# Patient Record
Sex: MALE | Race: WHITE | NOT HISPANIC OR LATINO | Employment: OTHER | URBAN - METROPOLITAN AREA
[De-identification: names, ages, dates, MRNs, and addresses within clinical notes are randomized per-mention and may not be internally consistent; named-entity substitution may affect disease eponyms.]

---

## 2017-03-23 ENCOUNTER — TELEPHONE (OUTPATIENT)
Dept: FAMILY MEDICINE | Facility: CLINIC | Age: 67
End: 2017-03-23

## 2017-03-23 DIAGNOSIS — Z12.11 COLON CANCER SCREENING: Primary | ICD-10-CM

## 2017-03-23 DIAGNOSIS — E78.5 HYPERLIPIDEMIA LDL GOAL <130: ICD-10-CM

## 2017-03-23 NOTE — TELEPHONE ENCOUNTER
pravastatin (PRAVACHOL) 20 MG tablet     Last Written Prescription Date: 01/01/2017  Last Fill Quantity: 36, # refills: 0  Last Office Visit with G, [facility] or [facility] prescribing provider: 10/07/2016       Lab Results   Component Value Date    CHOL 202 10/07/2016     Lab Results   Component Value Date    HDL 56 10/07/2016     Lab Results   Component Value Date     10/07/2016     Lab Results   Component Value Date    TRIG 192 10/07/2016     Lab Results   Component Value Date    CHOLHDLRATIO 3.8 09/28/2015     Lita Girard MA

## 2017-03-24 RX ORDER — PRAVASTATIN SODIUM 20 MG
TABLET ORAL
Qty: 36 TABLET | Refills: 1 | Status: SHIPPED | OUTPATIENT
Start: 2017-03-24 | End: 2017-09-05

## 2017-03-24 NOTE — TELEPHONE ENCOUNTER
Routing refill request to provider for review/approval because:  Marquita given x1 and patient did not follow up, please advise    Christa Weinstein RN - BC

## 2017-03-24 NOTE — TELEPHONE ENCOUNTER
I will refill this medication but there are some concerns     1. His last colonoscopy was in 2007 and he will be absolutely due for the repeat colonoscopy in August but I don't want to wait that long, lets get the colonoscopy right away.  2. Refills provided for pravastatin   3. deadline for follow up with me is by October 2017 which would be a year         Bro Herrera MD

## 2017-03-27 NOTE — TELEPHONE ENCOUNTER
Left message on voicemail for patient to return call to RN hotline at # [phone].  Angelia Yao RN

## 2017-03-27 NOTE — TELEPHONE ENCOUNTER
Patient notified of providers message as written.  Patient states he will not do another colonoscopy, he did not like the prep and is not going to do another.  Advised patient that provider may want to order a fit test.   Routing as GRETEL Valdivia RN

## 2017-09-05 DIAGNOSIS — E78.5 HYPERLIPIDEMIA LDL GOAL <130: ICD-10-CM

## 2017-09-06 RX ORDER — PRAVASTATIN SODIUM 20 MG
TABLET ORAL
Qty: 36 TABLET | Refills: 0 | Status: SHIPPED | OUTPATIENT
Start: 2017-09-06 | End: 2017-10-13

## 2017-09-06 NOTE — TELEPHONE ENCOUNTER
Pravastatin      Last Written Prescription Date: 3/24/2017  Last Fill Quantity: 36, # refills: 1  Last Office Visit with G, P or [facility] prescribing provider: 10/7/2016       Lab Results   Component Value Date    CHOL 202 10/07/2016     Lab Results   Component Value Date    HDL 56 10/07/2016     Lab Results   Component Value Date     10/07/2016     Lab Results   Component Value Date    TRIG 192 10/07/2016     Lab Results   Component Value Date    CHOLHDLRATIO 3.8 09/28/2015

## 2017-09-06 NOTE — TELEPHONE ENCOUNTER
Medication is being filled for 1 time refill only due to:  Patient needs to be seen because pt is due to be seen next month .     Signed Prescriptions:                        Disp   Refills    pravastatin (PRAVACHOL) 20 MG tablet       36 tab*0        Sig: TAKE 1 TABLET BY MOUTH THREE TIMES A WEEK  Authorizing Provider: LACHELLE KOHLER  Ordering User: JUANITO SANTOS, RN, BSN

## 2017-10-13 ENCOUNTER — OFFICE VISIT (OUTPATIENT)
Dept: INTERNAL MEDICINE | Facility: CLINIC | Age: 67
End: 2017-10-13
Payer: COMMERCIAL

## 2017-10-13 VITALS
HEIGHT: 72 IN | TEMPERATURE: 98.1 F | WEIGHT: 169 LBS | SYSTOLIC BLOOD PRESSURE: 160 MMHG | DIASTOLIC BLOOD PRESSURE: 80 MMHG | BODY MASS INDEX: 22.89 KG/M2 | HEART RATE: 61 BPM | OXYGEN SATURATION: 99 %

## 2017-10-13 DIAGNOSIS — Z91.81 AT RISK FOR FALLING: ICD-10-CM

## 2017-10-13 DIAGNOSIS — Z00.00 ROUTINE GENERAL MEDICAL EXAMINATION AT A HEALTH CARE FACILITY: Primary | ICD-10-CM

## 2017-10-13 DIAGNOSIS — R73.09 ELEVATED GLUCOSE: ICD-10-CM

## 2017-10-13 DIAGNOSIS — E78.5 HYPERLIPIDEMIA LDL GOAL <130: ICD-10-CM

## 2017-10-13 DIAGNOSIS — I10 ESSENTIAL HYPERTENSION WITH GOAL BLOOD PRESSURE LESS THAN 140/90: ICD-10-CM

## 2017-10-13 DIAGNOSIS — Z12.11 SCREEN FOR COLON CANCER: ICD-10-CM

## 2017-10-13 DIAGNOSIS — H90.6 MIXED HEARING LOSS, BILATERAL: ICD-10-CM

## 2017-10-13 DIAGNOSIS — Z23 NEED FOR PROPHYLACTIC VACCINATION WITH TETANUS-DIPHTHERIA (TD): ICD-10-CM

## 2017-10-13 LAB
ANION GAP SERPL CALCULATED.3IONS-SCNC: 3 MMOL/L (ref 3–14)
BUN SERPL-MCNC: 16 MG/DL (ref 7–30)
CALCIUM SERPL-MCNC: 9.1 MG/DL (ref 8.5–10.1)
CHLORIDE SERPL-SCNC: 104 MMOL/L (ref 94–109)
CHOLEST SERPL-MCNC: 196 MG/DL
CO2 SERPL-SCNC: 30 MMOL/L (ref 20–32)
CREAT SERPL-MCNC: 1 MG/DL (ref 0.66–1.25)
GFR SERPL CREATININE-BSD FRML MDRD: 75 ML/MIN/1.7M2
GLUCOSE SERPL-MCNC: 106 MG/DL (ref 70–99)
HBA1C MFR BLD: 5.2 % (ref 4.3–6)
HDLC SERPL-MCNC: 64 MG/DL
HGB BLD-MCNC: 13.5 G/DL (ref 13.3–17.7)
LDLC SERPL CALC-MCNC: 106 MG/DL
NONHDLC SERPL-MCNC: 132 MG/DL
POTASSIUM SERPL-SCNC: 5 MMOL/L (ref 3.4–5.3)
SODIUM SERPL-SCNC: 137 MMOL/L (ref 133–144)
TRIGL SERPL-MCNC: 128 MG/DL

## 2017-10-13 PROCEDURE — 90471 IMMUNIZATION ADMIN: CPT | Performed by: INTERNAL MEDICINE

## 2017-10-13 PROCEDURE — 80061 LIPID PANEL: CPT | Performed by: INTERNAL MEDICINE

## 2017-10-13 PROCEDURE — 99397 PER PM REEVAL EST PAT 65+ YR: CPT | Mod: 25 | Performed by: INTERNAL MEDICINE

## 2017-10-13 PROCEDURE — 80048 BASIC METABOLIC PNL TOTAL CA: CPT | Performed by: INTERNAL MEDICINE

## 2017-10-13 PROCEDURE — 90714 TD VACC NO PRESV 7 YRS+ IM: CPT | Performed by: INTERNAL MEDICINE

## 2017-10-13 PROCEDURE — 85018 HEMOGLOBIN: CPT | Performed by: INTERNAL MEDICINE

## 2017-10-13 PROCEDURE — 83036 HEMOGLOBIN GLYCOSYLATED A1C: CPT | Performed by: INTERNAL MEDICINE

## 2017-10-13 PROCEDURE — 36415 COLL VENOUS BLD VENIPUNCTURE: CPT | Performed by: INTERNAL MEDICINE

## 2017-10-13 RX ORDER — LISINOPRIL 10 MG/1
10 TABLET ORAL DAILY
Qty: 90 TABLET | Refills: 3 | Status: SHIPPED | OUTPATIENT
Start: 2017-10-13 | End: 2017-10-13

## 2017-10-13 RX ORDER — LISINOPRIL 20 MG/1
20 TABLET ORAL DAILY
Qty: 90 TABLET | Refills: 3 | Status: SHIPPED | OUTPATIENT
Start: 2017-10-13 | End: 2018-10-05

## 2017-10-13 RX ORDER — PRAVASTATIN SODIUM 20 MG
TABLET ORAL
Qty: 36 TABLET | Refills: 3 | Status: SHIPPED | OUTPATIENT
Start: 2017-10-13 | End: 2018-10-22

## 2017-10-13 NOTE — PROGRESS NOTES
SUBJECTIVE:   Hamilton Angulo is a 67 year old male who presents for Preventive Visit.    -- Patient states that he is generally feeling good and active.    Hearing-- Patient has been having some hearing impairment and would like to go and get a hearing exam in order to get help on how to improve his hearing.     Hypertension--  BP Readings from Last 6 Encounters:   10/13/17 160/80   10/07/16 138/70   10/28/15 130/86   09/28/15 (!) 148/92   09/22/14 136/78   02/18/14 167/90       Are you in the first 12 months of your Medicare coverage?  No    Physical   Annual:     Getting at least 3 servings of Calcium per day::  NO    Bi-annual eye exam::  Yes    Dental care twice a year::  Yes    Sleep apnea or symptoms of sleep apnea::  None    Diet::  Regular (no restrictions)    Frequency of exercise::  2-3 days/week    Duration of exercise::  30-45 minutes    Taking medications regularly::  Yes    Medication side effects::  None    Additional concerns today::  No      COGNITIVE SCREEN  1) Repeat 3 items (Banana, Sunrise, Chair)    2) Clock draw: NORMAL  3) 3 item recall: Recalls 3 objects  Results: NORMAL clock, 1-2 items recalled: COGNITIVE IMPAIRMENT LESS LIKELY    Mini-CogTM Copyright S Sandra. Licensed by the author for use in [facility]; reprinted with permission (mick@.[email or web address]). All rights reserved.      Reviewed and updated as needed this visit by clinical staffTobacco  Allergies  Meds  Med Hx  Surg Hx  Fam Hx  Soc Hx      Reviewed and updated as needed this visit by Provider        Social History   Substance Use Topics     Smoking status: Never Smoker     Smokeless tobacco: Never Used     Alcohol use Yes      Comment: Ocss.       The patient does not drink >3 drinks per day nor >7 drinks per week.      Today's PHQ-2 Score:   PHQ-2 ( 1999 Pfizer) 10/13/2017   Q1: Little interest or pleasure in doing things 0   Q2: Feeling down, depressed or hopeless 0   PHQ-2 Score 0   Q1: Little interest or pleasure in  doing things Not at all   Q2: Feeling down, depressed or hopeless Not at all   PHQ-2 Score 0       Do you feel safe in your environment - Yes    Do you have a Health Care Directive?: No: Advance care planning was reviewed with patient; patient declined at this time.      Current providers sharing in care for this patient include: Patient Care Team:  Bro Herrera MD as PCP - General (Internal Medicine)      Hearing impairment: Yes, Difficulty following a conversation in a noisy restaurant or crowded room.    Ability to successfully perform activities of daily living: Yes, no assistance needed     Fall risk:  Fallen 2 or more times in the past year?: No  Any fall with injury in the past year?: No      Home safety:  none identified      The following health maintenance items are reviewed in Epic and correct as of today:  Health Maintenance   Topic Date Due     EYE EXAM Q1 YEAR  05/26/2017     TETANUS IMMUNIZATION (SYSTEM ASSIGNED)  06/27/2017     COLON CANCER SCREEN (SYSTEM ASSIGNED)  08/16/2017     FALL RISK ASSESSMENT  10/07/2017     ADVANCE DIRECTIVE PLANNING Q5 YRS  12/28/2017     LIPID SCREEN Q5 YR MALE (SYSTEM ASSIGNED)  10/07/2021     INFLUENZA VACCINE (SYSTEM ASSIGNED)  Completed     PNEUMOCOCCAL  Completed     AORTIC ANEURYSM SCREENING (SYSTEM ASSIGNED)  Completed     HEPATITIS C SCREENING  Completed     Labs reviewed in EPIC      ROS:  Constitutional, HEENT, cardiovascular, pulmonary, GI, , musculoskeletal, neuro, skin, endocrine and psych systems are negative, except as otherwise noted.    This document serves as a record of the services and decisions personally performed and made by Bro Herrera MD. It was created on their behalf by Timur Meyer, a trained medical scribe. The creation of this document is based the provider's statements to the medical scribe.  Timur Meyer  October 13, 2017 10:00 AM    OBJECTIVE:   /80  Pulse 61  Temp 98.1  F (36.7  C) (Oral)  Ht 1.829 m (6')  Wt 76.7 kg  (169 lb)  SpO2 99%  BMI 22.92 kg/m2 Estimated body mass index is 22.92 kg/(m^2) as calculated from the following:    Height as of this encounter: 1.829 m (6').    Weight as of this encounter: 76.7 kg (169 lb).  EXAM:   GENERAL: healthy, alert and no distress  EYES: Eyes grossly normal to inspection, EOMI, conjunctivae and sclerae normal  RESP: lungs clear to auscultation - no rales, rhonchi or wheezes  CV: regular rate and rhythm, normal S1 S2, no S3 or S4, no murmur, click or rub, no peripheral edema and peripheral pulses strong  ABDOMEN: soft, nontender, no hepatosplenomegaly, no masses and bowel sounds normal  SKIN: Seborrheic keratosis on the left lower quadrant of his abdomen  NEURO: mentation intact and speech normal  PSYCH: mentation appears normal, affect normal/bright    ASSESSMENT / PLAN:   (Z00.00) Routine general medical examination at a health care facility  (primary encounter diagnosis)  Comment: routine screening issues   Plan: Hemoglobin            (Z12.11) Screen for colon cancer  Comment: recommended to schedule colonoscopy   Plan: not entirely clear to me if he's agreeing or not. Will follow up with patient with telephone call     (Z91.81) At risk for falling  Comment:   Plan:     (Z23) Need for prophylactic vaccination with tetanus-diphtheria (TD)  Comment: administered   Plan: TD PRESERV FREE >=7 YRS ADS IM            (H90.6) Mixed hearing loss, bilateral  Comment: not a ear wax impaction , patient agrees to audiologic consultation   Plan: AUDIOLOGY ADULT REFERRAL            (E78.5) Hyperlipidemia LDL goal <130  Comment: continue current plan of care     Plan: Lipid panel reflex to direct LDL, pravastatin         (PRAVACHOL) 20 MG tablet            (R73.09) Elevated glucose  Comment: doubt clinical significance but warrants hemoglobin a1c  [ diabetes test ]   Plan: Hemoglobin A1c, Basic metabolic panel            (I10) Essential hypertension with goal blood pressure less than 140/90  Comment:  out of control today. Lisinopril dose double up , medical assistant blood pressure recheck  2-3 weeks  Plan: Basic metabolic panel, lisinopril         (PRINIVIL/ZESTRIL) 20 MG tablet, DISCONTINUED:         lisinopril (PRINIVIL/ZESTRIL) 10 MG tablet             End of Life Planning:  Patient currently has an advanced directive: The clinic has not recieved any directive    COUNSELING:  Reviewed preventive health counseling, as reflected in patient instructions       Regular exercise       Healthy diet/nutrition       Vision screening       Hearing screening       Dental care          Alcohol Use       Prostate cancer screening          Estimated body mass index is 22.92 kg/(m^2) as calculated from the following:    Height as of this encounter: 1.829 m (6').    Weight as of this encounter: 76.7 kg (169 lb).     reports that he has never smoked. He has never used smokeless tobacco.    Appropriate preventive services were discussed with this patient, including applicable screening as appropriate for cardiovascular disease, diabetes, osteopenia/osteoporosis, and glaucoma.  As appropriate for age/gender, discussed screening for colorectal cancer, prostate cancer, breast cancer, and cervical cancer. Checklist reviewing preventive services available has been given to the patient.    Reviewed patients plan of care and provided an AVS. The Basic Care Plan (routine screening as documented in Health Maintenance) for Hamilton meets the Care Plan requirement. This Care Plan has been established and reviewed with the Patient.    Counseling Resources:  ATP IV Guidelines  Pooled Cohorts Equation Calculator  Breast Cancer Risk Calculator  FRAX Risk Assessment  ICSI Preventive Guidelines  Dietary Guidelines for Americans, 2010  USDA's MyPlate  ASA Prophylaxis  Lung CA Screening    The information in this document, created by the medical scribe for me, accurately reflects the services I personally performed and the decisions made by me. I  have reviewed and approved this document for accuracy.   MD Bro Desir MD  [facility]

## 2017-10-13 NOTE — MR AVS SNAPSHOT
After Visit Summary   10/13/2017    Hamilton Angulo    MRN: [id number]           Patient Information     Date Of Birth          1950        Visit Information        Provider Department      10/13/2017 9:50 AM Bro Herrera MD [facility]        Today's Diagnoses     Routine general medical examination at a health care facility    -  1    Screen for colon cancer        At risk for falling        Need for prophylactic vaccination with tetanus-diphtheria (TD)        Mixed hearing loss, bilateral        Hyperlipidemia LDL goal <130        Elevated glucose        Essential hypertension with goal blood pressure less than 140/90          Care Instructions    [facility]    If you have any questions regarding to your visit please contact your care team:     Team Pink:   Clinic Hours Telephone Number   Internal Medicine:  Dr. Naomie Johnson NP       7am-7pm  Monday - Thursday   7am-5pm  Fridays  [phone]  (Appointment scheduling available 24/7)    Questions about your visit?  Team Line  [phone]   Urgent Care - [place] - 11am-9pm Monday-Friday Saturday-Sunday- 9am-5pm   [place] - 5pm-9pm Monday-Friday Saturday-Sunday- 9am-5pm  [phone] - [place]   [phone] - [place]       What options do I have for visits at the clinic other than the traditional office visit?  To expand how we care for you, many of our providers are utilizing electronic visits (e-visits) and telephone visits, when medically appropriate, for interactions with their patients rather than a visit in the clinic.   We also offer nurse visits for many medical concerns. Just like any other service, we will bill your insurance company for this type of visit based on time spent on the phone with your provider. Not all insurance companies cover these visits. Please check with your medical insurance if this type of visit is covered. You  will be responsible for any charges that are not paid by your insurance.      E-visits via [employer]:  generally incur a $35.00 fee.  Telephone visits:  Time spent on the phone: *charged based on time that is spent on the phone in increments of 10 minutes. Estimated cost:   5-10 mins $30.00   11-20 mins. $59.00   21-30 mins. $85.00   Use [employer]IC (formerly [employer])t (secure email communication and access to your chart) to send your primary care provider a message or make an appointment. Ask someone on your Team how to sign up for [place].    For a Price Quote for your services, please call our uControl Line at [phone].    As always, Thank you for trusting us with your health care needs!    BP Readings from Last 6 Encounters:   10/13/17 170/86   10/07/16 138/70   10/28/15 130/86   09/28/15 (!) 148/92   09/22/14 136/78   02/18/14 167/90               Follow-ups after your visit        Additional Services     AUDIOLOGY ADULT REFERRAL       Your provider has referred you to: [facility]: [place] [facility] [place] [phone]   [email or web address]    Treatment:  Evaluation & Treatment  Specialty Testing:  None    Please be aware that coverage of these services is subject to the terms and limitations of your health insurance plan.  Call member services at your health plan with any benefit or coverage questions.      Please bring the following to your appointment:  >>   Any x-rays, CTs or MRIs which have been performed.  Contact the facility where they were done to arrange for  prior to your scheduled appointment.   >>   List of current medications  >>   This referral request   >>   Any documents/labs given to you for this referral                  Who to contact     If you have questions or need follow up information about today's clinic visit or your schedule please contact [facility] directly at [phone].  Normal or non-critical lab and imaging results will be communicated to you by  MyChart, letter or phone within 4 business days after the clinic has received the results. If you do not hear from us within 7 days, please contact the clinic through MyChart or phone. If you have a critical or abnormal lab result, we will notify you by phone as soon as possible.  Submit refill requests through Cellumen or call your pharmacy and they will forward the refill request to us. Please allow 3 business days for your refill to be completed.          Additional Information About Your Visit        Care EveryWhere ID     This is your Care EveryWhere ID. This could be used by other organizations to access your [place] medical records  [id number]        Your Vitals Were     Pulse Temperature Height Pulse Oximetry BMI (Body Mass Index)       61 98.1  F (36.7  C) (Oral) 6' (1.829 m) 99% 22.92 kg/m2        Blood Pressure from Last 3 Encounters:   10/13/17 170/86   10/07/16 138/70   10/28/15 130/86    Weight from Last 3 Encounters:   10/13/17 169 lb (76.7 kg)   10/07/16 165 lb (74.8 kg)   09/28/15 165 lb (74.8 kg)              We Performed the Following     AUDIOLOGY ADULT REFERRAL     Basic metabolic panel     Hemoglobin A1c     Hemoglobin     Lipid panel reflex to direct LDL     TD PRESERV FREE >=7 YRS ADS IM          Today's Medication Changes          These changes are accurate as of: 10/13/17 10:42 AM.  If you have any questions, ask your nurse or doctor.               These medicines have changed or have updated prescriptions.        Dose/Directions    pravastatin 20 MG tablet   Commonly known as:  PRAVACHOL   This may have changed:  See the new instructions.   Used for:  Hyperlipidemia LDL goal <130   Changed by:  Bro Herrera MD        TAKE 1 TABLET BY MOUTH THREE TIMES A WEEK   Quantity:  36 tablet   Refills:  3            Where to get your medicines      These medications were sent to [place] [address] [place] [facility] - [place] - [address]  [address] [place] [address] [address]     Phone:  [phone]      lisinopril 10 MG tablet    pravastatin 20 MG tablet                Primary Care Provider Office Phone # Fax #    Bro Herrera [phone]       [address] [address]        Equal Access to Services     AMANNANNETTE SHAVONNE : Hadii aad ku hadjennyfero Soomaali, waaxda luqadaha, qaybta kaalmada adeegyada, waxnathan idiin haymihaelan aderowena chatterjee laGarryharper mancuso. So [facility] [phone].    ATENCIÓN: Si habla español, tiene a quinn disposición servicios gratuitos de asistencia lingüística. Llame al [phone].    We comply with applicable federal civil rights laws and [place] laws. We do not discriminate on the basis of race, color, national origin, age, disability, sex, sexual orientation, or gender identity.            Thank you!     Thank you for choosing [facility]  for your care. Our goal is always to provide you with excellent care. Hearing back from our patients is one way we can continue to improve our services. Please take a few minutes to complete the written survey that you may receive in the mail after your visit with us. Thank you!             Your Updated Medication List - Protect others around you: Learn how to safely use, store and throw away your medicines at www.disposemymeds.org.          This list is accurate as of: 10/13/17 10:42 AM.  Always use your most recent med list.                   Brand Name Dispense Instructions for use Diagnosis    ibuprofen 200 MG capsule      Take 200 mg by mouth as needed.        lisinopril 10 MG tablet    PRINIVIL/ZESTRIL    90 tablet    Take 1 tablet (10 mg) by mouth daily    Essential hypertension with goal blood pressure less than 140/90       pravastatin 20 MG tablet    PRAVACHOL    36 tablet    TAKE 1 TABLET BY MOUTH THREE TIMES A WEEK    Hyperlipidemia LDL goal <130       PRESERVISION AREDS 2 Caps     60 capsule    Take 2 capsules by mouth 2 times daily    Nonexudative senile macular degeneration of retina       VISINE 0.05 % ophthalmic  solution   Generic drug:  tetrahydrozoline      1 drop every morning.

## 2017-10-13 NOTE — Clinical Note
I think patient escaped from this office visit without confirming for the colonoscopy , he's due. Does he want this or not. Please check with patient !

## 2017-10-13 NOTE — NURSING NOTE
Chief Complaint   Patient presents with     Physical       Initial /86  Pulse 61  Temp 98.1  F (36.7  C) (Oral)  Ht 6' (1.829 m)  Wt 169 lb (76.7 kg)  SpO2 99%  BMI 22.92 kg/m2 Estimated body mass index is 22.92 kg/(m^2) as calculated from the following:    Height as of this encounter: 6' (1.829 m).    Weight as of this encounter: 169 lb (76.7 kg).  Medication Reconciliation: complete   Monalisa RUSH MA

## 2017-10-13 NOTE — LETTER
[place] [id number] [place] [address]    October 16, 2017    Hamilton Angulo  [address]  [place] [address]          Dear Hamilton,  All of these tests are within acceptable limits , Things look good ! Please let me know if you have any questions for me about all of these lab tests. Take care Hamilton.  Results for orders placed or performed in visit on 10/13/17   Lipid panel reflex to direct LDL   Result Value Ref Range    Cholesterol 196 <200 mg/dL    Triglycerides 128 <150 mg/dL    HDL Cholesterol 64 >39 mg/dL    LDL Cholesterol Calculated 106 (H) <100 mg/dL    Non HDL Cholesterol 132 (H) <130 mg/dL   Hemoglobin   Result Value Ref Range    Hemoglobin 13.5 13.3 - 17.7 g/dL   Hemoglobin A1c   Result Value Ref Range    Hemoglobin A1C 5.2 4.3 - 6.0 %   Basic metabolic panel   Result Value Ref Range    Sodium 137 133 - 144 mmol/L    Potassium 5.0 3.4 - 5.3 mmol/L    Chloride 104 94 - 109 mmol/L    Carbon Dioxide 30 20 - 32 mmol/L    Anion Gap 3 3 - 14 mmol/L    Glucose 106 (H) 70 - 99 mg/dL    Urea Nitrogen 16 7 - 30 mg/dL    Creatinine 1.00 0.66 - 1.25 mg/dL    GFR Estimate 75 >60 mL/min/1.7m2    GFR Estimate If Black >90 >60 mL/min/1.7m2    Calcium 9.1 8.5 - 10.1 mg/dL       If you have any questions or concerns, please me or my clinic team at [phone].      Sincerely,        Bro Herrera MD/bt

## 2017-10-13 NOTE — PATIENT INSTRUCTIONS
[facility]    If you have any questions regarding to your visit please contact your care team:     Team Pink:   Clinic Hours Telephone Number   Internal Medicine:  Dr. Naomie Johnson NP       7am-7pm  Monday - Thursday   7am-5pm  Fridays  [phone]  (Appointment scheduling available 24/7)    Questions about your visit?  Team Line  [phone]   Urgent Care - [place] [facility] [place] [place] - 11am-9pm Monday-Friday Saturday-Sunday- 9am-5pm   [place] - 5pm-9pm Monday-Friday Saturday-Sunday- 9am-5pm  [phone] - [place]   [phone] - [place]       What options do I have for visits at the clinic other than the traditional office visit?  To expand how we care for you, many of our providers are utilizing electronic visits (e-visits) and telephone visits, when medically appropriate, for interactions with their patients rather than a visit in the clinic.   We also offer nurse visits for many medical concerns. Just like any other service, we will bill your insurance company for this type of visit based on time spent on the phone with your provider. Not all insurance companies cover these visits. Please check with your medical insurance if this type of visit is covered. You will be responsible for any charges that are not paid by your insurance.      E-visits via [employer]:  generally incur a $35.00 fee.  Telephone visits:  Time spent on the phone: *charged based on time that is spent on the phone in increments of 10 minutes. Estimated cost:   5-10 mins $30.00   11-20 mins. $59.00   21-30 mins. $85.00   Use [employer]t (secure email communication and access to your chart) to send your primary care provider a message or make an appointment. Ask someone on your Team how to sign up for [place].    For a Price Quote for your services, please call our Consumer Price Line at [phone].    As always, Thank you for trusting us with your health care needs!    BP  Readings from Last 6 Encounters:   10/13/17 170/86   10/07/16 138/70   10/28/15 130/86   09/28/15 (!) 148/92   09/22/14 136/78   02/18/14 167/90

## 2017-10-16 ENCOUNTER — TELEPHONE (OUTPATIENT)
Dept: INTERNAL MEDICINE | Facility: CLINIC | Age: 67
End: 2017-10-16

## 2017-10-16 NOTE — TELEPHONE ENCOUNTER
I think patient escaped from this office visit without confirming for the colonoscopy, he's due. Does he want this or not? Please check with patient ! Bro Herrera MD

## 2017-10-19 RX ORDER — LISINOPRIL 10 MG/1
TABLET ORAL
Qty: 90 TABLET | Refills: 0
Start: 2017-10-19

## 2017-10-19 NOTE — TELEPHONE ENCOUNTER
Pharmacy closed. Will call after 9 to confirm duplicate.    lisinopril (PRINIVIL/ZESTRIL) 20 MG tablet 90 tablet 3 10/13/2017  --      Sig: Take 1 tablet (20 mg) by mouth daily     Class: E-Prescribe     Route: Oral     Order: [id number]     E-Prescribing Status: Receipt confirmed by pharmacy (10/13/2017 10:59 AM CDT)           Jacqueline PURCELL CMA ([facility])

## 2017-10-19 NOTE — TELEPHONE ENCOUNTER
Spoke to pharmacy and confirmed duplicate request.  Jacqueline PURCELL CMA ([facility])

## 2017-10-19 NOTE — TELEPHONE ENCOUNTER
Prescription(s) that was alma'd up has now been removed from encounter.    Closing encounter.    Angelia Yao RN

## 2017-10-23 ENCOUNTER — TELEPHONE (OUTPATIENT)
Dept: INTERNAL MEDICINE | Facility: CLINIC | Age: 67
End: 2017-10-23

## 2017-10-23 NOTE — TELEPHONE ENCOUNTER
Panel Management Review      Patient has the following on his problem list: None      Composite cancer screening  Chart review shows that this patient is due/due soon for the following Colonoscopy  Summary:    Patient is due/failing the following:   COLONOSCOPY    Action needed:   Patient needs office visit for Colonoscopy.    Type of outreach:    Sent letter.    Questions for provider review:    None                                                                                                                                    Magalis Alexander CMA       Chart routed to  .

## 2017-10-23 NOTE — LETTER
[facility]  [address] [place] [id number] [phone]        October 23, 2017          Hamilton Angulo,  [address] [address]        Dear Hamilton Angulo,      Monitoring and managing your preventative and chronic health conditions are very important to us. Our records indicate that you have not scheduled for a Colonoscopy  which was recommended by Dr. Herrera for Colon Cancer screening.      If you have received your health care elsewhere, please call the clinic so the information can be documented in your chart.    Please call [phone] or message us through your bLife account to schedule an appointment or provide information for your chart.     Feel free to contact us if you have any questions or concerns!    I look forward to seeing you and working with you on your health care needs.     Sincerely,         Bro Herrera / Magalis Alexander, CMA

## 2017-11-03 ENCOUNTER — ALLIED HEALTH/NURSE VISIT (OUTPATIENT)
Dept: NURSING | Facility: CLINIC | Age: 67
End: 2017-11-03

## 2017-11-03 VITALS — OXYGEN SATURATION: 98 % | HEART RATE: 69 BPM | DIASTOLIC BLOOD PRESSURE: 62 MMHG | SYSTOLIC BLOOD PRESSURE: 130 MMHG

## 2017-11-03 DIAGNOSIS — I10 ESSENTIAL HYPERTENSION WITH GOAL BLOOD PRESSURE LESS THAN 140/90: Primary | ICD-10-CM

## 2017-11-03 NOTE — MR AVS SNAPSHOT
After Visit Summary   11/3/2017    Hamilton Angulo    MRN: [id number]           Patient Information     Date Of Birth          1950        Visit Information        Provider Department      11/3/2017 8:00 AM FZ ANCILLARY [facility] [place]        Today's Diagnoses     Essential hypertension with goal blood pressure less than 140/90    -  1       Follow-ups after your visit        Who to contact     If you have questions or need follow up information about today's clinic visit or your schedule please contact [facility] directly at [phone].  Normal or non-critical lab and imaging results will be communicated to you by MyChart, letter or phone within 4 business days after the clinic has received the results. If you do not hear from us within 7 days, please contact the clinic through MyChart or phone. If you have a critical or abnormal lab result, we will notify you by phone as soon as possible.  Submit refill requests through Engage or call your pharmacy and they will forward the refill request to us. Please allow 3 business days for your refill to be completed.          Additional Information About Your Visit        Care EveryWhere ID     This is your Care EveryWhere ID. This could be used by other organizations to access your [place] medical records  [id number]        Your Vitals Were     Pulse Pulse Oximetry                69 98%           Blood Pressure from Last 3 Encounters:   11/03/17 130/62   10/13/17 160/80   10/07/16 138/70    Weight from Last 3 Encounters:   10/13/17 169 lb (76.7 kg)   10/07/16 165 lb (74.8 kg)   09/28/15 165 lb (74.8 kg)              Today, you had the following     No orders found for display       Primary Care Provider Office Phone # Fax #    Bro Herrera [phone]       [address]  [facility] [address]        Equal Access to Services     SARA MARVIN AH: judd Silva, milagros whiteside  sandy vaughanrowena martidev morenoaan ah. So [facility] [phone].    ATENCIÓN: Si habla moustapha, tiene a quinn disposición servicios gratuitos de asistencia lingüística. Enrrique al [phone].    We comply with applicable federal civil rights laws and [place] laws. We do not discriminate on the basis of race, color, national origin, age, disability, sex, sexual orientation, or gender identity.            Thank you!     [facility] you for [facility]  [facility] your [facility]. Our goal is always to provide you with excellent care. Hearing back from our patients is one way we can continue to improve our services. Please take a few minutes to complete the written survey that you may receive in the mail after your visit with us. Thank you!             Your Updated Medication List - Protect others around you: Learn how to safely use, store and throw away your medicines at www.disposemymeds.org.          This list is accurate as of: 11/3/17  8:08 AM.  Always use your most recent med list.                   Brand Name Dispense Instructions for use Diagnosis    ibuprofen 200 MG capsule      Take 200 mg by mouth as needed.        lisinopril 20 MG tablet    PRINIVIL/ZESTRIL    90 tablet    Take 1 tablet (20 mg) by mouth daily    Essential hypertension with goal blood pressure less than 140/90       pravastatin 20 MG tablet    PRAVACHOL    36 tablet    TAKE 1 TABLET BY MOUTH THREE TIMES A WEEK    Hyperlipidemia LDL goal <130       PRESERVISION AREDS 2 Caps     60 capsule    Take 2 capsules by mouth 2 times daily    Nonexudative senile macular degeneration of retina       VISINE 0.05 % ophthalmic solution   Generic drug:  tetrahydrozoline      1 drop every morning.

## 2017-11-03 NOTE — NURSING NOTE
Chief Complaint   Patient presents with     Hypertension     BP Check       Initial /62 (Cuff Size: Adult Regular)  Pulse 69  SpO2 98% Estimated body mass index is 22.92 kg/(m^2) as calculated from the following:    Height as of 10/13/17: 6' (1.829 m).    Weight as of 10/13/17: 169 lb (76.7 kg).  Medication Reconciliation: unable or not appropriate to perform   Hamilton Angulo is a 67 year old male who comes in today for a Blood Pressure check because of medication change.    *Document pulse and BP  *Use new set of vitals button for multiple readings.  *Use extended vitals for orthostatic    Vitals as recorded, a regular cuff was used.    Patient is taking medication as prescribed  Patient is tolerating medications well.  Patient is not monitoring Blood Pressure at home.  Average readings if yes are unknown    Current complaints: none    Disposition: results routed to MD/ANTONY PURCELL CMA ([facility])

## 2018-07-19 ENCOUNTER — OFFICE VISIT (OUTPATIENT)
Dept: OPHTHALMOLOGY | Facility: CLINIC | Age: 68
End: 2018-07-19
Payer: COMMERCIAL

## 2018-07-19 DIAGNOSIS — H43.813 PVD (POSTERIOR VITREOUS DETACHMENT), BILATERAL: ICD-10-CM

## 2018-07-19 DIAGNOSIS — H52.4 PRESBYOPIA: ICD-10-CM

## 2018-07-19 DIAGNOSIS — Z96.1 PSEUDOPHAKIA: ICD-10-CM

## 2018-07-19 DIAGNOSIS — H35.3190 NONEXUDATIVE SENILE MACULAR DEGENERATION OF RETINA: Primary | ICD-10-CM

## 2018-07-19 PROCEDURE — 92015 DETERMINE REFRACTIVE STATE: CPT | Performed by: STUDENT IN AN ORGANIZED HEALTH CARE EDUCATION/TRAINING PROGRAM

## 2018-07-19 PROCEDURE — 92014 COMPRE OPH EXAM EST PT 1/>: CPT | Performed by: STUDENT IN AN ORGANIZED HEALTH CARE EDUCATION/TRAINING PROGRAM

## 2018-07-19 ASSESSMENT — REFRACTION_WEARINGRX
SPECS_TYPE: SVL
OS_SPHERE: -1.50
OS_AXIS: 163
OD_CYLINDER: +1.00
OS_CYLINDER: +0.50
OD_AXIS: 050
OD_SPHERE: -3.00

## 2018-07-19 ASSESSMENT — TONOMETRY
IOP_METHOD: APPLANATION
OD_IOP_MMHG: 17
OS_IOP_MMHG: 17

## 2018-07-19 ASSESSMENT — REFRACTION_MANIFEST
OD_AXIS: 048
OS_ADD: +2.75
OS_SPHERE: -1.75
OS_AXIS: 157
OS_CYLINDER: +0.75
OD_ADD: +2.75
OD_SPHERE: -3.00
OD_CYLINDER: +1.00

## 2018-07-19 ASSESSMENT — EXTERNAL EXAM - RIGHT EYE: OD_EXAM: NORMAL

## 2018-07-19 ASSESSMENT — VISUAL ACUITY
METHOD: SNELLEN - LINEAR
OS_CC: 20/20-1
OS_SC: J3
OD_CC: 20/20
OD_SC: J1

## 2018-07-19 ASSESSMENT — CUP TO DISC RATIO
OS_RATIO: 0.2
OD_RATIO: 0.3

## 2018-07-19 ASSESSMENT — CONF VISUAL FIELD
OS_NORMAL: 1
OD_NORMAL: 1

## 2018-07-19 ASSESSMENT — SLIT LAMP EXAM - LIDS
COMMENTS: NORMAL
COMMENTS: NORMAL

## 2018-07-19 ASSESSMENT — EXTERNAL EXAM - LEFT EYE: OS_EXAM: NORMAL

## 2018-07-19 NOTE — MR AVS SNAPSHOT
"              After Visit Summary   7/19/2018    Hamilton Angulo    MRN: [id number]           Patient Information     Date Of Birth          1950        Visit Information        Provider Department      7/19/2018 8:30 AM Gabe Randhawa MD [facility]        Today's Diagnoses     Nonexudative senile macular degeneration of retina    -  1    PSEUDOPHAKIA OU        PVD (posterior vitreous detachment), bilateral        Presbyopia          Care Instructions    Use artificial tears up to 4 times per day (Refresh Plus, Systane Balance, or generic artificial tears are ok. Avoid \"get the red out\" drops like Visine).     Recommend AREDS2 eye vitamins (e.g. Preservision) as discussed on a daily basis.  Monitor the vision in each eye weekly - Call if any sudden persistent changes.  Wear sunglasses with UV protection in princess conditions.    Glasses prescription given - optional    Gabe FRANKS. Edis GAYTAN  [phone]            Follow-ups after your visit        Follow-up notes from your care team     Return in about 1 year (around 7/19/2019) for Complete Exam.      Who to contact     If you have questions or need follow up information about today's clinic visit or your schedule please contact [facility] directly at [phone].  Normal or non-critical lab and imaging results will be communicated to you by MyChart, letter or phone within 4 business days after the clinic has received the results. If you do not hear from us within 7 days, please contact the clinic through MyChart or phone. If you have a critical or abnormal lab result, we will notify you by phone as soon as possible.  Submit refill requests through Chanyouji or call your pharmacy and they will forward the refill request to us. Please allow 3 business days for your refill to be completed.          Additional Information About Your Visit        Care EveryWhere ID     This is your Care EveryWhere ID. This could be used by other " organizations to access your [place] medical records  [id number]         Blood Pressure from Last 3 Encounters:   11/03/17 130/62   10/13/17 160/80   10/07/16 138/70    Weight from Last 3 Encounters:   10/13/17 76.7 kg (169 lb)   10/07/16 74.8 kg (165 lb)   09/28/15 74.8 kg (165 lb)              We Performed the Following     EYE EXAM (SIMPLE-NONBILLABLE)     REFRACTIVE STATUS        Primary Care Provider Office Phone # Fax #    Bro Herrera [phone] [address] [facility] [address]        Equal Access to Services     [facility] : Hadii janell ojeda hadjennyfero Sodominic, waaxda luqadaha, qaybta kaalmada aderowenayada, sandy mena . So [facility] [phone].    ATENCIÓN: Si habla español, tiene a quinn disposición servicios gratuitos de asistencia lingüística. Llame al [phone].    We comply with applicable federal civil rights laws and [place] laws. We do not discriminate on the basis of race, color, national origin, age, disability, sex, sexual orientation, or gender identity.            Thank you!     Thank you for choosing [facility]  for your care. Our goal is always to provide you with excellent care. Hearing back from our patients is one way we can continue to improve our services. Please take a few minutes to complete the written survey that you may receive in the mail after your visit with us. Thank you!             Your Updated Medication List - Protect others around you: Learn how to safely use, store and throw away your medicines at www.disposemymeds.org.          This list is accurate as of 7/19/18  9:16 AM.  Always use your most recent med list.                   Brand Name Dispense Instructions for use Diagnosis    ibuprofen 200 MG capsule      Take 200 mg by mouth as needed.        lisinopril 20 MG tablet    PRINIVIL/ZESTRIL    90 tablet    Take 1 tablet (20 mg) by mouth daily    Essential hypertension with goal blood pressure less  than 140/90       pravastatin 20 MG tablet    PRAVACHOL    36 tablet    TAKE 1 TABLET BY MOUTH THREE TIMES A WEEK    Hyperlipidemia LDL goal <130       PRESERVISION AREDS 2 Caps     60 capsule    Take 2 capsules by mouth 2 times daily    Nonexudative senile macular degeneration of retina       VISINE 0.05 % ophthalmic solution   Generic drug:  tetrahydrozoline      1 drop every morning.

## 2018-07-19 NOTE — LETTER
"    7/19/2018         RE: Hamilton Angulo  [address]  [place] [address]        Dear Colleague,    Thank you for referring your patient, Hamilton Angulo, to the [facility]. Please see a copy of my visit note below.     Current Eye Medications:  Visine every morning     Subjective:  Comprehensive eye exam.   Pt reports it is a little harder to see to read, pt stated has to blink sometimes to clear up his vision.     Objective:  See Ophthalmology Exam.      Assessment:  Hamilton Angulo is a 68 year old male who presents with:   Encounter Diagnoses   Name Primary?     Nonexudative senile macular degeneration of retina Yes     PSEUDOPHAKIA OU      PVD (posterior vitreous detachment), bilateral      Presbyopia      Plan:  Use artificial tears up to 4 times per day (Refresh Plus, Systane Balance, or generic artificial tears are ok. Avoid \"get the red out\" drops like Visine).     Recommend AREDS2 eye vitamins (e.g. Preservision) as discussed on a daily basis.  Monitor the vision in each eye weekly - Call if any sudden persistent changes.  Wear sunglasses with UV protection in princess conditions.    Glasses prescription given - optional    Gabe Randhawa MD  [phone]             Again, thank you for allowing me to participate in the care of your patient.        Sincerely,        Gabe Randhawa MD    "

## 2018-07-19 NOTE — PROGRESS NOTES
" Current Eye Medications:  Visine every morning     Subjective:  Comprehensive eye exam.   Pt reports it is a little harder to see to read, pt stated has to blink sometimes to clear up his vision. Denies eye pain or discomfort.     Objective:  See Ophthalmology Exam.      Assessment:  Hamilton Angulo is a 68 year old male who presents with:   Encounter Diagnoses   Name Primary?     Nonexudative senile macular degeneration of retina Discussed AREDS2 eye vitamins.     PSEUDOPHAKIA OU      PVD (posterior vitreous detachment), bilateral      Plan:  Use artificial tears up to 4 times per day (Refresh Plus, Systane Balance, or generic artificial tears are ok. Avoid \"get the red out\" drops like Visine).     Recommend AREDS2 eye vitamins (e.g. Preservision) as discussed on a daily basis.  Monitor the vision in each eye weekly - Call if any sudden persistent changes.  Wear sunglasses with UV protection in princess conditions.    Glasses prescription given - optional    Gabe Randhawa MD  [phone]  "

## 2018-07-19 NOTE — PATIENT INSTRUCTIONS
"Use artificial tears up to 4 times per day (Refresh Plus, Systane Balance, or generic artificial tears are ok. Avoid \"get the red out\" drops like Visine).     Recommend AREDS2 eye vitamins (e.g. Preservision) as discussed on a daily basis.  Monitor the vision in each eye weekly - Call if any sudden persistent changes.  Wear sunglasses with UV protection in princess conditions.    Glasses prescription given - optional    Gabe Randhawa MD  [phone]    "

## 2018-10-01 ENCOUNTER — RADIANT APPOINTMENT (OUTPATIENT)
Dept: CT IMAGING | Facility: CLINIC | Age: 68
End: 2018-10-01
Attending: UROLOGY
Payer: COMMERCIAL

## 2018-10-01 ENCOUNTER — OFFICE VISIT (OUTPATIENT)
Dept: UROLOGY | Facility: CLINIC | Age: 68
End: 2018-10-01
Payer: COMMERCIAL

## 2018-10-01 VITALS — RESPIRATION RATE: 12 BRPM | DIASTOLIC BLOOD PRESSURE: 78 MMHG | SYSTOLIC BLOOD PRESSURE: 128 MMHG | HEART RATE: 69 BPM

## 2018-10-01 DIAGNOSIS — R31.0 GROSS HEMATURIA: Primary | ICD-10-CM

## 2018-10-01 DIAGNOSIS — R97.20 ELEVATED PROSTATE SPECIFIC ANTIGEN (PSA): ICD-10-CM

## 2018-10-01 DIAGNOSIS — R31.0 GROSS HEMATURIA: ICD-10-CM

## 2018-10-01 LAB — PSA SERPL-MCNC: 5.04 UG/L (ref 0–4)

## 2018-10-01 PROCEDURE — 74178 CT ABD&PLV WO CNTR FLWD CNTR: CPT | Mod: TC

## 2018-10-01 PROCEDURE — 84153 ASSAY OF PSA TOTAL: CPT | Performed by: UROLOGY

## 2018-10-01 PROCEDURE — 88112 CYTOPATH CELL ENHANCE TECH: CPT | Performed by: UROLOGY

## 2018-10-01 PROCEDURE — 99204 OFFICE O/P NEW MOD 45 MIN: CPT | Performed by: UROLOGY

## 2018-10-01 PROCEDURE — 36415 COLL VENOUS BLD VENIPUNCTURE: CPT | Performed by: UROLOGY

## 2018-10-01 RX ORDER — IOPAMIDOL 755 MG/ML
100 INJECTION, SOLUTION INTRAVASCULAR ONCE
Status: COMPLETED | OUTPATIENT
Start: 2018-10-01 | End: 2018-10-01

## 2018-10-01 RX ADMIN — IOPAMIDOL 100 ML: 755 INJECTION, SOLUTION INTRAVASCULAR at 13:30

## 2018-10-01 NOTE — PROGRESS NOTES
Urology Note            S:  Hamilton Angulo is a 68 year old male who was seen in a consultationfor hematuria.   Patient has gross hematuria recently that lasted for several days.   He has no other voiding symptoms in addition to hematuria.  He has no flank pain.  He has no history of kidney stone.  He denies any trauma.  Recent UA showed many rbc/hpf.  Urine culture was neg.  Renal ultrasound was also normal.  He had a psa several years ago.  Psa was 4.16 in 2014.  Past Medical History:   Diagnosis Date     Bilateral cataracts 2001     Herniated disc 1983    Lumbar     Hyperlipidemia LDL goal <130      Hypertension 5/2004     Tear of MCL (medial collateral ligament) of knee 6/20/2001    LT Knee/WC     Current Outpatient Prescriptions   Medication Sig Dispense Refill     lisinopril (PRINIVIL/ZESTRIL) 20 MG tablet Take 1 tablet (20 mg) by mouth daily 90 tablet 3     Multiple Vitamins-Minerals (PRESERVISION AREDS 2) CAPS Take 2 capsules by mouth 2 times daily 60 capsule 11     pravastatin (PRAVACHOL) 20 MG tablet TAKE 1 TABLET BY MOUTH THREE TIMES A WEEK 36 tablet 3     tetrahydrozoline (VISINE) 0.05 % ophthalmic solution 1 drop every morning.       Ibuprofen 200 MG capsule Take 200 mg by mouth as needed.       Past Surgical History:   Procedure Laterality Date     CATARACT IOL, RT/LT  12/2003    Bilateral      Social History     Social History     Marital status:      Spouse name: N/A     Number of children: N/A     Years of education: N/A     Occupational History     Not on file.     Social History Main Topics     Smoking status: Never Smoker     Smokeless tobacco: Never Used     Alcohol use Yes      Comment: Ocss.     Drug use: No     Sexual activity: Yes     Partners: Female     Other Topics Concern     Not on file     Social History Narrative     Family History   Problem Relation Age of Onset     Cancer Mother      Hypertension Mother      HEART DISEASE Maternal Grandmother      Cancer Maternal Grandfather            REVIEW OF SYSTEMS  =================  C: NEGATIVE for fever, chills, change in weight  I: NEGATIVE for worrisome rashes, moles or lesions  E/M: NEGATIVE for ear, mouth and throat problems  R: NEGATIVE for significant cough or SHORTNESS OF BREATH  CV:  NEGATIVE for chest pain, palpitations or peripheral edema  GI: NEGATIVE for nausea, abdominal pain, heartburn, or change in bowel habits  NEURO: NEGATIVE numbness/weakness  : see HPI  PSYCH: NEGATIVE depression/anxiety  LYmph: no new enlarged lymph nodes  Ortho: no new trauma/movements           O: Exam:  Constitutional: healthy, alert and no distress  Head: Normocephalic. No masses, lesions, tenderness or abnormalities  ENT: ENT exam normal, no neck nodes or sinus tenderness  Cardiovascular: negative, PMI normal.   Respiratory: negative, no evidence of respiratory distress  Gastrointestinal: Abdomen soft, non-tender. BS normal. No masses, organomegaly  : penis no discharge.  Testis no masses.  No scrotal skin lesion.  Prostate 50 gm smooth no nodule  Musculoskeletal: extremities normal- no gross deformities noted, gait normal and normal muscle tone  Skin: no suspicious lesions or rashes  Neurologic: Alert and oriented  Psychiatric: mentation appears normal. and affect normal/bright  Hematologic/Lymphatic/Immunologic: normal ant/post cervical, axillary, supraclavicular and inguinal nodes    Assessment:  Hematuria, gross                          Elevated psa    Recommendation: Schedule patient for CT urogram/cysto/urine cytology.  psa test today.

## 2018-10-01 NOTE — MR AVS SNAPSHOT
After Visit Summary   10/1/2018    Hamilton Angulo    MRN: [id number]           Patient Information     Date Of Birth          1950        Visit Information        Provider Department      10/1/2018 10:30 AM Wilder Foote MD [facility]        Today's Diagnoses     Gross hematuria    -  1    Elevated prostate specific antigen (PSA)          Care Instructions    Your cystoscopy is scheduled 10/9/2018 @ 2:30pm. No preparation necessary. Please call  if you need to reschedule this appointment.    Please complete your CT Scan sometime prior to your appointment for cystoscopy.     Please call  to schedule the CT scan at [facility]/[address].            Follow-ups after your visit        Your next 10 appointments already scheduled     Oct 09, 2018  2:30 PM CDT   Return Visit with Wilder Foote MD, [facility] CYSTO PROC ROOM   [facility] ([address] [id number] [phone]              Who to contact     If you have questions or need follow up information about today's clinic visit or your schedule please contact [facility] directly at [phone].  Normal or non-critical lab and imaging results will be communicated to you by MyChart, letter or phone within 4 business days after the clinic has received the results. If you do not hear from us within 7 days, please contact the clinic through MyChart or phone. If you have a critical or abnormal lab result, we will notify you by phone as soon as possible.  Submit refill requests through mobileo or call your pharmacy and they will forward the refill request to us. Please allow 3 business days for your refill to be completed.          Additional Information About Your Visit        Care EveryWhere ID     This is your Care EveryWhere ID. This could be used by other organizations to access your  [place] medical records  [id number]        Your Vitals Were     Pulse Respirations                69 12           Blood Pressure from Last 3 Encounters:   10/01/18 128/78   11/03/17 130/62   10/13/17 160/80    Weight from Last 3 Encounters:   10/13/17 76.7 kg (169 lb)   10/07/16 74.8 kg (165 lb)   09/28/15 74.8 kg (165 lb)              We Performed the Following     PSA tumor marker        Primary Care Provider Office Phone # Fax #    Bro Herrera [phone] [address] [facility] [address]        Equal Access to Services     [facility]: Hadii janell ojeda hadasho Sochrisali, waaxda luqadaha, qaybta kaalmada aderowenayada, sandy mena . So [facility] [phone].    ATENCIÓN: Si habla español, tiene a quinn disposición servicios gratuitos de asistencia lingüística. Llame al [phone].    We comply with applicable federal civil rights laws and [place] laws. We do not discriminate on the basis of race, color, national origin, age, disability, sex, sexual orientation, or gender identity.            Thank you!     Thank you for choosing [facility]  for your care. Our goal is always to provide you with excellent care. Hearing back from our patients is one way we can continue to improve our services. Please take a few minutes to complete the written survey that you may receive in the mail after your visit with us. Thank you!             Your Updated Medication List - Protect others around you: Learn how to safely use, store and throw away your medicines at www.disposemymeds.org.          This list is accurate as of 10/1/18 10:38 AM.  Always use your most recent med list.                   Brand Name Dispense Instructions for use Diagnosis    ibuprofen 200 MG capsule      Take 200 mg by mouth as needed.        lisinopril 20 MG tablet    PRINIVIL/ZESTRIL    90 tablet    Take 1 tablet (20 mg) by mouth daily    Essential hypertension with goal blood pressure  less than 140/90       pravastatin 20 MG tablet    PRAVACHOL    36 tablet    TAKE 1 TABLET BY MOUTH THREE TIMES A WEEK    Hyperlipidemia LDL goal <130       PRESERVISION AREDS 2 Caps     60 capsule    Take 2 capsules by mouth 2 times daily    Nonexudative senile macular degeneration of retina       VISINE 0.05 % ophthalmic solution   Generic drug:  tetrahydrozoline      1 drop every morning.

## 2018-10-01 NOTE — PATIENT INSTRUCTIONS
Your cystoscopy is scheduled 10/9/2018 @ 2:30pm. No preparation necessary. Please call  if you need to reschedule this appointment.    Please complete your CT Scan sometime prior to your appointment for cystoscopy.     Please call  to schedule the CT scan at [facility], [address] [place] [address].

## 2018-10-03 LAB — COPATH REPORT: NORMAL

## 2018-10-05 DIAGNOSIS — I10 ESSENTIAL HYPERTENSION WITH GOAL BLOOD PRESSURE LESS THAN 140/90: ICD-10-CM

## 2018-10-05 NOTE — TELEPHONE ENCOUNTER
Requested Prescriptions   Pending Prescriptions Disp Refills     lisinopril (PRINIVIL/ZESTRIL) 20 MG tablet 90 tablet 3     Sig: Take 1 tablet (20 mg) by mouth daily    There is no refill protocol information for this order        Last Written Prescription Date:  10/13/2017  Last Fill Quantity: 90,  # refills: 3   Last office visit: 10/7/2016 with prescribing provider:  Javier   Future Office Visit:   Next 5 appointments (look out 90 days)     Oct 09, 2018  2:30 PM CDT   Return Visit with Wilder Foote MD, [place] CYSTO PROC ROOM   [facility] ([facility])    [address] [place] [address]  [place] [address]   [phone]

## 2018-10-08 RX ORDER — LISINOPRIL 20 MG/1
20 TABLET ORAL DAILY
Qty: 90 TABLET | Refills: 0 | Status: SHIPPED | OUTPATIENT
Start: 2018-10-08 | End: 2018-10-22

## 2018-10-08 NOTE — TELEPHONE ENCOUNTER
Left message for patient to call back in regards to approved prescription and needing appointment for further refills.    Magalis Alexander, CMA

## 2018-10-08 NOTE — TELEPHONE ENCOUNTER
Medication is being filled for 1 time refill only due to:  Patient needs to be seen because it has been more than one year since last visit.   Please call to schedule annual exam-fasting labs.  Danielle Padron RN

## 2018-10-09 ENCOUNTER — OFFICE VISIT (OUTPATIENT)
Dept: UROLOGY | Facility: CLINIC | Age: 68
End: 2018-10-09
Payer: COMMERCIAL

## 2018-10-09 DIAGNOSIS — R31.0 GROSS HEMATURIA: Primary | ICD-10-CM

## 2018-10-09 DIAGNOSIS — R33.8 BENIGN PROSTATIC HYPERPLASIA WITH URINARY RETENTION: ICD-10-CM

## 2018-10-09 DIAGNOSIS — R97.20 ELEVATED PROSTATE SPECIFIC ANTIGEN (PSA): ICD-10-CM

## 2018-10-09 DIAGNOSIS — N40.1 BENIGN PROSTATIC HYPERPLASIA WITH URINARY RETENTION: ICD-10-CM

## 2018-10-09 PROCEDURE — 51798 US URINE CAPACITY MEASURE: CPT | Performed by: UROLOGY

## 2018-10-09 PROCEDURE — 52000 CYSTOURETHROSCOPY: CPT | Performed by: UROLOGY

## 2018-10-09 PROCEDURE — 87070 CULTURE OTHR SPECIMN AEROBIC: CPT | Performed by: UROLOGY

## 2018-10-09 PROCEDURE — 87077 CULTURE AEROBIC IDENTIFY: CPT | Performed by: UROLOGY

## 2018-10-09 PROCEDURE — 87186 SC STD MICRODIL/AGAR DIL: CPT | Performed by: UROLOGY

## 2018-10-09 NOTE — PROGRESS NOTES
S: Hamilton Angulo is a 68 year old male returns for hematuria.    Patient is draped and prepped.  Flexible cystoscopy placed under direct vision.      The anterior urethra is normal   The prostatic urethra showed trilobar enlargement.     The length is 3cm,  the coaptation is 3 cm.     In the bladder there is trabeculation grade 2-3 with bladder distention.  No cancer seen.    Bladder scan 874 ml    Assessment/Plan:  (R31.0) Gross hematuria  (primary encounter diagnosis)  Comment:  Neg CT scan  Plan: prostatic bleeding    (R97.20) Elevated prostate specific antigen (PSA)  Comment:  Discussed psa elevation/prostate cancer  Plan: schedule trus and biopsy           Risks of bleeding/infection discussed    (N40.1,  R33.8) Benign prostatic hyperplasia with urinary retention  Comment: high residual but is unaware of problems.  Plan: discussed TURP if no prostate cancer found

## 2018-10-09 NOTE — MR AVS SNAPSHOT
After Visit Summary   10/9/2018    Hamilton Angulo    MRN: [id number]           Patient Information     Date Of Birth          1950        Visit Information        Provider Department      10/9/2018 2:30 PM Wilder Foote MD; [place] [facility]        Today's Diagnoses     Gross hematuria    -  1    Elevated prostate specific antigen (PSA)        Benign prostatic hyperplasia with urinary retention           Follow-ups after your visit        Your next 10 appointments already scheduled     Oct 19, 2018  8:30 AM CDT   PHYSICAL with Bro Herrera MD   [facility]    [address] [place] [place] [address] [address]   [phone]              Future tests that were ordered for you today     Open Future Orders        Priority Expected Expires Ordered    US Guided Needle Placement Routine  10/9/2019 10/9/2018            Who to contact     If you have questions or need follow up information about today's clinic visit or your schedule please contact [facility] directly at [phone].  Normal or non-critical lab and imaging results will be communicated to you by MyChart, letter or phone within 4 business days after the clinic has received the results. If you do not hear from us within 7 days, please contact the clinic through MyChart or phone. If you have a critical or abnormal lab result, we will notify you by phone as soon as possible.  Submit refill requests through [employer] or call your pharmacy and they will forward the refill request to us. Please allow 3 business days for your refill to be completed.          Additional Information About Your Visit        Care EveryWhere ID     This is your Care EveryWhere ID. This could be used by other organizations to access your [place] medical records  [id number]         Blood Pressure from Last 3 Encounters:   10/01/18 128/78   11/03/17 130/62   10/13/17 160/80    Weight from  Last 3 Encounters:   10/13/17 76.7 kg (169 lb)   10/07/16 74.8 kg (165 lb)   09/28/15 74.8 kg (165 lb)              We Performed the Following     CYSTOURETHROSCOPY (55866)     MEASURE POST-VOID RESIDUAL URINE/BLADDER CAPACITY, US NON-IMAGING     Perirectal Culture Aerobic Bacterial        Primary Care Provider Office Phone # Fax #    Bro Herrera [phone] [address] [facility] [address]        Equal Access to Services     [facility]: Hadii aad ku hadasho Soomaali, waaxda luqadaha, qaybta kaalmada adeegyada, waxnathan west hayharper mena . So [facility] [phone].    ATENCIÓN: Si habla español, tiene a quinn disposición servicios gratuitos de asistencia lingüística. [facility] [phone].    We comply with applicable federal civil rights laws and [place] laws. We do not discriminate on the basis of race, color, national origin, age, disability, sex, sexual orientation, or gender identity.            Thank you!     Thank you for choosing [facility]  for your care. Our goal is always to provide you with excellent care. Hearing back from our patients is one way we can continue to improve our services. Please take a few minutes to complete the written survey that you may receive in the mail after your visit with us. Thank you!             Your Updated Medication List - Protect others around you: Learn how to safely use, store and throw away your medicines at www.disposemymeds.org.          This list is accurate as of 10/9/18  2:32 PM.  Always use your most recent med list.                   Brand Name Dispense Instructions for use Diagnosis    ibuprofen 200 MG capsule      Take 200 mg by mouth as needed.        lisinopril 20 MG tablet    PRINIVIL/ZESTRIL    90 tablet    Take 1 tablet (20 mg) by mouth daily    Essential hypertension with goal blood pressure less than 140/90       pravastatin 20 MG tablet    PRAVACHOL    36 tablet    TAKE 1 TABLET BY MOUTH THREE  TIMES A WEEK    Hyperlipidemia LDL goal <130       PRESERVISION AREDS 2 Caps     60 capsule    Take 2 capsules by mouth 2 times daily    Nonexudative senile macular degeneration of retina       VISINE 0.05 % ophthalmic solution   Generic drug:  tetrahydrozoline      1 drop every morning.

## 2018-10-11 DIAGNOSIS — R97.20 ELEVATED PROSTATE SPECIFIC ANTIGEN (PSA): Primary | ICD-10-CM

## 2018-10-11 LAB
BACTERIA SPEC CULT: ABNORMAL
BACTERIA SPEC CULT: ABNORMAL
Lab: ABNORMAL
SPECIMEN SOURCE: ABNORMAL

## 2018-10-11 RX ORDER — CIPROFLOXACIN 500 MG/1
500 TABLET, FILM COATED ORAL 2 TIMES DAILY
Qty: 6 TABLET | Refills: 0 | Status: SHIPPED | OUTPATIENT
Start: 2018-10-11 | End: 2018-10-14

## 2018-10-11 RX ORDER — CEPHALEXIN 500 MG/1
500 CAPSULE ORAL 3 TIMES DAILY
Qty: 9 CAPSULE | Refills: 0 | Status: SHIPPED | OUTPATIENT
Start: 2018-10-11 | End: 2018-10-14

## 2018-10-19 ENCOUNTER — RADIANT APPOINTMENT (OUTPATIENT)
Dept: ULTRASOUND IMAGING | Facility: CLINIC | Age: 68
End: 2018-10-19
Payer: COMMERCIAL

## 2018-10-19 ENCOUNTER — OFFICE VISIT (OUTPATIENT)
Dept: UROLOGY | Facility: CLINIC | Age: 68
End: 2018-10-19
Payer: COMMERCIAL

## 2018-10-19 DIAGNOSIS — R97.20 ELEVATED PROSTATE SPECIFIC ANTIGEN (PSA): ICD-10-CM

## 2018-10-19 DIAGNOSIS — R97.20 ELEVATED PROSTATE SPECIFIC ANTIGEN (PSA): Primary | ICD-10-CM

## 2018-10-19 PROCEDURE — 88305 TISSUE EXAM BY PATHOLOGIST: CPT | Performed by: UROLOGY

## 2018-10-19 PROCEDURE — 99000 SPECIMEN HANDLING OFFICE-LAB: CPT | Performed by: UROLOGY

## 2018-10-19 PROCEDURE — 76942 ECHO GUIDE FOR BIOPSY: CPT | Performed by: UROLOGY

## 2018-10-19 PROCEDURE — 88344 IMHCHEM/IMCYTCHM EA MLT ANTB: CPT | Performed by: UROLOGY

## 2018-10-19 PROCEDURE — 55700 HC BIOPSY PROSTATE NEEDLE/PUNCH: CPT | Performed by: UROLOGY

## 2018-10-19 NOTE — PATIENT INSTRUCTIONS
Preventive Health Recommendations:       Male Ages 65 and over    Yearly exam:             See your health care provider every year in order to  o   Review health changes.   o   Discuss preventive care.    o   Review your medicines if your doctor has prescribed any.    Talk with your health care provider about whether you should have a test to screen for prostate cancer (PSA).    Every 3 years, have a diabetes test (fasting glucose). If you are at risk for diabetes, you should have this test more often.    Every 5 years, have a cholesterol test. Have this test more often if you are at risk for high cholesterol or heart disease.     Every 10 years, have a colonoscopy. Or, have a yearly FIT test (stool test). These exams will check for colon cancer.    Talk to with your health care provider about screening for Abdominal Aortic Aneurysm if you have a family history of AAA or have a history of smoking.  Shots:     Get a flu shot each year.     Get a tetanus shot every 10 years.     Talk to your doctor about your pneumonia vaccines. There are now two you should receive - Pneumovax (PPSV 23) and Prevnar (PCV 13).    Talk to your pharmacist about a shingles vaccine.     Talk to your doctor about the hepatitis B vaccine.  Nutrition:     Eat at least 5 servings of fruits and vegetables each day.     Eat whole-grain bread, whole-wheat pasta and brown rice instead of white grains and rice.     Get adequate Calcium and Vitamin D.   Lifestyle    Exercise for at least 150 minutes a week (30 minutes a day, 5 days a week). This will help you control your weight and prevent disease.     Limit alcohol to one drink per day.     No smoking.     Wear sunscreen to prevent skin cancer.     See your dentist every six months for an exam and cleaning.     See your eye doctor every 1 to 2 years to screen for conditions such as glaucoma, macular degeneration and cataracts.      [facility]    If you have any questions regarding  to your visit please contact your care team:     Team Pink:   Clinic Hours Telephone Number   Internal Medicine:  Dr. Naomie Johnson NP 7am-7pm  Monday - Thursday   7am-5pm  Fridays  [phone]  (Appointment scheduling available 24/7)   Urgent Care - [place] - 11am-9pm Monday-Friday Saturday-Sunday- 9am-5pm   [place] - 5pm-9pm Monday-Friday Saturday-Sunday- 9am-5pm  [phone] - [place]  [phone] - [place]       What options do I have for a visit other than an office visit? We offer electronic visits (e-visits) and telephone visits, when medically appropriate.  Please check with your medical insurance to see if these types of visits are covered, as you will be responsible for any charges that are not paid by your insurance.      You can use [employer] (secure electronic communication) to access to your chart, send your primary care provider a message, or make an appointment. Ask a team member how to get started.     For a price quote for your services, please call our Consumer Price Line at [phone] or our Imaging Cost estimation line at [phone] (for imaging tests).

## 2018-10-19 NOTE — MR AVS SNAPSHOT
After Visit Summary   10/19/2018    Hamilton Angulo    MRN: [id number]           Patient Information     Date Of Birth          1950        Visit Information        Provider Department      10/19/2018 8:45 AM Wilder Foote MD [facility] [place]        Today's Diagnoses     Elevated prostate specific antigen (PSA)    -  1       Follow-ups after your visit        Your next 10 appointments already scheduled     Oct 22, 2018  8:50 AM CDT   PHYSICAL with Bro Herrera MD   [facility]    [address]  [place] [address] [phone]            Oct 26, 2018 11:30 AM CDT   Return Visit with Wilder Foote MD   [facility] [place] ([facility])    [address]  [place] [id number] [phone]              Who to contact     If you have questions or need follow up information about today's clinic visit or your schedule please contact [facility] [place] directly at [phone].  Normal or non-critical lab and imaging results will be communicated to you by MyChart, letter or phone within 4 business days after the clinic has received the results. If you do not hear from us within 7 days, please contact the clinic through MyChart or phone. If you have a critical or abnormal lab result, we will notify you by phone as soon as possible.  Submit refill requests through [employer] or call your pharmacy and they will forward the refill request to us. Please allow 3 business days for your refill to be completed.          Additional Information About Your Visit        Care EveryWhere ID     This is your Care EveryWhere ID. This could be used by other organizations to access your [place] medical records  [id number]         Blood Pressure from Last 3 Encounters:   10/01/18 128/78   11/03/17 130/62   10/13/17 160/80    Weight from Last 3 Encounters:   10/13/17 76.7 kg (169 lb)   10/07/16 74.8 kg (165 lb)   09/28/15  74.8 kg (165 lb)              We Performed the Following     BIOPSY PROSTATE NEEDLE/PUNCH     CL SPECIMEN HANDLING,DR OFF->LAB     Surgical pathology exam        Primary Care Provider Office Phone # Fax #    Bro Herrera [phone] [address] [facility] [address]        Equal Access to Services     [facility] : Hadii aad ku hadasho Soomaali, waaxda luqadaha, qaybta kaalmada adeegyada, waxay idiin hayaan adeeg kharash la'aan . So [facility] [phone].    ATENCIÓN: Si habla español, tiene a quinn disposición servicios gratuitos de asistencia lingüística. Llame al [phone].    We comply with applicable federal civil rights laws and [place] laws. We do not discriminate on the basis of race, color, national origin, age, disability, sex, sexual orientation, or gender identity.            Thank you!     Thank you for choosing [facility]  for your care. Our goal is always to provide you with excellent care. Hearing back from our patients is one way we can continue to improve our services. Please take a few minutes to complete the written survey that you may receive in the mail after your visit with us. Thank you!             Your Updated Medication List - Protect others around you: Learn how to safely use, store and throw away your medicines at www.disposemymeds.org.          This list is accurate as of 10/19/18 10:06 AM.  Always use your most recent med list.                   Brand Name Dispense Instructions for use Diagnosis    ibuprofen 200 MG capsule      Take 200 mg by mouth as needed.        lisinopril 20 MG tablet    PRINIVIL/ZESTRIL    90 tablet    Take 1 tablet (20 mg) by mouth daily    Essential hypertension with goal blood pressure less than 140/90       pravastatin 20 MG tablet    PRAVACHOL    36 tablet    TAKE 1 TABLET BY MOUTH THREE TIMES A WEEK    Hyperlipidemia LDL goal <130       PRESERVISION AREDS 2 Caps     60 capsule    Take 2 capsules by mouth 2 times  daily    Nonexudative senile macular degeneration of retina       VISINE 0.05 % ophthalmic solution   Generic drug:  tetrahydrozoline      1 drop every morning.

## 2018-10-22 ENCOUNTER — OFFICE VISIT (OUTPATIENT)
Dept: FAMILY MEDICINE | Facility: CLINIC | Age: 68
End: 2018-10-22
Payer: COMMERCIAL

## 2018-10-22 VITALS
RESPIRATION RATE: 16 BRPM | OXYGEN SATURATION: 100 % | DIASTOLIC BLOOD PRESSURE: 72 MMHG | TEMPERATURE: 97.4 F | WEIGHT: 164.8 LBS | HEART RATE: 69 BPM | SYSTOLIC BLOOD PRESSURE: 124 MMHG | BODY MASS INDEX: 21.84 KG/M2 | HEIGHT: 73 IN

## 2018-10-22 DIAGNOSIS — E78.5 HYPERLIPIDEMIA LDL GOAL <130: ICD-10-CM

## 2018-10-22 DIAGNOSIS — M72.0 DUPUYTREN'S CONTRACTURE OF BOTH HANDS: ICD-10-CM

## 2018-10-22 DIAGNOSIS — R73.09 ELEVATED GLUCOSE: ICD-10-CM

## 2018-10-22 DIAGNOSIS — Z23 NEED FOR PROPHYLACTIC VACCINATION AND INOCULATION AGAINST INFLUENZA: ICD-10-CM

## 2018-10-22 DIAGNOSIS — Z23 NEED FOR SHINGLES VACCINE: ICD-10-CM

## 2018-10-22 DIAGNOSIS — Z12.11 SCREEN FOR COLON CANCER: ICD-10-CM

## 2018-10-22 DIAGNOSIS — Z00.00 ROUTINE HISTORY AND PHYSICAL EXAMINATION OF ADULT: Primary | ICD-10-CM

## 2018-10-22 DIAGNOSIS — I10 ESSENTIAL HYPERTENSION WITH GOAL BLOOD PRESSURE LESS THAN 140/90: ICD-10-CM

## 2018-10-22 LAB
ANION GAP SERPL CALCULATED.3IONS-SCNC: 5 MMOL/L (ref 3–14)
BUN SERPL-MCNC: 12 MG/DL (ref 7–30)
CALCIUM SERPL-MCNC: 9.3 MG/DL (ref 8.5–10.1)
CHLORIDE SERPL-SCNC: 108 MMOL/L (ref 94–109)
CHOLEST SERPL-MCNC: 157 MG/DL
CO2 SERPL-SCNC: 28 MMOL/L (ref 20–32)
CREAT SERPL-MCNC: 1.02 MG/DL (ref 0.66–1.25)
GFR SERPL CREATININE-BSD FRML MDRD: 73 ML/MIN/1.7M2
GLUCOSE SERPL-MCNC: 108 MG/DL (ref 70–99)
HBA1C MFR BLD: 5.2 % (ref 0–5.6)
HDLC SERPL-MCNC: 60 MG/DL
LDLC SERPL CALC-MCNC: 75 MG/DL
NONHDLC SERPL-MCNC: 97 MG/DL
POTASSIUM SERPL-SCNC: 5.1 MMOL/L (ref 3.4–5.3)
SODIUM SERPL-SCNC: 141 MMOL/L (ref 133–144)
TRIGL SERPL-MCNC: 112 MG/DL

## 2018-10-22 PROCEDURE — 80061 LIPID PANEL: CPT | Performed by: INTERNAL MEDICINE

## 2018-10-22 PROCEDURE — 80048 BASIC METABOLIC PNL TOTAL CA: CPT | Performed by: INTERNAL MEDICINE

## 2018-10-22 PROCEDURE — 83036 HEMOGLOBIN GLYCOSYLATED A1C: CPT | Performed by: INTERNAL MEDICINE

## 2018-10-22 PROCEDURE — G0008 ADMIN INFLUENZA VIRUS VAC: HCPCS | Performed by: INTERNAL MEDICINE

## 2018-10-22 PROCEDURE — 36415 COLL VENOUS BLD VENIPUNCTURE: CPT | Performed by: INTERNAL MEDICINE

## 2018-10-22 PROCEDURE — 90662 IIV NO PRSV INCREASED AG IM: CPT | Performed by: INTERNAL MEDICINE

## 2018-10-22 PROCEDURE — 99397 PER PM REEVAL EST PAT 65+ YR: CPT | Mod: 25 | Performed by: INTERNAL MEDICINE

## 2018-10-22 RX ORDER — LISINOPRIL 20 MG/1
20 TABLET ORAL DAILY
Qty: 90 TABLET | Refills: 3 | Status: SHIPPED | OUTPATIENT
Start: 2018-10-22 | End: 2019-10-29

## 2018-10-22 ASSESSMENT — ACTIVITIES OF DAILY LIVING (ADL)
I_NEED_ASSISTANCE_FOR_THE_FOLLOWING_DAILY_ACTIVITIES:: NO ASSISTANCE IS NEEDED
CURRENT_FUNCTION: NO ASSISTANCE NEEDED

## 2018-10-22 NOTE — MR AVS SNAPSHOT
After Visit Summary   10/22/2018    Hamilton Angulo    MRN: [id number]           Patient Information     Date Of Birth          1950        Visit Information        Provider Department      10/22/2018 8:50 AM Bro Herrera MD [facility]        Today's Diagnoses     Routine history and physical examination of adult    -  1    Screen for colon cancer        Need for prophylactic vaccination and inoculation against influenza        Need for shingles vaccine        Elevated glucose        Essential hypertension with goal blood pressure less than 140/90        Hyperlipidemia LDL goal <130        Dupuytren's contracture of both hands          Care Instructions      Preventive Health Recommendations:       Male Ages 65 and over    Yearly exam:             See your health care provider every year in order to  o   Review health changes.   o   Discuss preventive care.    o   Review your medicines if your doctor has prescribed any.    Talk with your health care provider about whether you should have a test to screen for prostate cancer (PSA).    Every 3 years, have a diabetes test (fasting glucose). If you are at risk for diabetes, you should have this test more often.    Every 5 years, have a cholesterol test. Have this test more often if you are at risk for high cholesterol or heart disease.     Every 10 years, have a colonoscopy. Or, have a yearly FIT test (stool test). These exams will check for colon cancer.    Talk to with your health care provider about screening for Abdominal Aortic Aneurysm if you have a family history of AAA or have a history of smoking.  Shots:     Get a flu shot each year.     Get a tetanus shot every 10 years.     Talk to your doctor about your pneumonia vaccines. There are now two you should receive - Pneumovax (PPSV 23) and Prevnar (PCV 13).    Talk to your pharmacist about a shingles vaccine.     Talk to your doctor about the hepatitis B vaccine.  Nutrition:      Eat at least 5 servings of fruits and vegetables each day.     Eat whole-grain bread, whole-wheat pasta and brown rice instead of white grains and rice.     Get adequate Calcium and Vitamin D.   Lifestyle    Exercise for at least 150 minutes a week (30 minutes a day, 5 days a week). This will help you control your weight and prevent disease.     Limit alcohol to one drink per day.     No smoking.     Wear sunscreen to prevent skin cancer.     See your dentist every six months for an exam and cleaning.     See your eye doctor every 1 to 2 years to screen for conditions such as glaucoma, macular degeneration and cataracts.      [facility]    If you have any questions regarding to your visit please contact your care team:     Team Pink:   Clinic Hours Telephone Number   Internal Medicine:  Dr. Naomie Johnson, NP 7am-7pm  Monday - Thursday   7am-5pm  Fridays  [phone]  (Appointment scheduling available 24/7)   Urgent Care - [place] and [place] - 11am-9pm Monday-Friday Saturday-Sunday- 9am-5pm   [place] - 5pm-9pm Monday-Friday Saturday-Sunday- 9am-5pm  [phone] - [place]  [phone] - [place]       What options do I have for a visit other than an office visit? We offer electronic visits (e-visits) and telephone visits, when medically appropriate.  Please check with your medical insurance to see if these types of visits are covered, as you will be responsible for any charges that are not paid by your insurance.      You can use [employer] (secure electronic communication) to access to your chart, send your primary care provider a message, or make an appointment. Ask a team member how to get started.     For a price quote for your services, please call our Consumer Price Line at [phone] or our Imaging Cost estimation line at [phone] (for imaging tests).            Follow-ups after your visit        Additional Services      GASTROENTEROLOGY ADULT REF PROCEDURE ONLY       Last Lab Result: Creatinine (mg/dL)       Date                     Value                 10/13/2017               1.00             ----------  Body mass index is 21.96 kg/(m^2).     Needed:  No  Language:  English    Patient will be contacted to schedule procedure.     Please be aware that coverage of these services is subject to the terms and limitations of your health insurance plan.  Call member services at your health plan with any benefit or coverage questions.  Any procedures must be performed at a [place] facility OR coordinated by your clinic's referral office.    Please bring the following with you to your appointment:    (1) Any X-Rays, CTs or MRIs which have been performed.  Contact the facility where they were done to arrange for  prior to your scheduled appointment.    (2) List of current medications   (3) This referral request   (4) Any documents/labs given to you for this referral            ORTHOPEDICS ADULT REFERRAL       Your provider has referred you to: [facility]:  [facility] [phone]   [email or web address] surgeon specialist      Please be aware that coverage of these services is subject to the terms and limitations of your health insurance plan.  Call member services at your health plan with any benefit or coverage questions.      Please bring the following to your appointment:    >>   Any x-rays, CTs or MRIs which have been performed.  Contact the facility where they were done to arrange for  prior to your scheduled appointment.    >>   List of current medications   >>   This referral request   >>   Any documents/labs given to you for this referral                  Follow-up notes from your care team     Return in about 1 year (around 10/22/2019) for Physical Exam.      Your next 10  "appointments already scheduled     Oct 26, 2018 11:30 AM CDT   Return Visit with Wilder Foote MD   [facility] [place] ([facility] [place])    [address] [place] [address]  [place] [address] [phone]              Who to contact     If you have questions or need follow up information about today's clinic visit or your schedule please contact [facility] [place] directly at [phone].  Normal or non-critical lab and imaging results will be communicated to you by MyChart, letter or phone within 4 business days after the clinic has received the results. If you do not hear from us within 7 days, please contact the clinic through MyChart or phone. If you have a critical or abnormal lab result, we will notify you by phone as soon as possible.  Submit refill requests through [employer] or call your pharmacy and they will forward the refill request to us. Please allow 3 business days for your refill to be completed.          Additional Information About Your Visit        Care EveryWhere ID     This is your Care EveryWhere ID. This could be used by other organizations to access your [place] medical records  [id number]        Your Vitals Were     Pulse Temperature Respirations Height Pulse Oximetry BMI (Body Mass Index)    69 97.4  F (36.3  C) (Oral) 16 6' 0.64\" (1.845 m) 100% 21.96 kg/m2       Blood Pressure from Last 3 Encounters:   10/22/18 124/72   10/01/18 128/78   11/03/17 130/62    Weight from Last 3 Encounters:   10/22/18 164 lb 12.8 oz (74.8 kg)   10/13/17 169 lb (76.7 kg)   10/07/16 165 lb (74.8 kg)              We Performed the Following     Basic metabolic panel     FLU VACCINE, INCREASED ANTIGEN, PRESV FREE, AGE 65+ [82362]     GASTROENTEROLOGY ADULT REF PROCEDURE ONLY     Hemoglobin A1c     Lipid panel reflex to direct LDL Fasting     ORTHOPEDICS ADULT REFERRAL     Vaccine Administration, Initial [53423]          Where to get your medicines      These medications were sent to [facility] - [place]AEANN HERNANDEZ - [address] AT [facility] [address] [place]  [address], [place] [address]     Phone:  [phone]     lisinopril 20 MG tablet          Primary Care Provider Office Phone # Fax #    Bro Herrera [phone] [address]  [place] [address]        Equal Access to Services     SARA MARVIN : Hadii aad ku hadasho Soomaali, waaxda luqadaha, qaybta kaalmada adeegyada, waxay idiin hayaan adeeg kharash la'aan . So [facility] [phone].    ATENCIÓN: Si habla español, tiene a quinn disposición servicios gratuitos de asistencia lingüística. [facility] [phone].    We comply with applicable federal civil rights laws and [place] laws. We do not discriminate on the basis of race, color, national origin, age, disability, sex, sexual orientation, or gender identity.            Thank you!     Thank you for choosing [facility]  for your care. Our goal is always to provide you with excellent care. Hearing back from our patients is one way we can continue to improve our services. Please take a few minutes to complete the written survey that you may receive in the mail after your visit with us. Thank you!             Your Updated Medication List - Protect others around you: Learn how to safely use, store and throw away your medicines at www.disposemymeds.org.          This list is accurate as of 10/22/18  9:26 AM.  Always use your most recent med list.                   Brand Name Dispense Instructions for use Diagnosis    artificial tears Oint ophthalmic ointment      At Bedtime        ibuprofen 200 MG capsule      Take 200 mg by mouth as needed.        lisinopril 20 MG tablet    PRINIVIL/ZESTRIL    90 tablet    Take 1 tablet (20 mg) by mouth daily    Essential hypertension with goal blood pressure less than 140/90       pravastatin 20 MG tablet    PRAVACHOL    36 tablet    TAKE 1 TABLET BY MOUTH THREE TIMES A WEEK     Hyperlipidemia LDL goal <130       PRESERVISION AREDS 2 Caps     60 capsule    Take 2 capsules by mouth 2 times daily    Nonexudative senile macular degeneration of retina       VISINE 0.05 % ophthalmic solution   Generic drug:  tetrahydrozoline      1 drop every morning.

## 2018-10-22 NOTE — PROGRESS NOTES
"  SUBJECTIVE:   Hamilton Angulo is a 68 year old male who presents for Preventive Visit.  Are you in the first 12 months of your Medicare Part B coverage?  No    Physical Health:    In general, how would you rate your overall physical health? poor    Outside of work, how many days during the week do you exercise? 4-5 days/week    Outside of work, approximately how many minutes a day do you exercise?15-30 minutes    If you drink alcohol do you typically have >3 drinks per day or >7 drinks per week? No    Do you usually eat at least 4 servings of fruit and vegetables a day, include whole grains & fiber and avoid regularly eating high fat or \"junk\" foods? NO    Do you have any problems taking medications regularly?  No    Do you have any side effects from medications? none    Needs assistance for the following daily activities: None    Home safety:  none identified     Hearing impairment: Yes, Difficulty following a conversation in a noisy restaurant or crowded room.    In the past 6 months, have you been bothered by leaking of urine? Bloody urine    Mental Health:    In general, how would you rate your overall mental or emotional health? good  PHQ-2 Score:      Additional concerns to address?  YES- Bloody Urine, Fingers     Fall risk:      Fallen 2 or more times in the past year?: No  Any fall with injury in the past year?: No    COGNITIVE SCREEN  1) Repeat 3 items (Leader, Season, Table)    2) Clock draw: NORMAL  3) 3 item recall: Recalls 3 objects  Results: 3 items recalled: COGNITIVE IMPAIRMENT LESS LIKELY    Mini-CogTM Copyright MATT Flores. Licensed by the author for use in [facility]; reprinted with permission (mick@.[facility]). All rights reserved.      Prostate  Following a bladder scan, unnoticed urinary retension, and hematuria he had a prostate biopsy and has not yet received results yet. He is following with Dr. Foote of urology.  Apart from his worry about the results he feels really great. "     Dupuytren's Contracture  He mentions that his hands are worse than they use to be making tasks like shoveling more difficult. He noticed that this really didn't bother him until about a year ago. Patient will follow up with a hand specialist for treatment.     Additional Information   He is due for a colonoscopy and plans to follow up on this. His last exam had normal results he says. He notes some minor hearing loss and plans to follow with an audiogram if this worsens.    Reviewed and updated as needed this visit by clinical staff       Reviewed and updated as needed this visit by Provider        Social History   Substance Use Topics     Smoking status: Never Smoker     Smokeless tobacco: Never Used     Alcohol use Yes      Comment: Ocss.     Do you feel safe in your environment - Yes    Do you have a Health Care Directive?: No: Advance care planning was reviewed with patient; patient declined at this time.    Current providers sharing in care for this patient include:   Patient Care Team:  Bro Herrera MD as PCP - General (Internal Medicine)    The following health maintenance items are reviewed in Epic and correct as of today:  Health Maintenance   Topic Date Due     COLON CANCER SCREEN (SYSTEM ASSIGNED)  08/16/2017     ADVANCE DIRECTIVE PLANNING Q5 YRS  12/28/2017     INFLUENZA VACCINE (1) 09/01/2018     FALL RISK ASSESSMENT  10/13/2018     PHQ-2 Q1 YR  10/13/2018     EYE EXAM Q1 YEAR  07/19/2019     LIPID SCREEN Q5 YR MALE (SYSTEM ASSIGNED)  10/13/2022     TETANUS IMMUNIZATION (SYSTEM ASSIGNED)  10/13/2027     PNEUMOCOCCAL  Completed     AORTIC ANEURYSM SCREENING (SYSTEM ASSIGNED)  Completed     HEPATITIS C SCREENING  Completed     BP Readings from Last 3 Encounters:   10/22/18 124/72   10/01/18 128/78   11/03/17 130/62    Wt Readings from Last 3 Encounters:   10/22/18 74.8 kg (164 lb 12.8 oz)   10/13/17 76.7 kg (169 lb)   10/07/16 74.8 kg (165 lb)           Patient Active Problem List   Diagnosis      "HYPERLIPIDEMIA LDL GOAL <130     PSEUDOPHAKIA OU     Advanced directives, counseling/discussion     History of actinic keratoses     PVD (posterior vitreous detachment), bilateral     Nonexudative senile macular degeneration of retina     Elevated glucose     Essential hypertension with goal blood pressure less than 140/90     Elevated prostate specific antigen (PSA)     Past Surgical History:   Procedure Laterality Date     CATARACT IOL, RT/LT  12/2003    Bilateral       Social History   Substance Use Topics     Smoking status: Never Smoker     Smokeless tobacco: Never Used     Alcohol use Yes      Comment: Ocss.     Family History   Problem Relation Age of Onset     Cancer Mother      Hypertension Mother      HEART DISEASE Maternal Grandmother      Cancer Maternal Grandfather          ROS:  Constitutional, HEENT, cardiovascular, pulmonary, GI, , musculoskeletal, neuro, skin, endocrine and psych systems are negative, except as otherwise noted.    This document serves as a record of the services and decisions personally performed and made by Bro Herrera MD. It was created on his behalf by Steve Schilling, a trained medical scribe. The creation of this document is based on the provider's statements to the medical scribe.  Steve Schilling 9:04 AM October 22, 2018    OBJECTIVE:   /72  Pulse 69  Temp 97.4  F (36.3  C) (Oral)  Resp 16  Ht 6' 0.64\" (1.845 m)  Wt 164 lb 12.8 oz (74.8 kg)  SpO2 100%  BMI 21.96 kg/m2 Estimated body mass index is 22.92 kg/(m^2) as calculated from the following:    Height as of 10/13/17: 6' (1.829 m).    Weight as of 10/13/17: 169 lb (76.7 kg).  EXAM:   GENERAL: healthy, alert and no distress  EYES: Eyes grossly normal to inspection, PERRL and conjunctivae and sclerae normal  HENT: ear canals and TM's normal, nose and mouth without ulcers or lesions  NECK: no adenopathy, no asymmetry, masses, or scars and thyroid normal to palpation  RESP: lungs clear to auscultation - no rales, " rhonchi or wheezes  CV: regular rate and rhythm, normal S1 S2, no S3 or S4, no murmur, click or rub, no peripheral edema and peripheral pulses strong  ABDOMEN: soft, nontender, no hepatosplenomegaly, no masses and bowel sounds normal  MS: there are major signs of scarring and loss of normal range of motion with bilateral pinkys in the hands consistent with Dupuytren contracture , moderately severe   SKIN: no suspicious lesions or rashes to visible skin   NEURO: Normal strength and tone, mentation intact and speech normal  PSYCH: mentation appears normal, affect normal/bright    Diagnostic Test Results:  No results found for this or any previous visit (from the past 24 hour(s)).    ASSESSMENT / PLAN:   (Z00.00) Routine history and physical examination of adult  (primary encounter diagnosis)  Comment: routine screening issues   Plan: generally doing well     (Z12.11) Screen for colon cancer  Comment: due for this test / procedure / healthcare maintenance   Plan: GASTROENTEROLOGY ADULT REF PROCEDURE ONLY            (Z23) Need for prophylactic vaccination and inoculation against influenza  Comment: administered   Plan: FLU VACCINE, INCREASED ANTIGEN, PRESV FREE, AGE        65+ [23769], Vaccine Administration, Initial         [08381]            (Z23) Need for shingles vaccine  Comment: pharmacy router form given   Plan:     (R73.09) Elevated glucose  Comment: doubt clinical significance but warrants hemoglobin a1c  [ diabetes test ]   Plan: Basic metabolic panel, Hemoglobin A1c            (I10) Essential hypertension with goal blood pressure less than 140/90  Comment: controlled within acceptable limits   Plan: lisinopril (PRINIVIL/ZESTRIL) 20 MG tablet,         Basic metabolic panel            (E78.5) Hyperlipidemia LDL goal <130  Comment: due for recheck   Plan: Lipid panel reflex to direct LDL Fasting        Follow up as indicated on results     (M72.0) Dupuytren's contracture of both hands  Comment: this is a serious  case, wonder about XIAFLEX  (collagenase clostridium histolyticum) , need to see a hand surgeon specialist  !  Plan: ORTHOPEDICS ADULT REFERRAL              End of Life Planning:  Patient currently has an advanced directive: No.  I have verified the patient's ablity to prepare an advanced directive/make health care decisions.  Literature was provided to assist patient in preparing an advanced directive.    COUNSELING:  Reviewed preventive health counseling, as reflected in patient instructions    BP Readings from Last 1 Encounters:   10/01/18 128/78     Estimated body mass index is 22.92 kg/(m^2) as calculated from the following:    Height as of 10/13/17: 6' (1.829 m).    Weight as of 10/13/17: 169 lb (76.7 kg).     reports that he has never smoked. He has never used smokeless tobacco.    Appropriate preventive services were discussed with this patient, including applicable screening as appropriate for cardiovascular disease, diabetes, osteopenia/osteoporosis, and glaucoma.  As appropriate for age/gender, discussed screening for colorectal cancer, prostate cancer, breast cancer, and cervical cancer. Checklist reviewing preventive services available has been given to the patient.    Reviewed patients plan of care and provided an AVS. The Basic Care Plan (routine screening as documented in Health Maintenance) for Hamilton meets the Care Plan requirement. This Care Plan has been established and reviewed with the Patient.    Counseling Resources:  ATP IV Guidelines  Pooled Cohorts Equation Calculator  Breast Cancer Risk Calculator  FRAX Risk Assessment  ICSI Preventive Guidelines  Dietary Guidelines for Americans, 2010  USDA's MyPlate  ASA Prophylaxis  Lung CA Screening    The information in this document, created by the medical scribe for me, accurately reflects the services I personally performed and the decisions made by me. I have reviewed and approved this document for accuracy prior to leaving the patient care  area.  October 22, 2018 9:14 AM    Bro Herrera MD  [facility]

## 2018-10-22 NOTE — TELEPHONE ENCOUNTER
Requested Prescriptions   Pending Prescriptions Disp Refills     pravastatin (PRAVACHOL) 20 MG tablet 36 tablet 3     Sig: TAKE 1 TABLET BY MOUTH THREE TIMES A WEEK    There is no refill protocol information for this order        Last Written Prescription Date:  10/13/2017  Last Fill Quantity: 36,  # refills: 3   Last office visit: 10/22/2018   with prescribing provider:  Javier   Future Office Visit:   Next 5 appointments (look out 90 days)     Oct 26, 2018 11:30 AM CDT   Return Visit with Wilder Foote MD   [facility] [place] ([facility])    [address] [place] [address]  [place] [address]   [phone]

## 2018-10-22 NOTE — LETTER
[facility]  [address] [place] [address] [place] [address]    October 22, 2018    Hamilton Angulo  [address] [place] [address]    Dear Hamilton,    All of these tests are within acceptable limits. Things look good     Enclosed is a copy of your results.     Results for orders placed or performed in visit on 10/22/18   Lipid panel reflex to direct LDL Fasting   Result Value Ref Range    Cholesterol 157 <200 mg/dL    Triglycerides 112 <150 mg/dL    HDL Cholesterol 60 >39 mg/dL    LDL Cholesterol Calculated 75 <100 mg/dL    Non HDL Cholesterol 97 <130 mg/dL   Basic metabolic panel   Result Value Ref Range    Sodium 141 133 - 144 mmol/L    Potassium 5.1 3.4 - 5.3 mmol/L    Chloride 108 94 - 109 mmol/L    Carbon Dioxide 28 20 - 32 mmol/L    Anion Gap 5 3 - 14 mmol/L    Glucose 108 (H) 70 - 99 mg/dL    Urea Nitrogen 12 7 - 30 mg/dL    Creatinine 1.02 0.66 - 1.25 mg/dL    GFR Estimate 73 >60 mL/min/1.7m2    GFR Estimate If Black 88 >60 mL/min/1.7m2    Calcium 9.3 8.5 - 10.1 mg/dL   Hemoglobin A1c   Result Value Ref Range    Hemoglobin A1C 5.2 0 - 5.6 %   If you have any questions or concerns, please call myself or my nurse at [phone].      Sincerely,        Bro Herrera MD/riley

## 2018-10-23 RX ORDER — PRAVASTATIN SODIUM 20 MG
TABLET ORAL
Qty: 36 TABLET | Refills: 3 | Status: SHIPPED | OUTPATIENT
Start: 2018-10-23 | End: 2019-09-16

## 2018-10-24 ENCOUNTER — TELEPHONE (OUTPATIENT)
Dept: FAMILY MEDICINE | Facility: CLINIC | Age: 68
End: 2018-10-24

## 2018-10-24 RX ORDER — MULTIVITAMIN/IRON/FOLIC ACID 18MG-0.4MG
1 TABLET ORAL DAILY
COMMUNITY
Start: 2018-10-24

## 2018-10-24 RX ORDER — ACETAMINOPHEN 325 MG/1
325-650 TABLET ORAL EVERY 4 HOURS PRN
COMMUNITY
Start: 2018-10-24

## 2018-10-24 RX ORDER — POLYVINYL ALCOHOL/POVIDONE 0.5%-0.6%
1 DROPS OPHTHALMIC (EYE)
COMMUNITY
Start: 2018-10-24

## 2018-10-24 NOTE — TELEPHONE ENCOUNTER
Reason for Call:  Request for results:    Name of test or procedure: Lab    Date of test of procedure: NA    Location of the test or procedure: [facility]    OK to leave the result message on voice mail or with a family member? NO    Phone number Patient can be reached at:  Home number on file [phone] (home)    Additional comments: Patient wanted to speak with rn regarding lab results. Transferred per patient to RN hotline.    Call taken on 10/24/2018 at 2:52 PM by Katja Davila

## 2018-10-24 NOTE — TELEPHONE ENCOUNTER
Per patient, he received the influenza vaccine on 10/22/18  Last night, he felt the shakes and was shivering, ears burning up  Symptoms resolved by morning and he feels fine.  He is wondering if that was a normal reaction  Advise him that it could be a normal reaction and as long as he feels better, we would not be too concerned about it.  Explained that the body responds to the vaccination and may lead to symptoms  Advised that this is a sign that the body is building immunity so that his immune system can recognize and fight off the illness the next time if comes into contact with it again.  Patient verbalized understanding.    Patient also wanted to update his med list as it was not all correct on his AVS  Med list updated    Angelia Yao RN

## 2018-10-25 LAB — COPATH REPORT: NORMAL

## 2018-10-26 ENCOUNTER — TELEPHONE (OUTPATIENT)
Dept: UROLOGY | Facility: CLINIC | Age: 68
End: 2018-10-26

## 2018-10-26 ENCOUNTER — OFFICE VISIT (OUTPATIENT)
Dept: FAMILY MEDICINE | Facility: CLINIC | Age: 68
End: 2018-10-26
Payer: COMMERCIAL

## 2018-10-26 ENCOUNTER — OFFICE VISIT (OUTPATIENT)
Dept: UROLOGY | Facility: CLINIC | Age: 68
End: 2018-10-26
Payer: COMMERCIAL

## 2018-10-26 VITALS
TEMPERATURE: 97.9 F | WEIGHT: 165.8 LBS | RESPIRATION RATE: 14 BRPM | HEART RATE: 85 BPM | HEIGHT: 73 IN | OXYGEN SATURATION: 94 % | BODY MASS INDEX: 21.98 KG/M2 | SYSTOLIC BLOOD PRESSURE: 150 MMHG | DIASTOLIC BLOOD PRESSURE: 76 MMHG

## 2018-10-26 VITALS — SYSTOLIC BLOOD PRESSURE: 138 MMHG | DIASTOLIC BLOOD PRESSURE: 76 MMHG | HEART RATE: 78 BPM | RESPIRATION RATE: 16 BRPM

## 2018-10-26 DIAGNOSIS — R33.8 BENIGN PROSTATIC HYPERPLASIA WITH URINARY RETENTION: ICD-10-CM

## 2018-10-26 DIAGNOSIS — Z01.818 PREOP GENERAL PHYSICAL EXAM: Primary | ICD-10-CM

## 2018-10-26 DIAGNOSIS — I10 ESSENTIAL HYPERTENSION WITH GOAL BLOOD PRESSURE LESS THAN 140/90: ICD-10-CM

## 2018-10-26 DIAGNOSIS — N40.1 HYPERTROPHY OF PROSTATE WITH URINARY OBSTRUCTION: ICD-10-CM

## 2018-10-26 DIAGNOSIS — R97.20 ELEVATED PROSTATE SPECIFIC ANTIGEN (PSA): ICD-10-CM

## 2018-10-26 DIAGNOSIS — N40.1 BENIGN PROSTATIC HYPERPLASIA WITH URINARY RETENTION: ICD-10-CM

## 2018-10-26 DIAGNOSIS — E78.5 HYPERLIPIDEMIA LDL GOAL <130: ICD-10-CM

## 2018-10-26 DIAGNOSIS — R97.20 ELEVATED PROSTATE SPECIFIC ANTIGEN (PSA): Primary | ICD-10-CM

## 2018-10-26 DIAGNOSIS — N13.8 HYPERTROPHY OF PROSTATE WITH URINARY OBSTRUCTION: ICD-10-CM

## 2018-10-26 PROCEDURE — 99214 OFFICE O/P EST MOD 30 MIN: CPT | Performed by: FAMILY MEDICINE

## 2018-10-26 PROCEDURE — 99213 OFFICE O/P EST LOW 20 MIN: CPT | Performed by: UROLOGY

## 2018-10-26 PROCEDURE — 93000 ELECTROCARDIOGRAM COMPLETE: CPT | Performed by: FAMILY MEDICINE

## 2018-10-26 NOTE — PATIENT INSTRUCTIONS
Surgery Preparation    You will receive a phone call from the [facility] within 1-2 days. If you do not receive a call within 2 days, contact [phone] to schedule the procedure. You can write the location/date/time of surgery in the space provided below for your records.    Location of Surgery:                                          Date of Surgery:                                                 Time of Arrival:                                                   Time of Surgery:                                                   Please arrange an appointment with a primary care provider for a pre-op physical. This is a mandatory appointment that needs to be completed within the two weeks prior to your surgery date. This gives clearance for you to receive anesthesia during your procedure. If you have had a pre-op physical within 30 days of your surgery date, you may not need another one. Check with your provider.    If you are having a Same Day Surgery, you must have a  to and from the procedure. Someone needs to stay with you post-operatively for 12 hours.     Do not eat or drink any solids, milk products, or pulpy juices after midnight the night before your surgery. You may have clear liquids up to 8 hours prior to the surgery. This would include water, soda, coffee (without cream), tea, broth, and jello.    Please stop all over the counter blood thinners such as Aspirin, Advil, Aleve, Ibuprofen, Motrin, and Excedrin one week prior to surgery. Please discontinue prescription blood thinners 5-7 days prior to surgery, per your primary physician's orders.     Please check with your insurance company to confirm that your procedure is covered, and that the facility is in-network.     Call the Urology Department @ [phone] if you have questions about your surgery, or if you need to reschedule your procedure.       TURP  You have an enlarged prostate gland. This is also called benign  prostatic hyperplasia (BPH). BPH is not cancer, but it can cause problems with urination. To relieve your symptoms, your healthcare provider may recommend laser prostatectomy. This procedure uses a laser (concentrated light energy). The laser removes prostate tissue from around the urethra. The urethra is the tube that carries urine from the bladder out of the body. The surgery lets urine flow more freely. The laser destroys the prostate tissue. This means it can t be looked at for signs of cancer.     Types of prostate laser surgery  The type of laser surgery your healthcare provider will do may depend on your health, the size of your prostate, and the type of tools available. The different types of prostate laser surgery are:    Photoselective vaporization of the prostate (PVP). The laser is used to vaporize or melt away the extra prostate tissue.    Holmium laser ablation of the prostate (HoLAP). This type of surgery is similar to PVP, but uses a different kind of laser.    Holmium laser enucleation of the prostate (HoLEP). In this procedure the laser cuts and removes extra tissue. A tool cuts the tissue into small pieces. This makes them easier to remove.   Possible risks and side effects of laser prostatectomy    Bleeding    Infection    Pneumonia    Blood clots    Scarring or narrowing of the urethra. This can cause trouble urinating.    Only some relief of symptoms    Erectile dysfunction (rare)    Loss of bladder control (very rare)    Loss of ejaculation  Getting ready for the procedure  Your healthcare team will give you detailed instructions on how to get ready for the procedure. Be sure to follow them.    Tell your healthcare team about all medicines you take. This includes prescription and over-the-counter medicines, vitamins, herbs, and other supplements. It also includes any blood thinners such as warfarin, clopidogrel, or daily aspirin. You may need to stop taking some or all of them  before surgery.    You will probably be told not to eat or drink anything after midnight on the night before your procedure.    When you arrive for the procedure, you will have an IV (intravenous) line placed. This gives you fluids and medicines during the procedure.    You will be given medicine to keep you from feeling pain (anesthesia) before the procedure. You may have 1 or more of the following:  ? Local anesthesia. Your bladder and urethra are numbed.  ? Regional anesthesia. Your body below the waist is numbed.  ? General anesthesia. You are in a state like deep sleep.  During the procedure  Your healthcare provider can help explain the details of your surgery. These details depend on the type of laser surgery that will be done. In general, you can expect the following:    The procedure itself usually takes less than an hour.    A thin, tube-like telescope (cystoscope) is put through the opening in your penis and into your urethra. This tool lets your provider see your urethra and your prostate, either through the cystoscope or on a video screen.    The laser is put through the cystoscope. The laser is then used to destroy the extra prostate tissue.    You may get a urinary catheter. This helps your bladder drain for a short time after the procedure. It s removed when it s no longer needed.  After the procedure  You may go home the same day after your surgery. Or you may stay a night in the hospital. An adult friend or family member should drive you home. To get the best results from your laser prostatectomy, follow your healthcare provider's instructions. Keep your follow-up appointments.    Your prostate will likely be sore at first. This will get better as you heal. Here are some things you can expect:    You may be sent home with a catheter to drain urine from your bladder. If so, you may wear a leg bag until it's no longer needed. The catheter will allow the area to heal and help you avoid painful  urination.    Your provider may also prescribe antibiotics to prevent infection and pain medicine to ease any discomfort.    In about a week, you ll visit your provider to have your catheter removed. If swelling still makes urination difficult, the catheter may be left in longer. After the catheter is removed, you may need to urinate more often. This is normal and should get better with time.    For the first few weeks after your procedure, you may notice that your urine is cloudy. Or you may have blood or blood clots in your urine. This is normal while your body rids itself of the treated tissue. These symptoms may begin to get better during the first few weeks. But it may take a few months before they go away. Your provider can tell you when you can have sex again and when you can go back to work.    You also may be told to avoid lifting anything over 10 pounds or bending over to lift things from the ground.    You should drink plenty of fluids to help flush out your bladder.  Getting back to sex  You may be glad to know that BPH and its treatments rarely cause problems with sex. You may find that you have retrograde ejaculation. This happens when semen goes into the bladder instead of the urethra during ejaculation. Retrograde ejaculation is common after procedures for BPH. But even if this does occur, orgasm shouldn t feel any different than it did before the procedure. And it should not cause any health problems or affect your sexual function. If you notice any problems with sex, talk with your healthcare provider. Help may be available.  When to call your healthcare provider  Contact your healthcare provider right away if:    You have a fever of 100.4 F (38 C) or higher, or as directed by your provider    You have excessive bleeding    You have pain not relieved by medicines    You notice that no urine is draining from the catheter or the catheter falls out    You have a frequent or very strong urge to  urinate    You re not able to urinate, or notice a decrease in urine flow   Date Last Reviewed: 2/1/2017 2000-2017 [employer]. [address] [place] [address], [place] [address]. All rights reserved. This information is not intended as a substitute for professional medical care. Always follow your healthcare professional's instructions.

## 2018-10-26 NOTE — PATIENT INSTRUCTIONS
[facility]    If you have any questions regarding to your visit please contact your care team:       Team Purple:   Clinic Hours Telephone Number   Dr. Hilaria Sosa   7am-7pm  Monday - Thursday   7am-5pm  Fridays  [phone]  (Appointment scheduling available 24/7)   Urgent Care - [place] and [facility] - 11am-9pm Monday-Friday Saturday-Sunday- 9am-5pm   [place] - 5pm-9pm Monday-Friday Saturday-Sunday- 9am-5pm  [phone] - [place]  [phone] - [place]       What options do I have for a visit other than an office visit? We offer electronic visits (e-visits) and telephone visits, when medically appropriate.  Please check with your medical insurance to see if these types of visits are covered, as you will be responsible for any charges that are not paid by your insurance.      You can use [employer] (secure electronic communication) to access to your chart, send your primary care provider a message, or make an appointment. Ask a team member how to get started.     For a price quote for your services, please call our Consumer Price Line at [phone] or our Imaging Cost estimation line at [phone] (for imaging tests).    Yusef Ibarra    Before Your Surgery      Call your surgeon if there is any change in your health. This includes signs of a cold or flu (such as a sore throat, runny nose, cough, rash or fever).    Do not smoke, drink alcohol or take over the counter medicine (unless your surgeon or primary care doctor tells you to) for the 24 hours before and after surgery.    If you take prescribed drugs: Follow your doctor s orders about which medicines to take and which to stop until after surgery.    Eating and drinking prior to surgery: follow the instructions from your surgeon    Take a shower or bath the night before surgery. Use the soap your surgeon gave you to gently clean your skin. If you do not have soap  from your surgeon, use your regular soap. Do not shave or scrub the surgery site.  Wear clean pajamas and have clean sheets on your bed.     Recommendation:   - Patient is to take all scheduled medications on the day of surgery EXCEPT for modifications listed below.    ACE Inhibitor or Angiotensin Receptor Blocker (ARB) Use  Ace inhibitor or Angiotensin Receptor Blocker (ARB) and should HOLD this medication for the 24 hours prior to surgery (on the morning of surgery).    APPROVAL GIVEN to proceed with proposed procedure, without further diagnostic evaluation     Signed Electronically by: Jeffrey Hernandez MD

## 2018-10-26 NOTE — TELEPHONE ENCOUNTER
Paperwork faxed to [facility]     Thank you,   Glenis Mcghee   Referral Department  [phone]

## 2018-10-26 NOTE — MR AVS SNAPSHOT
After Visit Summary   10/26/2018    Hamilton Angulo    MRN: [id number]           Patient Information     Date Of Birth          1950        Visit Information        Provider Department      10/26/2018 11:30 AM Wilder Foote MD [facility] [place]        Today's Diagnoses     Elevated prostate specific antigen (PSA)    -  1    Benign prostatic hyperplasia with urinary retention          Care Instructions    Surgery Preparation    You will receive a phone call from the [facility] within 1-2 days. If you do not receive a call within 2 days, contact [phone] to schedule the procedure. You can write the location/date/time of surgery in the space provided below for your records.    Location of Surgery:                                          Date of Surgery:                                                 Time of Arrival:                                                   Time of Surgery:                                                   Please arrange an appointment with a primary care provider for a pre-op physical. This is a mandatory appointment that needs to be completed within the two weeks prior to your surgery date. This gives clearance for you to receive anesthesia during your procedure. If you have had a pre-op physical within 30 days of your surgery date, you may not need another one. Check with your provider.    If you are having a Same Day Surgery, you must have a  to and from the procedure. Someone needs to stay with you post-operatively for 12 hours.     Do not eat or drink any solids, milk products, or pulpy juices after midnight the night before your surgery. You may have clear liquids up to 8 hours prior to the surgery. This would include water, soda, coffee (without cream), tea, broth, and jello.    Please stop all over the counter blood thinners such as Aspirin, Advil, Aleve, Ibuprofen, Motrin, and Excedrin one week prior to surgery. Please  discontinue prescription blood thinners 5-7 days prior to surgery, per your primary physician's orders.     Please check with your insurance company to confirm that your procedure is covered, and that the facility is in-network.     Call the Urology Department @ [phone] if you have questions about your surgery, or if you need to reschedule your procedure.       TURP  You have an enlarged prostate gland. This is also called benign prostatic hyperplasia (BPH). BPH is not cancer, but it can cause problems with urination. To relieve your symptoms, your healthcare provider may recommend laser prostatectomy. This procedure uses a laser (concentrated light energy). The laser removes prostate tissue from around the urethra. The urethra is the tube that carries urine from the bladder out of the body. The surgery lets urine flow more freely. The laser destroys the prostate tissue. This means it can t be looked at for signs of cancer.     Types of prostate laser surgery  The type of laser surgery your healthcare provider will do may depend on your health, the size of your prostate, and the type of tools available. The different types of prostate laser surgery are:    Photoselective vaporization of the prostate (PVP). The laser is used to vaporize or melt away the extra prostate tissue.    Holmium laser ablation of the prostate (HoLAP). This type of surgery is similar to PVP, but uses a different kind of laser.    Holmium laser enucleation of the prostate (HoLEP). In this procedure the laser cuts and removes extra tissue. A tool cuts the tissue into small pieces. This makes them easier to remove.   Possible risks and side effects of laser prostatectomy    Bleeding    Infection    Pneumonia    Blood clots    Scarring or narrowing of the urethra. This can cause trouble urinating.    Only some relief of symptoms    Erectile dysfunction (rare)    Loss of bladder control (very rare)    Loss of ejaculation  Getting ready for the  procedure  Your healthcare team will give you detailed instructions on how to get ready for the procedure. Be sure to follow them.    Tell your healthcare team about all medicines you take. This includes prescription and over-the-counter medicines, vitamins, herbs, and other supplements. It also includes any blood thinners such as warfarin, clopidogrel, or daily aspirin. You may need to stop taking some or all of them before surgery.    You will probably be told not to eat or drink anything after midnight on the night before your procedure.    When you arrive for the procedure, you will have an IV (intravenous) line placed. This gives you fluids and medicines during the procedure.    You will be given medicine to keep you from feeling pain (anesthesia) before the procedure. You may have 1 or more of the following:  ? Local anesthesia. Your bladder and urethra are numbed.  ? Regional anesthesia. Your body below the waist is numbed.  ? General anesthesia. You are in a state like deep sleep.  During the procedure  Your healthcare provider can help explain the details of your surgery. These details depend on the type of laser surgery that will be done. In general, you can expect the following:    The procedure itself usually takes less than an hour.    A thin, tube-like telescope (cystoscope) is put through the opening in your penis and into your urethra. This tool lets your provider see your urethra and your prostate, either through the cystoscope or on a video screen.    The laser is put through the cystoscope. The laser is then used to destroy the extra prostate tissue.    You may get a urinary catheter. This helps your bladder drain for a short time after the procedure. It s removed when it s no longer needed.  After the procedure  You may go home the same day after your surgery. Or you may stay a night in the hospital. An adult friend or family member should drive you home. To get the best results from your laser  prostatectomy, follow your healthcare provider's instructions. Keep your follow-up appointments.    Your prostate will likely be sore at first. This will get better as you heal. Here are some things you can expect:    You may be sent home with a catheter to drain urine from your bladder. If so, you may wear a leg bag until it's no longer needed. The catheter will allow the area to heal and help you avoid painful urination.    Your provider may also prescribe antibiotics to prevent infection and pain medicine to ease any discomfort.    In about a week, you ll visit your provider to have your catheter removed. If swelling still makes urination difficult, the catheter may be left in longer. After the catheter is removed, you may need to urinate more often. This is normal and should get better with time.    For the first few weeks after your procedure, you may notice that your urine is cloudy. Or you may have blood or blood clots in your urine. This is normal while your body rids itself of the treated tissue. These symptoms may begin to get better during the first few weeks. But it may take a few months before they go away. Your provider can tell you when you can have sex again and when you can go back to work.    You also may be told to avoid lifting anything over 10 pounds or bending over to lift things from the ground.    You should drink plenty of fluids to help flush out your bladder.  Getting back to sex  You may be glad to know that BPH and its treatments rarely cause problems with sex. You may find that you have retrograde ejaculation. This happens when semen goes into the bladder instead of the urethra during ejaculation. Retrograde ejaculation is common after procedures for BPH. But even if this does occur, orgasm shouldn t feel any different than it did before the procedure. And it should not cause any health problems or affect your sexual function. If you notice any problems with sex, talk with your  healthcare provider. Help may be available.  When to call your healthcare provider  Contact your healthcare provider right away if:    You have a fever of 100.4 F (38 C) or higher, or as directed by your provider    You have excessive bleeding    You have pain not relieved by medicines    You notice that no urine is draining from the catheter or the catheter falls out    You have a frequent or very strong urge to urinate    You re not able to urinate, or notice a decrease in urine flow   Date Last Reviewed: 2/1/2017 2000-2017 [employer]. [address] [place] [address]. All rights reserved. This information is not intended as a substitute for professional medical care. Always follow your healthcare professional's instructions.                Follow-ups after your visit        Your next 10 appointments already scheduled     Oct 30, 2018  1:30 PM CDT   New Visit with DIANN Stroud MD   [facility] ([facility])    [address] [place] [id number] [phone]              Who to contact     If you have questions or need follow up information about today's clinic visit or your schedule please contact [facility] directly at [phone].  Normal or non-critical lab and imaging results will be communicated to you by MyChart, letter or phone within 4 business days after the clinic has received the results. If you do not hear from us within 7 days, please contact the clinic through MyChart or phone. If you have a critical or abnormal lab result, we will notify you by phone as soon as possible.  Submit refill requests through [employer] or call your pharmacy and they will forward the refill request to us. Please allow 3 business days for your refill to be completed.          Additional Information About Your Visit        Care EveryWhere ID     This is your Care EveryWhere ID. This could be used by other organizations to access  your [facility] medical records  [id number]        Your Vitals Were     Pulse Respirations                78 16           Blood Pressure from Last 3 Encounters:   10/26/18 138/76   10/22/18 124/72   10/01/18 128/78    Weight from Last 3 Encounters:   10/22/18 74.8 kg (164 lb 12.8 oz)   10/13/17 76.7 kg (169 lb)   10/07/16 74.8 kg (165 lb)              Today, you had the following     No orders found for display       Primary Care Provider Office Phone # Fax #    Bro Herrera [phone] [address] [facility] [address]        Equal Access to Services     [facility] : Hadii janell kellyo Sodominic, waaxda luqadaha, qaybta kaalmada aderowenayada, sandy mena . So [facility] [phone].    ATENCIÓN: Si habla español, tiene a quinn disposición servicios gratuitos de asistencia lingüística. Llame al [phone].    We comply with applicable federal civil rights laws and [place] laws. We do not discriminate on the basis of race, color, national origin, age, disability, sex, sexual orientation, or gender identity.            Thank you!     Thank you for choosing [facility]  for your care. Our goal is always to provide you with excellent care. Hearing back from our patients is one way we can continue to improve our services. Please take a few minutes to complete the written survey that you may receive in the mail after your visit with us. Thank you!             Your Updated Medication List - Protect others around you: Learn how to safely use, store and throw away your medicines at www.disposemymeds.org.          This list is accurate as of 10/26/18 11:34 AM.  Always use your most recent med list.                   Brand Name Dispense Instructions for use Diagnosis    acetaminophen 325 MG tablet    TYLENOL     Take as needed following label instructions        EQ COMPLETE MULTIVITAMIN-ADULT Tabs      Take 1 tablet by mouth daily        lisinopril 20 MG  tablet    PRINIVIL/ZESTRIL    90 tablet    Take 1 tablet (20 mg) by mouth daily    Essential hypertension with goal blood pressure less than 140/90       pravastatin 20 MG tablet    PRAVACHOL    36 tablet    TAKE 1 TABLET BY MOUTH THREE TIMES A WEEK    Hyperlipidemia LDL goal <130       SM ARTIFICIAL TEARS Soln      Use per label instructions

## 2018-10-26 NOTE — MR AVS SNAPSHOT
After Visit Summary   10/26/2018    Hamilton Angulo    MRN: [id number]           Patient Information     Date Of Birth          1950        Visit Information        Provider Department      10/26/2018 4:40 PM Jeffrey Hernandez MD [facility]        Today's Diagnoses     Preop general physical exam    -  1    Hypertrophy of prostate with urinary obstruction        Essential hypertension with goal blood pressure less than 140/90        Hyperlipidemia LDL goal <130          Care Instructions    [facility]    If you have any questions regarding to your visit please contact your care team:       Team Purple:   Clinic Hours Telephone Number   Dr. Hilaria Sosa   7am-7pm  Monday - Thursday   7am-5pm  Fridays  [phone]  (Appointment scheduling available 24/7)   Urgent Care - [place] - 11am-9pm Monday-Friday Saturday-Sunday- 9am-5pm   [place] - 5pm-9pm Monday-Friday Saturday-Sunday- 9am-5pm  [phone] - [place]  [phone] [facility]       What options do I have for a visit other than an office visit? We offer electronic visits (e-visits) and telephone visits, when medically appropriate.  Please check with your medical insurance to see if these types of visits are covered, as you will be responsible for any charges that are not paid by your insurance.      You can use [employer]u (secure electronic communication) to access to your chart, send your primary care provider a message, or make an appointment. Ask a team member how to get started.     For a price quote for your services, please call our Consumer Price Line at [phone] or our Imaging Cost estimation line at [phone] (for imaging tests).    Yusef Ibarra    Before Your Surgery      Call your surgeon if there is any change in your health. This includes signs of a cold or flu (such as a sore throat, runny nose, cough,  rash or fever).    Do not smoke, drink alcohol or take over the counter medicine (unless your surgeon or primary care doctor tells you to) for the 24 hours before and after surgery.    If you take prescribed drugs: Follow your doctor s orders about which medicines to take and which to stop until after surgery.    Eating and drinking prior to surgery: follow the instructions from your surgeon    Take a shower or bath the night before surgery. Use the soap your surgeon gave you to gently clean your skin. If you do not have soap from your surgeon, use your regular soap. Do not shave or scrub the surgery site.  Wear clean pajamas and have clean sheets on your bed.     Recommendation:   - Patient is to take all scheduled medications on the day of surgery EXCEPT for modifications listed below.    ACE Inhibitor or Angiotensin Receptor Blocker (ARB) Use  Ace inhibitor or Angiotensin Receptor Blocker (ARB) and should HOLD this medication for the 24 hours prior to surgery (on the morning of surgery).    APPROVAL GIVEN to proceed with proposed procedure, without further diagnostic evaluation     Signed Electronically by: Jeffrey Hernandez MD          Follow-ups after your visit        Your next 10 appointments already scheduled     Oct 30, 2018  1:30 PM CDT   New Visit with DIANN Stroud MD   [facility] ([facility])    [address] [place] [address] [phone]            Nov 27, 2018  8:00 AM CST   Return Visit with Wilder Foote MD   [facility] ([facility])    [address] [place] [id number] [phone]              Who to contact     If you have questions or need follow up information about today's clinic visit or your schedule please contact [facility] directly at [phone].  Normal or non-critical lab and imaging results will be communicated to you by MyChart, letter or phone within 4  "business days after the clinic has received the results. If you do not hear from us within 7 days, please contact the clinic through [employer] or phone. If you have a critical or abnormal lab result, we will notify you by phone as soon as possible.  Submit refill requests through [email or web address] or call your pharmacy and they will forward the refill request to us. Please allow 3 business days for your refill to be completed.          Additional Information About Your Visit        Care EveryWhere ID     This is your Care EveryWhere ID. This could be used by other organizations to access your [facility] medical records  [id number]        Your Vitals Were     Pulse Temperature Respirations Height Pulse Oximetry BMI (Body Mass Index)    85 97.9  F (36.6  C) (Oral) 14 6' 0.64\" (1.845 m) 94% 22.09 kg/m2       Blood Pressure from Last 3 Encounters:   10/26/18 150/76   10/26/18 138/76   10/22/18 124/72    Weight from Last 3 Encounters:   10/26/18 165 lb 12.8 oz (75.2 kg)   10/22/18 164 lb 12.8 oz (74.8 kg)   10/13/17 169 lb (76.7 kg)              We Performed the Following     EKG 12-lead complete w/read - Clinics        Primary Care Provider Office Phone # Fax #    Bro Herrera [phone] [address] [facility] [address]        Equal Access to Services     [facility]: Hadii aad ku hadasho Soomaali, waaxda luqadaha, qaybta kaalmada adeegyada, sandy mena . So [facility] [phone].    ATENCIÓN: Si habla español, tiene a quinn disposición servicios gratuitos de asistencia lingüística. Enrrique al [phone].    We comply with applicable federal civil rights laws and [place] laws. We do not discriminate on the basis of race, color, national origin, age, disability, sex, sexual orientation, or gender identity.            Thank you!     Thank you for choosing [facility]  for your care. Our goal is always to provide you with excellent care. Hearing back from our " patients is one way we can continue to improve our services. Please take a few minutes to complete the written survey that you may receive in the mail after your visit with us. Thank you!             Your Updated Medication List - Protect others around you: Learn how to safely use, store and throw away your medicines at www.disposemymeds.org.          This list is accurate as of 10/26/18  5:21 PM.  Always use your most recent med list.                   Brand Name Dispense Instructions for use Diagnosis    acetaminophen 325 MG tablet    TYLENOL     Take as needed following label instructions        EQ COMPLETE MULTIVITAMIN-ADULT Tabs      Take 1 tablet by mouth daily        lisinopril 20 MG tablet    PRINIVIL/ZESTRIL    90 tablet    Take 1 tablet (20 mg) by mouth daily    Essential hypertension with goal blood pressure less than 140/90       pravastatin 20 MG tablet    PRAVACHOL    36 tablet    TAKE 1 TABLET BY MOUTH THREE TIMES A WEEK    Hyperlipidemia LDL goal <130       SM ARTIFICIAL TEARS Soln      Use per label instructions

## 2018-10-26 NOTE — PROGRESS NOTES
[facility]  [address] [place] [address]  [phone]  Dept: [phone]    PRE-OP EVALUATION:  Today's date: 10/26/2018    Hamilton Angulo (: 1950) presents for pre-operative evaluation assessment as requested by Dr. Foote .  He requires evaluation and anesthesia risk assessment prior to undergoing surgery/procedure for treatment of prostate.    Proposed Surgery/ Procedure: green laser surgery, prostate  Date of Surgery/ Procedure: 10/31/2018  Time of Surgery/ Procedure: arrive by 100pm, and [id number]   Hospital/Surgical Facility: [facility]  Fax number for surgical facility:   Primary Physician: Bro Herrera  Type of Anesthesia Anticipated: to be determined    Patient has a Health Care Directive or Living Will:  NO    1. NO - Do you have a history of heart attack, stroke, stent, bypass or surgery on an artery in the head, neck, heart or legs?  2. NO - Do you ever have any pain or discomfort in your chest?  3. NO - Do you have a history of  Heart Failure?  4. NO - Are you troubled by shortness of breath when: walking on the level, up a slight hill or at night?  5. NO - Do you currently have a cold, bronchitis or other respiratory infection?  6. NO - Do you have a cough, shortness of breath or wheezing?  7. NO - Do you sometimes get pains in the calves of your legs when you walk?  8. NO - Do you or anyone in your family have previous history of blood clots?  9. NO - Do you or does anyone in your family have a serious bleeding problem such as prolonged bleeding following surgeries or cuts?  10. NO - Have you ever had problems with anemia or been told to take iron pills?  11. YES - HAVE YOU HAD ANY ABNORMAL BLOOD LOSS SUCH AS BLACK, TARRY OR BLOODY STOOLS, OR ABNORMAL VAGINAL BLEEDING?   12. NO - Have you ever had a blood transfusion?  13. NO - Have you or any of your relatives ever had problems with anesthesia?  14. NO - Do you have sleep apnea, excessive snoring or  daytime drowsiness?  15. NO - Do you have any prosthetic heart valves?  16. NO - Do you have prosthetic joints?  17. NO - Is there any chance that you may be pregnant?      HPI:     HPI related to upcoming procedure: Elevated PSA, BPH with urinary retention, laser turp recommended.      HYPERLIPIDEMIA - Patient has a long history of significant Hyperlipidemia requiring medication for treatment with recent good control. Patient reports no problems or side effects with the medication.                                                                                                                                                       .  HYPERTENSION - Patient has longstanding history of HTN , currently denies any symptoms referable to elevated blood pressure. Specifically denies chest pain, palpitations, dyspnea, orthopnea, PND or peripheral edema. Blood pressure readings have been in normal range; except today slightly elevated. Current medication regimen is as listed below. Patient denies any side effects of medication.                                                                                                                                                                                  MEDICAL HISTORY:     Patient Active Problem List    Diagnosis Date Noted     Elevated prostate specific antigen (PSA) 10/11/2018     Priority: Medium     Essential hypertension with goal blood pressure less than 140/90 10/13/2017     Priority: Medium     Elevated glucose 10/07/2016     Priority: Medium     PVD (posterior vitreous detachment), bilateral 05/26/2016     Priority: Medium     Nonexudative senile macular degeneration of retina 05/26/2016     Priority: Medium     History of actinic keratoses 09/20/2013     Priority: Medium     treated January 2013 by Dr. Tu Coleman, Ear, Nose, and Throat specialist with [facility]         Advanced directives, counseling/discussion 12/28/2012     Priority: Medium      "Discussed advance care planning with patient; information given to patient to review. 12/28/2012          PSEUDOPHAKIA OU 07/26/2012     Priority: Medium     HYPERLIPIDEMIA LDL GOAL <130 10/31/2010     Priority: Medium      Past Medical History:   Diagnosis Date     Bilateral cataracts 2001     Herniated disc 1983    Lumbar     Hyperlipidemia LDL goal <130      Hypertension 5/2004     Tear of MCL (medial collateral ligament) of knee 6/20/2001    LT Knee/WC     Past Surgical History:   Procedure Laterality Date     CATARACT IOL, RT/LT  12/2003    Bilateral     Current Outpatient Prescriptions   Medication Sig Dispense Refill     acetaminophen (TYLENOL) 325 MG tablet Take as needed following label instructions       Artificial Tear Solution (SM ARTIFICIAL TEARS) SOLN Use per label instructions       lisinopril (PRINIVIL/ZESTRIL) 20 MG tablet Take 1 tablet (20 mg) by mouth daily 90 tablet 3     Multiple Vitamins-Minerals (EQ COMPLETE MULTIVITAMIN-ADULT) TABS Take 1 tablet by mouth daily       pravastatin (PRAVACHOL) 20 MG tablet TAKE 1 TABLET BY MOUTH THREE TIMES A WEEK 36 tablet 3     OTC products: None, except as noted above    Allergies   Allergen Reactions     Shrimp       Latex Allergy: NO    Social History   Substance Use Topics     Smoking status: Never Smoker     Smokeless tobacco: Never Used     Alcohol use Yes      Comment: Ocss.     History   Drug Use No       REVIEW OF SYSTEMS:   Constitutional, HEENT, cardiovascular, pulmonary, gi and gu systems are negative, except as otherwise noted.    EXAM:   /76  Pulse 85  Temp 97.9  F (36.6  C) (Oral)  Resp 14  Ht 6' 0.64\" (1.845 m)  Wt 165 lb 12.8 oz (75.2 kg)  SpO2 94%  BMI 22.09 kg/m2    ZGENERAL APPEARANCE: healthy, alert and no distress     EYES: EOMI,  PERRL     HENT: ear canals and TM's normal and nose and mouth without ulcers or lesions     NECK: no adenopathy and thyroid normal to palpation     RESP: lungs clear to auscultation - no rales, " rhonchi or wheezes     CV: regular rates and rhythm, normal S1 S2, no S3 or S4 and no murmur, click or rub     ABDOMEN:  soft, nontender, no masses and bowel sounds normal     MS: extremities normal- no gross deformities noted, no evidence of inflammation in joints, FROM in all extremities.     SKIN: no suspicious lesions or rashes     NEURO: Normal strength and tone, sensory exam grossly normal, mentation intact and speech normal     PSYCH: mentation appears normal. and affect normal/bright    DIAGNOSTICS:   EKG: Normal sinus rhythm, non specific T wave changes, No ST or T waves changes consider with CAD. No prior EKG for comparison.    Recent Labs   Lab Test  10/22/18   0929  10/13/17   1057  10/07/16   0929   10/23/14   0823   09/09/11   1040   HGB   --   13.5  12.9*   < >  12.4*   < >  13.6   PLT   --    --    --    --   212   --   225   NA  141  137  139   < >   --    < >   --    POTASSIUM  5.1  5.0  4.4   < >   --    < >   --    CR  1.02  1.00  1.05   < >   --    < >   --    A1C  5.2  5.2  5.4   < >   --    --   5.5    < > = values in this interval not displayed.      IMPRESSION:   Reason for surgery/procedure: BPH and elevated PSA/TURP  Diagnosis/reason for consult: BPH with urinary retention/Pre OP    The proposed surgical procedure is considered LOW risk.    REVISED CARDIAC RISK INDEX  The patient has the following serious cardiovascular risks for perioperative complications such as (MI, PE, VFib and 3  AV Block):  No serious cardiac risks  INTERPRETATION: 1 risks: Class II (low risk - 0.9% complication rate)    The patient has the following additional risks for perioperative complications:  No identified additional risks      ICD-10-CM    1. Preop general physical exam Z01.818 EKG 12-lead complete w/read - Clinics   2. Hypertrophy of prostate with urinary obstruction N40.1     N13.8    3. Elevated prostate specific antigen (PSA) R97.20    4. Essential hypertension with goal blood pressure less than 140/90  I10    5. Hyperlipidemia LDL goal <130 E78.5        RECOMMENDATIONS:     -- Patient is to take all scheduled medications on the day of surgery EXCEPT for modifications listed below.    ACE Inhibitor or Angiotensin Receptor Blocker (ARB) Use  Ace inhibitor or Angiotensin Receptor Blocker (ARB) and should HOLD this medication for the 24 hours prior to surgery (on the morning of surgery).    APPROVAL GIVEN to proceed with proposed procedure, without further diagnostic evaluation       Signed Electronically by: Jeffrey Hernandez MD    Copy of this evaluation report is provided to requesting physician.    [facility] Preop Guidelines    Revised Cardiac Risk Index

## 2018-10-26 NOTE — NURSING NOTE
"Chief Complaint   Patient presents with     Pre-Op Exam     Initial /74 (BP Location: Left arm, Patient Position: Chair, Cuff Size: Adult Regular)  Pulse 85  Temp 97.9  F (36.6  C) (Oral)  Resp 14  Ht 6' 0.64\" (1.845 m)  Wt 165 lb 12.8 oz (75.2 kg)  SpO2 94%  BMI 22.09 kg/m2 Estimated body mass index is 22.09 kg/(m^2) as calculated from the following:    Height as of this encounter: 6' 0.64\" (1.845 m).    Weight as of this encounter: 165 lb 12.8 oz (75.2 kg).  BP completed using cuff size: sean Ibarra  "

## 2018-10-26 NOTE — PROGRESS NOTES
Chief Complaint   Patient presents with     RECHECK       Hamilton Angulo is a 68 year old male who presents today for follow up of   Chief Complaint   Patient presents with     RECHECK    f/u after recent biopsy of prostate and urinary retention.  He is without any complaints.    Current Outpatient Prescriptions   Medication Sig Dispense Refill     acetaminophen (TYLENOL) 325 MG tablet Take as needed following label instructions       Artificial Tear Solution (SM ARTIFICIAL TEARS) SOLN Use per label instructions       lisinopril (PRINIVIL/ZESTRIL) 20 MG tablet Take 1 tablet (20 mg) by mouth daily 90 tablet 3     Multiple Vitamins-Minerals (EQ COMPLETE MULTIVITAMIN-ADULT) TABS Take 1 tablet by mouth daily       pravastatin (PRAVACHOL) 20 MG tablet TAKE 1 TABLET BY MOUTH THREE TIMES A WEEK 36 tablet 3     Allergies   Allergen Reactions     Shrimp       Past Medical History:   Diagnosis Date     Bilateral cataracts 2001     Herniated disc 1983    Lumbar     Hyperlipidemia LDL goal <130      Hypertension 5/2004     Tear of MCL (medial collateral ligament) of knee 6/20/2001    LT Knee/WC     Past Surgical History:   Procedure Laterality Date     CATARACT IOL, RT/LT  12/2003    Bilateral     Family History   Problem Relation Age of Onset     Cancer Mother      Hypertension Mother      HEART DISEASE Maternal Grandmother      Cancer Maternal Grandfather      Social History     Social History     Marital status:      Spouse name: N/A     Number of children: N/A     Years of education: N/A     Social History Main Topics     Smoking status: Never Smoker     Smokeless tobacco: Never Used     Alcohol use Yes      Comment: Ocss.     Drug use: No     Sexual activity: Yes     Partners: Female     Other Topics Concern     None     Social History Narrative       REVIEW OF SYSTEMS  =================  C: NEGATIVE for fever, chills, change in weight  I: NEGATIVE for worrisome rashes, moles or lesions  E/M: NEGATIVE for ear, mouth  and throat problems  R: NEGATIVE for significant cough or SHORTNESS OF BREATH,   CV: NEGATIVE for chest pain, palpitations or peripheral edema  GI: NEGATIVE for nausea, abdominal pain, heartburn, or change in bowel habits  NEURO: NEGATIVE any motor/sensory changes  PSYCH: NEGATIVE for recent mood disorder    Physical Exam:  /76 (BP Location: Right arm, Patient Position: Chair, Cuff Size: Adult Regular)  Pulse 78  Resp 16   Patient is pleasant, in no acute distress, good general condition.  Lung: no evidence of respiratory distress    Abdomen: Soft, nondistended, non tender. No masses. No rebound or guarding.   Exam: no cva tenderness  Skin: Warm and dry.  No redness.  Psych: normal mood and affect  Neuro: alert and oriented  Musculaskeletal: moving all extremities  Copath Report Patient Name: BOB RINALDI   MR#: [id number]   Specimen #: [id number]   Collected: 10/19/2018   Received: 10/19/2018   Reported: 10/25/2018 17:43   Ordering Phy(s): DAVID HARRIS     For improved result formatting, select 'View Enhanced Report Format' under    Linked Documents section.     SPECIMEN(S):   A: Prostate needle biopsy, left   B: Prostate needle biopsy, right     FINAL DIAGNOSIS:   A.  Prostate gland, left, needle biopsies:   - Negative for malignancy.   - Focal mild chronic inflammation.     B.  Prostate gland, right, needle biopsies:   - Negative for malignancy.   - Focal mild chronic inflammation.     Electronically signed out by:     Rae Hankins M.D.     CLINICAL HISTORY:   68 year old male.  Elevated prostate specific antigen          Assessment/Plan:   (R97.20) Elevated prostate specific antigen (PSA)  (primary encounter diagnosis)  Comment: no cancer found  Plan: recheck psa in one year    (N40.1,  R33.8) Benign prostatic hyperplasia with urinary retention  Comment: laser turp discussed  Plan: video reviewed.           Risks and benefits discussed           Bleeding/infection/ED/incontinence/retrograde  pyelogram discussed.  15 min spent face to face consultation about tx options.  Schedule for laser turp.

## 2018-10-26 NOTE — TELEPHONE ENCOUNTER
Type of surgery: xps laser prostate turp  CPT 79422  Benign prostatic hyperplasia with urinary retention [N40.1, R33.8]   Location of surgery: [facility]  Date and time of surgery: 10-31-18  1:45pm  Surgeon: Dr Foote  Pre-Op Appt Date: 10-26-18  Post-Op Appt Date: 11-27-18   Packet sent out: No  Pre-cert/Authorization completed:no pre cert needed    Date: 10/26/2018

## 2018-10-30 ENCOUNTER — OFFICE VISIT (OUTPATIENT)
Dept: ORTHOPEDICS | Facility: CLINIC | Age: 68
End: 2018-10-30
Attending: INTERNAL MEDICINE
Payer: COMMERCIAL

## 2018-10-30 VITALS — WEIGHT: 165 LBS | HEIGHT: 73 IN | BODY MASS INDEX: 21.87 KG/M2

## 2018-10-30 DIAGNOSIS — M72.0 DUPUYTREN CONTRACTURE: ICD-10-CM

## 2018-10-30 PROCEDURE — 99203 OFFICE O/P NEW LOW 30 MIN: CPT | Performed by: PLASTIC SURGERY

## 2018-10-30 ASSESSMENT — PAIN SCALES - GENERAL: PAINLEVEL: NO PAIN (0)

## 2018-10-30 NOTE — MR AVS SNAPSHOT
After Visit Summary   10/30/2018    Hamilton Angulo    MRN: [id number]           Patient Information     Date Of Birth          1950        Visit Information        Provider Department      10/30/2018 1:30 PM DIANN Stroud MD [facility]        Today's Diagnoses     Dupuytren contracture          Care Instructions    Thanks for coming today.  [facility]  [address] [place] [address]    To schedule future appointments in Ortho Clinic, you may call [phone].    To schedule ordered imaging by your provider:   Call [facility] [phone]    To schedule an injection ordered by your provider:  Call [facility] Injection scheduling line: [phone]  [facility] available online at:  [email or web address]    Please call if any further questions or concerns ([phone]).  Clinic hours 8 am to 5 pm.    Return to clinic (call) if symptoms worsen or fail to improve.          Follow-ups after your visit        Follow-up notes from your care team     Return if symptoms worsen or fail to improve.      Your next 10 appointments already scheduled     2018  8:00 AM CST   Return Visit with Wilder Foote MD   [facility]    [address] [place] [address] [phone]              Who to contact     If you have questions or need follow up information about today's clinic visit or your schedule please contact [facility] directly at [phone].  Normal or non-critical lab and imaging results will be communicated to you by MyChart, letter or phone within 4 business days after the clinic has received the results. If you do not hear from us within 7 days, please contact the clinic through [facility] or phone. If you have a critical or abnormal lab result, we will notify you by phone as soon as possible.  Submit refill requests through [facility] or call your  "pharmacy and they will forward the refill request to us. Please allow 3 business days for your refill to be completed.          Additional Information About Your Visit        Care EveryWhere ID     This is your Care EveryWhere ID. This could be used by other organizations to access your [facility] medical records  [id number]        Your Vitals Were     Height BMI (Body Mass Index)                6' 0.64\" 21.99 kg/m2           Blood Pressure from Last 3 Encounters:   10/26/18 150/76   10/26/18 138/76   10/22/18 124/72    Weight from Last 3 Encounters:   10/30/18 165 lb   10/26/18 165 lb 12.8 oz   10/22/18 164 lb 12.8 oz              Today, you had the following     No orders found for display       Primary Care Provider Office Phone # Fax #    Bro Herrera [phone] [address] [facility] [address]        Equal Access to Services     [facility]: Hadii aad ku hadasho Soomaali, waaxda luqadaha, qaybta kaalmada adeegyada, waxay shabbirin haymihaelan jose alejandro lopez'aan . [facility] [phone].    ATENCIÓN: Si habla español, tiene a quinn disposición servicios gratuitos de asistencia lingüística. Enrrique al [phone].    We comply with applicable federal civil rights laws and [place] laws. We do not discriminate on the basis of race, color, national origin, age, disability, sex, sexual orientation, or gender identity.            Thank you!     Thank you for choosing [facility]  for your care. Our goal is always to provide you with excellent care. Hearing back from our patients is one way we can continue to improve our services. Please take a few minutes to complete the written survey that you may receive in the mail after your visit with us. Thank you!             Your Updated Medication List - Protect others around you: Learn how to safely use, store and throw away your medicines at www.disposemymeds.org.          This list is accurate as of 10/30/18 11:59 PM.  Always use " your most recent med list.                   Brand Name Dispense Instructions for use Diagnosis    acetaminophen 325 MG tablet    TYLENOL     Take as needed following label instructions        EQ COMPLETE MULTIVITAMIN-ADULT Tabs      Take 1 tablet by mouth daily        lisinopril 20 MG tablet    PRINIVIL/ZESTRIL    90 tablet    Take 1 tablet (20 mg) by mouth daily    Essential hypertension with goal blood pressure less than 140/90       pravastatin 20 MG tablet    PRAVACHOL    36 tablet    TAKE 1 TABLET BY MOUTH THREE TIMES A WEEK    Hyperlipidemia LDL goal <130       SM ARTIFICIAL TEARS Soln      Use per label instructions

## 2018-10-30 NOTE — NURSING NOTE
"Hamilton Angulo's chief complaint for this visit includes:  Chief Complaint   Patient presents with     Hand Problem     Bilateral Dupuytren's contractures. L<H     PCP: Bro Herrera    Referring Provider:  Bro Herrera MD  [address] [place] [address]    Ht 1.845 m (6' 0.64\")  Wt 74.8 kg (165 lb)  BMI 21.99 kg/m2  No Pain (0)     Do you need any medication refills at today's visit? NO    "

## 2018-10-30 NOTE — PROGRESS NOTES
CONSULTATION NOTE      REFERRING PHYSICIAN:  Bro Herrera MD       PRESENTING COMPLAINT:  Consultation for Dupuytren disease/contractures.      HISTORY OF PRESENTING COMPLAINT:  Mr. Angulo is 68 years old.  He is right-hand dominant.  He has noticed some thickening in his palm and fingers as well as contractures for about 2 years now.  It is affecting his day-to-day life.  He cannot wear gloves, cannot wash his face without poking his eye and he finds it difficult shaking hands and doing functional activities with his hand.  He has no family history of Dupuytren.  His left side is worse than his right.      PAST MEDICAL HISTORY:  Hypertension, hyperlipidemia.      PAST SURGICAL HISTORY:  Cataracts.      MEDICATIONS:  Lisinopril, pravastatin.      ALLERGIES:  Nil.      SOCIAL HISTORY:  Does not smoke, socially drinks.  Lives in [place], is retired.      REVIEW OF SYSTEMS:  Denies chest pain, shortness of breath, MI, CVA, DVT and PE.      PHYSICAL EXAMINATION:  Vital signs stable.  He is afebrile, in no obvious distress.  On examination of his right hand, he has a thumb MP joint contracture of 30 degrees, little finger MP joint contracture of 10 degrees, PIP joint contracture of 45 degrees, ring finger MP joint contracture of 30 degrees.  Each has pretendinous as well as spiral cords involving the appropriate joints.  The left hand little finger has an MP joint contracture of 90 degrees, PIP of 70 degrees, ring finger MP joint 45 degrees, PIP joint of 10 degrees and long finger MP joint 10 degrees and PIP joint 40 degrees.  Grossly sensation and vascularity is intact.      ASSESSMENT AND PLAN:  Based on above findings, a diagnosis of Dupuytren contractures of both hands was made.  I had a long discussion with the patient about his findings.  Talked to him about options including conservative management versus surgical partial fasciectomy versus Xiaflex injections.  The pros and cons of each were explained to him.  He  is more interested in the Xiaflex injections.  He wants to start on the left and go to the right.  Explained to him how this is done, the staged nature of the procedure, including potential failure requiring further injections as well risks of infection, swelling, pain, tendon rupture skin rupture, nerve injury, recurrence.  He understood them all and wants to proceed.  We will get prior authorization for the Xiaflex injections and then proceed as indicated.  All questions were answered.  He was happy with the visit.  I look forward to helping him out in near future.      Total time spent with patient 30 minutes, more than half was counseling.      cc:   Bro Herrera MD   [facility]   [address] [facility] [place]  [address]

## 2018-10-30 NOTE — LETTER
10/30/2018         RE: Hamilton Angulo  [address] [address]        Dear Colleague,    Thank you for referring your patient, Hamilton Angulo, to the [facility]. Please see a copy of my visit note below.    CONSULTATION NOTE      REFERRING PHYSICIAN:  Bro Herrera MD       PRESENTING COMPLAINT:  Consultation for Dupuytren disease/contractures.      HISTORY OF PRESENTING COMPLAINT:  Mr. Angulo is 68 years old.  He is right-hand dominant.  He has noticed some thickening in his palm and fingers as well as contractures for about 2 years now.  It is affecting his day-to-day life.  He cannot wear gloves, cannot wash his face without poking his eye and he finds it difficult shaking hands and doing functional activities with his hand.  He has no family history of Dupuytren.  His left side is worse than his right.      PAST MEDICAL HISTORY:  Hypertension, hyperlipidemia.      PAST SURGICAL HISTORY:  Cataracts.      MEDICATIONS:  Lisinopril, pravastatin.      ALLERGIES:  Nil.      SOCIAL HISTORY:  Does not smoke, socially drinks.  Lives in [place], is retired.      REVIEW OF SYSTEMS:  Denies chest pain, shortness of breath, MI, CVA, DVT and PE.      PHYSICAL EXAMINATION:  Vital signs stable.  He is afebrile, in no obvious distress.  On examination of his right hand, he has a thumb MP joint contracture of 30 degrees, little finger MP joint contracture of 10 degrees, PIP joint contracture of 45 degrees, ring finger MP joint contracture of 30 degrees.  Each has pretendinous as well as spiral cords involving the appropriate joints.  The left hand little finger has an MP joint contracture of 90 degrees, PIP of 70 degrees, ring finger MP joint 45 degrees, PIP joint of 10 degrees and long finger MP joint 10 degrees and PIP joint 40 degrees.  Grossly sensation and vascularity is intact.      ASSESSMENT AND PLAN:  Based on above findings, a diagnosis of Dupuytren contractures of both hands was made.   I had a long discussion with the patient about his findings.  Talked to him about options including conservative management versus surgical partial fasciectomy versus Xiaflex injections.  The pros and cons of each were explained to him.  He is more interested in the Xiaflex injections.  He wants to start on the left and go to the right.  Explained to him how this is done, the staged nature of the procedure, including potential failure requiring further injections as well risks of infection, swelling, pain, tendon rupture skin rupture, nerve injury, recurrence.  He understood them all and wants to proceed.  We will get prior authorization for the Xiaflex injections and then proceed as indicated.  All questions were answered.  He was happy with the visit.  I look forward to helping him out in near future.      Total time spent with patient 30 minutes, more than half was counseling.      cc:   Bro Herrera MD   [facility]   [address] [place]  [address]         Again, thank you for allowing me to participate in the care of your patient.        Sincerely,        DIANN Stroud MD

## 2018-10-30 NOTE — PATIENT INSTRUCTIONS
Thanks for coming today.  Or[facility] Medicine Clinic  [address] [place] [address]    To schedule future appointments in Ortho Clinic, you may call [phone].    To schedule ordered imaging by your provider:   Call Central Imaging [phone]    To schedule an injection ordered by your provider:  Call Central Imaging Injection scheduling line: [phone]  [email or web address] available online at:  [email or web address]    Please call if any further questions or concerns ([phone]).  Clinic hours 8 am to 5 pm.    Return to clinic (call) if symptoms worsen or fail to improve.

## 2018-11-01 ENCOUNTER — TELEPHONE (OUTPATIENT)
Dept: UROLOGY | Facility: CLINIC | Age: 68
End: 2018-11-01

## 2018-11-01 NOTE — TELEPHONE ENCOUNTER
Patient left voicemail on Surgery line regarding his catheter. Please call to discuss further. Thank you.

## 2018-11-06 ENCOUNTER — OFFICE VISIT (OUTPATIENT)
Dept: UROLOGY | Facility: CLINIC | Age: 68
End: 2018-11-06
Payer: COMMERCIAL

## 2018-11-06 DIAGNOSIS — Z98.890: Primary | ICD-10-CM

## 2018-11-06 PROCEDURE — 51700 IRRIGATION OF BLADDER: CPT | Mod: 58 | Performed by: UROLOGY

## 2018-11-06 PROCEDURE — 99024 POSTOP FOLLOW-UP VISIT: CPT | Performed by: UROLOGY

## 2018-11-06 NOTE — MR AVS SNAPSHOT
After Visit Summary   11/6/2018    Hamilton Angulo    MRN: [id number]           Patient Information     Date Of Birth          1950        Visit Information        Provider Department      11/6/2018 8:00 AM Wilder Foote MD [facility] [place]        Today's Diagnoses     Status post recent transurethral resection of prostate    -  1       Follow-ups after your visit        Your next 10 appointments already scheduled     Nov 27, 2018  8:00 AM CST   Return Visit with Wilder Foote MD   [facility] [place] ([address]  [place] [id number] [phone]              Who to contact     If you have questions or need follow up information about today's clinic visit or your schedule please contact [facility] [place] directly at [phone].  Normal or non-critical lab and imaging results will be communicated to you by MyChart, letter or phone within 4 business days after the clinic has received the results. If you do not hear from us within 7 days, please contact the clinic through MyChart or phone. If you have a critical or abnormal lab result, we will notify you by phone as soon as possible.  Submit refill requests through [employer] or call your pharmacy and they will forward the refill request to us. Please allow 3 business days for your refill to be completed.          Additional Information About Your Visit        Care EveryWhere ID     This is your Care EveryWhere ID. This could be used by other organizations to access your [facility] medical records  [id number]         Blood Pressure from Last 3 Encounters:   10/26/18 150/76   10/26/18 138/76   10/22/18 124/72    Weight from Last 3 Encounters:   10/30/18 74.8 kg (165 lb)   10/26/18 75.2 kg (165 lb 12.8 oz)   10/22/18 74.8 kg (164 lb 12.8 oz)              We Performed the Following     IRRIGATION BLADDER SIMPLE LAVAGE/INSTILLATION        Primary Care Provider Office Phone # Fax #     Bro Herrera [phone]       [address] [facility]  [place] [address]        Equal Access to Services     SARA MARVIN : Martin janell ojeda mike Del Toro, wajefeda luqalvin, qaderrekta kaalmada clement, sandy chatterjee laGarryharper bartolome. So [facility] [phone].    ATENCIÓN: Si habla español, tiene a quinn disposición servicios gratuitos de asistencia lingüística. Llame al [phone].    We comply with applicable federal civil rights laws and [place] laws. We do not discriminate on the basis of race, color, national origin, age, disability, sex, sexual orientation, or gender identity.            Thank you!     Thank you for choosing [facility]  for your care. Our goal is always to provide you with excellent care. Hearing back from our patients is one way we can continue to improve our services. Please take a few minutes to complete the written survey that you may receive in the mail after your visit with us. Thank you!             Your Updated Medication List - Protect others around you: Learn how to safely use, store and throw away your medicines at www.disposemymeds.org.          This list is accurate as of 11/6/18  8:15 AM.  Always use your most recent med list.                   Brand Name Dispense Instructions for use Diagnosis    acetaminophen 325 MG tablet    TYLENOL     Take as needed following label instructions        EQ COMPLETE MULTIVITAMIN-ADULT Tabs      Take 1 tablet by mouth daily        lisinopril 20 MG tablet    PRINIVIL/ZESTRIL    90 tablet    Take 1 tablet (20 mg) by mouth daily    Essential hypertension with goal blood pressure less than 140/90       pravastatin 20 MG tablet    PRAVACHOL    36 tablet    TAKE 1 TABLET BY MOUTH THREE TIMES A WEEK    Hyperlipidemia LDL goal <130       SM ARTIFICIAL TEARS Soln      Use per label instructions

## 2018-11-06 NOTE — PROGRESS NOTES
Catheter removal documentation on 11/6/2018:    Hamilton Angulo presents to the clinic for catheter removal.  Reason for removal: Trial void   250 ml sterile water instilled through catheter   Catheter successfully removed at 8:14 AM without immediate complication.  Urethral meatus is free of secretions and encrustation.  The patient is afebrile.  The patient tolerated the procedure and was instructed to return or call for pain, fever, leakage or decreased urine flow    Angie Kruger

## 2018-11-06 NOTE — PROGRESS NOTES
Chief Complaint   Patient presents with     Post-op Visit       Hamilton Angulo is a 68 year old male who presents today for follow up of   Chief Complaint   Patient presents with     Post-op Visit    f/u post TURP for urinary retention.    Current Outpatient Prescriptions   Medication Sig Dispense Refill     acetaminophen (TYLENOL) 325 MG tablet Take as needed following label instructions       Artificial Tear Solution (SM ARTIFICIAL TEARS) SOLN Use per label instructions       lisinopril (PRINIVIL/ZESTRIL) 20 MG tablet Take 1 tablet (20 mg) by mouth daily 90 tablet 3     Multiple Vitamins-Minerals (EQ COMPLETE MULTIVITAMIN-ADULT) TABS Take 1 tablet by mouth daily       pravastatin (PRAVACHOL) 20 MG tablet TAKE 1 TABLET BY MOUTH THREE TIMES A WEEK 36 tablet 3     Allergies   Allergen Reactions     Shrimp       Past Medical History:   Diagnosis Date     Bilateral cataracts 2001     Herniated disc 1983    Lumbar     Hyperlipidemia LDL goal <130      Hypertension 5/2004     Tear of MCL (medial collateral ligament) of knee 6/20/2001    LT Knee/WC     Past Surgical History:   Procedure Laterality Date     CATARACT IOL, RT/LT  12/2003    Bilateral     Family History   Problem Relation Age of Onset     Cancer Mother      Hypertension Mother      HEART DISEASE Maternal Grandmother      Cancer Maternal Grandfather      Social History     Social History     Marital status:      Spouse name: N/A     Number of children: N/A     Years of education: N/A     Social History Main Topics     Smoking status: Never Smoker     Smokeless tobacco: Never Used     Alcohol use Yes      Comment: Ocss.     Drug use: No     Sexual activity: Yes     Partners: Female     Other Topics Concern     Not on file     Social History Narrative       REVIEW OF SYSTEMS  =================  C: NEGATIVE for fever, chills, change in weight  I: NEGATIVE for worrisome rashes, moles or lesions  E/M: NEGATIVE for ear, mouth and throat problems  R: NEGATIVE  for significant cough or SHORTNESS OF BREATH,   CV: NEGATIVE for chest pain, palpitations or peripheral edema  GI: NEGATIVE for nausea, abdominal pain, heartburn, or change in bowel habits  NEURO: NEGATIVE any motor/sensory changes  PSYCH: NEGATIVE for recent mood disorder    Physical Exam:  There were no vitals taken for this visit.   Patient is pleasant, in no acute distress, good general condition.  Lung: no evidence of respiratory distress    Abdomen: Soft, nondistended, non tender. No masses. No rebound or guarding.   Exam: 250 ml placed and he voided out completely  Skin: Warm and dry.  No redness.  Psych: normal mood and affect  Neuro: alert and oriented  Musculaskeletal: moving all extremities    Assessment/Plan:   (Z98.890) Status post recent transurethral resection of prostate  (primary encounter diagnosis)  Comment: doing well post op  Plan: rtc in one month for recheck

## 2018-11-27 ENCOUNTER — OFFICE VISIT (OUTPATIENT)
Dept: UROLOGY | Facility: CLINIC | Age: 68
End: 2018-11-27
Payer: COMMERCIAL

## 2018-11-27 VITALS — DIASTOLIC BLOOD PRESSURE: 80 MMHG | SYSTOLIC BLOOD PRESSURE: 152 MMHG | OXYGEN SATURATION: 97 % | HEART RATE: 76 BPM

## 2018-11-27 DIAGNOSIS — R97.20 ELEVATED PROSTATE SPECIFIC ANTIGEN (PSA): ICD-10-CM

## 2018-11-27 DIAGNOSIS — R33.8 BENIGN PROSTATIC HYPERPLASIA WITH URINARY RETENTION: Primary | ICD-10-CM

## 2018-11-27 DIAGNOSIS — I10 ESSENTIAL HYPERTENSION WITH GOAL BLOOD PRESSURE LESS THAN 140/90: ICD-10-CM

## 2018-11-27 DIAGNOSIS — N40.1 BENIGN PROSTATIC HYPERPLASIA WITH URINARY RETENTION: Primary | ICD-10-CM

## 2018-11-27 PROCEDURE — 99024 POSTOP FOLLOW-UP VISIT: CPT | Performed by: UROLOGY

## 2018-11-27 PROCEDURE — 51798 US URINE CAPACITY MEASURE: CPT | Mod: 58 | Performed by: UROLOGY

## 2018-11-27 NOTE — PROGRESS NOTES
Chief Complaint   Patient presents with     RECHECK     post op TURP        Hamilton Angulo is a 68 year old male who presents today for follow up of   Chief Complaint   Patient presents with     RECHECK     post op TURP     f/u post turp for unaware retention of urine.  He is doing well since surgery.    Current Outpatient Prescriptions   Medication Sig Dispense Refill     acetaminophen (TYLENOL) 325 MG tablet Take as needed following label instructions       Artificial Tear Solution (SM ARTIFICIAL TEARS) SOLN Use per label instructions       lisinopril (PRINIVIL/ZESTRIL) 20 MG tablet Take 1 tablet (20 mg) by mouth daily 90 tablet 3     Multiple Vitamins-Minerals (EQ COMPLETE MULTIVITAMIN-ADULT) TABS Take 1 tablet by mouth daily       pravastatin (PRAVACHOL) 20 MG tablet TAKE 1 TABLET BY MOUTH THREE TIMES A WEEK 36 tablet 3     Allergies   Allergen Reactions     Shrimp       Past Medical History:   Diagnosis Date     Bilateral cataracts 2001     Herniated disc 1983    Lumbar     Hyperlipidemia LDL goal <130      Hypertension 5/2004     Tear of MCL (medial collateral ligament) of knee 6/20/2001    LT Knee/WC     Past Surgical History:   Procedure Laterality Date     CATARACT IOL, RT/LT  12/2003    Bilateral     Family History   Problem Relation Age of Onset     Cancer Mother      Hypertension Mother      HEART DISEASE Maternal Grandmother      Cancer Maternal Grandfather      Social History     Social History     Marital status:      Spouse name: N/A     Number of children: N/A     Years of education: N/A     Social History Main Topics     Smoking status: Never Smoker     Smokeless tobacco: Never Used     Alcohol use Yes      Comment: Ocss.     Drug use: No     Sexual activity: Yes     Partners: Female     Other Topics Concern     None     Social History Narrative       REVIEW OF SYSTEMS  =================  C: NEGATIVE for fever, chills, change in weight  I: NEGATIVE for worrisome rashes, moles or lesions  E/M:  NEGATIVE for ear, mouth and throat problems  R: NEGATIVE for significant cough or SHORTNESS OF BREATH,   CV: NEGATIVE for chest pain, palpitations or peripheral edema  GI: NEGATIVE for nausea, abdominal pain, heartburn, or change in bowel habits  NEURO: NEGATIVE any motor/sensory changes  PSYCH: NEGATIVE for recent mood disorder    Physical Exam:  /80 (BP Location: Right arm, Patient Position: Chair, Cuff Size: Adult Regular)  Pulse 76  SpO2 97%   Patient is pleasant, in no acute distress, good general condition.  Lung: no evidence of respiratory distress    Abdomen: Soft, nondistended, non tender. No masses. No rebound or guarding.   Exam: bladder scan 25 ml from over 800 ml before TURP  Skin: Warm and dry.  No redness.  Psych: normal mood and affect  Neuro: alert and oriented  Musculaskeletal: moving all extremities    Assessment/Plan:   (N40.1,  R33.8) Benign prostatic hyperplasia with urinary retention  (primary encounter diagnosis)  Comment: doing well post op  Plan: MEASURE POST-VOID RESIDUAL URINE/BLADDER         CAPACITY, US NON-IMAGING        Recheck in one year    (R97.20) Elevated prostate specific antigen (PSA)  Comment:    Plan: PSA tumor marker         In one year    (I10) Essential hypertension with goal blood pressure less than 140/90  Comment:    Plan: Patient to follow up with Primary Care provider regarding elevated blood pressure.

## 2018-11-27 NOTE — MR AVS SNAPSHOT
After Visit Summary   11/27/2018    Hamilton Angulo    MRN: [id number]           Patient Information     Date Of Birth          1950        Visit Information        Provider Department      11/27/2018 8:00 AM Wilder Foote MD [facility]        Today's Diagnoses     Benign prostatic hyperplasia with urinary retention    -  1    Elevated prostate specific antigen (PSA)        Essential hypertension with goal blood pressure less than 140/90           Follow-ups after your visit        Your next 10 appointments already scheduled     Nov 19, 2019  8:00 AM CST   LAB with [facility]   [facility] [place] ([facility])    [address]  [place] [address]   [phone]           Please do not eat 10-12 hours before your appointment if you are coming in fasting for labs on lipids, cholesterol, or glucose (sugar). This does not apply to pregnant women. Water, hot tea and black coffee (with nothing added) are okay. Do not drink other fluids, diet soda or chew gum.            Nov 26, 2019  8:00 AM CST   Return Visit with Wilder Foote MD   [facility] [place] ([facility])    [address] [place] [address]  [place] [address]   [phone]              Future tests that were ordered for you today     Open Future Orders        Priority Expected Expires Ordered    PSA tumor marker Routine 11/28/2019 11/28/2019 11/27/2018            Who to contact     If you have questions or need follow up information about today's clinic visit or your schedule please contact [facility] [place] directly at [phone].  Normal or non-critical lab and imaging results will be communicated to you by MyChart, letter or phone within 4 business days after the clinic has received the results. If you do not hear from us within 7 days, please contact the clinic through MyChart or phone. If you have a critical or abnormal lab result, we will notify you by phone as soon  as possible.  Submit refill requests through [employer] or call your pharmacy and they will forward the refill request to us. Please allow 3 business days for your refill to be completed.          Additional Information About Your Visit        Care EveryWhere ID     This is your Care EveryWhere ID. This could be used by other organizations to access your [facility] medical records  [id number]        Your Vitals Were     Pulse Pulse Oximetry                76 97%           Blood Pressure from Last 3 Encounters:   11/27/18 152/80   10/26/18 150/76   10/26/18 138/76    Weight from Last 3 Encounters:   10/30/18 74.8 kg (165 lb)   10/26/18 75.2 kg (165 lb 12.8 oz)   10/22/18 74.8 kg (164 lb 12.8 oz)              We Performed the Following     MEASURE POST-VOID RESIDUAL URINE/BLADDER CAPACITY, US NON-IMAGING        Primary Care Provider Office Phone # Fax #    Bro Herrera [phone] [address] [facility] [address]        Equal Access to Services     [facility]: Hadii aad ku hadasho Soomaali, waaxda luqadaha, qaybta kaalmada adeegyada, waxay idiin hayharper mena . So [facility] [phone].    ATENCIÓN: Si habla español, tiene a quinn disposición servicios gratuitos de asistencia lingüística. [facility] [phone].    We comply with applicable federal civil rights laws and [place] laws. We do not discriminate on the basis of race, color, national origin, age, disability, sex, sexual orientation, or gender identity.            Thank you!     Thank you for choosing [facility]  for your care. Our goal is always to provide you with excellent care. Hearing back from our patients is one way we can continue to improve our services. Please take a few minutes to complete the written survey that you may receive in the mail after your visit with us. Thank you!             Your Updated Medication List - Protect others around you: Learn how to safely use, store and throw away  your medicines at www.disposemymeds.org.          This list is accurate as of 11/27/18  8:24 AM.  Always use your most recent med list.                   Brand Name Dispense Instructions for use Diagnosis    acetaminophen 325 MG tablet    TYLENOL     Take as needed following label instructions        EQ COMPLETE MULTIVITAMIN-ADULT Tabs      Take 1 tablet by mouth daily        lisinopril 20 MG tablet    PRINIVIL/ZESTRIL    90 tablet    Take 1 tablet (20 mg) by mouth daily    Essential hypertension with goal blood pressure less than 140/90       pravastatin 20 MG tablet    PRAVACHOL    36 tablet    TAKE 1 TABLET BY MOUTH THREE TIMES A WEEK    Hyperlipidemia LDL goal <130       SM ARTIFICIAL TEARS Soln      Use per label instructions

## 2019-02-06 ENCOUNTER — DOCUMENTATION ONLY (OUTPATIENT)
Dept: OPHTHALMOLOGY | Facility: CLINIC | Age: 69
End: 2019-02-06

## 2019-03-01 ENCOUNTER — TELEPHONE (OUTPATIENT)
Dept: FAMILY MEDICINE | Facility: CLINIC | Age: 69
End: 2019-03-01

## 2019-03-01 NOTE — LETTER
March 1, 2019          Hamilton Angulo,  [address]  [place] [address]        Dear Hamilton Angulo      Monitoring and managing your preventative and chronic health conditions are very important to us. Our records indicate that you have not scheduled for Blood pressure check and a colonoscopy which was recommended by Dr. Herrera.      If you have received your health care elsewhere, please call the clinic so the information can be documented in your chart.    Please call [phone] or message us through your [employer] account to schedule an appointment or provide information for your chart.     Feel free to contact us if you have any questions or concerns!    I look forward to seeing you and working with you on your health care needs.     Sincerely,       Your [facility] Care Team/Stacey JACOME CMA ([facility])

## 2019-03-01 NOTE — TELEPHONE ENCOUNTER
Panel Management Review      Patient has the following on his problem list:     Hypertension   Last three blood pressure readings:  BP Readings from Last 3 Encounters:   11/27/18 152/80   10/26/18 150/76   10/26/18 138/76     Blood pressure: FAILED    HTN Guidelines:  Age 18-59 BP range:  Less than 140/90  Age 60-85 with Diabetes:  Less than 140/90  Age 60-85 without Diabetes:  less than 150/90      Composite cancer screening  Chart review shows that this patient is due/due soon for the following Colonoscopy  Summary:    Patient is due/failing the following:   Zoster, wellness, adv directives and COLONOSCOPY    Action needed:   Patient needs office visit for hypertension.    Type of outreach:    Sent letter.    Questions for provider review:    None                                                                                                                                    Stacey JACOME CMA ([facility])       Chart routed to none .

## 2019-09-16 DIAGNOSIS — E78.5 HYPERLIPIDEMIA LDL GOAL <130: ICD-10-CM

## 2019-09-16 RX ORDER — PRAVASTATIN SODIUM 20 MG
TABLET ORAL
Qty: 36 TABLET | Refills: 0 | Status: SHIPPED | OUTPATIENT
Start: 2019-09-16 | End: 2019-10-29

## 2019-09-17 ENCOUNTER — TELEPHONE (OUTPATIENT)
Dept: FAMILY MEDICINE | Facility: CLINIC | Age: 69
End: 2019-09-17

## 2019-09-17 NOTE — LETTER
September 17, 2019          Hamilton Angulo,  [address] [place] [address]        Dear Hamilton Angulo      Monitoring and managing your preventative and chronic health conditions are very important to us. Our records indicate that you have not scheduled for Appointment with your provider for a general check up around 10/22/2019 which was recommended by Dr. Herrera.      If you have received your health care elsewhere, please call the clinic so the information can be documented in your chart.    Please call [phone] or message us through your nivio account to schedule an appointment or provide information for your chart.     Feel free to contact us if you have any questions or concerns!    I look forward to seeing you and working with you on your health care needs.     Sincerely,       Your [place] Care Team/Stacey BRADY CMA ([facility])

## 2019-09-17 NOTE — TELEPHONE ENCOUNTER
Panel Management Review      Patient has the following on his problem list:     Hypertension   Last three blood pressure readings:  BP Readings from Last 3 Encounters:   11/27/18 152/80   10/26/18 150/76   10/26/18 138/76     Blood pressure: FAILED    HTN Guidelines:  Less than 140/90      Composite cancer screening  Chart review shows that this patient is due/due soon for the following Colonoscopy  Summary:    Patient is due/failing the following:   Zoster, adv directives, phq2, eye exam and COLONOSCOPY    Action needed:   Patient needs office visit for blood pressure check, general exam.    Type of outreach:    Sent letter.    Questions for provider review:    None                                                                                                                                    Stacey BRADY CMA ([facility])      Chart routed to none .

## 2019-10-29 ENCOUNTER — OFFICE VISIT (OUTPATIENT)
Dept: INTERNAL MEDICINE | Facility: CLINIC | Age: 69
End: 2019-10-29
Payer: COMMERCIAL

## 2019-10-29 VITALS
HEART RATE: 77 BPM | OXYGEN SATURATION: 100 % | WEIGHT: 159.6 LBS | RESPIRATION RATE: 16 BRPM | DIASTOLIC BLOOD PRESSURE: 82 MMHG | SYSTOLIC BLOOD PRESSURE: 138 MMHG | TEMPERATURE: 98.5 F | BODY MASS INDEX: 21.27 KG/M2

## 2019-10-29 DIAGNOSIS — E78.5 HYPERLIPIDEMIA LDL GOAL <130: ICD-10-CM

## 2019-10-29 DIAGNOSIS — Z23 NEED FOR SHINGLES VACCINE: ICD-10-CM

## 2019-10-29 DIAGNOSIS — Z00.00 MEDICARE ANNUAL WELLNESS VISIT, SUBSEQUENT: Primary | ICD-10-CM

## 2019-10-29 DIAGNOSIS — Z12.11 SPECIAL SCREENING FOR MALIGNANT NEOPLASMS, COLON: ICD-10-CM

## 2019-10-29 DIAGNOSIS — I10 ESSENTIAL HYPERTENSION WITH GOAL BLOOD PRESSURE LESS THAN 140/90: ICD-10-CM

## 2019-10-29 DIAGNOSIS — R10.31 RLQ ABDOMINAL PAIN: ICD-10-CM

## 2019-10-29 DIAGNOSIS — R73.09 ELEVATED GLUCOSE: ICD-10-CM

## 2019-10-29 DIAGNOSIS — Z71.89 ADVANCED DIRECTIVES, COUNSELING/DISCUSSION: ICD-10-CM

## 2019-10-29 LAB
ALBUMIN SERPL-MCNC: 4.1 G/DL (ref 3.4–5)
ALP SERPL-CCNC: 53 U/L (ref 40–150)
ALT SERPL W P-5'-P-CCNC: 20 U/L (ref 0–70)
ANION GAP SERPL CALCULATED.3IONS-SCNC: 2 MMOL/L (ref 3–14)
AST SERPL W P-5'-P-CCNC: 12 U/L (ref 0–45)
BASOPHILS # BLD AUTO: 0 10E9/L (ref 0–0.2)
BASOPHILS NFR BLD AUTO: 0.2 %
BILIRUB SERPL-MCNC: 0.9 MG/DL (ref 0.2–1.3)
BUN SERPL-MCNC: 15 MG/DL (ref 7–30)
CALCIUM SERPL-MCNC: 9.3 MG/DL (ref 8.5–10.1)
CHLORIDE SERPL-SCNC: 108 MMOL/L (ref 94–109)
CHOLEST SERPL-MCNC: 159 MG/DL
CO2 SERPL-SCNC: 29 MMOL/L (ref 20–32)
CREAT SERPL-MCNC: 0.94 MG/DL (ref 0.66–1.25)
CRP SERPL-MCNC: <2.9 MG/L (ref 0–8)
DIFFERENTIAL METHOD BLD: ABNORMAL
EOSINOPHIL # BLD AUTO: 0.1 10E9/L (ref 0–0.7)
EOSINOPHIL NFR BLD AUTO: 2.9 %
ERYTHROCYTE [DISTWIDTH] IN BLOOD BY AUTOMATED COUNT: 13.8 % (ref 10–15)
ERYTHROCYTE [SEDIMENTATION RATE] IN BLOOD BY WESTERGREN METHOD: 15 MM/H (ref 0–20)
GFR SERPL CREATININE-BSD FRML MDRD: 82 ML/MIN/{1.73_M2}
GLUCOSE SERPL-MCNC: 105 MG/DL (ref 70–99)
HBA1C MFR BLD: 5 % (ref 0–5.6)
HCT VFR BLD AUTO: 37.9 % (ref 40–53)
HDLC SERPL-MCNC: 60 MG/DL
HGB BLD-MCNC: 12.4 G/DL (ref 13.3–17.7)
LDLC SERPL CALC-MCNC: 75 MG/DL
LYMPHOCYTES # BLD AUTO: 2.1 10E9/L (ref 0.8–5.3)
LYMPHOCYTES NFR BLD AUTO: 49.8 %
MCH RBC QN AUTO: 32.5 PG (ref 26.5–33)
MCHC RBC AUTO-ENTMCNC: 32.7 G/DL (ref 31.5–36.5)
MCV RBC AUTO: 100 FL (ref 78–100)
MONOCYTES # BLD AUTO: 0.3 10E9/L (ref 0–1.3)
MONOCYTES NFR BLD AUTO: 7.1 %
NEUTROPHILS # BLD AUTO: 1.7 10E9/L (ref 1.6–8.3)
NEUTROPHILS NFR BLD AUTO: 40 %
NONHDLC SERPL-MCNC: 99 MG/DL
PLATELET # BLD AUTO: 230 10E9/L (ref 150–450)
POTASSIUM SERPL-SCNC: 4.9 MMOL/L (ref 3.4–5.3)
PROT SERPL-MCNC: 8.8 G/DL (ref 6.8–8.8)
RBC # BLD AUTO: 3.81 10E12/L (ref 4.4–5.9)
SODIUM SERPL-SCNC: 139 MMOL/L (ref 133–144)
TRIGL SERPL-MCNC: 120 MG/DL
WBC # BLD AUTO: 4.2 10E9/L (ref 4–11)

## 2019-10-29 PROCEDURE — 36415 COLL VENOUS BLD VENIPUNCTURE: CPT | Performed by: INTERNAL MEDICINE

## 2019-10-29 PROCEDURE — 83036 HEMOGLOBIN GLYCOSYLATED A1C: CPT | Performed by: INTERNAL MEDICINE

## 2019-10-29 PROCEDURE — 85025 COMPLETE CBC W/AUTO DIFF WBC: CPT | Performed by: INTERNAL MEDICINE

## 2019-10-29 PROCEDURE — 80053 COMPREHEN METABOLIC PANEL: CPT | Performed by: INTERNAL MEDICINE

## 2019-10-29 PROCEDURE — 80061 LIPID PANEL: CPT | Performed by: INTERNAL MEDICINE

## 2019-10-29 PROCEDURE — 86140 C-REACTIVE PROTEIN: CPT | Performed by: INTERNAL MEDICINE

## 2019-10-29 PROCEDURE — 99213 OFFICE O/P EST LOW 20 MIN: CPT | Mod: 25 | Performed by: INTERNAL MEDICINE

## 2019-10-29 PROCEDURE — 99397 PER PM REEVAL EST PAT 65+ YR: CPT | Performed by: INTERNAL MEDICINE

## 2019-10-29 PROCEDURE — 85652 RBC SED RATE AUTOMATED: CPT | Performed by: INTERNAL MEDICINE

## 2019-10-29 RX ORDER — PRAVASTATIN SODIUM 20 MG
TABLET ORAL
Qty: 36 TABLET | Refills: 0 | Status: SHIPPED | OUTPATIENT
Start: 2019-10-29 | End: 2020-02-28

## 2019-10-29 RX ORDER — LISINOPRIL 20 MG/1
20 TABLET ORAL DAILY
Qty: 90 TABLET | Refills: 3 | Status: SHIPPED | OUTPATIENT
Start: 2019-10-29 | End: 2020-10-30

## 2019-10-29 ASSESSMENT — ACTIVITIES OF DAILY LIVING (ADL): CURRENT_FUNCTION: NO ASSISTANCE NEEDED

## 2019-10-29 NOTE — LETTER
[address] [place] [address]    October 31, 2019    Hamilton Angulo  [address] [facility]  [place] [address]          Dear Hamilton,    All of these tests are within acceptable limits , things look good !     Enclosed is a copy of your results.     Results for orders placed or performed in visit on 10/29/19   Lipid panel reflex to direct LDL Fasting   Result Value Ref Range    Cholesterol 159 <200 mg/dL    Triglycerides 120 <150 mg/dL    HDL Cholesterol 60 >39 mg/dL    LDL Cholesterol Calculated 75 <100 mg/dL    Non HDL Cholesterol 99 <130 mg/dL   Hemoglobin A1c   Result Value Ref Range    Hemoglobin A1C 5.0 0 - 5.6 %   Erythrocyte sedimentation rate auto   Result Value Ref Range    Sed Rate 15 0 - 20 mm/h   CRP inflammation   Result Value Ref Range    CRP Inflammation <2.9 0.0 - 8.0 mg/L   CBC with platelets differential   Result Value Ref Range    WBC 4.2 4.0 - 11.0 10e9/L    RBC Count 3.81 (L) 4.4 - 5.9 10e12/L    Hemoglobin 12.4 (L) 13.3 - 17.7 g/dL    Hematocrit 37.9 (L) 40.0 - 53.0 %     78 - 100 fl    MCH 32.5 26.5 - 33.0 pg    MCHC 32.7 31.5 - 36.5 g/dL    RDW 13.8 10.0 - 15.0 %    Platelet Count 230 150 - 450 10e9/L    % Neutrophils 40.0 %    % Lymphocytes 49.8 %    % Monocytes 7.1 %    % Eosinophils 2.9 %    % Basophils 0.2 %    Absolute Neutrophil 1.7 1.6 - 8.3 10e9/L    Absolute Lymphocytes 2.1 0.8 - 5.3 10e9/L    Absolute Monocytes 0.3 0.0 - 1.3 10e9/L    Absolute Eosinophils 0.1 0.0 - 0.7 10e9/L    Absolute Basophils 0.0 0.0 - 0.2 10e9/L    Diff Method Automated Method    Comprehensive metabolic panel   Result Value Ref Range    Sodium 139 133 - 144 mmol/L    Potassium 4.9 3.4 - 5.3 mmol/L    Chloride 108 94 - 109 mmol/L    Carbon Dioxide 29 20 - 32 mmol/L    Anion Gap 2 (L) 3 - 14 mmol/L    Glucose 105 (H) 70 - 99 mg/dL    Urea Nitrogen 15 7 - 30 mg/dL    Creatinine 0.94 0.66 - 1.25 mg/dL    GFR Estimate 82  >60 mL/min/[1.73_m2]    GFR Estimate If Black >90 >60 mL/min/[1.73_m2]    Calcium 9.3 8.5 - 10.1 mg/dL    Bilirubin Total 0.9 0.2 - 1.3 mg/dL    Albumin 4.1 3.4 - 5.0 g/dL    Protein Total 8.8 6.8 - 8.8 g/dL    Alkaline Phosphatase 53 40 - 150 U/L    ALT 20 0 - 70 U/L    AST 12 0 - 45 U/L       If you have any questions or concerns, please call myself or my nurse at [phone].      Sincerely,        Bro Herrera MD/riley

## 2019-10-29 NOTE — PATIENT INSTRUCTIONS
1) Be sure to follow-up with urology for an annual visit. If they haven't contacted you in the next 2 weeks, call to schedule this.     2) consider an audiology appointment for an evaluation of your hearing.    3) We will do some lab work and notify you of the results. If there is anything abnormal then we would call, otherwise expect results in the mail and that they would be normal.     4) Today we discussed and advised the new shingles vaccine (ShingRx) which you can get at any pharmacy. You will then follow-up 2-6 months later for a second booster.     5) Please send us a copy of your living will (this is a health care directive) in order that we can scan this into your chart when you have this complete.

## 2019-10-29 NOTE — PROGRESS NOTES
"SUBJECTIVE:   Hamilton Angulo is a 69 year old male who presents for Preventive Visit.  Are you in the first 12 months of your Medicare coverage?  No    Healthy Habits:    In general, how would you rate your overall health?  Very good    Frequency of exercise:  2-3 days/week    Duration of exercise:  30-45 minutes    Do you usually eat at least 4 servings of fruit and vegetables a day, include whole grains    & fiber and avoid regularly eating high fat or \"junk\" foods?  No    Taking medications regularly:  Yes    Barriers to taking medications:  Not applicable    Medication side effects:  Not applicable    Ability to successfully perform activities of daily living:  No assistance needed    Home Safety:  No safety concerns identified    Hearing Impairment:  Need to ask people to speak up or repeat themselves    In the past 6 months, have you been bothered by leaking of urine? Yes    In general, how would you rate your overall mental or emotional health?  Very good      PHQ-2 Total Score:    Additional concerns today:  Yes    Do you feel safe in your environment? Yes    Do you have a Health Care Directive? No: Advance care planning reviewed with patient; information given to patient to review.    Fall risk  Fallen 2 or more times in the past year?: No  Any fall with injury in the past year?: No    Cognitive Screening   1) Repeat 3 items (Leader, Season, Table)    2) Clock draw: NORMAL  3) 3 item recall: Recalls 3 objects  Results: 3 items recalled: COGNITIVE IMPAIRMENT LESS LIKELY    Mini-CogTM Copyright S Sandra. Licensed by the author for use in [facility]; reprinted with permission ([email or web address].[email or web address]). All rights reserved.      Do you have sleep apnea, excessive snoring or daytime drowsiness?: no    History of Prostate Surgery   Had recent prostate surgery has better urine flow and reduced frequency of urination. Since the surgery he has been feeling some abdominal bloating and gassy. Denies nauseas, vomiting, " diarrhea, constipation, or large changes in weight. These symptoms seem to have some randomness with no clear triggers. Notes more often when sitting down rather than when lying down or with activity. Doesn't wake him up at night. Bloating is often in the RLQ. Bowel movements are normal for him. Due for his annual follow-up with urology in November.     Hearing Loss  Discussed at last appointment and has not yet had an audiology visit. Notes that he has been turning up the volume on the TV more lately.     Reviewed and updated as needed this visit by clinical staff  Tobacco  Allergies  Meds  Med Hx  Surg Hx  Fam Hx  Soc Hx      Reviewed and updated as needed this visit by Provider        Social History     Tobacco Use     Smoking status: Never Smoker     Smokeless tobacco: Never Used   Substance Use Topics     Alcohol use: Yes     Comment: Ocss.     Alcohol Use 10/13/2017   Prescreen: >3 drinks/day or >7 drinks/week? The patient does not drink >3 drinks per day nor >7 drinks per week.     Current providers sharing in care for this patient include:   Patient Care Team:  Bro Herrera MD as PCP - General (Internal Medicine)  Bro Herrera MD as Assigned PCP    The following health maintenance items are reviewed in Epic and correct as of today:  Health Maintenance   Topic Date Due     ZOSTER IMMUNIZATION (2 of 3) 11/15/2013     COLONOSCOPY  08/16/2017     ADVANCE CARE PLANNING  12/28/2017     EYE EXAM  07/19/2019     FALL RISK ASSESSMENT  10/22/2019     MEDICARE ANNUAL WELLNESS VISIT  10/22/2019     LIPID  10/22/2023     DTAP/TDAP/TD IMMUNIZATION (3 - Td) 10/13/2027     HEPATITIS C SCREENING  Completed     PHQ-2  Completed     INFLUENZA VACCINE  Completed     PNEUMOCOCCAL IMMUNIZATION 65+ LOW/MEDIUM RISK  Completed     AORTIC ANEURYSM SCREENING (SYSTEM ASSIGNED)  Completed     IPV IMMUNIZATION  Aged Out     MENINGITIS IMMUNIZATION  Aged Out     BP Readings from Last 3 Encounters:   10/29/19 138/82   11/27/18  "152/80   10/26/18 150/76    Wt Readings from Last 3 Encounters:   10/29/19 72.4 kg (159 lb 9.6 oz)   10/30/18 74.8 kg (165 lb)   10/26/18 75.2 kg (165 lb 12.8 oz)         Patient Active Problem List   Diagnosis     HYPERLIPIDEMIA LDL GOAL <130     PSEUDOPHAKIA OU     Advanced directives, counseling/discussion     History of actinic keratoses     PVD (posterior vitreous detachment), bilateral     Nonexudative senile macular degeneration of retina     Elevated glucose     Essential hypertension with goal blood pressure less than 140/90     Elevated prostate specific antigen (PSA)     Dupuytren contracture     Past Surgical History:   Procedure Laterality Date     CATARACT IOL, RT/LT  12/2003    Bilateral       Social History     Tobacco Use     Smoking status: Never Smoker     Smokeless tobacco: Never Used   Substance Use Topics     Alcohol use: Yes     Comment: Ocss.     Family History   Problem Relation Age of Onset     Cancer Mother      Hypertension Mother      Heart Disease Maternal Grandmother      Cancer Maternal Grandfather          Review of Systems  Constitutional, HEENT, cardiovascular, pulmonary, GI, , musculoskeletal, neuro, skin, endocrine and psych systems are negative, except as otherwise noted.    This document serves as a record of the services and decisions personally performed and made by Bro Herrera MD. It was created on his behalf by Steve Schilling, a trained medical scribe. The creation of this document is based on the provider's statements to the medical scribe.  Steve Schilling 8:40 AM October 29, 2019    OBJECTIVE:   /82   Pulse 77   Temp 98.5  F (36.9  C) (Oral)   Resp 16   Wt 72.4 kg (159 lb 9.6 oz)   SpO2 100%   BMI 21.27 kg/m   Estimated body mass index is 21.27 kg/m  as calculated from the following:    Height as of 10/30/18: 1.845 m (6' 0.64\").    Weight as of this encounter: 72.4 kg (159 lb 9.6 oz).  Physical Exam  GENERAL: healthy, alert and no distress  EYES: Eyes " grossly normal to inspection, PERRL and conjunctivae and sclerae normal  HENT: left ear minimal non-occlusive cerumen, right ear canal normal and TM's normal bilaterally, nose and mouth without ulcers or lesions  NECK: no adenopathy, no asymmetry, masses, or scars and thyroid normal to palpation  RESP: lungs clear to auscultation - no rales, rhonchi or wheezes  CV: regular rate and rhythm, normal S1 S2, no S3 or S4, no murmur, click or rub, no peripheral edema and peripheral pulses strong  ABDOMEN: soft, nontender, no hepatosplenomegaly, no masses and bowel sounds normal  MS: no gross musculoskeletal defects noted, no edema  SKIN: no suspicious lesions or rashes to visible skin   NEURO: Normal strength and tone, mentation intact and speech normal  PSYCH: mentation appears normal, affect normal/bright    ASSESSMENT / PLAN:   (Z00.00) Medicare annual wellness visit, subsequent  (primary encounter diagnosis)  Comment: negative screening physical exam today, except for minimal cerumen on the left   Plan:  Follow-up in 1 year      (E78.5) Hyperlipidemia LDL goal <130  Comment: historically at goal   Plan: Lipid panel reflex to direct LDL Fasting,         pravastatin (PRAVACHOL) 20 MG tablet        Continue current medications     (Z71.89) Advanced directives, counseling/discussion  Comment: POLST form provided.  Plan: see below.    (R73.09) Elevated glucose  Comment: last glucose 108. Hemoglobin A1C tests have been normal.  Plan: Hemoglobin A1c, Comprehensive metabolic panel            (I10) Essential hypertension with goal blood pressure less than 140/90  Comment: borderline controlled.   Plan: lisinopril (PRINIVIL/ZESTRIL) 20 MG tablet,         Comprehensive metabolic panel        Continue current treatment     (Z12.11) Special screening for malignant neoplasms, colon  Comment: Patient declines colonoscopy, plans to follow with fit card test.  Plan: Fecal colorectal cancer screen (FIT)            (R10.31) RLQ abdominal  "pain  Comment: since prostate surgery with accompanied gas and bloating symptoms.  Plan: Erythrocyte sedimentation rate auto, CRP         inflammation, CBC with platelets differential,         Comprehensive metabolic panel            (Z23) Need for shingles vaccine  Comment: pharmacy router form given   Plan:     End of Life Planning:  Patient currently has an advanced directive: No.  I have verified the patient's ablity to prepare an advanced directive/make health care decisions.  Literature was provided to assist patient in preparing an advanced directive.    COUNSELING:  Reviewed preventive health counseling, as reflected in patient instructions  Special attention given to:       Regular exercise       Healthy diet/nutrition       Hearing Screening       Immunizations    Vaccinated for: Zoster  (recommended)    Estimated body mass index is 21.27 kg/m  as calculated from the following:    Height as of 10/30/18: 1.845 m (6' 0.64\").    Weight as of this encounter: 72.4 kg (159 lb 9.6 oz).    Weight management plan noted, stable and monitoring     reports that he has never smoked. He has never used smokeless tobacco.    Appropriate preventive services were discussed with this patient, including applicable screening as appropriate for cardiovascular disease, diabetes, osteopenia/osteoporosis, and glaucoma.  As appropriate for age/gender, discussed screening for colorectal cancer, prostate cancer, breast cancer, and cervical cancer. Checklist reviewing preventive services available has been given to the patient.    Reviewed patients plan of care and provided an AVS. The Basic Care Plan (routine screening as documented in Health Maintenance) for Hamilton meets the Care Plan requirement. This Care Plan has been established and reviewed with the Patient.    Counseling Resources:  ATP IV Guidelines  Pooled Cohorts Equation Calculator  Breast Cancer Risk Calculator  FRAX Risk Assessment  ICSI Preventive Guidelines  Dietary " Guidelines for Americans, 2010  USDA's MyPlate  ASA Prophylaxis  Lung CA Screening    The information in this document, created by the medical scribe for me, accurately reflects the services I personally performed and the decisions made by me. I have reviewed and approved this document for accuracy prior to leaving the patient care area.  October 29, 2019 9:02 AM     Bro Herrera MD  [facility]    Identified Health Risks:

## 2019-10-30 DIAGNOSIS — Z12.11 SPECIAL SCREENING FOR MALIGNANT NEOPLASMS, COLON: ICD-10-CM

## 2019-10-30 PROCEDURE — 82274 ASSAY TEST FOR BLOOD FECAL: CPT | Performed by: INTERNAL MEDICINE

## 2019-11-04 LAB — HEMOCCULT STL QL IA: NEGATIVE

## 2019-11-19 DIAGNOSIS — R97.20 ELEVATED PROSTATE SPECIFIC ANTIGEN (PSA): ICD-10-CM

## 2019-11-19 LAB — PSA SERPL-MCNC: 2.79 UG/L (ref 0–4)

## 2019-11-19 PROCEDURE — 36415 COLL VENOUS BLD VENIPUNCTURE: CPT | Performed by: UROLOGY

## 2019-11-19 PROCEDURE — 84153 ASSAY OF PSA TOTAL: CPT | Performed by: UROLOGY

## 2019-11-26 ENCOUNTER — OFFICE VISIT (OUTPATIENT)
Dept: UROLOGY | Facility: CLINIC | Age: 69
End: 2019-11-26
Payer: COMMERCIAL

## 2019-11-26 VITALS
WEIGHT: 159 LBS | DIASTOLIC BLOOD PRESSURE: 77 MMHG | SYSTOLIC BLOOD PRESSURE: 176 MMHG | HEART RATE: 66 BPM | BODY MASS INDEX: 21.19 KG/M2 | OXYGEN SATURATION: 100 %

## 2019-11-26 DIAGNOSIS — R33.8 BENIGN PROSTATIC HYPERPLASIA WITH URINARY RETENTION: Primary | ICD-10-CM

## 2019-11-26 DIAGNOSIS — I10 ESSENTIAL HYPERTENSION WITH GOAL BLOOD PRESSURE LESS THAN 140/90: ICD-10-CM

## 2019-11-26 DIAGNOSIS — R97.20 ELEVATED PROSTATE SPECIFIC ANTIGEN (PSA): ICD-10-CM

## 2019-11-26 DIAGNOSIS — N40.1 BENIGN PROSTATIC HYPERPLASIA WITH URINARY RETENTION: Primary | ICD-10-CM

## 2019-11-26 PROCEDURE — 51798 US URINE CAPACITY MEASURE: CPT | Performed by: UROLOGY

## 2019-11-26 PROCEDURE — 99213 OFFICE O/P EST LOW 20 MIN: CPT | Mod: 25 | Performed by: UROLOGY

## 2019-11-26 NOTE — PROGRESS NOTES
Chief Complaint   Patient presents with     RECHECK     1 year follow up       Hamilton Angulo is a 69 year old male who presents today for follow up of   Chief Complaint   Patient presents with     RECHECK     1 year follow up    Patient is a 69-year-old male with history of urinary retention due to BPH.  He is status post expressed laser TURP in 2018.  Prior to surgery his urinary retention was 900 mL.  He is doing well since surgery without any complaints.  His urinary flow is good.  He feels he empties his bladder well.  His PSA was up to 5 however his recent PSA is only 2.7.    Current Outpatient Medications   Medication Sig Dispense Refill     acetaminophen (TYLENOL) 325 MG tablet Take as needed following label instructions       Artificial Tear Solution (SM ARTIFICIAL TEARS) SOLN Use per label instructions       lisinopril (PRINIVIL/ZESTRIL) 20 MG tablet Take 1 tablet (20 mg) by mouth daily 90 tablet 3     Multiple Vitamins-Minerals (EQ COMPLETE MULTIVITAMIN-ADULT) TABS Take 1 tablet by mouth daily       pravastatin (PRAVACHOL) 20 MG tablet TAKE 1 TABLET BY MOUTH 3 TIMES A WEEK 36 tablet 0     Allergies   Allergen Reactions     Shrimp       Past Medical History:   Diagnosis Date     Bilateral cataracts 2001     Herniated disc 1983    Lumbar     Hyperlipidemia LDL goal <130      Hypertension 5/2004     Tear of MCL (medial collateral ligament) of knee 6/20/2001    LT Knee/WC     Past Surgical History:   Procedure Laterality Date     CATARACT IOL, RT/LT  12/2003    Bilateral     Family History   Problem Relation Age of Onset     Cancer Mother      Hypertension Mother      Heart Disease Maternal Grandmother      Cancer Maternal Grandfather      Social History     Socioeconomic History     Marital status:      Spouse name: None     Number of children: None     Years of education: None     Highest education level: None   Occupational History     None   Social Needs     Financial resource strain: None     Food  insecurity:     Worry: None     Inability: None     Transportation needs:     Medical: None     Non-medical: None   Tobacco Use     Smoking status: Never Smoker     Smokeless tobacco: Never Used   Substance and Sexual Activity     Alcohol use: Yes     Comment: Ocss.     Drug use: No     Sexual activity: Yes     Partners: Female   Lifestyle     Physical activity:     Days per week: None     Minutes per session: None     Stress: None   Relationships     Social connections:     Talks on phone: None     Gets together: None     Attends Islam service: None     Active member of club or organization: None     Attends meetings of clubs or organizations: None     Relationship status: None     Intimate partner violence:     Fear of current or ex partner: None     Emotionally abused: None     Physically abused: None     Forced sexual activity: None   Other Topics Concern     Parent/sibling w/ CABG, MI or angioplasty before 65F 55M? Not Asked   Social History Narrative     None       REVIEW OF SYSTEMS  =================  C: NEGATIVE for fever, chills, change in weight  I: NEGATIVE for worrisome rashes, moles or lesions  E/M: NEGATIVE for ear, mouth and throat problems  R: NEGATIVE for significant cough or SHORTNESS OF BREATH  CV:  NEGATIVE for chest pain, palpitations or peripheral edema  GI: NEGATIVE for nausea, abdominal pain, heartburn, or change in bowel habits  NEURO: NEGATIVE numbness/weakness  : see HPI  PSYCH: NEGATIVE depression/anxiety  LYmph: no new enlarged lymph nodes  Ortho: no new trauma/movements    Physical Exam:  BP (!) 176/77 (BP Location: Right arm, Patient Position: Sitting, Cuff Size: Adult Regular)   Pulse 66   Wt 72.1 kg (159 lb)   SpO2 100%   BMI 21.19 kg/m      Patient is pleasant, in no acute distress, good general condition.  Lung: no evidence of respiratory distress    Abdomen: Soft, nondistended, non tender. No masses. No rebound or guarding.   Exam: Penis no discharge.  Testes without  any masses but no scrotal skin lesion.  Prostate 50 g plus smooth no nodule.  Bladder scan with a residual 145 mL.  Skin: Warm and dry.  No redness.  Psych: normal mood and affect  Neuro: alert and oriented  Musculaskeletal: moving all extremities    Assessment/Plan:   (N40.1,  R33.8) Benign prostatic hyperplasia with urinary retention  (primary encounter diagnosis)  Comment: Doing well  Plan: MEASURE POST-VOID RESIDUAL URINE/BLADDER         CAPACITY, US NON-IMAGING (74460)        Recheck in 1 year    (R97.20) Elevated prostate specific antigen (PSA)  Comment: PSA normalized  Plan: PSA tumor marker        1 year    (I10) Essential hypertension with goal blood pressure less than 140/90  Comment:    Plan: Follow-up with primary care MD

## 2019-11-26 NOTE — PROGRESS NOTES
Bladder Scan performed. 145ml maximum residual urine detected after 3 scans. MD informed.    Malika Marshall, [facility]  11/26/2019  7:58 AM      Hamilton to follow up with Primary Care provider regarding elevated blood pressure.

## 2019-12-08 DIAGNOSIS — E78.5 HYPERLIPIDEMIA LDL GOAL <130: ICD-10-CM

## 2019-12-08 RX ORDER — PRAVASTATIN SODIUM 20 MG
TABLET ORAL
Qty: 36 TABLET | Refills: 2 | Status: SHIPPED | OUTPATIENT
Start: 2019-12-08 | End: 2020-10-30

## 2019-12-29 DIAGNOSIS — I10 ESSENTIAL HYPERTENSION WITH GOAL BLOOD PRESSURE LESS THAN 140/90: ICD-10-CM

## 2019-12-30 NOTE — TELEPHONE ENCOUNTER
Duplicate, 1st request.    lisinopril (PRINIVIL/ZESTRIL) 20 MG tablet 90 tablet 3 10/29/2019  No   Sig - Route: Take 1 tablet (20 mg) by mouth daily - Oral   Sent to pharmacy as: lisinopril (PRINIVIL/ZESTRIL) 20 MG tablet   Class: E-Prescribe   Order: [id number]   E-Prescribing Status: Receipt confirmed by pharmacy (10/29/2019  9:01 AM CDT)     Jacqueline PURCELL CMA ([facility])

## 2019-12-31 RX ORDER — LISINOPRIL 20 MG/1
TABLET ORAL
Qty: 90 TABLET | Refills: 3 | OUTPATIENT
Start: 2019-12-31

## 2020-02-28 DIAGNOSIS — E78.5 HYPERLIPIDEMIA LDL GOAL <130: ICD-10-CM

## 2020-02-28 RX ORDER — PRAVASTATIN SODIUM 20 MG
TABLET ORAL
Qty: 36 TABLET | Refills: 1 | Status: SHIPPED | OUTPATIENT
Start: 2020-02-28 | End: 2020-08-12

## 2020-02-28 RX ORDER — PRAVASTATIN SODIUM 20 MG
TABLET ORAL
Qty: 38 TABLET | OUTPATIENT
Start: 2020-02-28

## 2020-08-12 DIAGNOSIS — E78.5 HYPERLIPIDEMIA LDL GOAL <130: ICD-10-CM

## 2020-08-12 RX ORDER — PRAVASTATIN SODIUM 20 MG
TABLET ORAL
Qty: 36 TABLET | Refills: 0 | Status: SHIPPED | OUTPATIENT
Start: 2020-08-12 | End: 2021-04-28

## 2020-10-30 ENCOUNTER — OFFICE VISIT (OUTPATIENT)
Dept: INTERNAL MEDICINE | Facility: CLINIC | Age: 70
End: 2020-10-30
Payer: COMMERCIAL

## 2020-10-30 VITALS
OXYGEN SATURATION: 99 % | HEIGHT: 72 IN | WEIGHT: 156 LBS | TEMPERATURE: 98 F | SYSTOLIC BLOOD PRESSURE: 158 MMHG | RESPIRATION RATE: 18 BRPM | HEART RATE: 83 BPM | DIASTOLIC BLOOD PRESSURE: 92 MMHG | BODY MASS INDEX: 21.13 KG/M2

## 2020-10-30 DIAGNOSIS — D64.9 ANEMIA, UNSPECIFIED TYPE: ICD-10-CM

## 2020-10-30 DIAGNOSIS — Z12.11 SPECIAL SCREENING FOR MALIGNANT NEOPLASMS, COLON: ICD-10-CM

## 2020-10-30 DIAGNOSIS — E78.5 HYPERLIPIDEMIA LDL GOAL <130: ICD-10-CM

## 2020-10-30 DIAGNOSIS — I10 ESSENTIAL HYPERTENSION WITH GOAL BLOOD PRESSURE LESS THAN 140/90: ICD-10-CM

## 2020-10-30 DIAGNOSIS — R73.09 ELEVATED GLUCOSE: ICD-10-CM

## 2020-10-30 DIAGNOSIS — H61.23 BILATERAL IMPACTED CERUMEN: ICD-10-CM

## 2020-10-30 DIAGNOSIS — Z00.00 ENCOUNTER FOR ANNUAL WELLNESS EXAM IN MEDICARE PATIENT: Primary | ICD-10-CM

## 2020-10-30 LAB
ANION GAP SERPL CALCULATED.3IONS-SCNC: 1 MMOL/L (ref 3–14)
BASOPHILS # BLD AUTO: 0 10E9/L (ref 0–0.2)
BASOPHILS NFR BLD AUTO: 0.8 %
BUN SERPL-MCNC: 16 MG/DL (ref 7–30)
CALCIUM SERPL-MCNC: 9.1 MG/DL (ref 8.5–10.1)
CHLORIDE SERPL-SCNC: 108 MMOL/L (ref 94–109)
CHOLEST SERPL-MCNC: 142 MG/DL
CO2 SERPL-SCNC: 30 MMOL/L (ref 20–32)
CREAT SERPL-MCNC: 0.87 MG/DL (ref 0.66–1.25)
DIFFERENTIAL METHOD BLD: ABNORMAL
EOSINOPHIL # BLD AUTO: 0.1 10E9/L (ref 0–0.7)
EOSINOPHIL NFR BLD AUTO: 1.8 %
ERYTHROCYTE [DISTWIDTH] IN BLOOD BY AUTOMATED COUNT: 13.7 % (ref 10–15)
FERRITIN SERPL-MCNC: 236 NG/ML (ref 26–388)
GFR SERPL CREATININE-BSD FRML MDRD: 87 ML/MIN/{1.73_M2}
GLUCOSE SERPL-MCNC: 103 MG/DL (ref 70–99)
HBA1C MFR BLD: 5.2 % (ref 0–5.6)
HCT VFR BLD AUTO: 33.8 % (ref 40–53)
HDLC SERPL-MCNC: 56 MG/DL
HGB BLD-MCNC: 11.2 G/DL (ref 13.3–17.7)
IRON SATN MFR SERPL: 34 % (ref 15–46)
IRON SERPL-MCNC: 99 UG/DL (ref 35–180)
LDLC SERPL CALC-MCNC: 55 MG/DL
LYMPHOCYTES # BLD AUTO: 1.8 10E9/L (ref 0.8–5.3)
LYMPHOCYTES NFR BLD AUTO: 46.5 %
MCH RBC QN AUTO: 34.1 PG (ref 26.5–33)
MCHC RBC AUTO-ENTMCNC: 33.1 G/DL (ref 31.5–36.5)
MCV RBC AUTO: 103 FL (ref 78–100)
MONOCYTES # BLD AUTO: 0.5 10E9/L (ref 0–1.3)
MONOCYTES NFR BLD AUTO: 13.1 %
NEUTROPHILS # BLD AUTO: 1.5 10E9/L (ref 1.6–8.3)
NEUTROPHILS NFR BLD AUTO: 37.8 %
NONHDLC SERPL-MCNC: 86 MG/DL
PLATELET # BLD AUTO: 204 10E9/L (ref 150–450)
POTASSIUM SERPL-SCNC: 4.8 MMOL/L (ref 3.4–5.3)
RBC # BLD AUTO: 3.28 10E12/L (ref 4.4–5.9)
SODIUM SERPL-SCNC: 139 MMOL/L (ref 133–144)
TIBC SERPL-MCNC: 289 UG/DL (ref 240–430)
TRIGL SERPL-MCNC: 153 MG/DL
WBC # BLD AUTO: 3.9 10E9/L (ref 4–11)

## 2020-10-30 PROCEDURE — 83540 ASSAY OF IRON: CPT | Performed by: INTERNAL MEDICINE

## 2020-10-30 PROCEDURE — 85025 COMPLETE CBC W/AUTO DIFF WBC: CPT | Performed by: INTERNAL MEDICINE

## 2020-10-30 PROCEDURE — 83036 HEMOGLOBIN GLYCOSYLATED A1C: CPT | Performed by: INTERNAL MEDICINE

## 2020-10-30 PROCEDURE — 36415 COLL VENOUS BLD VENIPUNCTURE: CPT | Performed by: INTERNAL MEDICINE

## 2020-10-30 PROCEDURE — 80048 BASIC METABOLIC PNL TOTAL CA: CPT | Performed by: INTERNAL MEDICINE

## 2020-10-30 PROCEDURE — 99397 PER PM REEVAL EST PAT 65+ YR: CPT | Mod: 25 | Performed by: INTERNAL MEDICINE

## 2020-10-30 PROCEDURE — 83550 IRON BINDING TEST: CPT | Performed by: INTERNAL MEDICINE

## 2020-10-30 PROCEDURE — 69209 REMOVE IMPACTED EAR WAX UNI: CPT | Mod: 50 | Performed by: INTERNAL MEDICINE

## 2020-10-30 PROCEDURE — 82274 ASSAY TEST FOR BLOOD FECAL: CPT | Performed by: INTERNAL MEDICINE

## 2020-10-30 PROCEDURE — 80061 LIPID PANEL: CPT | Performed by: INTERNAL MEDICINE

## 2020-10-30 PROCEDURE — 82728 ASSAY OF FERRITIN: CPT | Performed by: INTERNAL MEDICINE

## 2020-10-30 RX ORDER — PRAVASTATIN SODIUM 20 MG
TABLET ORAL
Qty: 36 TABLET | Refills: 3 | Status: SHIPPED | OUTPATIENT
Start: 2020-10-30 | End: 2021-09-30

## 2020-10-30 RX ORDER — AMLODIPINE BESYLATE 2.5 MG/1
2.5 TABLET ORAL DAILY
Qty: 90 TABLET | Refills: 3 | Status: SHIPPED | OUTPATIENT
Start: 2020-10-30 | End: 2021-10-19

## 2020-10-30 RX ORDER — LISINOPRIL 20 MG/1
20 TABLET ORAL DAILY
Qty: 90 TABLET | Refills: 3 | Status: SHIPPED | OUTPATIENT
Start: 2020-10-30 | End: 2021-11-29

## 2020-10-30 ASSESSMENT — MIFFLIN-ST. JEOR: SCORE: 1505.61

## 2020-10-30 ASSESSMENT — ACTIVITIES OF DAILY LIVING (ADL): CURRENT_FUNCTION: NO ASSISTANCE NEEDED

## 2020-10-30 NOTE — PATIENT INSTRUCTIONS
Things look great with your help Mr. Angulo,    Everything except for the blood pressure ! I have added a second medication to your blood pressure meds called Amlodipine [Norvasc]. We are starting with the lowest dose of 2.5 milligrams. I want you to be scheduled for a follow up medical assistant blood pressure recheck in 2-3 weeks and we will only be completely satisfied when we see successfully lowered blood pressure reading down to less then 140 over less then 90.     We will follow up with you with all your blood tests and if there's anything concerning we will talk about next steps to evaluate.    Take care !    Bro Herrera MD

## 2020-10-30 NOTE — LETTER
November 2, 2020      Hamilton FRITZ Adele  [address]  [place] [address]        Dear ,    Enclosed is a copy of your results that were discussed with the triage nurse.    All of these tests are within acceptable limits, except for a minor anemia, as we discussed at your appointment. Your iron studies are normal, and so it seems unlikely that there is any gastroenterological bleeding (blood loss from the bowels). I don't think this is anything significant, and yet I note tiny abnormalities with white blood cells, and your red blood cells are a bit on the large side.     In my opinion, all of this is likely not significant, but what makes the most sense to me is to recheck your complete blood count with differential with a peripheral smear in roughly 3-6 months. I am a worry wart, and just playing it safe. I will also check Cyanocobalamin, vitamin B12, as you might be low in this vitamin.    Resulted Orders   CBC with platelets differential   Result Value Ref Range    WBC 3.9 (L) 4.0 - 11.0 10e9/L    RBC Count 3.28 (L) 4.4 - 5.9 10e12/L    Hemoglobin 11.2 (L) 13.3 - 17.7 g/dL    Hematocrit 33.8 (L) 40.0 - 53.0 %     (H) 78 - 100 fl    MCH 34.1 (H) 26.5 - 33.0 pg    MCHC 33.1 31.5 - 36.5 g/dL    RDW 13.7 10.0 - 15.0 %    Platelet Count 204 150 - 450 10e9/L    % Neutrophils 37.8 %    % Lymphocytes 46.5 %    % Monocytes 13.1 %    % Eosinophils 1.8 %    % Basophils 0.8 %    Absolute Neutrophil 1.5 (L) 1.6 - 8.3 10e9/L    Absolute Lymphocytes 1.8 0.8 - 5.3 10e9/L    Absolute Monocytes 0.5 0.0 - 1.3 10e9/L    Absolute Eosinophils 0.1 0.0 - 0.7 10e9/L    Absolute Basophils 0.0 0.0 - 0.2 10e9/L    Diff Method Automated Method    Ferritin   Result Value Ref Range    Ferritin 236 26 - 388 ng/mL   Iron and iron binding capacity   Result Value Ref Range    Iron 99 35 - 180 ug/dL    Iron Binding Cap 289 240 - 430 ug/dL    Iron Saturation Index 34 15 - 46 %   Hemoglobin A1c   Result Value Ref Range    Hemoglobin  A1C 5.2 0 - 5.6 %      Comment:      Normal <5.7% Prediabetes 5.7-6.4%  Diabetes 6.5% or higher - adopted from ADA   consensus guidelines.     Basic metabolic panel   Result Value Ref Range    Sodium 139 133 - 144 mmol/L    Potassium 4.8 3.4 - 5.3 mmol/L    Chloride 108 94 - 109 mmol/L    Carbon Dioxide 30 20 - 32 mmol/L    Anion Gap 1 (L) 3 - 14 mmol/L    Glucose 103 (H) 70 - 99 mg/dL    Urea Nitrogen 16 7 - 30 mg/dL    Creatinine 0.87 0.66 - 1.25 mg/dL    GFR Estimate 87 >60 mL/min/[1.73_m2]      Comment:      Non  GFR Calc  Starting 12/18/2018, serum creatinine based estimated GFR (eGFR) will be   calculated using the Chronic Kidney Disease Epidemiology Collaboration   (CKD-EPI) equation.      GFR Estimate If Black >90 >60 mL/min/[1.73_m2]      Comment:       GFR Calc  Starting 12/18/2018, serum creatinine based estimated GFR (eGFR) will be   calculated using the Chronic Kidney Disease Epidemiology Collaboration   (CKD-EPI) equation.      Calcium 9.1 8.5 - 10.1 mg/dL   Lipid panel reflex to direct LDL Fasting   Result Value Ref Range    Cholesterol 142 <200 mg/dL    Triglycerides 153 (H) <150 mg/dL      Comment:      Borderline high:  150-199 mg/dl  High:             200-499 mg/dl  Very high:       >499 mg/dl      HDL Cholesterol 56 >39 mg/dL    LDL Cholesterol Calculated 55 <100 mg/dL      Comment:      Desirable:       <100 mg/dl    Non HDL Cholesterol 86 <130 mg/dL     Please feel free to contact my myself or my nurse with any questions or concerns.    Sincerely,        Bro Herrera MD/justus

## 2020-10-30 NOTE — PROGRESS NOTES
"SUBJECTIVE:   Hamilton Angulo is a 70 year old male who presents for Preventive Visit.      Patient has been advised of split billing requirements and indicates understanding: Yes   Are you in the first 12 months of your Medicare coverage?  No    Healthy Habits:     In general, how would you rate your overall health?  Very good    Frequency of exercise:  2-3 days/week    Duration of exercise:  30-45 minutes    Do you usually eat at least 4 servings of fruit and vegetables a day, include whole grains    & fiber and avoid regularly eating high fat or \"junk\" foods?  No    Taking medications regularly:  Yes    Barriers to taking medications:  None    Medication side effects:  None    Ability to successfully perform activities of daily living:  No assistance needed    Home Safety:  No safety concerns identified    Hearing Impairment:  Difficulty understanding soft or whispered speech    In the past 6 months, have you been bothered by leaking of urine?  No    In general, how would you rate your overall mental or emotional health?  Very good      PHQ-2 Total Score: 0    Additional concerns today:  No    Wt Readings from Last 5 Encounters:   10/30/20 70.8 kg (156 lb)   11/26/19 72.1 kg (159 lb)   10/29/19 72.4 kg (159 lb 9.6 oz)   10/30/18 74.8 kg (165 lb)   10/26/18 75.2 kg (165 lb 12.8 oz)     He never regained weight he lost after his prostate surgery . He is status post expressed laser TURP in 2018.    Do you feel safe in your environment? Yes    Have you ever done Advance Care Planning? (For example, a Health Directive, POLST, or a discussion with a medical provider or your loved ones about your wishes): No, advance care planning information given to patient to review.  Advanced care planning was discussed at today's visit.    Fall risk  Fallen 2 or more times in the past year?: No  Any fall with injury in the past year?: No    Cognitive Screening   1) Repeat 3 items (Leader, Season, Table)    2) Clock draw: NORMAL  3) 3 " item recall: Recalls 2 objects   Results: NORMAL clock, 1-2 items recalled: COGNITIVE IMPAIRMENT LESS LIKELY    Mini-CogTM Copyright MATT Flores. Licensed by the author for use in [facility]; reprinted with permission (mick@[facility]). All rights reserved.      Do you have sleep apnea, excessive snoring or daytime drowsiness?: no    Reviewed and updated as needed this visit by clinical staff  Tobacco  Allergies  Meds   Med Hx  Surg Hx  Fam Hx  Soc Hx        Reviewed and updated as needed this visit by Provider                Social History     Tobacco Use     Smoking status: Never Smoker     Smokeless tobacco: Never Used   Substance Use Topics     Alcohol use: Yes     Comment: Ocss.     If you drink alcohol do you typically have >3 drinks per day or >7 drinks per week? No    Alcohol Use 10/13/2017   Prescreen: >3 drinks/day or >7 drinks/week? The patient does not drink >3 drinks per day nor >7 drinks per week.   No flowsheet data found.        Current providers sharing in care for this patient include:     Patient Care Team:  Bro Herrera MD as PCP - General (Internal Medicine)  Bro Herrera MD as Assigned PCP  Wilder Foote MD as Assigned Surgical Provider    The following health maintenance items are reviewed in Epic and correct as of today:  Health Maintenance   Topic Date Due     ZOSTER IMMUNIZATION (2 of 3) 11/15/2013     EYE EXAM  07/19/2019     FALL RISK ASSESSMENT  10/29/2020     COLORECTAL CANCER SCREENING  10/30/2020     MEDICARE ANNUAL WELLNESS VISIT  10/29/2020     LIPID  10/29/2024     ADVANCE CARE PLANNING  10/29/2024     DTAP/TDAP/TD IMMUNIZATION (3 - Td) 10/13/2027     HEPATITIS C SCREENING  Completed     PHQ-2  Completed     INFLUENZA VACCINE  Completed     Pneumococcal Vaccine: 65+ Years  Completed     AORTIC ANEURYSM SCREENING (SYSTEM ASSIGNED)  Completed     Pneumococcal Vaccine: Pediatrics (0 to 5 Years) and At-Risk Patients (6 to 64 Years)  Aged Out     IPV IMMUNIZATION   Aged Out     MENINGITIS IMMUNIZATION  Aged Out     HEPATITIS B IMMUNIZATION  Aged Out     Lab work is in process  Labs reviewed in EPIC  BP Readings from Last 3 Encounters:   10/30/20 (!) 152/90   11/26/19 (!) 176/77   10/29/19 138/82    Wt Readings from Last 3 Encounters:   10/30/20 70.8 kg (156 lb)   11/26/19 72.1 kg (159 lb)   10/29/19 72.4 kg (159 lb 9.6 oz)                  Patient Active Problem List   Diagnosis     HYPERLIPIDEMIA LDL GOAL <130     PSEUDOPHAKIA OU     Advanced directives, counseling/discussion     History of actinic keratoses     PVD (posterior vitreous detachment), bilateral     Nonexudative senile macular degeneration of retina     Elevated glucose     Essential hypertension with goal blood pressure less than 140/90     Elevated prostate specific antigen (PSA)     Dupuytren contracture     Past Surgical History:   Procedure Laterality Date     CATARACT IOL, RT/LT  12/2003    Bilateral       Social History     Tobacco Use     Smoking status: Never Smoker     Smokeless tobacco: Never Used   Substance Use Topics     Alcohol use: Yes     Comment: Ocss.     Family History   Problem Relation Age of Onset     Cancer Mother      Hypertension Mother      Heart Disease Maternal Grandmother      Cancer Maternal Grandfather          Current Outpatient Medications   Medication Sig Dispense Refill     acetaminophen (TYLENOL) 325 MG tablet Take as needed following label instructions       Artificial Tear Solution (SM ARTIFICIAL TEARS) SOLN Use per label instructions       lisinopril (PRINIVIL/ZESTRIL) 20 MG tablet Take 1 tablet (20 mg) by mouth daily 90 tablet 3     Multiple Vitamins-Minerals (EQ COMPLETE MULTIVITAMIN-ADULT) TABS Take 1 tablet by mouth daily       pravastatin (PRAVACHOL) 20 MG tablet TAKE 1 TABLET BY MOUTH 3 TIMES A WEEK 36 tablet 2     pravastatin (PRAVACHOL) 20 MG tablet TAKE 1 TABLET BY MOUTH 3 TIMES A WEEK (Patient not taking: Reported on 10/30/2020) 36 tablet 0     Allergies    Allergen Reactions     Shrimp      Recent Labs   Lab Test 10/29/19  0907 10/22/18  0929 10/13/17  1057 09/22/14  0910 09/22/14  0910 09/20/13  1051   A1C 5.0 5.2 5.2   < >  --   --    LDL 75 75 106*   < > 123 121   HDL 60 60 64   < > 56 48   TRIG 120 112 128   < > 129 162*   ALT 20  --   --   --  24  --    CR 0.94 1.02 1.00   < > 0.99 1.02   GFRESTIMATED 82 73 75   < > 76 74   GFRESTBLACK >90 88 >90   < > >90   GFR Calc   89   POTASSIUM 4.9 5.1 5.0   < > 4.7 5.3   TSH  --   --   --   --  1.58 1.12    < > = values in this interval not displayed.          Review of Systems  Constitutional, HEENT, cardiovascular, pulmonary, gi and gu systems are negative, except as otherwise noted.    OBJECTIVE:   Pulse 83   Temp 98  F (36.7  C) (Oral)   Resp 18   Ht 1.829 m (6')   Wt 70.8 kg (156 lb)   SpO2 99%   BMI 21.16 kg/m   Estimated body mass index is 21.16 kg/m  as calculated from the following:    Height as of this encounter: 1.829 m (6').    Weight as of this encounter: 70.8 kg (156 lb).  Physical Exam  GENERAL: healthy, alert and no distress  EYES: Eyes grossly normal to inspection, PERRL and conjunctivae and sclerae normal  HENT: ear canals and TM's normal, nose and mouth without ulcers or lesions  NECK: no adenopathy, no asymmetry, masses, or scars and thyroid normal to palpation  RESP: lungs clear to auscultation - no rales, rhonchi or wheezes  CV: regular rate and rhythm, normal S1 S2, no S3 or S4, no murmur, click or rub, no peripheral edema and peripheral pulses strong  ABDOMEN: soft, nontender, no hepatosplenomegaly, no masses and bowel sounds normal  MS: no gross musculoskeletal defects noted, no edema  SKIN: no suspicious lesions or rashes, there is a relatively large seborrheic keratosis along the lower left side of his abdominal wall . He's had this for many many years without change .he says he's hat it frozen a few times before but it always comes back.   NEURO: Normal strength and tone,  mentation intact and speech normal  PSYCH: mentation appears normal, affect normal/bright    Diagnostic Test Results:  Labs reviewed in Epic  Orders Placed This Encounter   Procedures     REMOVE IMPACTED CERUMEN     Fecal colorectal cancer screen (FIT)     CBC with platelets differential     Ferritin     Iron and iron binding capacity     Hemoglobin A1c     Basic metabolic panel     Lipid panel reflex to direct LDL Fasting         ASSESSMENT / PLAN:   (Z00.00) Encounter for annual wellness exam in Medicare patient  (primary encounter diagnosis)  Comment: routine screening issues   Plan: see orders section of this encounter     (I10) Essential hypertension with goal blood pressure less than 140/90  Comment: just not adequately controlled. This patient has blood pressure that requires medication manipulation and we have added Amlodipine [Norvasc] and requested medical assistant blood pressure recheck 2-3 weeks from now with possibly further medication manipulations necessary until we achieve blood glucose goals   Plan: lisinopril (ZESTRIL) 20 MG tablet, Basic         metabolic panel, amLODIPine (NORVASC) 2.5 MG         tablet            (E78.5) Hyperlipidemia LDL goal <130  Comment: due for recheck   Plan: pravastatin (PRAVACHOL) 20 MG tablet, Lipid         panel reflex to direct LDL Fasting        Problem is stable and ongoing monitoring      (R73.09) Elevated glucose  Comment: doubt clinical significance but warrants hemoglobin a1c  [ diabetes test ]   Plan: Hemoglobin A1c, Basic metabolic panel            (Z12.11) Special screening for malignant neoplasms, colon  Comment: he declines colonoscopy but gets the annual fecal occult blood testing   Plan: Fecal colorectal cancer screen (FIT)            (D64.9) Anemia, unspecified type  Comment: this was present a year ago and it is a slightly low hemoglobin only. I do want use to check iron studies and recheck the complete blood count with differential today   Plan: CBC  with platelets differential, Ferritin, Iron        and iron binding capacity        Follow up as indicated on results     (H61.23) Bilateral impacted cerumen  Comment: significant bilateral ear wax impaction   Plan: REMOVE IMPACTED CERUMEN        Successfully irrigated out by my medical assistant        Patient has been advised of split billing requirements and indicates understanding: Yes  COUNSELING:  Reviewed preventive health counseling, as reflected in patient instructions    Estimated body mass index is 21.16 kg/m  as calculated from the following:    Height as of this encounter: 1.829 m (6').    Weight as of this encounter: 70.8 kg (156 lb).    Weight management plan noted, stable and monitoring    He reports that he has never smoked. He has never used smokeless tobacco.      Appropriate preventive services were discussed with this patient, including applicable screening as appropriate for cardiovascular disease, diabetes, osteopenia/osteoporosis, and glaucoma.  As appropriate for age/gender, discussed screening for colorectal cancer, prostate cancer, breast cancer, and cervical cancer. Checklist reviewing preventive services available has been given to the patient.    Reviewed patients plan of care and provided an AVS. The Basic Care Plan (routine screening as documented in Health Maintenance) for Hamilton meets the Care Plan requirement. This Care Plan has been established and reviewed with the Patient.    Counseling Resources:  ATP IV Guidelines  Pooled Cohorts Equation Calculator  Breast Cancer Risk Calculator  Breast Cancer: Medication to Reduce Risk  FRAX Risk Assessment  ICSI Preventive Guidelines  Dietary Guidelines for Americans, 2010  USDA's MyPlate  ASA Prophylaxis  Lung CA Screening    Bro Herrera MD  [facility]    Identified Health Risks:

## 2020-11-02 DIAGNOSIS — D64.9 ANEMIA: Primary | ICD-10-CM

## 2020-11-07 LAB — HEMOCCULT STL QL IA: NEGATIVE

## 2020-11-13 ENCOUNTER — OFFICE VISIT (OUTPATIENT)
Dept: OPHTHALMOLOGY | Facility: CLINIC | Age: 70
End: 2020-11-13
Payer: COMMERCIAL

## 2020-11-13 DIAGNOSIS — H52.13 MYOPIA OF BOTH EYES WITH REGULAR ASTIGMATISM AND PRESBYOPIA: ICD-10-CM

## 2020-11-13 DIAGNOSIS — H52.4 MYOPIA OF BOTH EYES WITH REGULAR ASTIGMATISM AND PRESBYOPIA: ICD-10-CM

## 2020-11-13 DIAGNOSIS — Z96.1 PSEUDOPHAKIA: ICD-10-CM

## 2020-11-13 DIAGNOSIS — H52.223 MYOPIA OF BOTH EYES WITH REGULAR ASTIGMATISM AND PRESBYOPIA: ICD-10-CM

## 2020-11-13 DIAGNOSIS — H43.813 PVD (POSTERIOR VITREOUS DETACHMENT), BILATERAL: ICD-10-CM

## 2020-11-13 DIAGNOSIS — H35.3131 EARLY DRY STAGE NONEXUDATIVE AGE-RELATED MACULAR DEGENERATION OF BOTH EYES: ICD-10-CM

## 2020-11-13 DIAGNOSIS — Z01.00 EXAMINATION OF EYES AND VISION: Primary | ICD-10-CM

## 2020-11-13 PROCEDURE — 92014 COMPRE OPH EXAM EST PT 1/>: CPT | Performed by: STUDENT IN AN ORGANIZED HEALTH CARE EDUCATION/TRAINING PROGRAM

## 2020-11-13 PROCEDURE — 92015 DETERMINE REFRACTIVE STATE: CPT | Performed by: STUDENT IN AN ORGANIZED HEALTH CARE EDUCATION/TRAINING PROGRAM

## 2020-11-13 ASSESSMENT — REFRACTION_WEARINGRX
OS_CYLINDER: +1.00
SPECS_TYPE: SVL
OS_SPHERE: -2.00
OD_SPHERE: -3.00
OD_AXIS: 050
OS_AXIS: 155
OD_CYLINDER: +1.00

## 2020-11-13 ASSESSMENT — REFRACTION_MANIFEST
OS_SPHERE: -1.50
OD_AXIS: 047
OD_CYLINDER: +1.25
OS_ADD: +3.00
OD_ADD: +3.00
OS_CYLINDER: +0.75
OD_SPHERE: -3.25
OS_AXIS: 170

## 2020-11-13 ASSESSMENT — VISUAL ACUITY
OD_CC: 20/20
CORRECTION_TYPE: GLASSES
OS_CC: 20/25
OD_SC: J1
METHOD: SNELLEN - LINEAR
OS_SC: J5

## 2020-11-13 ASSESSMENT — TONOMETRY
OS_IOP_MMHG: 18
IOP_METHOD: APPLANATION
OD_IOP_MMHG: 19

## 2020-11-13 ASSESSMENT — CUP TO DISC RATIO
OD_RATIO: 0.3
OS_RATIO: 0.2

## 2020-11-13 ASSESSMENT — EXTERNAL EXAM - RIGHT EYE: OD_EXAM: NORMAL

## 2020-11-13 ASSESSMENT — SLIT LAMP EXAM - LIDS
COMMENTS: NORMAL
COMMENTS: NORMAL

## 2020-11-13 ASSESSMENT — CONF VISUAL FIELD
OD_NORMAL: 1
OS_NORMAL: 1

## 2020-11-13 ASSESSMENT — EXTERNAL EXAM - LEFT EYE: OS_EXAM: NORMAL

## 2020-11-13 NOTE — PATIENT INSTRUCTIONS
Continue artificial tears up to four times a day as needed    Continue AREDS2 eye vitamins by mouth    Glasses prescription given - optional to update    Gabe Randhawa MD  [phone]

## 2020-11-13 NOTE — LETTER
11/13/2020         RE: Hamilton Angulo  [address]  [place] [address]        Dear Colleague,    Thank you for referring your patient, Hamilton Angulo, to the [facility]. Please see a copy of my visit note below.     Current Eye Medications:  Centrum Silver, Refresh as needed qam     Subjective:  Complete eye exam   Sometimes it is hard to read, otherwise no complaints.     Objective:  See Ophthalmology Exam.    Assessment:  Hamilton Angulo is a 70 year old male who presents with:   Encounter Diagnoses   Name Primary?     Examination of eyes and vision      Myopia of both eyes with regular astigmatism and presbyopia        Pseudophakia - Both Eyes      Early dry stage nonexudative age-related macular degeneration of both eyes Stable both eyes.      PVD (posterior vitreous detachment), bilateral      Plan:  Continue artificial tears up to four times a day as needed    Continue AREDS2 eye vitamins by mouth    Glasses prescription given - optional to update    Gabe Randhawa MD  [phone]            Again, thank you for allowing me to participate in the care of your patient.        Sincerely,        Gabe Randhawa MD

## 2020-11-13 NOTE — PROGRESS NOTES
Current Eye Medications:  Centrum Silver, Refresh as needed qam     Subjective:  Complete eye exam   Sometimes it is hard to read, otherwise no complaints.     Objective:  See Ophthalmology Exam.    Assessment:  Hamilton Angulo is a 70 year old male who presents with:   Encounter Diagnoses   Name Primary?     Examination of eyes and vision      Myopia of both eyes with regular astigmatism and presbyopia        Pseudophakia - Both Eyes      Early dry stage nonexudative age-related macular degeneration of both eyes Stable both eyes.      PVD (posterior vitreous detachment), bilateral      Plan:  Continue artificial tears up to four times a day as needed    Continue AREDS2 eye vitamins by mouth    Glasses prescription given - optional to update    Gabe Randhawa MD  [phone]

## 2020-11-16 ENCOUNTER — ALLIED HEALTH/NURSE VISIT (OUTPATIENT)
Dept: NURSING | Facility: CLINIC | Age: 70
End: 2020-11-16
Payer: COMMERCIAL

## 2020-11-16 VITALS — DIASTOLIC BLOOD PRESSURE: 76 MMHG | SYSTOLIC BLOOD PRESSURE: 138 MMHG

## 2020-11-16 DIAGNOSIS — R97.20 ELEVATED PROSTATE SPECIFIC ANTIGEN (PSA): ICD-10-CM

## 2020-11-16 DIAGNOSIS — Z01.30 BP CHECK: Primary | ICD-10-CM

## 2020-11-16 LAB — PSA SERPL-MCNC: 2.72 UG/L (ref 0–4)

## 2020-11-16 PROCEDURE — 84153 ASSAY OF PSA TOTAL: CPT | Performed by: UROLOGY

## 2020-11-16 NOTE — NURSING NOTE
Hamilton Angulo is a 70 year old patient who comes in today for a Blood Pressure check.  Initial BP:  /76      Data Unavailable  Disposition: results routed to provider    Sujatha Antunez MA

## 2020-11-20 ENCOUNTER — TELEPHONE (OUTPATIENT)
Dept: UROLOGY | Facility: CLINIC | Age: 70
End: 2020-11-20

## 2020-11-20 NOTE — TELEPHONE ENCOUNTER
Left message for patient to call [phone] to convert 11/23 appointment to video.  Jacqueline Mckinley, CMA

## 2020-11-23 ENCOUNTER — VIRTUAL VISIT (OUTPATIENT)
Dept: UROLOGY | Facility: CLINIC | Age: 70
End: 2020-11-23
Payer: COMMERCIAL

## 2020-11-23 DIAGNOSIS — N40.1 BENIGN PROSTATIC HYPERPLASIA WITH LOWER URINARY TRACT SYMPTOMS, SYMPTOM DETAILS UNSPECIFIED: Primary | ICD-10-CM

## 2020-11-23 DIAGNOSIS — R97.20 ELEVATED PROSTATE SPECIFIC ANTIGEN (PSA): ICD-10-CM

## 2020-11-23 PROCEDURE — 99212 OFFICE O/P EST SF 10 MIN: CPT | Mod: TEL | Performed by: UROLOGY

## 2020-11-23 NOTE — PROGRESS NOTES
"Hamilton Angulo is a 70 year old male who is being evaluated via a billable telephone visit.      The patient has been notified of following:     \"This telephone visit will be conducted via a call between you and your physician/provider. We have found that certain health care needs can be provided without the need for a physical exam.  This service lets us provide the care you need with a short phone conversation.  If a prescription is necessary we can send it directly to your pharmacy.  If lab work is needed we can place an order for that and you can then stop by our lab to have the test done at a later time.    Telephone visits are billed at different rates depending on your insurance coverage. During this emergency period, for some insurers they may be billed the same as an in-person visit.  Please reach out to your insurance provider with any questions.    If during the course of the call the physician/provider feels a telephone visit is not appropriate, you will not be charged for this service.\"    Patient has given verbal consent for Telephone visit?  Yes    What phone number would you like to be contacted at? [phone]    How would you like to obtain your AVS? Valerio     Chief Complaint   Patient presents with     Telephone       Hamilton Angulo is a 70 year old male who presents today for follow up of   Chief Complaint   Patient presents with     Telephone    Patient is a 70-year-old male with history of urinary retention due to BPH.  He is status post expressed laser TURP in 2018.  Prior to surgery his urinary retention was 900 mL.  He is doing well since surgery without any complaints.  His urinary flow is good.  He feels he empties his bladder well.  His PSA was up to 5 however his recent PSA is only 2.7.    Current Outpatient Medications   Medication Sig Dispense Refill     acetaminophen (TYLENOL) 325 MG tablet Take as needed following label instructions       amLODIPine (NORVASC) 2.5 MG tablet Take 1 tablet " (2.5 mg) by mouth daily 90 tablet 3     Artificial Tear Solution (SM ARTIFICIAL TEARS) SOLN Use per label instructions       lisinopril (ZESTRIL) 20 MG tablet Take 1 tablet (20 mg) by mouth daily 90 tablet 3     Multiple Vitamins-Minerals (EQ COMPLETE MULTIVITAMIN-ADULT) TABS Take 1 tablet by mouth daily       pravastatin (PRAVACHOL) 20 MG tablet TAKE 1 TABLET BY MOUTH 3 TIMES A WEEK 36 tablet 3     pravastatin (PRAVACHOL) 20 MG tablet TAKE 1 TABLET BY MOUTH 3 TIMES A WEEK 36 tablet 0     Allergies   Allergen Reactions     Shrimp       Past Medical History:   Diagnosis Date     Bilateral cataracts 2001     Herniated disc 1983    Lumbar     Hyperlipidemia LDL goal <130      Hypertension 5/2004     Tear of MCL (medial collateral ligament) of knee 6/20/2001    LT Knee/WC     Past Surgical History:   Procedure Laterality Date     CATARACT IOL, RT/LT  12/2003    Bilateral     Family History   Problem Relation Age of Onset     Cancer Mother      Hypertension Mother      Heart Disease Maternal Grandmother      Cancer Maternal Grandfather      Social History     Socioeconomic History     Marital status:      Spouse name: None     Number of children: None     Years of education: None     Highest education level: None   Occupational History     None   Social Needs     Financial resource strain: None     Food insecurity:     Worry: None     Inability: None     Transportation needs:     Medical: None     Non-medical: None   Tobacco Use     Smoking status: Never Smoker     Smokeless tobacco: Never Used   Substance and Sexual Activity     Alcohol use: Yes     Comment: Ocss.     Drug use: No     Sexual activity: Yes     Partners: Female   Lifestyle     Physical activity:     Days per week: None     Minutes per session: None     Stress: None   Relationships     Social connections:     Talks on phone: None     Gets together: None     Attends Jain service: None     Active member of club or organization: None     Attends  meetings of clubs or organizations: None     Relationship status: None     Intimate partner violence:     Fear of current or ex partner: None     Emotionally abused: None     Physically abused: None     Forced sexual activity: None   Other Topics Concern     Parent/sibling w/ CABG, MI or angioplasty before 65F 55M? Not Asked   Social History Narrative     None       REVIEW OF SYSTEMS  =================  C: NEGATIVE for fever, chills, change in weight  I: NEGATIVE for worrisome rashes, moles or lesions  E/M: NEGATIVE for ear, mouth and throat problems  R: NEGATIVE for significant cough or SHORTNESS OF BREATH  CV:  NEGATIVE for chest pain, palpitations or peripheral edema  GI: NEGATIVE for nausea, abdominal pain, heartburn, or change in bowel habits  NEURO: NEGATIVE numbness/weakness  : see HPI  PSYCH: NEGATIVE depression/anxiety  LYmph: no new enlarged lymph nodes  Ortho: no new trauma/movements    Physical Exam:  There were no vitals taken for this visit.   Patient is pleasant, in no acute distress, good general condition.  Respiratory:  No evidence of distress  Psych:  Normal mood and affect  Neuro:  Alert and oriented     Assessment/Plan:   (N40.1,  R33.8) Benign prostatic hyperplasia with urinary retention  (primary encounter diagnosis)  Comment: Doing well  Plan:       Recheck in 1 year    (R97.20) Elevated prostate specific antigen (PSA)  Comment: PSA normalized  Plan: PSA tumor marker        1 year                      Phone call duration: 5 minutes    Wilder Foote MD

## 2021-03-01 DIAGNOSIS — D64.9 ANEMIA: ICD-10-CM

## 2021-03-01 LAB
BASOPHILS # BLD AUTO: 0 10E9/L (ref 0–0.2)
BASOPHILS NFR BLD AUTO: 1 %
DIFFERENTIAL METHOD BLD: ABNORMAL
EOSINOPHIL # BLD AUTO: 0.1 10E9/L (ref 0–0.7)
EOSINOPHIL NFR BLD AUTO: 2 %
ERYTHROCYTE [DISTWIDTH] IN BLOOD BY AUTOMATED COUNT: 13.5 % (ref 10–15)
HCT VFR BLD AUTO: 33.3 % (ref 40–53)
HGB BLD-MCNC: 10.8 G/DL (ref 13.3–17.7)
LYMPHOCYTES # BLD AUTO: 2.1 10E9/L (ref 0.8–5.3)
LYMPHOCYTES NFR BLD AUTO: 49 %
MCH RBC QN AUTO: 34 PG (ref 26.5–33)
MCHC RBC AUTO-ENTMCNC: 32.4 G/DL (ref 31.5–36.5)
MCV RBC AUTO: 105 FL (ref 78–100)
METAMYELOCYTES # BLD: 0 10E9/L
METAMYELOCYTES NFR BLD MANUAL: 1 %
MONOCYTES # BLD AUTO: 0.9 10E9/L (ref 0–1.3)
MONOCYTES NFR BLD AUTO: 21 %
NEUTROPHILS # BLD AUTO: 1.1 10E9/L (ref 1.6–8.3)
NEUTROPHILS NFR BLD AUTO: 26 %
PLATELET # BLD AUTO: 191 10E9/L (ref 150–450)
PLATELET # BLD EST: ABNORMAL 10*3/UL
RBC # BLD AUTO: 3.18 10E12/L (ref 4.4–5.9)
RETICS # AUTO: 59.2 10E9/L (ref 25–95)
RETICS/RBC NFR AUTO: 1.9 % (ref 0.5–2)
STOMATOCYTES BLD QL SMEAR: SLIGHT
VIT B12 SERPL-MCNC: 644 PG/ML (ref 193–986)
WBC # BLD AUTO: 4.2 10E9/L (ref 4–11)

## 2021-03-01 PROCEDURE — 85045 AUTOMATED RETICULOCYTE COUNT: CPT | Performed by: INTERNAL MEDICINE

## 2021-03-01 PROCEDURE — 82607 VITAMIN B-12: CPT | Performed by: INTERNAL MEDICINE

## 2021-03-01 PROCEDURE — 85025 COMPLETE CBC W/AUTO DIFF WBC: CPT | Performed by: INTERNAL MEDICINE

## 2021-03-01 PROCEDURE — 36415 COLL VENOUS BLD VENIPUNCTURE: CPT | Performed by: INTERNAL MEDICINE

## 2021-03-02 LAB — COPATH REPORT: NORMAL

## 2021-03-03 DIAGNOSIS — R79.89 ABNORMAL CBC: Primary | ICD-10-CM

## 2021-03-04 ENCOUNTER — TELEPHONE (OUTPATIENT)
Dept: INTERNAL MEDICINE | Facility: CLINIC | Age: 71
End: 2021-03-04

## 2021-03-04 NOTE — TELEPHONE ENCOUNTER
Oncology/Surgical Oncology Referral Request:     Specialty Requested: Medical Oncology     Referring Provider: Bro Herrera MD    Referring Clinic/Organization: [facility]    Records location: [facility]     Requested Provider (if specified): Not Specified

## 2021-03-04 NOTE — TELEPHONE ENCOUNTER
I called patient and we reviewed his questions in significant detail . We see a progression with his complete blood count with differential and there are several unequivocal abnormalities that can't be explained away by any diagnosis other then a bone marrow based diagnosis.    He's had    1. Dropping hemoglobin with negative FIT test every year   2. Dropping absolute neutrophil count   3. Rising mcv [ mean corpuscular volume which means the size of the rbc ]   4. Most recently his peripheral smear shows a few stomatocytes and large granular lymphocytes and a paucity of neutrophils    All of these abnormalities as a whole suggest some type of myelodysplastic syndrome and while treatment of this is not likely necessary at this point, it's to the point that we need a more exact diagnosis now thus it's time for a hematology consultation and patient agrees. He already has an appointment with Carmen Ellis , hematologist with [facility] , for a face to face encounter next week    Bro Herrera MD

## 2021-03-04 NOTE — TELEPHONE ENCOUNTER
Pt is wondering what has changed with his hgb that he is now needing to see a hematologist for? States he had this problem 4-5 years ago. What has changed? Advised that his red cells are bigger and white blood cell count is lower. States he already received this message. Pt is requesting a call from provider to discuss. Please contact him at [phone].    Jacqueline Flynn RN  [facility]

## 2021-03-09 ENCOUNTER — IMMUNIZATION (OUTPATIENT)
Dept: PEDIATRICS | Facility: CLINIC | Age: 71
End: 2021-03-09
Payer: COMMERCIAL

## 2021-03-09 PROCEDURE — 91300 PR COVID VAC PFIZER DIL RECON 30 MCG/0.3 ML IM: CPT

## 2021-03-09 PROCEDURE — 0001A PR COVID VAC PFIZER DIL RECON 30 MCG/0.3 ML IM: CPT

## 2021-03-30 ENCOUNTER — IMMUNIZATION (OUTPATIENT)
Dept: PEDIATRICS | Facility: CLINIC | Age: 71
End: 2021-03-30
Attending: INTERNAL MEDICINE
Payer: COMMERCIAL

## 2021-03-30 PROCEDURE — 91300 PR COVID VAC PFIZER DIL RECON 30 MCG/0.3 ML IM: CPT

## 2021-03-30 PROCEDURE — 0002A PR COVID VAC PFIZER DIL RECON 30 MCG/0.3 ML IM: CPT

## 2021-04-28 ENCOUNTER — PRE VISIT (OUTPATIENT)
Dept: ONCOLOGY | Facility: CLINIC | Age: 71
End: 2021-04-28

## 2021-04-28 ENCOUNTER — ONCOLOGY VISIT (OUTPATIENT)
Dept: ONCOLOGY | Facility: CLINIC | Age: 71
End: 2021-04-28
Attending: INTERNAL MEDICINE
Payer: COMMERCIAL

## 2021-04-28 VITALS
OXYGEN SATURATION: 97 % | BODY MASS INDEX: 20.05 KG/M2 | HEART RATE: 55 BPM | WEIGHT: 148 LBS | TEMPERATURE: 98.3 F | DIASTOLIC BLOOD PRESSURE: 88 MMHG | SYSTOLIC BLOOD PRESSURE: 194 MMHG | HEIGHT: 72 IN

## 2021-04-28 DIAGNOSIS — R77.9 ELEVATED BLOOD PROTEIN: ICD-10-CM

## 2021-04-28 DIAGNOSIS — D70.9 NEUTROPENIA, UNSPECIFIED TYPE (H): ICD-10-CM

## 2021-04-28 DIAGNOSIS — R79.89 ABNORMAL CBC: ICD-10-CM

## 2021-04-28 DIAGNOSIS — D64.9 ANEMIA, UNSPECIFIED TYPE: Primary | ICD-10-CM

## 2021-04-28 PROBLEM — N40.1 BENIGN PROSTATIC HYPERPLASIA WITH URINARY RETENTION: Status: ACTIVE | Noted: 2018-10-31

## 2021-04-28 PROBLEM — R33.8 BENIGN PROSTATIC HYPERPLASIA WITH URINARY RETENTION: Status: ACTIVE | Noted: 2018-10-31

## 2021-04-28 LAB
ALBUMIN SERPL-MCNC: 4.5 G/DL (ref 3.4–5)
ALP SERPL-CCNC: 51 U/L (ref 40–150)
ALT SERPL W P-5'-P-CCNC: 18 U/L (ref 0–70)
ANION GAP SERPL CALCULATED.3IONS-SCNC: 3 MMOL/L (ref 3–14)
AST SERPL W P-5'-P-CCNC: 10 U/L (ref 0–45)
BILIRUB SERPL-MCNC: 0.6 MG/DL (ref 0.2–1.3)
BUN SERPL-MCNC: 17 MG/DL (ref 7–30)
CALCIUM SERPL-MCNC: 9.3 MG/DL (ref 8.5–10.1)
CHLORIDE SERPL-SCNC: 106 MMOL/L (ref 94–109)
CO2 SERPL-SCNC: 30 MMOL/L (ref 20–32)
CREAT SERPL-MCNC: 1.03 MG/DL (ref 0.66–1.25)
DIFFERENTIAL METHOD BLD: ABNORMAL
EOSINOPHIL # BLD AUTO: 0 10E9/L (ref 0–0.7)
EOSINOPHIL NFR BLD AUTO: 1 %
ERYTHROCYTE [DISTWIDTH] IN BLOOD BY AUTOMATED COUNT: 13.8 % (ref 10–15)
FERRITIN SERPL-MCNC: 267 NG/ML (ref 26–388)
FOLATE SERPL-MCNC: 38 NG/ML
GFR SERPL CREATININE-BSD FRML MDRD: 73 ML/MIN/{1.73_M2}
GLUCOSE SERPL-MCNC: 107 MG/DL (ref 70–99)
HCT VFR BLD AUTO: 34.7 % (ref 40–53)
HGB BLD-MCNC: 11.5 G/DL (ref 13.3–17.7)
IRON SATN MFR SERPL: 21 % (ref 15–46)
IRON SERPL-MCNC: 64 UG/DL (ref 35–180)
LDH SERPL L TO P-CCNC: 154 U/L (ref 85–227)
LYMPHOCYTES # BLD AUTO: 1.8 10E9/L (ref 0.8–5.3)
LYMPHOCYTES NFR BLD AUTO: 51 %
MCH RBC QN AUTO: 34.2 PG (ref 26.5–33)
MCHC RBC AUTO-ENTMCNC: 33.1 G/DL (ref 31.5–36.5)
MCV RBC AUTO: 103 FL (ref 78–100)
MONOCYTES # BLD AUTO: 0.5 10E9/L (ref 0–1.3)
MONOCYTES NFR BLD AUTO: 16 %
NEUTROPHILS # BLD AUTO: 1.1 10E9/L (ref 1.6–8.3)
NEUTROPHILS NFR BLD AUTO: 32 %
PLATELET # BLD AUTO: 150 10E9/L (ref 150–450)
POTASSIUM SERPL-SCNC: 4.1 MMOL/L (ref 3.4–5.3)
PROT SERPL-MCNC: 9.3 G/DL (ref 6.8–8.8)
RBC # BLD AUTO: 3.36 10E12/L (ref 4.4–5.9)
RETICS # AUTO: 66.2 10E9/L (ref 25–95)
RETICS/RBC NFR AUTO: 2 % (ref 0.5–2)
SODIUM SERPL-SCNC: 139 MMOL/L (ref 133–144)
TIBC SERPL-MCNC: 299 UG/DL (ref 240–430)
TSH SERPL DL<=0.005 MIU/L-ACNC: 1.36 MU/L (ref 0.4–4)
WBC # BLD AUTO: 3.4 10E9/L (ref 4–11)

## 2021-04-28 PROCEDURE — 83540 ASSAY OF IRON: CPT | Performed by: INTERNAL MEDICINE

## 2021-04-28 PROCEDURE — 99204 OFFICE O/P NEW MOD 45 MIN: CPT | Performed by: INTERNAL MEDICINE

## 2021-04-28 PROCEDURE — 83550 IRON BINDING TEST: CPT | Performed by: INTERNAL MEDICINE

## 2021-04-28 PROCEDURE — 84165 PROTEIN E-PHORESIS SERUM: CPT | Performed by: STUDENT IN AN ORGANIZED HEALTH CARE EDUCATION/TRAINING PROGRAM

## 2021-04-28 PROCEDURE — 99000 SPECIMEN HANDLING OFFICE-LAB: CPT | Performed by: INTERNAL MEDICINE

## 2021-04-28 PROCEDURE — 82784 ASSAY IGA/IGD/IGG/IGM EACH: CPT | Performed by: INTERNAL MEDICINE

## 2021-04-28 PROCEDURE — 85060 BLOOD SMEAR INTERPRETATION: CPT | Performed by: INTERNAL MEDICINE

## 2021-04-28 PROCEDURE — 85045 AUTOMATED RETICULOCYTE COUNT: CPT | Performed by: INTERNAL MEDICINE

## 2021-04-28 PROCEDURE — 99N1036 PR STATISTIC TOTAL PROTEIN: Performed by: INTERNAL MEDICINE

## 2021-04-28 PROCEDURE — 82728 ASSAY OF FERRITIN: CPT | Performed by: INTERNAL MEDICINE

## 2021-04-28 PROCEDURE — 36415 COLL VENOUS BLD VENIPUNCTURE: CPT | Performed by: INTERNAL MEDICINE

## 2021-04-28 PROCEDURE — 86880 COOMBS TEST DIRECT: CPT | Performed by: INTERNAL MEDICINE

## 2021-04-28 PROCEDURE — 86334 IMMUNOFIX E-PHORESIS SERUM: CPT | Performed by: INTERNAL MEDICINE

## 2021-04-28 PROCEDURE — 83010 ASSAY OF HAPTOGLOBIN QUANT: CPT | Performed by: INTERNAL MEDICINE

## 2021-04-28 PROCEDURE — 82525 ASSAY OF COPPER: CPT | Mod: 90 | Performed by: INTERNAL MEDICINE

## 2021-04-28 PROCEDURE — 82746 ASSAY OF FOLIC ACID SERUM: CPT | Performed by: INTERNAL MEDICINE

## 2021-04-28 PROCEDURE — 99N1086 PR STATISTIC MORPHOLOGY W/INTERP HEMEPATH TC 85060: Performed by: INTERNAL MEDICINE

## 2021-04-28 PROCEDURE — 82668 ASSAY OF ERYTHROPOIETIN: CPT | Mod: 90 | Performed by: INTERNAL MEDICINE

## 2021-04-28 PROCEDURE — 83615 LACTATE (LD) (LDH) ENZYME: CPT | Performed by: INTERNAL MEDICINE

## 2021-04-28 PROCEDURE — 80050 GENERAL HEALTH PANEL: CPT | Performed by: INTERNAL MEDICINE

## 2021-04-28 ASSESSMENT — PAIN SCALES - GENERAL: PAINLEVEL: NO PAIN (0)

## 2021-04-28 ASSESSMENT — MIFFLIN-ST. JEOR: SCORE: 1469.32

## 2021-04-28 NOTE — PROGRESS NOTES
Hematology initial visit:  Date on this visit: 4/28/2021    Hamilton Angulo  is referred by Dr.Reuben Herrera for a hematology consultation. He requires evaluation for neutropenia/anemia.     Primary Physician: Bro Herrera     History Of Present Illness:  Mr. Angulo is a 70 year old male who presents with neutropenia/anemia.    Looking back he has had off-and-on mild anemia since 2014.  In October 2020 his hemoglobin was 11.2 with .  White blood cell was 3.9 with neutrophil count 1.5.  Neutrophil count was 1.1 on 3/1/2021.  At that time hemoglobin was 10.8 and .    He feels good.  He denies any pain.  He has good energy.  No night sweats.  He has lost about 8 pounds over the last 6 months or so which he attributes to eating less although he has decent appetite and has no issues with eating.  He tells me that since the pandemic started, they have not been making regular food at home and he is not going outside to eat.   No issues with bleeding.  No neuropathy.  No infections.  No shortness of breath.  No GI problems.  He tells me that he used to drink alcohol heavily but about 13 years ago he quit drinking heavily and now he usually drinks a couple of drinks of hard liquor in a week.      ROS:  A comprehensive ROS was otherwise neg        Past Medical/Surgical History:  Past Medical History:   Diagnosis Date     Bilateral cataracts 2001     Herniated disc 1983    Lumbar     Hyperlipidemia LDL goal <130      Hypertension 5/2004     Tear of MCL (medial collateral ligament) of knee 6/20/2001    LT Knee/WC   BPH- urinary retention. S/p TURP in 2018  Hard of hearing.  Past Surgical History:   Procedure Laterality Date     CATARACT IOL, RT/LT  12/2003    Bilateral     Allergies:  Allergies as of 04/28/2021 - Reviewed 04/28/2021   Allergen Reaction Noted     Shrimp  01/28/2014     Current Medications:  Current Outpatient Medications   Medication Sig Dispense Refill     acetaminophen (TYLENOL) 325 MG tablet Take as  needed following label instructions       amLODIPine (NORVASC) 2.5 MG tablet Take 1 tablet (2.5 mg) by mouth daily 90 tablet 3     Artificial Tear Solution (SM ARTIFICIAL TEARS) SOLN Use per label instructions       lisinopril (ZESTRIL) 20 MG tablet Take 1 tablet (20 mg) by mouth daily 90 tablet 3     Multiple Vitamins-Minerals (EQ COMPLETE MULTIVITAMIN-ADULT) TABS Take 1 tablet by mouth daily       pravastatin (PRAVACHOL) 20 MG tablet TAKE 1 TABLET BY MOUTH 3 TIMES A WEEK 36 tablet 3      Family History:  Family History   Problem Relation Age of Onset     Cancer Mother      Hypertension Mother      Heart Disease Maternal Grandmother      Cancer Maternal Grandfather      No family history of bleeding or clotting disorder.  Mother had breast cancer in her late 50s.    Social History:  Social History     Socioeconomic History     Marital status:      Spouse name: Not on file     Number of children: Not on file     Years of education: Not on file     Highest education level: Not on file   Occupational History     Not on file   Social Needs     Financial resource strain: Not on file     Food insecurity     Worry: Not on file     Inability: Not on file     Transportation needs     Medical: Not on file     Non-medical: Not on file   Tobacco Use     Smoking status: Never Smoker     Smokeless tobacco: Never Used   Substance and Sexual Activity     Alcohol use: Yes     Comment: Ocss.     Drug use: No     Sexual activity: Yes     Partners: Female   Lifestyle     Physical activity     Days per week: Not on file     Minutes per session: Not on file     Stress: Not on file   Relationships     Social connections     Talks on phone: Not on file     Gets together: Not on file     Attends Alevism service: Not on file     Active member of club or organization: Not on file     Attends meetings of clubs or organizations: Not on file     Relationship status: Not on file     Intimate partner violence     Fear of current or ex  partner: Not on file     Emotionally abused: Not on file     Physically abused: Not on file     Forced sexual activity: Not on file   Other Topics Concern     Parent/sibling w/ CABG, MI or angioplasty before 65F 55M? Not Asked   Social History Narrative     Not on file   He denies any smoking.  He usually has a couple of drinks of hard liquor in a week.  About 13 years ago he quit drinking alcohol heavily.  He used to have much more alcohol use for 40 years prior to that.  He lives with his wife.    Physical Exam:  BP (!) 194/88 (BP Location: Left arm, Patient Position: Chair, Cuff Size: Adult Regular)   Pulse 55   Temp 98.3  F (36.8  C) (Oral)   Ht 1.829 m (6')   Wt 67.1 kg (148 lb)   SpO2 97%   BMI 20.07 kg/m       I repeated his blood pressure and it was 150/78.    Wt Readings from Last 4 Encounters:   04/28/21 67.1 kg (148 lb)   10/30/20 70.8 kg (156 lb)   11/26/19 72.1 kg (159 lb)   10/29/19 72.4 kg (159 lb 9.6 oz)     CONSTITUTIONAL: no acute distress  EYES: PERRLA, no palor or icterus.   ENT/MOUTH: no mouth lesions. Ears normal.  He is hard of hearing.  CVS: s1s2 no m r g .   RESPIRATORY: clear to auscultation b/l  GI: soft non tender no hepatosplenomegaly  NEURO: AAOX3  Grossly non focal neuro exam  INTEGUMENT: no obvious rashes  LYMPHATIC: no palpable cervical, supraclavicular, axillary or inguinal LAD  MUSCULOSKELETAL: Unremarkable. No bony tenderness.   EXTREMITIES: no edema  PSYCH: Mentation, mood and affect are normal. Decision making capacity is intact    Laboratory/Imaging Studies      Reviewed    CBC showed that he has had off-and-on mild anemia since 2014.  In October 2020 his hemoglobin was 11.2 with .  White blood cell was 3.9 with neutrophil count 1.5.  Neutrophil count was 1.1 on 3/1/2021.  At that time hemoglobin was 10.8 and .    Peripheral blood smear on 3/1/2021 showed moderate macrocytic anemia and absolute neutropenia.    October 2020, BMP was unremarkable.  Iron  studies showed ferritin 236, iron 99, iron binding capacity 289, iron saturation index 34.    Vitamin B12 was 644 on 3/1/2021.        ASSESSMENT/PLAN:    He has anemia and neutropenia.    Anemia.  He has macrocytosis.  I would like to repeat peripheral blood smear, iron studies, folate levels and erythropoietin levels.  We would also check copper levels due to macrocytosis.  I would also rule out hemolysis due to macrocytosis and check LDH, CMP, haptoglobin and GLENNY.  Recent B12 was normal.  We will also check TSH because of macrocytosis.    Neutropenia.  The work-up is as mentioned above.    He might need a bone marrow biopsy.  We discussed the procedure of bone marrow biopsy including its potential complications in detail.  We will decide about it after reviewing the above-mentioned work-up.    Alcohol use.  We discussed that alcohol could be the cause of cytopenias and he does have macrocytosis.  I recommend abstinence from alcohol and see if his blood counts improved with time.    Elevated blood pressure.  His blood pressure in the clinic was elevated.  He is asymptomatic.  I recommend that he checks his blood pressures at home regularly when he is relaxed and keep a log of it.  If it is persistently high then he should talk to his primary care physician for better blood pressure management.  I am not making changes to his antihypertensive regimen today.    Tentatively I will plan to maintain the right is to see him back in about 6 weeks with repeat labs prior and in the meantime he will abstain from alcohol use.  Most likely we will decide about bone marrow biopsy at that time.    I answered all of his questions to his satisfaction.  He is agreeable and comfortable with the plan.    Carmen Ellis MD       Addendum:  As total protein is elevated, I would also add serum protein electrophoresis/MANDI.  Carmen Ellis MD  4/28/2021

## 2021-04-28 NOTE — LETTER
4/28/2021         RE: Hamilton Angulo  [address] [place] [address]        Dear Colleague,    Thank you for referring your patient, Hamilton Angulo, to the [facility]. Please see a copy of my visit note below.    Hematology initial visit:  Date on this visit: 4/28/2021    Hamilton Angulo  is referred by Dr.Reuben Herrera for a hematology consultation. He requires evaluation for neutropenia/anemia.     Primary Physician: Bro Herrera     History Of Present Illness:  Mr. Angulo is a 70 year old male who presents with neutropenia/anemia.    Looking back he has had off-and-on mild anemia since 2014.  In October 2020 his hemoglobin was 11.2 with .  White blood cell was 3.9 with neutrophil count 1.5.  Neutrophil count was 1.1 on 3/1/2021.  At that time hemoglobin was 10.8 and .    He feels good.  He denies any pain.  He has good energy.  No night sweats.  He has lost about 8 pounds over the last 6 months or so which he attributes to eating less although he has decent appetite and has no issues with eating.  He tells me that since the pandemic started, they have not been making regular food at home and he is not going outside to eat.   No issues with bleeding.  No neuropathy.  No infections.  No shortness of breath.  No GI problems.  He tells me that he used to drink alcohol heavily but about 13 years ago he quit drinking heavily and now he usually drinks a couple of drinks of hard liquor in a week.      ROS:  A comprehensive ROS was otherwise neg        Past Medical/Surgical History:  Past Medical History:   Diagnosis Date     Bilateral cataracts 2001     Herniated disc 1983    Lumbar     Hyperlipidemia LDL goal <130      Hypertension 5/2004     Tear of MCL (medial collateral ligament) of knee 6/20/2001    LT Knee/WC   BPH- urinary retention. S/p TURP in 2018  Hard of hearing.  Past Surgical History:   Procedure Laterality Date     CATARACT IOL, RT/LT  12/2003     Bilateral     Allergies:  Allergies as of 04/28/2021 - Reviewed 04/28/2021   Allergen Reaction Noted     Shrimp  01/28/2014     Current Medications:  Current Outpatient Medications   Medication Sig Dispense Refill     acetaminophen (TYLENOL) 325 MG tablet Take as needed following label instructions       amLODIPine (NORVASC) 2.5 MG tablet Take 1 tablet (2.5 mg) by mouth daily 90 tablet 3     Artificial Tear Solution (SM ARTIFICIAL TEARS) SOLN Use per label instructions       lisinopril (ZESTRIL) 20 MG tablet Take 1 tablet (20 mg) by mouth daily 90 tablet 3     Multiple Vitamins-Minerals (EQ COMPLETE MULTIVITAMIN-ADULT) TABS Take 1 tablet by mouth daily       pravastatin (PRAVACHOL) 20 MG tablet TAKE 1 TABLET BY MOUTH 3 TIMES A WEEK 36 tablet 3      Family History:  Family History   Problem Relation Age of Onset     Cancer Mother      Hypertension Mother      Heart Disease Maternal Grandmother      Cancer Maternal Grandfather      No family history of bleeding or clotting disorder.  Mother had breast cancer in her late 50s.    Social History:  Social History     Socioeconomic History     Marital status:      Spouse name: Not on file     Number of children: Not on file     Years of education: Not on file     Highest education level: Not on file   Occupational History     Not on file   Social Needs     Financial resource strain: Not on file     Food insecurity     Worry: Not on file     Inability: Not on file     Transportation needs     Medical: Not on file     Non-medical: Not on file   Tobacco Use     Smoking status: Never Smoker     Smokeless tobacco: Never Used   Substance and Sexual Activity     Alcohol use: Yes     Comment: Ocss.     Drug use: No     Sexual activity: Yes     Partners: Female   Lifestyle     Physical activity     Days per week: Not on file     Minutes per session: Not on file     Stress: Not on file   Relationships     Social connections     Talks on phone: Not on file     Gets together:  Not on file     Attends Pentecostalism service: Not on file     Active member of club or organization: Not on file     Attends meetings of clubs or organizations: Not on file     Relationship status: Not on file     Intimate partner violence     Fear of current or ex partner: Not on file     Emotionally abused: Not on file     Physically abused: Not on file     Forced sexual activity: Not on file   Other Topics Concern     Parent/sibling w/ CABG, MI or angioplasty before 65F 55M? Not Asked   Social History Narrative     Not on file   He denies any smoking.  He usually has a couple of drinks of hard liquor in a week.  About 13 years ago he quit drinking alcohol heavily.  He used to have much more alcohol use for 40 years prior to that.  He lives with his wife.    Physical Exam:  BP (!) 194/88 (BP Location: Left arm, Patient Position: Chair, Cuff Size: Adult Regular)   Pulse 55   Temp 98.3  F (36.8  C) (Oral)   Ht 1.829 m (6')   Wt 67.1 kg (148 lb)   SpO2 97%   BMI 20.07 kg/m       I repeated his blood pressure and it was 150/78.    Wt Readings from Last 4 Encounters:   04/28/21 67.1 kg (148 lb)   10/30/20 70.8 kg (156 lb)   11/26/19 72.1 kg (159 lb)   10/29/19 72.4 kg (159 lb 9.6 oz)     CONSTITUTIONAL: no acute distress  EYES: PERRLA, no palor or icterus.   ENT/MOUTH: no mouth lesions. Ears normal.  He is hard of hearing.  CVS: s1s2 no m r g .   RESPIRATORY: clear to auscultation b/l  GI: soft non tender no hepatosplenomegaly  NEURO: AAOX3  Grossly non focal neuro exam  INTEGUMENT: no obvious rashes  LYMPHATIC: no palpable cervical, supraclavicular, axillary or inguinal LAD  MUSCULOSKELETAL: Unremarkable. No bony tenderness.   EXTREMITIES: no edema  PSYCH: Mentation, mood and affect are normal. Decision making capacity is intact    Laboratory/Imaging Studies      Reviewed    CBC showed that he has had off-and-on mild anemia since 2014.  In October 2020 his hemoglobin was 11.2 with .  White blood cell was 3.9  with neutrophil count 1.5.  Neutrophil count was 1.1 on 3/1/2021.  At that time hemoglobin was 10.8 and .    Peripheral blood smear on 3/1/2021 showed moderate macrocytic anemia and absolute neutropenia.    October 2020, BMP was unremarkable.  Iron studies showed ferritin 236, iron 99, iron binding capacity 289, iron saturation index 34.    Vitamin B12 was 644 on 3/1/2021.        ASSESSMENT/PLAN:    He has anemia and neutropenia.    Anemia.  He has macrocytosis.  I would like to repeat peripheral blood smear, iron studies, folate levels and erythropoietin levels.  We would also check copper levels due to macrocytosis.  I would also rule out hemolysis due to macrocytosis and check LDH, CMP, haptoglobin and GLENNY.  Recent B12 was normal.  We will also check TSH because of macrocytosis.    Neutropenia.  The work-up is as mentioned above.    He might need a bone marrow biopsy.  We discussed the procedure of bone marrow biopsy including its potential complications in detail.  We will decide about it after reviewing the above-mentioned work-up.    Alcohol use.  We discussed that alcohol could be the cause of cytopenias and he does have macrocytosis.  I recommend abstinence from alcohol and see if his blood counts improved with time.    Elevated blood pressure.  His blood pressure in the clinic was elevated.  He is asymptomatic.  I recommend that he checks his blood pressures at home regularly when he is relaxed and keep a log of it.  If it is persistently high then he should talk to his primary care physician for better blood pressure management.  I am not making changes to his antihypertensive regimen today.    Tentatively I will plan to maintain the right is to see him back in about 6 weeks with repeat labs prior and in the meantime he will abstain from alcohol use.  Most likely we will decide about bone marrow biopsy at that time.    I answered all of his questions to his satisfaction.  He is agreeable and  comfortable with the plan.    Carmen Ellis MD             Again, thank you for allowing me to participate in the care of your patient.        Sincerely,        Carmen Ellis MD

## 2021-04-28 NOTE — NURSING NOTE
Oncology Rooming Note    April 28, 2021 11:20 AM   Hamilton Angulo is a 70 year old male who presents for:    Chief Complaint   Patient presents with     Oncology Clinic Visit     Abnormal CBC     Initial Vitals: BP (!) 194/88 (BP Location: Left arm, Patient Position: Chair, Cuff Size: Adult Regular)   Pulse 55   Temp 98.3  F (36.8  C) (Oral)   Ht 1.829 m (6')   Wt 67.1 kg (148 lb)   SpO2 97%   BMI 20.07 kg/m   Estimated body mass index is 20.07 kg/m  as calculated from the following:    Height as of this encounter: 1.829 m (6').    Weight as of this encounter: 67.1 kg (148 lb). Body surface area is 1.85 meters squared.  No Pain (0) Comment: Data Unavailable   No LMP for male patient.  Allergies reviewed: Yes  Medications reviewed: Yes    Medications: Medication refills not needed today.  Pharmacy name entered into [facility]: [id number] [place], [place] - [address] AT [facility]    Clinical concerns: NO Dr. Ellis was notified.      Tricia Vila CMA

## 2021-04-29 LAB
ALBUMIN SERPL ELPH-MCNC: 4.9 G/DL (ref 3.7–5.1)
ALPHA1 GLOB SERPL ELPH-MCNC: 0.3 G/DL (ref 0.2–0.4)
ALPHA2 GLOB SERPL ELPH-MCNC: 0.8 G/DL (ref 0.5–0.9)
B-GLOBULIN SERPL ELPH-MCNC: 0.6 G/DL (ref 0.6–1)
COPATH REPORT: NORMAL
DAT POLY-SP REAG RBC QL: NORMAL
EPO SERPL-ACNC: 27 MU/ML (ref 4–27)
GAMMA GLOB SERPL ELPH-MCNC: 2.4 G/DL (ref 0.7–1.6)
HAPTOGLOB SERPL-MCNC: 157 MG/DL (ref 32–197)
IGA SERPL-MCNC: 35 MG/DL (ref 84–499)
IGG SERPL-MCNC: 2862 MG/DL (ref 610–1616)
IGM SERPL-MCNC: 31 MG/DL (ref 35–242)
M PROTEIN SERPL ELPH-MCNC: 2.1 G/DL
PROT PATTERN SERPL ELPH-IMP: ABNORMAL
PROT PATTERN SERPL IFE-IMP: ABNORMAL

## 2021-04-30 LAB — COPPER SERPL-MCNC: 113.4 UG/DL (ref 70–140)

## 2021-05-05 DIAGNOSIS — D47.2 MONOCLONAL PARAPROTEINEMIA: Primary | ICD-10-CM

## 2021-05-06 ENCOUNTER — PATIENT OUTREACH (OUTPATIENT)
Dept: ONCOLOGY | Facility: CLINIC | Age: 71
End: 2021-05-06

## 2021-05-06 NOTE — PROGRESS NOTES
Spoke with patient regarding need for additional lab work, 24-hr urine, bone x-ray and bone marrow biopsy based on Dr. Ellis's analysis of labs from 4/28:  Patient has a large monoclonal protein spike.  Patient will  urine container from [facility]; will coordinate all other testing to be done on day of bone marrow biopsy; he will drop off the 24-hr urine on that same day.  Writer reviewed bone marrow procedure with patient, including  needed.  About 10 minutes was spent on the telephone reviewing with patient.  Advised one of our schedulers will be calling him with an appointment time.

## 2021-06-09 DIAGNOSIS — D47.2 MONOCLONAL PARAPROTEINEMIA: ICD-10-CM

## 2021-06-09 LAB
B2 MICROGLOB SERPL-MCNC: 1.9 MG/L
KAPPA LC UR-MCNC: 57.19 MG/DL (ref 0.33–1.94)
KAPPA LC/LAMBDA SER: 87.98 {RATIO} (ref 0.26–1.65)
LAMBDA LC SERPL-MCNC: 0.65 MG/DL (ref 0.57–2.63)

## 2021-06-09 PROCEDURE — 36415 COLL VENOUS BLD VENIPUNCTURE: CPT | Performed by: INTERNAL MEDICINE

## 2021-06-09 PROCEDURE — 82232 ASSAY OF BETA-2 PROTEIN: CPT | Performed by: INTERNAL MEDICINE

## 2021-06-09 PROCEDURE — 83883 ASSAY NEPHELOMETRY NOT SPEC: CPT | Performed by: INTERNAL MEDICINE

## 2021-06-09 PROCEDURE — 83883 ASSAY NEPHELOMETRY NOT SPEC: CPT | Mod: 59 | Performed by: INTERNAL MEDICINE

## 2021-06-10 DIAGNOSIS — D47.2 MONOCLONAL PARAPROTEINEMIA: ICD-10-CM

## 2021-06-10 LAB — PROT ELPH PNL UR ELPH: NORMAL

## 2021-06-10 PROCEDURE — 84166 PROTEIN E-PHORESIS/URINE/CSF: CPT | Performed by: PATHOLOGY

## 2021-06-11 VITALS — HEIGHT: 72 IN | BODY MASS INDEX: 20.05 KG/M2 | WEIGHT: 148 LBS

## 2021-06-11 LAB
ALBUMIN MFR UR ELPH: 2.1 %
ALPHA1 GLOB MFR UR ELPH: 2.1 %
ALPHA2 GLOB MFR UR ELPH: 7 %
B-GLOBULIN MFR UR ELPH: 16.5 %
GAMMA GLOB MFR UR ELPH: 72.3 %
M PROTEIN MFR UR ELPH: 57.5 %
PROT PATTERN UR ELPH-IMP: ABNORMAL

## 2021-06-11 ASSESSMENT — PAIN SCALES - GENERAL: PAINLEVEL: NO PAIN (0)

## 2021-06-11 ASSESSMENT — MIFFLIN-ST. JEOR: SCORE: 1469.32

## 2021-06-11 NOTE — NURSING NOTE
Hamilton is a 70 year old who is being evaluated via a billable video visit.      How would you like to obtain your AVS? MyChart  If the video visit is dropped, the invitation should be resent by: Text to cell phone: [phone]  Will anyone else be joining your video visit? No      Video-Visit Details    Type of service:  Video Visit    Originating Location (pt. Location): Home in [place]    Distant Location (provider location):  [facility]     Platform used for Video Visit: [employer] Luis     Concerns: Provider explain test results    Nubia Lebron CMA

## 2021-06-14 ENCOUNTER — TELEPHONE (OUTPATIENT)
Dept: ONCOLOGY | Facility: CLINIC | Age: 71
End: 2021-06-14

## 2021-06-14 ENCOUNTER — VIRTUAL VISIT (OUTPATIENT)
Dept: ONCOLOGY | Facility: CLINIC | Age: 71
End: 2021-06-14
Attending: INTERNAL MEDICINE
Payer: COMMERCIAL

## 2021-06-14 DIAGNOSIS — D49.89 PLASMA CELL NEOPLASM: Primary | ICD-10-CM

## 2021-06-14 PROCEDURE — 99214 OFFICE O/P EST MOD 30 MIN: CPT | Mod: 95 | Performed by: INTERNAL MEDICINE

## 2021-06-14 NOTE — LETTER
6/14/2021         RE: Hamilton Angulo  [address] [place] [address]        Dear Colleague,    Thank you for referring your patient, Hamilton Angulo, to the [facility]. Please see a copy of my visit note below.    Hematology follow-up visit:  Date on this visit: 6/14/21     Hamilton Angulo  is referred by Dr.Reuben Herrera for a hematology consultation. He requires evaluation for neutropenia/anemia.     Primary Physician: Bro Herrera     History Of Present Illness:  Please see my previous note for details.  I have copied and updated from prior note.      Mr. Angulo is a 70 year old male who presents for follow-up of neutropenia/anemia.  He initially saw me on 4/28/2021.    Looking back he has had off-and-on mild anemia since 2014.  In October 2020 his hemoglobin was 11.2 with .  White blood cell was 3.9 with neutrophil count 1.5.  Neutrophil count was 1.1 on 3/1/2021.  At that time hemoglobin was 10.8 and .    He feels good.  He denies any pain.  He has good energy.  No night sweats.  He has lost about 8 pounds over the last 6 months or so which he attributes to eating less although he has decent appetite and has no issues with eating.  He tells me that since the pandemic started, they have not been making regular food at home and he is not going outside to eat.   No issues with bleeding.  No neuropathy.  No infections.  No shortness of breath.  No GI problems.  He tells me that he used to drink alcohol heavily but about 13 years ago he quit drinking heavily and now he usually drinks a couple of drinks of hard liquor in a week.      Interval history.  This is a video visit.  I did further testing and it showed that he had elevated protein so I did serum protein electrophoresis which showed M spike of 2.1 and MANDI showed monoclonal IgG kappa.  It also showed a small monoclonal free kappa.  Serum IgG level was 2862.  Monoclonal peak in the urine was 57.5 and MANDI  showed large monoclonal free kappa and monoclonal IgG kappa.  I also wanted to do a bone survey and bone marrow biopsy prior to this visit but somehow it was not rescheduled.    He continues to feel good.  He tells me that he has stopped drinking alcohol altogether.  He misses it but he is doing well.  He denies any B symptoms.  No new swellings.  Denies any pain.  Energy is good.  No further weight loss.  No infections.  No neuropathy.      ROS:  Rest of the comprehensive review of the system was essentially unremarkable.    I reviewed other history in epic as below.      Past Medical/Surgical History:  Past Medical History:   Diagnosis Date     Bilateral cataracts 2001     Herniated disc 1983    Lumbar     Hyperlipidemia LDL goal <130      Hypertension 5/2004     Tear of MCL (medial collateral ligament) of knee 6/20/2001    LT Knee/WC   BPH- urinary retention. S/p TURP in 2018  Hard of hearing.  Past Surgical History:   Procedure Laterality Date     CATARACT IOL, RT/LT  12/2003    Bilateral     Allergies:  Allergies as of 06/14/2021 - Reviewed 06/11/2021   Allergen Reaction Noted     Shrimp  01/28/2014     Current Medications:  Current Outpatient Medications   Medication Sig Dispense Refill     acetaminophen (TYLENOL) 325 MG tablet Take as needed following label instructions       amLODIPine (NORVASC) 2.5 MG tablet Take 1 tablet (2.5 mg) by mouth daily 90 tablet 3     Artificial Tear Solution (SM ARTIFICIAL TEARS) SOLN Use per label instructions       lisinopril (ZESTRIL) 20 MG tablet Take 1 tablet (20 mg) by mouth daily 90 tablet 3     Multiple Vitamins-Minerals (EQ COMPLETE MULTIVITAMIN-ADULT) TABS Take 1 tablet by mouth daily       pravastatin (PRAVACHOL) 20 MG tablet TAKE 1 TABLET BY MOUTH 3 TIMES A WEEK 36 tablet 3      Family History:  Family History   Problem Relation Age of Onset     Cancer Mother      Hypertension Mother      Heart Disease Maternal Grandmother      Cancer Maternal Grandfather      No  family history of bleeding or clotting disorder.  Mother had breast cancer in her late 50s.    Social History:  Social History     Socioeconomic History     Marital status:      Spouse name: Not on file     Number of children: Not on file     Years of education: Not on file     Highest education level: Not on file   Occupational History     Not on file   Social Needs     Financial resource strain: Not on file     Food insecurity     Worry: Not on file     Inability: Not on file     Transportation needs     Medical: Not on file     Non-medical: Not on file   Tobacco Use     Smoking status: Never Smoker     Smokeless tobacco: Never Used   Substance and Sexual Activity     Alcohol use: Yes     Comment: Ocss.     Drug use: No     Sexual activity: Yes     Partners: Female   Lifestyle     Physical activity     Days per week: Not on file     Minutes per session: Not on file     Stress: Not on file   Relationships     Social connections     Talks on phone: Not on file     Gets together: Not on file     Attends Nondenominational service: Not on file     Active member of club or organization: Not on file     Attends meetings of clubs or organizations: Not on file     Relationship status: Not on file     Intimate partner violence     Fear of current or ex partner: Not on file     Emotionally abused: Not on file     Physically abused: Not on file     Forced sexual activity: Not on file   Other Topics Concern     Parent/sibling w/ CABG, MI or angioplasty before 65F 55M? Not Asked   Social History Narrative     Not on file   He denies any smoking.  He usually has a couple of drinks of hard liquor in a week.  About 13 years ago he quit drinking alcohol heavily.  He used to have much more alcohol use for 40 years prior to that.  He lives with his wife.    Physical Exam:  Ht 1.829 m (6')   Wt 67.1 kg (148 lb)   BMI 20.07 kg/m       I repeated his blood pressure and it was 150/78.    Wt Readings from Last 4 Encounters:   06/11/21  67.1 kg (148 lb)   04/28/21 67.1 kg (148 lb)   10/30/20 70.8 kg (156 lb)   11/26/19 72.1 kg (159 lb)     Constitutional.  Looks well and in no apparent distress.   Eyes.  Without eye redness or apparent jaundice.   Respiratory.  Non labored breathing. Speaking in full sentences.    Skin.  No concerning skin rashes on the skin visualized.   Neurological.  Is alert and oriented.  Psychiatric.  Mood and affect seem appropriate.      The rest of a comprehensive physical examination is deferred due to Public [facility] video visit restrictions.    Laboratory/Imaging Studies      Reviewed    4/28/2021.  CBC showed WBC 3.4.  ANC 1.1.  Hemoglobin 11.5.  Platelets 150.    CMP only remarkable for total protein 9.3.  Creatinine 1.03.  Calcium 9.3.  Albumin 4.5.    Serum protein electrophoresis showed M spike of 2.1.   and MANDI showed monoclonal IgG kappa.  It also showed a small monoclonal free kappa.  Serum IgG level was 2862.  Monoclonal peak in the urine was 57.5 and MANDI showed large monoclonal free kappa and monoclonal IgG kappa.  Serum IgG 2862.  IgA and IgM were both low.  [place] free light chain 57.  Lambda 0.65 and the ratio 88.    Beta-2 microglobulin 1.9.    GLENNY, haptoglobin and LDH normal.  Copper 113.  Erythropoietin 27.  Ferritin 267, iron 64, TIBC 299 and iron saturation index 21%.  B12 and folate normal.        ASSESSMENT/PLAN:    Anemia and neutropenia.      He has large M spike of IgG kappa and free kappa in the serum and large M spike of free kappa and IgG kappa in the urine.    Serum IgG level is elevated.  Serum free kappa light chain is 57 and the ratio is 88.    Erythropoietin is mildly lower than expected for the degree of hemoglobin of 11.5.    We discussed the situation in detail.  I suspect that he could have smoldering myeloma or myeloma and I would like to do further testing and check a PET CT and a bone marrow biopsy.      Discussion regarding alcohol.  He has been a heavy alcohol drinker but  over the last several weeks, he completely quit that.  I congratulated him on his efforts and advised him not to restart alcohol.    He will follow up with me after the bone marrow biopsy.    I answered all of his questions to his satisfaction.  He is agreeable and comfortable with the plan.    Carmen Ellis MD     Video start time. 7:41 AM  Video stop time. 8:04 AM      Again, thank you for allowing me to participate in the care of your patient.        Sincerely,        Carmen Ellis MD

## 2021-06-14 NOTE — PROGRESS NOTES
Hematology follow-up visit:  Date on this visit: 6/14/21     Hamilton Angulo  is referred by Dr.Reuben Herrera for a hematology consultation. He requires evaluation for neutropenia/anemia.     Primary Physician: Bro Herrera     History Of Present Illness:  Please see my previous note for details.  I have copied and updated from prior note.      Mr. Angulo is a 70 year old male who presents for follow-up of neutropenia/anemia.  He initially saw me on 4/28/2021.    Looking back he has had off-and-on mild anemia since 2014.  In October 2020 his hemoglobin was 11.2 with .  White blood cell was 3.9 with neutrophil count 1.5.  Neutrophil count was 1.1 on 3/1/2021.  At that time hemoglobin was 10.8 and .    He feels good.  He denies any pain.  He has good energy.  No night sweats.  He has lost about 8 pounds over the last 6 months or so which he attributes to eating less although he has decent appetite and has no issues with eating.  He tells me that since the pandemic started, they have not been making regular food at home and he is not going outside to eat.   No issues with bleeding.  No neuropathy.  No infections.  No shortness of breath.  No GI problems.  He tells me that he used to drink alcohol heavily but about 13 years ago he quit drinking heavily and now he usually drinks a couple of drinks of hard liquor in a week.      Interval history.  This is a video visit.  I did further testing and it showed that he had elevated protein so I did serum protein electrophoresis which showed M spike of 2.1 and MANDI showed monoclonal IgG kappa.  It also showed a small monoclonal free kappa.  Serum IgG level was 2862.  Monoclonal peak in the urine was 57.5 and MANDI showed large monoclonal free kappa and monoclonal IgG kappa.  I also wanted to do a bone survey and bone marrow biopsy prior to this visit but somehow it was not rescheduled.    He continues to feel good.  He tells me that he has stopped drinking alcohol  altogether.  He misses it but he is doing well.  He denies any B symptoms.  No new swellings.  Denies any pain.  Energy is good.  No further weight loss.  No infections.  No neuropathy.      ROS:  Rest of the comprehensive review of the system was essentially unremarkable.    I reviewed other history in epic as below.      Past Medical/Surgical History:  Past Medical History:   Diagnosis Date     Bilateral cataracts 2001     Herniated disc 1983    Lumbar     Hyperlipidemia LDL goal <130      Hypertension 5/2004     Tear of MCL (medial collateral ligament) of knee 6/20/2001    LT Knee/WC   BPH- urinary retention. S/p TURP in 2018  Hard of hearing.  Past Surgical History:   Procedure Laterality Date     CATARACT IOL, RT/LT  12/2003    Bilateral     Allergies:  Allergies as of 06/14/2021 - Reviewed 06/11/2021   Allergen Reaction Noted     Shrimp  01/28/2014     Current Medications:  Current Outpatient Medications   Medication Sig Dispense Refill     acetaminophen (TYLENOL) 325 MG tablet Take as needed following label instructions       amLODIPine (NORVASC) 2.5 MG tablet Take 1 tablet (2.5 mg) by mouth daily 90 tablet 3     Artificial Tear Solution (SM ARTIFICIAL TEARS) SOLN Use per label instructions       lisinopril (ZESTRIL) 20 MG tablet Take 1 tablet (20 mg) by mouth daily 90 tablet 3     Multiple Vitamins-Minerals (EQ COMPLETE MULTIVITAMIN-ADULT) TABS Take 1 tablet by mouth daily       pravastatin (PRAVACHOL) 20 MG tablet TAKE 1 TABLET BY MOUTH 3 TIMES A WEEK 36 tablet 3      Family History:  Family History   Problem Relation Age of Onset     Cancer Mother      Hypertension Mother      Heart Disease Maternal Grandmother      Cancer Maternal Grandfather      No family history of bleeding or clotting disorder.  Mother had breast cancer in her late 50s.    Social History:  Social History     Socioeconomic History     Marital status:      Spouse name: Not on file     Number of children: Not on file     Years of  education: Not on file     Highest education level: Not on file   Occupational History     Not on file   Social Needs     Financial resource strain: Not on file     Food insecurity     Worry: Not on file     Inability: Not on file     Transportation needs     Medical: Not on file     Non-medical: Not on file   Tobacco Use     Smoking status: Never Smoker     Smokeless tobacco: Never Used   Substance and Sexual Activity     Alcohol use: Yes     Comment: Ocss.     Drug use: No     Sexual activity: Yes     Partners: Female   Lifestyle     Physical activity     Days per week: Not on file     Minutes per session: Not on file     Stress: Not on file   Relationships     Social connections     Talks on phone: Not on file     Gets together: Not on file     Attends Jainism service: Not on file     Active member of club or organization: Not on file     Attends meetings of clubs or organizations: Not on file     Relationship status: Not on file     Intimate partner violence     Fear of current or ex partner: Not on file     Emotionally abused: Not on file     Physically abused: Not on file     Forced sexual activity: Not on file   Other Topics Concern     Parent/sibling w/ CABG, MI or angioplasty before 65F 55M? Not Asked   Social History Narrative     Not on file   He denies any smoking.  He usually has a couple of drinks of hard liquor in a week.  About 13 years ago he quit drinking alcohol heavily.  He used to have much more alcohol use for 40 years prior to that.  He lives with his wife.    Physical Exam:  Ht 1.829 m (6')   Wt 67.1 kg (148 lb)   BMI 20.07 kg/m       I repeated his blood pressure and it was 150/78.    Wt Readings from Last 4 Encounters:   06/11/21 67.1 kg (148 lb)   04/28/21 67.1 kg (148 lb)   10/30/20 70.8 kg (156 lb)   11/26/19 72.1 kg (159 lb)     Constitutional.  Looks well and in no apparent distress.   Eyes.  Without eye redness or apparent jaundice.   Respiratory.  Non labored breathing. Speaking  in full sentences.    Skin.  No concerning skin rashes on the skin visualized.   Neurological.  Is alert and oriented.  Psychiatric.  Mood and affect seem appropriate.      The rest of a comprehensive physical examination is deferred due to [facility] Emergency video visit restrictions.    Laboratory/Imaging Studies      Reviewed    4/28/2021.  CBC showed WBC 3.4.  ANC 1.1.  Hemoglobin 11.5.  Platelets 150.    CMP only remarkable for total protein 9.3.  Creatinine 1.03.  Calcium 9.3.  Albumin 4.5.    Serum protein electrophoresis showed M spike of 2.1.   and MANDI showed monoclonal IgG kappa.  It also showed a small monoclonal free kappa.  Serum IgG level was 2862.  Monoclonal peak in the urine was 57.5 and MANDI showed large monoclonal free kappa and monoclonal IgG kappa.  Serum IgG 2862.  IgA and IgM were both low.  [place] free light chain 57.  Lambda 0.65 and the ratio 88.    Beta-2 microglobulin 1.9.    GLENNY, haptoglobin and LDH normal.  Copper 113.  Erythropoietin 27.  Ferritin 267, iron 64, TIBC 299 and iron saturation index 21%.  B12 and folate normal.        ASSESSMENT/PLAN:    Anemia and neutropenia.      He has large M spike of IgG kappa and free kappa in the serum and large M spike of free kappa and IgG kappa in the urine.    Serum IgG level is elevated.  Serum free kappa light chain is 57 and the ratio is 88.    Erythropoietin is mildly lower than expected for the degree of hemoglobin of 11.5.    We discussed the situation in detail.  I suspect that he could have smoldering myeloma or myeloma and I would like to do further testing and check a PET CT and a bone marrow biopsy.      Discussion regarding alcohol.  He has been a heavy alcohol drinker but over the last several weeks, he completely quit that.  I congratulated him on his efforts and advised him not to restart alcohol.    He will follow up with me after the bone marrow biopsy.    I answered all of his questions to his satisfaction.  He is agreeable  and comfortable with the plan.    Carmen Ellis MD     Video start time. 7:41 AM  Video stop time. 8:04 AM

## 2021-06-14 NOTE — TELEPHONE ENCOUNTER
PER MADISON    lets do labs, BM Bx and PET scan asap    See me 2 weeks after the BM bx    Thanks

## 2021-06-19 ENCOUNTER — HOSPITAL ENCOUNTER (OUTPATIENT)
Dept: PET IMAGING | Facility: CLINIC | Age: 71
Setting detail: NUCLEAR MEDICINE
Discharge: HOME | End: 2021-06-19
Attending: INTERNAL MEDICINE | Admitting: INTERNAL MEDICINE
Payer: COMMERCIAL

## 2021-06-19 DIAGNOSIS — D49.89 PLASMA CELL NEOPLASM: ICD-10-CM

## 2021-06-19 PROCEDURE — 74177 CT ABD & PELVIS W/CONTRAST: CPT

## 2021-06-19 PROCEDURE — 78816 PET IMAGE W/CT FULL BODY: CPT | Mod: 26

## 2021-06-19 PROCEDURE — 78816 PET IMAGE W/CT FULL BODY: CPT | Mod: PI,GZ

## 2021-06-19 PROCEDURE — 250N000011 HC RX IP 250 OP 636: Performed by: INTERNAL MEDICINE

## 2021-06-19 PROCEDURE — A9552 F18 FDG: HCPCS | Performed by: INTERNAL MEDICINE

## 2021-06-19 PROCEDURE — 71260 CT THORAX DX C+: CPT | Mod: 26

## 2021-06-19 PROCEDURE — 343N000001 HC RX 343: Performed by: INTERNAL MEDICINE

## 2021-06-19 PROCEDURE — 74177 CT ABD & PELVIS W/CONTRAST: CPT | Mod: 26

## 2021-06-19 RX ORDER — IOPAMIDOL 755 MG/ML
40-135 INJECTION, SOLUTION INTRAVASCULAR ONCE
Status: COMPLETED | OUTPATIENT
Start: 2021-06-19 | End: 2021-06-19

## 2021-06-19 RX ADMIN — IOPAMIDOL 91 ML: 755 INJECTION, SOLUTION INTRAVENOUS at 09:17

## 2021-06-19 RX ADMIN — FLUDEOXYGLUCOSE F-18 10.19 MCI.: 500 INJECTION, SOLUTION INTRAVENOUS at 08:33

## 2021-06-23 ENCOUNTER — OFFICE VISIT (OUTPATIENT)
Dept: ONCOLOGY | Facility: CLINIC | Age: 71
End: 2021-06-23
Attending: INTERNAL MEDICINE
Payer: COMMERCIAL

## 2021-06-23 VITALS
SYSTOLIC BLOOD PRESSURE: 186 MMHG | HEART RATE: 77 BPM | OXYGEN SATURATION: 98 % | RESPIRATION RATE: 16 BRPM | DIASTOLIC BLOOD PRESSURE: 85 MMHG | BODY MASS INDEX: 20.06 KG/M2 | WEIGHT: 147.9 LBS | TEMPERATURE: 98 F

## 2021-06-23 DIAGNOSIS — D49.89 PLASMA CELL NEOPLASM: ICD-10-CM

## 2021-06-23 DIAGNOSIS — D47.2 MONOCLONAL PARAPROTEINEMIA: Primary | ICD-10-CM

## 2021-06-23 LAB
ALBUMIN SERPL-MCNC: 4.1 G/DL (ref 3.4–5)
ALP SERPL-CCNC: 46 U/L (ref 40–150)
ALT SERPL W P-5'-P-CCNC: 13 U/L (ref 0–70)
ANION GAP SERPL CALCULATED.3IONS-SCNC: 5 MMOL/L (ref 3–14)
AST SERPL W P-5'-P-CCNC: 11 U/L (ref 0–45)
BASOPHILS # BLD AUTO: 0 10E9/L (ref 0–0.2)
BASOPHILS NFR BLD AUTO: 0.3 %
BILIRUB SERPL-MCNC: 0.6 MG/DL (ref 0.2–1.3)
BUN SERPL-MCNC: 15 MG/DL (ref 7–30)
CALCIUM SERPL-MCNC: 8.5 MG/DL (ref 8.5–10.1)
CHLORIDE SERPL-SCNC: 108 MMOL/L (ref 94–109)
CO2 SERPL-SCNC: 27 MMOL/L (ref 20–32)
CREAT SERPL-MCNC: 0.97 MG/DL (ref 0.66–1.25)
DIFFERENTIAL METHOD BLD: ABNORMAL
EOSINOPHIL # BLD AUTO: 0 10E9/L (ref 0–0.7)
EOSINOPHIL NFR BLD AUTO: 0.9 %
ERYTHROCYTE [DISTWIDTH] IN BLOOD BY AUTOMATED COUNT: 13.7 % (ref 10–15)
GFR SERPL CREATININE-BSD FRML MDRD: 78 ML/MIN/{1.73_M2}
GLUCOSE SERPL-MCNC: 100 MG/DL (ref 70–99)
HCT VFR BLD AUTO: 30.6 % (ref 40–53)
HGB BLD-MCNC: 10 G/DL (ref 13.3–17.7)
IGA SERPL-MCNC: 37 MG/DL (ref 84–499)
IGG SERPL-MCNC: 2530 MG/DL (ref 610–1616)
IGM SERPL-MCNC: 30 MG/DL (ref 35–242)
IMM GRANULOCYTES # BLD: 0.1 10E9/L (ref 0–0.4)
IMM GRANULOCYTES NFR BLD: 1.8 %
LDH SERPL L TO P-CCNC: 155 U/L (ref 85–227)
LYMPHOCYTES # BLD AUTO: 1.6 10E9/L (ref 0.8–5.3)
LYMPHOCYTES NFR BLD AUTO: 48.8 %
MCH RBC QN AUTO: 33.9 PG (ref 26.5–33)
MCHC RBC AUTO-ENTMCNC: 32.7 G/DL (ref 31.5–36.5)
MCV RBC AUTO: 104 FL (ref 78–100)
MONOCYTES # BLD AUTO: 0.5 10E9/L (ref 0–1.3)
MONOCYTES NFR BLD AUTO: 16.6 %
NEUTROPHILS # BLD AUTO: 1 10E9/L (ref 1.6–8.3)
NEUTROPHILS NFR BLD AUTO: 31.6 %
NRBC # BLD AUTO: 0 10*3/UL
NRBC BLD AUTO-RTO: 0 /100
PLATELET # BLD AUTO: 155 10E9/L (ref 150–450)
POTASSIUM SERPL-SCNC: 3.6 MMOL/L (ref 3.4–5.3)
PROT SERPL-MCNC: 8.7 G/DL (ref 6.8–8.8)
RBC # BLD AUTO: 2.95 10E12/L (ref 4.4–5.9)
SODIUM SERPL-SCNC: 140 MMOL/L (ref 133–144)
WBC # BLD AUTO: 3.3 10E9/L (ref 4–11)

## 2021-06-23 PROCEDURE — 88185 FLOWCYTOMETRY/TC ADD-ON: CPT | Performed by: INTERNAL MEDICINE

## 2021-06-23 PROCEDURE — 88280 CHROMOSOME KARYOTYPE STUDY: CPT | Performed by: INTERNAL MEDICINE

## 2021-06-23 PROCEDURE — 88311 DECALCIFY TISSUE: CPT | Mod: TC | Performed by: INTERNAL MEDICINE

## 2021-06-23 PROCEDURE — 88264 CHROMOSOME ANALYSIS 20-25: CPT | Performed by: INTERNAL MEDICINE

## 2021-06-23 PROCEDURE — 88305 TISSUE EXAM BY PATHOLOGIST: CPT | Mod: 26 | Performed by: STUDENT IN AN ORGANIZED HEALTH CARE EDUCATION/TRAINING PROGRAM

## 2021-06-23 PROCEDURE — 96374 THER/PROPH/DIAG INJ IV PUSH: CPT | Mod: 59

## 2021-06-23 PROCEDURE — 83615 LACTATE (LD) (LDH) ENZYME: CPT | Performed by: INTERNAL MEDICINE

## 2021-06-23 PROCEDURE — 84165 PROTEIN E-PHORESIS SERUM: CPT | Mod: TC | Performed by: INTERNAL MEDICINE

## 2021-06-23 PROCEDURE — 88313 SPECIAL STAINS GROUP 2: CPT | Mod: TC | Performed by: INTERNAL MEDICINE

## 2021-06-23 PROCEDURE — 999N001137 HC STATISTIC FLOW >15 ABY TC 88189: Performed by: INTERNAL MEDICINE

## 2021-06-23 PROCEDURE — 80053 COMPREHEN METABOLIC PANEL: CPT | Performed by: INTERNAL MEDICINE

## 2021-06-23 PROCEDURE — 88341 IMHCHEM/IMCYTCHM EA ADD ANTB: CPT | Mod: 26 | Performed by: STUDENT IN AN ORGANIZED HEALTH CARE EDUCATION/TRAINING PROGRAM

## 2021-06-23 PROCEDURE — 999N001022 HC STATISTIC H-SPHEME PROCESS B/S: Performed by: INTERNAL MEDICINE

## 2021-06-23 PROCEDURE — 85097 BONE MARROW INTERPRETATION: CPT | Performed by: STUDENT IN AN ORGANIZED HEALTH CARE EDUCATION/TRAINING PROGRAM

## 2021-06-23 PROCEDURE — 250N000011 HC RX IP 250 OP 636: Performed by: PHYSICIAN ASSISTANT

## 2021-06-23 PROCEDURE — 88189 FLOWCYTOMETRY/READ 16 & >: CPT | Performed by: PATHOLOGY

## 2021-06-23 PROCEDURE — 88237 TISSUE CULTURE BONE MARROW: CPT | Performed by: INTERNAL MEDICINE

## 2021-06-23 PROCEDURE — 999N001068 HC STATISTIC BONE MARROW CORE PERF TC 38221: Performed by: INTERNAL MEDICINE

## 2021-06-23 PROCEDURE — 88341 IMHCHEM/IMCYTCHM EA ADD ANTB: CPT | Mod: TC | Performed by: INTERNAL MEDICINE

## 2021-06-23 PROCEDURE — 85025 COMPLETE CBC W/AUTO DIFF WBC: CPT | Performed by: INTERNAL MEDICINE

## 2021-06-23 PROCEDURE — 88271 CYTOGENETICS DNA PROBE: CPT | Performed by: INTERNAL MEDICINE

## 2021-06-23 PROCEDURE — 999N001102 HC STATISTIC DNA PROCESS AND HOLD: Performed by: INTERNAL MEDICINE

## 2021-06-23 PROCEDURE — 82784 ASSAY IGA/IGD/IGG/IGM EACH: CPT | Performed by: INTERNAL MEDICINE

## 2021-06-23 PROCEDURE — 38222 DX BONE MARROW BX & ASPIR: CPT | Performed by: PHYSICIAN ASSISTANT

## 2021-06-23 PROCEDURE — 88342 IMHCHEM/IMCYTCHM 1ST ANTB: CPT | Mod: TC,XU | Performed by: INTERNAL MEDICINE

## 2021-06-23 PROCEDURE — 999N001036 HC STATISTIC TOTAL PROTEIN: Performed by: INTERNAL MEDICINE

## 2021-06-23 PROCEDURE — 88311 DECALCIFY TISSUE: CPT | Mod: 26 | Performed by: STUDENT IN AN ORGANIZED HEALTH CARE EDUCATION/TRAINING PROGRAM

## 2021-06-23 PROCEDURE — 88305 TISSUE EXAM BY PATHOLOGIST: CPT | Mod: TC | Performed by: INTERNAL MEDICINE

## 2021-06-23 PROCEDURE — 88313 SPECIAL STAINS GROUP 2: CPT | Mod: 26 | Performed by: STUDENT IN AN ORGANIZED HEALTH CARE EDUCATION/TRAINING PROGRAM

## 2021-06-23 PROCEDURE — 88184 FLOWCYTOMETRY/ TC 1 MARKER: CPT | Performed by: INTERNAL MEDICINE

## 2021-06-23 PROCEDURE — 84165 PROTEIN E-PHORESIS SERUM: CPT | Mod: 26 | Performed by: PATHOLOGY

## 2021-06-23 PROCEDURE — 999N001086 HC STATISTIC MORPHOLOGY W/INTERP HEMEPATH TC 85060: Performed by: INTERNAL MEDICINE

## 2021-06-23 PROCEDURE — 88161 CYTOPATH SMEAR OTHER SOURCE: CPT | Mod: TC,XU | Performed by: INTERNAL MEDICINE

## 2021-06-23 PROCEDURE — 88275 CYTOGENETICS 100-300: CPT | Performed by: INTERNAL MEDICINE

## 2021-06-23 PROCEDURE — 999N001126 HC STATISTIC BONE MARROW INTERP TC 85097: Performed by: INTERNAL MEDICINE

## 2021-06-23 PROCEDURE — 88342 IMHCHEM/IMCYTCHM 1ST ANTB: CPT | Mod: 26 | Performed by: STUDENT IN AN ORGANIZED HEALTH CARE EDUCATION/TRAINING PROGRAM

## 2021-06-23 RX ADMIN — MIDAZOLAM HYDROCHLORIDE 1 MG: 1 INJECTION, SOLUTION INTRAMUSCULAR; INTRAVENOUS at 10:04

## 2021-06-23 ASSESSMENT — PAIN SCALES - GENERAL: PAINLEVEL: NO PAIN (0)

## 2021-06-23 NOTE — NURSING NOTE
Oncology Rooming Note    June 23, 2021 9:47 AM   Hamilton Angulo is a 71 year old male who presents for:    Chief Complaint   Patient presents with     Bone Marrow Biopsy     bmbx r/t anemia     Initial Vitals: BP (!) 186/85   Pulse 77   Temp 98  F (36.7  C)   Resp 16   Wt 67.1 kg (147 lb 14.4 oz)   SpO2 98%   BMI 20.06 kg/m   Estimated body mass index is 20.06 kg/m  as calculated from the following:    Height as of 6/11/21: 1.829 m (6').    Weight as of this encounter: 67.1 kg (147 lb 14.4 oz). Body surface area is 1.85 meters squared.  No Pain (0) Comment: Data Unavailable   No LMP for male patient.  Allergies reviewed: Yes  Medications reviewed: Yes    Medications: Medication refills not needed today.  Pharmacy name entered into [employer]: [id number] - [place], [place] - [address] AT [facility] [address]    Clinical concerns: VSS. No clinical concerns at this time.      Johanny Davenport, RN

## 2021-06-23 NOTE — PROGRESS NOTES
BMT ONC Adult Bone Marrow Biopsy Procedure Note  June 23, 2021  BP (!) 186/85   Pulse 77   Temp 98  F (36.7  C)   Resp 16   Wt 67.1 kg (147 lb 14.4 oz)   SpO2 98%   BMI 20.06 kg/m       Learning needs assessment complete within 12 months? YES    DIAGNOSIS: monoclonal paraproteinemia- anemia- neutropenia     PROCEDURE: Unilateral Bone Marrow Biopsy and Unilateral Aspirate    LOCATION: [facility] 2nd Floor    Patient s identification was positively verified by verbal identification and invasive procedure safety checklist was completed. Informed consent was obtained. Following the administration of 1 mg Midazolam as pre-medication, patient was placed in the prone position and prepped and draped in a sterile manner. Approximately 10 cc of 1% Lidocaine was used over the left posterior iliac spine. Following this a 3 mm incision was made. Trephine bone marrow core(s) was (were) obtained from the [facility]. Bone marrow aspirates were obtained from the [facility]. Aspirates were sent for morphology, immunophenotyping, cytogenetics and molecular diagnostics process and hold. A total of approximately 14 ml of marrow was aspirated. Following this procedure a sterile dressing was applied to the bone marrow biopsy site(s). The patient was placed in the supine position to maintain pressure on the biopsy site. Post-procedure wound care instructions were given.     Complications: NO    Post-procedural pain assessment: 0 out of 10 on the numeric pain rating scale.     Interventions: NO    Length of procedure:20 minutes or less      Procedure performed by:   Shannon Archer PA-C  [facility]  [address] [place] [id number]  [phone]

## 2021-06-23 NOTE — PROGRESS NOTES
BMT Teaching Flowsheet   Teaching Topic: bmbx    Person(s) involved in teaching: Patient  Motivation Level High  Asks Questions: Yes  Eager to Learn: Yes  Cooperative: Yes  Receptive (willing/able to accept information): Yes    Patient demonstrates understanding of the following:   - Reason for the appointment, diagnosis and treatment plan: Yes  - Knowledge of proper use of medications and conditions for which they are ordered (with special attention to potential side effects or drug interactions): Yes  - Which situations necessitate calling provider and whom to contact: Yes    Teaching concerns addressed: reviewed activity restrictions if received premeds, potential for bleeding and actions to take if develops any of the issues below    Proper use and care of (medical equipment, care aids, etc.) Yes  Pain management techniques: Yes  Patient instructed on hand hygiene: Yes  How and/when to access community resources: Yes    Infection Control:  Patient demonstrates understanding of the following:   Surgical procedure site care taught NA  Signs and symptoms of infection taught Yes  Wound care taught Yes  Central venous catheter care taught NA    Instructional Materials Used/Given: verbal, print out of post biopsy instructions.     Patient supine for 30 minutes following biopsy. After 30 minutes, dressing clean, dry and intact. Vital signs stable. See DOC flow sheets for details. Left ambulatory with family member.    Provider order received to administer Versed 1mg IVP as premed for BMBX.   Dose ordered: 1mg  Dose given: 1mg  Dose wasted: 1mg  [id number]    Procedural consent discussed and pt's signature obtained.  Allergies reviewed.  PT currently alert and oriented to plan of care.  Pt lying prone in stretcher.  Call light w/in reach.  Provider and  at bedside.    Specific Concerns: NA    Time spent with patient: 20 minutes.    Johanny Davenport RN

## 2021-06-23 NOTE — LETTER
6/23/2021         RE: Hamilton Angulo  [address] [place] [address]        Dear Colleague,    Thank you for referring your patient, Hamilton Angulo, to the [facility]. Please see a copy of my visit note below.    BMT ONC Adult Bone Marrow Biopsy Procedure Note  June 23, 2021  BP (!) 186/85   Pulse 77   Temp 98  F (36.7  C)   Resp 16   Wt 67.1 kg (147 lb 14.4 oz)   SpO2 98%   BMI 20.06 kg/m       Learning needs assessment complete within 12 months? YES    DIAGNOSIS: monoclonal paraproteinemia- anemia- neutropenia     PROCEDURE: Unilateral Bone Marrow Biopsy and Unilateral Aspirate    LOCATION: [facility] 2nd Floor    Patient s identification was positively verified by verbal identification and invasive procedure safety checklist was completed. Informed consent was obtained. Following the administration of 1 mg Midazolam as pre-medication, patient was placed in the prone position and prepped and draped in a sterile manner. Approximately 10 cc of 1% Lidocaine was used over the left posterior iliac spine. Following this a 3 mm incision was made. Trephine bone marrow core(s) was (were) obtained from the [facility]. Bone marrow aspirates were obtained from the [facility]. Aspirates were sent for morphology, immunophenotyping, cytogenetics and molecular diagnostics process and hold. A total of approximately 14 ml of marrow was aspirated. Following this procedure a sterile dressing was applied to the bone marrow biopsy site(s). The patient was placed in the supine position to maintain pressure on the biopsy site. Post-procedure wound care instructions were given.     Complications: NO    Post-procedural pain assessment: 0 out of 10 on the numeric pain rating scale.     Interventions: NO    Length of procedure:20 minutes or less      Procedure performed by:   Shannon Archer PA-C  [facility]  [address] [place] [address]  [phone]        BMT Teaching Flowsheet   Teaching  Topic: bmbx    Person(s) involved in teaching: Patient  Motivation Level High  Asks Questions: Yes  Eager to Learn: Yes  Cooperative: Yes  Receptive (willing/able to accept information): Yes    Patient demonstrates understanding of the following:   - Reason for the appointment, diagnosis and treatment plan: Yes  - Knowledge of proper use of medications and conditions for which they are ordered (with special attention to potential side effects or drug interactions): Yes  - Which situations necessitate calling provider and whom to contact: Yes    Teaching concerns addressed: reviewed activity restrictions if received premeds, potential for bleeding and actions to take if develops any of the issues below    Proper use and care of (medical equipment, care aids, etc.) Yes  Pain management techniques: Yes  Patient instructed on hand hygiene: Yes  How and/when to access community resources: Yes    Infection Control:  Patient demonstrates understanding of the following:   Surgical procedure site care taught NA  Signs and symptoms of infection taught Yes  Wound care taught Yes  Central venous catheter care taught NA    Instructional Materials Used/Given: verbal, print out of post biopsy instructions.     Patient supine for 30 minutes following biopsy. After 30 minutes, dressing clean, dry and intact. Vital signs stable. See DOC flow sheets for details. Left ambulatory with family member.    Provider order received to administer Versed 1mg IVP as premed for BMBX.   Dose ordered: 1mg  Dose given: 1mg  Dose wasted: 1mg  [id number]    Procedural consent discussed and pt's signature obtained.  Allergies reviewed.  PT currently alert and oriented to plan of care.  Pt lying prone in stretcher.  Call light w/in reach.  Provider and  at bedside.    Specific Concerns: NA    Time spent with patient: 20 minutes.    Johanny Davenport RN        Again, thank you for allowing me to participate in the care of your patient.         Sincerely,        Shannon Archer PA-C

## 2021-06-24 LAB
ALBUMIN SERPL ELPH-MCNC: 4.6 G/DL (ref 3.7–5.1)
ALPHA1 GLOB SERPL ELPH-MCNC: 0.3 G/DL (ref 0.2–0.4)
ALPHA2 GLOB SERPL ELPH-MCNC: 0.7 G/DL (ref 0.5–0.9)
B-GLOBULIN SERPL ELPH-MCNC: 0.5 G/DL (ref 0.6–1)
COPATH REPORT: NORMAL
COPATH REPORT: NORMAL
GAMMA GLOB SERPL ELPH-MCNC: 2.2 G/DL (ref 0.7–1.6)
M PROTEIN SERPL ELPH-MCNC: 1.7 G/DL
PROT PATTERN SERPL ELPH-IMP: ABNORMAL

## 2021-06-28 LAB — COPATH REPORT: NORMAL

## 2021-07-05 VITALS — HEIGHT: 72 IN | BODY MASS INDEX: 19.91 KG/M2 | WEIGHT: 147 LBS

## 2021-07-05 LAB
COPATH REPORT: NORMAL
COPATH REPORT: NORMAL

## 2021-07-05 ASSESSMENT — PAIN SCALES - GENERAL: PAINLEVEL: NO PAIN (0)

## 2021-07-05 ASSESSMENT — MIFFLIN-ST. JEOR: SCORE: 1459.79

## 2021-07-05 NOTE — NURSING NOTE
Hamilton is a 71 year old who is being evaluated via a billable video visit.      How would you like to obtain your AVS? MyChart  If the video visit is dropped, the invitation should be resent by: Text to cell phone: [phone]  Will anyone else be joining your video visit? No      Video-Visit Details    Type of service:  Video Visit    Originating Location (pt. Location): Home in [place]    Distant Location (provider location):  [facility]     Platform used for Video Visit: Jose Luis     Per patient no concerns    Nubia Lebron CMA

## 2021-07-06 ENCOUNTER — VIRTUAL VISIT (OUTPATIENT)
Dept: ONCOLOGY | Facility: CLINIC | Age: 71
End: 2021-07-06
Payer: COMMERCIAL

## 2021-07-06 DIAGNOSIS — C90.00 MULTIPLE MYELOMA NOT HAVING ACHIEVED REMISSION (H): ICD-10-CM

## 2021-07-06 PROCEDURE — 99215 OFFICE O/P EST HI 40 MIN: CPT | Mod: 95 | Performed by: INTERNAL MEDICINE

## 2021-07-06 NOTE — PATIENT INSTRUCTIONS
Please think about starting treatment for myeloma as we discussed.  I recommend Velcade, Revlimid and dexamethasone.    If you start treatment then I would recommend that you also start taking aspirin and acyclovir    I also recommend getting dental clearance to start Zometa.  With Zometa start taking calcium and vitamin D    We will also arrange evaluation by bone marrow transplant team for stem cell transplant at the [facility].    I also recommend doing MRI of the liver to have a better look at the indeterminate spot in the liver    Once you have made your decision, then we will determine the follow-up accordingly.

## 2021-07-06 NOTE — PROGRESS NOTES
Hematology follow-up visit:  Date on this visit: 7/06/21     Hamilton Angulo  is referred by Dr.Reuben Herrera for a hematology consultation. He requires evaluation for neutropenia/anemia.     Primary Physician: Bro Herrera     History Of Present Illness:  Please see my previous note for details.  I have copied and updated from prior note.      Mr. Angulo is a 71 year old male who presents for follow-up of neutropenia/anemia.  He initially saw me on 4/28/2021.    Looking back he has had off-and-on mild anemia since 2014.  In October 2020 his hemoglobin was 11.2 with .  White blood cell was 3.9 with neutrophil count 1.5.  Neutrophil count was 1.1 on 3/1/2021.  At that time hemoglobin was 10.8 and .    He feels good.  He denies any pain.  He has good energy.  No night sweats.  He has lost about 8 pounds over the last 6 months or so which he attributes to eating less although he has decent appetite and has no issues with eating.  He tells me that since the pandemic started, they have not been making regular food at home and he is not going outside to eat.   No issues with bleeding.  No neuropathy.  No infections.  No shortness of breath.  No GI problems.  He tells me that he used to drink alcohol heavily but about 13 years ago he quit drinking heavily and now he usually drinks a couple of drinks of hard liquor in a week.      Interval history.  This is a video visit.  I did further testing and it showed that he had elevated protein so I did serum protein electrophoresis which showed M spike of 2.1 and MANDI showed monoclonal IgG kappa.  It also showed a small monoclonal free kappa.  Serum IgG level was 2862.  Monoclonal peak in the urine was 57.5 and MANDI showed large monoclonal free kappa and monoclonal IgG kappa.      Interval history.  This is a video visit.  He continues to feel well and remains pretty much asymptomatic.  He had his bone marrow biopsy done and PET scan done so he wants to discuss the  results.      ECOG 1    ROS:  A comprehensive ROS was otherwise neg      I reviewed other history in epic as below.      Past Medical/Surgical History:  Past Medical History:   Diagnosis Date     Bilateral cataracts 2001     Herniated disc 1983    Lumbar     Hyperlipidemia LDL goal <130      Hypertension 5/2004     Tear of MCL (medial collateral ligament) of knee 6/20/2001    LT Knee/WC   BPH- urinary retention. S/p TURP in 2018  Hard of hearing.  Past Surgical History:   Procedure Laterality Date     CATARACT IOL, RT/LT  12/2003    Bilateral     Allergies:  Allergies as of 07/06/2021 - Reviewed 07/05/2021   Allergen Reaction Noted     Shrimp  01/28/2014     Current Medications:  Current Outpatient Medications   Medication Sig Dispense Refill     acetaminophen (TYLENOL) 325 MG tablet Take as needed following label instructions       amLODIPine (NORVASC) 2.5 MG tablet Take 1 tablet (2.5 mg) by mouth daily 90 tablet 3     Artificial Tear Solution (SM ARTIFICIAL TEARS) SOLN Use per label instructions       lisinopril (ZESTRIL) 20 MG tablet Take 1 tablet (20 mg) by mouth daily 90 tablet 3     Multiple Vitamins-Minerals (EQ COMPLETE MULTIVITAMIN-ADULT) TABS Take 1 tablet by mouth daily       pravastatin (PRAVACHOL) 20 MG tablet TAKE 1 TABLET BY MOUTH 3 TIMES A WEEK 36 tablet 3      Family History:  Family History   Problem Relation Age of Onset     Cancer Mother      Hypertension Mother      Heart Disease Maternal Grandmother      Cancer Maternal Grandfather      No family history of bleeding or clotting disorder.  Mother had breast cancer in her late 50s.    Social History:  Social History     Socioeconomic History     Marital status:      Spouse name: Not on file     Number of children: Not on file     Years of education: Not on file     Highest education level: Not on file   Occupational History     Not on file   Social Needs     Financial resource strain: Not on file     Food insecurity     Worry: Not on file      Inability: Not on file     Transportation needs     Medical: Not on file     Non-medical: Not on file   Tobacco Use     Smoking status: Never Smoker     Smokeless tobacco: Never Used   Substance and Sexual Activity     Alcohol use: Yes     Comment: [employer].     Drug use: No     Sexual activity: Yes     Partners: Female   Lifestyle     Physical activity     Days per week: Not on file     Minutes per session: Not on file     Stress: Not on file   Relationships     Social connections     Talks on phone: Not on file     Gets together: Not on file     Attends Sabianism service: Not on file     Active member of club or organization: Not on file     Attends meetings of clubs or organizations: Not on file     Relationship status: Not on file     Intimate partner violence     Fear of current or ex partner: Not on file     Emotionally abused: Not on file     Physically abused: Not on file     Forced sexual activity: Not on file   Other Topics Concern     Parent/sibling w/ CABG, MI or angioplasty before 65F 55M? Not Asked   Social History Narrative     Not on file   He denies any smoking.  He usually has a couple of drinks of hard liquor in a week.  About 13 years ago he quit drinking alcohol heavily.  He used to have much more alcohol use for 40 years prior to that.  He lives with his wife.    Physical Exam:  Ht 1.829 m (6')   Wt 66.7 kg (147 lb)   BMI 19.94 kg/m       I repeated his blood pressure and it was 150/78.    Wt Readings from Last 4 Encounters:   07/05/21 66.7 kg (147 lb)   06/23/21 67.1 kg (147 lb 14.4 oz)   06/11/21 67.1 kg (148 lb)   04/28/21 67.1 kg (148 lb)         Constitutional.  Does not seem to be in any acute distress.  Eyes.  No redness or discharge noted.  Respiratory.  Speaking in full sentences.  Breathing seems comfortable without any accessory use of muscles.    Skin.  Visualized his skin does not show any obvious rashes.  Musculoskeletal.  Range of motion for visualized areas is  intact.  Neurological.  Alert and oriented x3.  Psychiatric.  Mood, mentation and affect are normal.  Decision making capacity is intact.      The rest of a comprehensive physical examination is deferred due to Public Health Emergency video visit restrictions.      Laboratory/Imaging Studies      Reviewed  6/23/2021  CBC shows WBC 3.3.  Hemoglobin 10.  Platelets 155.  CMP shows normal creatinine and calcium.  Total protein 8.7.  LFTs unremarkable.  Albumin 4.1  Previous beta-2 microglobulin 1.9.    Bone marrow biopsy showed plasma cell myeloma with normocellular marrow, cellularity 20 to 30% with trilineage hematopoiesis and 20- 25% kappa monotypic plasma cells.  Increased marrow storage iron.    Bone marrow flow cytometry showed 1.2% plasma cells which express CD38, CD19, CD20 (partial dim), CD45 (dim), CD56  and monotypic cytoplasmic kappa immunoglobulin light chains.    FISH  Hyperdiploid and on monosomy 13.  - Gains of chromosomes 5, 9, and/or 15 (85%)   - Gain of 11q (95%)   - Monosomy 13 (46%)   - Loss of IGH (4.5%) (control range 0-2.8%); no rearrangement of IGH    Cytogenetics.  Normal 46 XY          Serum protein electrophoresis showed M spike of 1.7.   and MANDI showed monoclonal IgG kappa.  It also showed a small monoclonal free kappa.  Serum IgG level was 2530.  IgA and IgM are low.      24-hour urine protein electrophoresis showed monoclonal peak in the urine was 57.5 and MANDI showed large monoclonal free kappa and monoclonal IgG kappa.      Rico free light chain 57.  Lambda 0.65 and the ratio 88.    PET/CT on 6/19/2021 does not show any hypermetabolic intraosseous lesion however multiple nonavid lucent lesions in the pelvis are suspicious for myeloma.  There is enhancing focus in segment 2 of the liver which is indeterminate.    GLENNY, haptoglobin and LDH normal.  Copper 113.  Erythropoietin 27.  Ferritin 267, iron 64, TIBC 299 and iron saturation index 21%.  B12 and folate  normal.        ASSESSMENT/PLAN:    Multiple myeloma.  RISS Stage 1  IgG kappa/free kappa in the serum and IgG kappa in the urine   FISH testing shows hyperdiploid E and monosomy 13.  Beta-2 microglobulin 1.9.  Albumin 4.1  LDH normal  This will be considered a standard risk myeloma.    He has anemia and neutropenia.  PET scan showed multiple lucent lesions in the pelvis although they are not FDG avid.    We discussed the situation in detail.    Myeloma is generally considered not curable but very treatable disease.  He would be potentially transplant eligible so I recommend that we start Velcade Revlimid and dexamethasone for 4 cycles and then assess him for the transplant.  We discussed the rational schedule and potential side effects of it in detail.  I would also like him to be evaluated at the [facility] for possibility of potential future stem cell transplant.    Bone disease.  He has lytic lesions in the pelvis although they are not FDG avid.  Most likely these lesions are related to myeloma.  I would like him to start on monthly Zometa.  We discussed the rational schedule and potential side effects of it.  I would recommend getting dental clearance prior to start of Zometa.  Then he should start taking calcium and vitamin D.    Prophylaxis.  With Velcade we will give acyclovir prophylaxis and he will take aspirin as well.    Indeterminate segment 2 liver lesion.  I recommend doing MRI of the abdomen.  At this time I have low suspicion that this is related to myeloma.    Plan at this time he has not decided what he wants to pursue the treatment for myeloma or not.  We discussed that in case he does not want to get the treatment then I strongly recommend that he sees palliative care although currently he does not have any symptoms but I anticipate that he will start having problems in the near future due to progression of myeloma.    He wants to think about all of this before making a final  decision and I advised him to try to make the decision as soon as possible.    We will determine the follow-up accordingly after he has informed us of his decision.    We did not address the following today.    Discussion regarding alcohol.  He has been a heavy alcohol drinker but over the last several weeks, he completely quit that.  I congratulated him on his efforts and advised him not to restart alcohol.        I answered all of his questions to his satisfaction.  He is agreeable and comfortable with the plan.    Carmen Ellis MD     Video start time. 7:34 AM  Video stop time. 8:01 AM    I spent 49 minutes on this visit including the video time, reviewing records and labs and imaging and placing new orders as well as coordination of care and documentation.

## 2021-07-06 NOTE — LETTER
7/6/2021         RE: Hamilton Angulo  [address] [place] [address]        Dear Colleague,    Thank you for referring your patient, Hamilton Angulo, to the [facility] [place]. Please see a copy of my visit note below.    Hematology follow-up visit:  Date on this visit: 7/06/21     Hamilton Angulo  is referred by Dr.Reuben Herrera for a hematology consultation. He requires evaluation for neutropenia/anemia.     Primary Physician: Bro Herrera     History Of Present Illness:  Please see my previous note for details.  I have copied and updated from prior note.      Mr. Angulo is a 71 year old male who presents for follow-up of neutropenia/anemia.  He initially saw me on 4/28/2021.    Looking back he has had off-and-on mild anemia since 2014.  In October 2020 his hemoglobin was 11.2 with .  White blood cell was 3.9 with neutrophil count 1.5.  Neutrophil count was 1.1 on 3/1/2021.  At that time hemoglobin was 10.8 and .    He feels good.  He denies any pain.  He has good energy.  No night sweats.  He has lost about 8 pounds over the last 6 months or so which he attributes to eating less although he has decent appetite and has no issues with eating.  He tells me that since the pandemic started, they have not been making regular food at home and he is not going outside to eat.   No issues with bleeding.  No neuropathy.  No infections.  No shortness of breath.  No GI problems.  He tells me that he used to drink alcohol heavily but about 13 years ago he quit drinking heavily and now he usually drinks a couple of drinks of hard liquor in a week.      Interval history.  This is a video visit.  I did further testing and it showed that he had elevated protein so I did serum protein electrophoresis which showed M spike of 2.1 and MANDI showed monoclonal IgG kappa.  It also showed a small monoclonal free kappa.  Serum IgG level was 2862.  Monoclonal peak in the urine was 57.5 and MANDI showed  large monoclonal free kappa and monoclonal IgG kappa.      Interval history.  This is a video visit.  He continues to feel well and remains pretty much asymptomatic.  He had his bone marrow biopsy done and PET scan done so he wants to discuss the results.      ECOG 1    ROS:  A comprehensive ROS was otherwise neg      I reviewed other history in epic as below.      Past Medical/Surgical History:  Past Medical History:   Diagnosis Date     Bilateral cataracts 2001     Herniated disc 1983    Lumbar     Hyperlipidemia LDL goal <130      Hypertension 5/2004     Tear of MCL (medial collateral ligament) of knee 6/20/2001    LT Knee/WC   BPH- urinary retention. S/p TURP in 2018  Hard of hearing.  Past Surgical History:   Procedure Laterality Date     CATARACT IOL, RT/LT  12/2003    Bilateral     Allergies:  Allergies as of 07/06/2021 - Reviewed 07/05/2021   Allergen Reaction Noted     Shrimp  01/28/2014     Current Medications:  Current Outpatient Medications   Medication Sig Dispense Refill     acetaminophen (TYLENOL) 325 MG tablet Take as needed following label instructions       amLODIPine (NORVASC) 2.5 MG tablet Take 1 tablet (2.5 mg) by mouth daily 90 tablet 3     Artificial Tear Solution (SM ARTIFICIAL TEARS) SOLN Use per label instructions       lisinopril (ZESTRIL) 20 MG tablet Take 1 tablet (20 mg) by mouth daily 90 tablet 3     Multiple Vitamins-Minerals (EQ COMPLETE MULTIVITAMIN-ADULT) TABS Take 1 tablet by mouth daily       pravastatin (PRAVACHOL) 20 MG tablet TAKE 1 TABLET BY MOUTH 3 TIMES A WEEK 36 tablet 3      Family History:  Family History   Problem Relation Age of Onset     Cancer Mother      Hypertension Mother      Heart Disease Maternal Grandmother      Cancer Maternal Grandfather      No family history of bleeding or clotting disorder.  Mother had breast cancer in her late 50s.    Social History:  Social History     Socioeconomic History     Marital status:      Spouse name: Not on file      Number of children: Not on file     Years of education: Not on file     Highest education level: Not on file   Occupational History     Not on file   Social Needs     Financial resource strain: Not on file     Food insecurity     Worry: Not on file     Inability: Not on file     Transportation needs     Medical: Not on file     Non-medical: Not on file   Tobacco Use     Smoking status: Never Smoker     Smokeless tobacco: Never Used   Substance and Sexual Activity     Alcohol use: Yes     Comment: [employer].     Drug use: No     Sexual activity: Yes     Partners: Female   Lifestyle     Physical activity     Days per week: Not on file     Minutes per session: Not on file     Stress: Not on file   Relationships     Social connections     Talks on phone: Not on file     Gets together: Not on file     Attends [employer] service: Not on file     Active member of club or organization: Not on file     Attends meetings of clubs or organizations: Not on file     Relationship status: Not on file     Intimate partner violence     Fear of current or ex partner: Not on file     Emotionally abused: Not on file     Physically abused: Not on file     Forced sexual activity: Not on file   Other Topics Concern     Parent/sibling w/ CABG, MI or angioplasty before 65F 55M? Not Asked   Social History Narrative     Not on file   He denies any smoking.  He usually has a couple of drinks of hard liquor in a week.  About 13 years ago he quit drinking alcohol heavily.  He used to have much more alcohol use for 40 years prior to that.  He lives with his wife.    Physical Exam:  Ht 1.829 m (6')   Wt 66.7 kg (147 lb)   BMI 19.94 kg/m       I repeated his blood pressure and it was 150/78.    Wt Readings from Last 4 Encounters:   07/05/21 66.7 kg (147 lb)   06/23/21 67.1 kg (147 lb 14.4 oz)   06/11/21 67.1 kg (148 lb)   04/28/21 67.1 kg (148 lb)         Constitutional.  Does not seem to be in any acute distress.  Eyes.  No redness or discharge  noted.  Respiratory.  Speaking in full sentences.  Breathing seems comfortable without any accessory use of muscles.    Skin.  Visualized his skin does not show any obvious rashes.  Musculoskeletal.  Range of motion for visualized areas is intact.  Neurological.  Alert and oriented x3.  Psychiatric.  Mood, mentation and affect are normal.  Decision making capacity is intact.      The rest of a comprehensive physical examination is deferred due to [facility] video visit restrictions.      Laboratory/Imaging Studies      Reviewed  6/23/2021  CBC shows WBC 3.3.  Hemoglobin 10.  Platelets 155.  CMP shows normal creatinine and calcium.  Total protein 8.7.  LFTs unremarkable.  Albumin 4.1  Previous beta-2 microglobulin 1.9.    Bone marrow biopsy showed plasma cell myeloma with normocellular marrow, cellularity 20 to 30% with trilineage hematopoiesis and 20- 25% kappa monotypic plasma cells.  Increased marrow storage iron.    Bone marrow flow cytometry showed 1.2% plasma cells which express CD38, CD19, CD20 (partial dim), CD45 (dim), CD56  and monotypic cytoplasmic kappa immunoglobulin light chains.    FISH  Hyperdiploid and on monosomy 13.  - Gains of chromosomes 5, 9, and/or 15 (85%)   - Gain of 11q (95%)   - Monosomy 13 (46%)   - Loss of IGH (4.5%) (control range 0-2.8%); no rearrangement of IGH    Cytogenetics.  Normal 46 XY          Serum protein electrophoresis showed M spike of 1.7.   and MANDI showed monoclonal IgG kappa.  It also showed a small monoclonal free kappa.  Serum IgG level was 2530.  IgA and IgM are low.      24-hour urine protein electrophoresis showed monoclonal peak in the urine was 57.5 and MANDI showed large monoclonal free kappa and monoclonal IgG kappa.      [place] free light chain 57.  Lambda 0.65 and the ratio 88.    PET/CT on 6/19/2021 does not show any hypermetabolic intraosseous lesion however multiple nonavid lucent lesions in the pelvis are suspicious for myeloma.  There is  enhancing focus in segment 2 of the liver which is indeterminate.    GLENNY, haptoglobin and LDH normal.  Copper 113.  Erythropoietin 27.  Ferritin 267, iron 64, TIBC 299 and iron saturation index 21%.  B12 and folate normal.        ASSESSMENT/PLAN:    Multiple myeloma.  RISS Stage 1  IgG kappa/free kappa in the serum and IgG kappa in the urine   FISH testing shows hyperdiploid E and monosomy 13.  Beta-2 microglobulin 1.9.  Albumin 4.1  LDH normal  This will be considered a standard risk myeloma.    He has anemia and neutropenia.  PET scan showed multiple lucent lesions in the pelvis although they are not FDG avid.    We discussed the situation in detail.    Myeloma is generally considered not curable but very treatable disease.  He would be potentially transplant eligible so I recommend that we start Velcade Revlimid and dexamethasone for 4 cycles and then assess him for the transplant.  We discussed the rational schedule and potential side effects of it in detail.  I would also like him to be evaluated at the [facility] for possibility of potential future stem cell transplant.    Bone disease.  He has lytic lesions in the pelvis although they are not FDG avid.  Most likely these lesions are related to myeloma.  I would like him to start on monthly Zometa.  We discussed the rational schedule and potential side effects of it.  I would recommend getting dental clearance prior to start of Zometa.  Then he should start taking calcium and vitamin D.    Prophylaxis.  With Velcade we will give acyclovir prophylaxis and he will take aspirin as well.    Indeterminate segment 2 liver lesion.  I recommend doing MRI of the abdomen.  At this time I have low suspicion that this is related to myeloma.    Plan at this time he has not decided what he wants to pursue the treatment for myeloma or not.  We discussed that in case he does not want to get the treatment then I strongly recommend that he sees palliative care  although currently he does not have any symptoms but I anticipate that he will start having problems in the near future due to progression of myeloma.    He wants to think about all of this before making a final decision and I advised him to try to make the decision as soon as possible.    We will determine the follow-up accordingly after he has informed us of his decision.    We did not address the following today.    Discussion regarding alcohol.  He has been a heavy alcohol drinker but over the last several weeks, he completely quit that.  I congratulated him on his efforts and advised him not to restart alcohol.        I answered all of his questions to his satisfaction.  He is agreeable and comfortable with the plan.    Carmen Ellis MD     Video start time. 7:34 AM  Video stop time. 8:01 AM    I spent 49 minutes on this visit including the video time, reviewing records and labs and imaging and placing new orders as well as coordination of care and documentation.        Again, thank you for allowing me to participate in the care of your patient.        Sincerely,        Carmen Ellis MD

## 2021-07-07 ENCOUNTER — PATIENT OUTREACH (OUTPATIENT)
Dept: ONCOLOGY | Facility: CLINIC | Age: 71
End: 2021-07-07

## 2021-07-07 ENCOUNTER — TELEPHONE (OUTPATIENT)
Dept: FAMILY MEDICINE | Facility: CLINIC | Age: 71
End: 2021-07-07

## 2021-07-07 DIAGNOSIS — C90.00 MULTIPLE MYELOMA NOT HAVING ACHIEVED REMISSION (H): Primary | ICD-10-CM

## 2021-07-07 RX ORDER — ALBUTEROL SULFATE 90 UG/1
1-2 AEROSOL, METERED RESPIRATORY (INHALATION)
Status: CANCELLED
Start: 2021-07-23

## 2021-07-07 RX ORDER — MEPERIDINE HYDROCHLORIDE 25 MG/ML
25 INJECTION INTRAMUSCULAR; INTRAVENOUS; SUBCUTANEOUS EVERY 30 MIN PRN
Status: CANCELLED | OUTPATIENT
Start: 2021-07-20

## 2021-07-07 RX ORDER — NALOXONE HYDROCHLORIDE 0.4 MG/ML
0.2 INJECTION, SOLUTION INTRAMUSCULAR; INTRAVENOUS; SUBCUTANEOUS
Status: CANCELLED | OUTPATIENT
Start: 2021-07-20

## 2021-07-07 RX ORDER — MEPERIDINE HYDROCHLORIDE 25 MG/ML
25 INJECTION INTRAMUSCULAR; INTRAVENOUS; SUBCUTANEOUS EVERY 30 MIN PRN
Status: CANCELLED | OUTPATIENT
Start: 2021-07-16

## 2021-07-07 RX ORDER — ALBUTEROL SULFATE 0.83 MG/ML
2.5 SOLUTION RESPIRATORY (INHALATION)
Status: CANCELLED | OUTPATIENT
Start: 2021-07-13

## 2021-07-07 RX ORDER — NALOXONE HYDROCHLORIDE 0.4 MG/ML
0.2 INJECTION, SOLUTION INTRAMUSCULAR; INTRAVENOUS; SUBCUTANEOUS
Status: CANCELLED | OUTPATIENT
Start: 2021-07-23

## 2021-07-07 RX ORDER — ALBUTEROL SULFATE 0.83 MG/ML
2.5 SOLUTION RESPIRATORY (INHALATION)
Status: CANCELLED | OUTPATIENT
Start: 2021-07-16

## 2021-07-07 RX ORDER — EPINEPHRINE 1 MG/ML
0.3 INJECTION, SOLUTION INTRAMUSCULAR; SUBCUTANEOUS EVERY 5 MIN PRN
Status: CANCELLED | OUTPATIENT
Start: 2021-07-13

## 2021-07-07 RX ORDER — ALBUTEROL SULFATE 0.83 MG/ML
2.5 SOLUTION RESPIRATORY (INHALATION)
Status: CANCELLED | OUTPATIENT
Start: 2021-07-20

## 2021-07-07 RX ORDER — ALBUTEROL SULFATE 90 UG/1
1-2 AEROSOL, METERED RESPIRATORY (INHALATION)
Status: CANCELLED
Start: 2021-07-20

## 2021-07-07 RX ORDER — METHYLPREDNISOLONE SODIUM SUCCINATE 125 MG/2ML
125 INJECTION, POWDER, LYOPHILIZED, FOR SOLUTION INTRAMUSCULAR; INTRAVENOUS
Status: CANCELLED
Start: 2021-07-20

## 2021-07-07 RX ORDER — DIPHENHYDRAMINE HYDROCHLORIDE 50 MG/ML
50 INJECTION INTRAMUSCULAR; INTRAVENOUS
Status: CANCELLED
Start: 2021-07-13

## 2021-07-07 RX ORDER — DIPHENHYDRAMINE HYDROCHLORIDE 50 MG/ML
50 INJECTION INTRAMUSCULAR; INTRAVENOUS
Status: CANCELLED
Start: 2021-07-20

## 2021-07-07 RX ORDER — METHYLPREDNISOLONE SODIUM SUCCINATE 125 MG/2ML
125 INJECTION, POWDER, LYOPHILIZED, FOR SOLUTION INTRAMUSCULAR; INTRAVENOUS
Status: CANCELLED
Start: 2021-07-13

## 2021-07-07 RX ORDER — EPINEPHRINE 1 MG/ML
0.3 INJECTION, SOLUTION INTRAMUSCULAR; SUBCUTANEOUS EVERY 5 MIN PRN
Status: CANCELLED | OUTPATIENT
Start: 2021-07-23

## 2021-07-07 RX ORDER — EPINEPHRINE 1 MG/ML
0.3 INJECTION, SOLUTION INTRAMUSCULAR; SUBCUTANEOUS EVERY 5 MIN PRN
Status: CANCELLED | OUTPATIENT
Start: 2021-07-20

## 2021-07-07 RX ORDER — NALOXONE HYDROCHLORIDE 0.4 MG/ML
0.2 INJECTION, SOLUTION INTRAMUSCULAR; INTRAVENOUS; SUBCUTANEOUS
Status: CANCELLED | OUTPATIENT
Start: 2021-07-13

## 2021-07-07 RX ORDER — METHYLPREDNISOLONE SODIUM SUCCINATE 125 MG/2ML
125 INJECTION, POWDER, LYOPHILIZED, FOR SOLUTION INTRAMUSCULAR; INTRAVENOUS
Status: CANCELLED
Start: 2021-07-23

## 2021-07-07 RX ORDER — ALBUTEROL SULFATE 0.83 MG/ML
2.5 SOLUTION RESPIRATORY (INHALATION)
Status: CANCELLED | OUTPATIENT
Start: 2021-07-23

## 2021-07-07 RX ORDER — MEPERIDINE HYDROCHLORIDE 25 MG/ML
25 INJECTION INTRAMUSCULAR; INTRAVENOUS; SUBCUTANEOUS EVERY 30 MIN PRN
Status: CANCELLED | OUTPATIENT
Start: 2021-07-23

## 2021-07-07 RX ORDER — ALBUTEROL SULFATE 90 UG/1
1-2 AEROSOL, METERED RESPIRATORY (INHALATION)
Status: CANCELLED
Start: 2021-07-16

## 2021-07-07 RX ORDER — DIPHENHYDRAMINE HYDROCHLORIDE 50 MG/ML
50 INJECTION INTRAMUSCULAR; INTRAVENOUS
Status: CANCELLED
Start: 2021-07-16

## 2021-07-07 RX ORDER — ALBUTEROL SULFATE 90 UG/1
1-2 AEROSOL, METERED RESPIRATORY (INHALATION)
Status: CANCELLED
Start: 2021-07-13

## 2021-07-07 RX ORDER — MEPERIDINE HYDROCHLORIDE 25 MG/ML
25 INJECTION INTRAMUSCULAR; INTRAVENOUS; SUBCUTANEOUS EVERY 30 MIN PRN
Status: CANCELLED | OUTPATIENT
Start: 2021-07-13

## 2021-07-07 RX ORDER — EPINEPHRINE 1 MG/ML
0.3 INJECTION, SOLUTION INTRAMUSCULAR; SUBCUTANEOUS EVERY 5 MIN PRN
Status: CANCELLED | OUTPATIENT
Start: 2021-07-16

## 2021-07-07 RX ORDER — DIPHENHYDRAMINE HYDROCHLORIDE 50 MG/ML
50 INJECTION INTRAMUSCULAR; INTRAVENOUS
Status: CANCELLED
Start: 2021-07-23

## 2021-07-07 RX ORDER — METHYLPREDNISOLONE SODIUM SUCCINATE 125 MG/2ML
125 INJECTION, POWDER, LYOPHILIZED, FOR SOLUTION INTRAMUSCULAR; INTRAVENOUS
Status: CANCELLED
Start: 2021-07-16

## 2021-07-07 RX ORDER — NALOXONE HYDROCHLORIDE 0.4 MG/ML
0.2 INJECTION, SOLUTION INTRAMUSCULAR; INTRAVENOUS; SUBCUTANEOUS
Status: CANCELLED | OUTPATIENT
Start: 2021-07-16

## 2021-07-07 NOTE — PROGRESS NOTES
Patient contacted writer.  He would like to proceed with treatment - he has received information on mychart but has not had time to review.

## 2021-07-08 NOTE — PROGRESS NOTES
Update from patient.  He would like to seek a second opinion and will be going to [facility] in the near future for an appointment.

## 2021-07-09 ENCOUNTER — TRANSFERRED RECORDS (OUTPATIENT)
Dept: HEALTH INFORMATION MANAGEMENT | Facility: CLINIC | Age: 71
End: 2021-07-09

## 2021-07-13 ENCOUNTER — TRANSFERRED RECORDS (OUTPATIENT)
Dept: HEALTH INFORMATION MANAGEMENT | Facility: CLINIC | Age: 71
End: 2021-07-13

## 2021-07-23 ENCOUNTER — TRANSFERRED RECORDS (OUTPATIENT)
Dept: HEALTH INFORMATION MANAGEMENT | Facility: CLINIC | Age: 71
End: 2021-07-23

## 2021-07-30 ENCOUNTER — TRANSFERRED RECORDS (OUTPATIENT)
Dept: HEALTH INFORMATION MANAGEMENT | Facility: CLINIC | Age: 71
End: 2021-07-30

## 2021-08-13 ENCOUNTER — TRANSFERRED RECORDS (OUTPATIENT)
Dept: HEALTH INFORMATION MANAGEMENT | Facility: CLINIC | Age: 71
End: 2021-08-13

## 2021-09-03 ENCOUNTER — TRANSFERRED RECORDS (OUTPATIENT)
Dept: HEALTH INFORMATION MANAGEMENT | Facility: CLINIC | Age: 71
End: 2021-09-03

## 2021-09-10 ENCOUNTER — TRANSFERRED RECORDS (OUTPATIENT)
Dept: HEALTH INFORMATION MANAGEMENT | Facility: CLINIC | Age: 71
End: 2021-09-10

## 2021-09-26 ENCOUNTER — HEALTH MAINTENANCE LETTER (OUTPATIENT)
Age: 71
End: 2021-09-26

## 2021-09-28 DIAGNOSIS — E78.5 HYPERLIPIDEMIA LDL GOAL <130: ICD-10-CM

## 2021-09-30 RX ORDER — PRAVASTATIN SODIUM 20 MG
TABLET ORAL
Qty: 36 TABLET | Refills: 3 | Status: SHIPPED | OUTPATIENT
Start: 2021-09-30 | End: 2022-05-04

## 2021-09-30 NOTE — TELEPHONE ENCOUNTER
Prescription approved per [facility] Refill Protocol.    DAJA ChrisN RN  [facility]

## 2021-10-01 ENCOUNTER — TRANSFERRED RECORDS (OUTPATIENT)
Dept: HEALTH INFORMATION MANAGEMENT | Facility: CLINIC | Age: 71
End: 2021-10-01

## 2021-10-05 ENCOUNTER — TELEPHONE (OUTPATIENT)
Dept: TRANSPLANT | Facility: CLINIC | Age: 71
End: 2021-10-05

## 2021-10-05 DIAGNOSIS — C90.00 MULTIPLE MYELOMA NOT HAVING ACHIEVED REMISSION (H): Primary | ICD-10-CM

## 2021-10-10 ENCOUNTER — TRANSFERRED RECORDS (OUTPATIENT)
Dept: HEALTH INFORMATION MANAGEMENT | Facility: CLINIC | Age: 71
End: 2021-10-10

## 2021-10-13 ENCOUNTER — CARE COORDINATION (OUTPATIENT)
Dept: TRANSPLANT | Facility: CLINIC | Age: 71
End: 2021-10-13

## 2021-10-13 NOTE — PROGRESS NOTES
[facility]  RN Coordinator Pre-Visit Documentation      Hamilton Angulo is a 71 year old male who has been referred to the [facility] for hematopoietic cell transplant or immune effector cell therapy.      Referring MD Name: Yusef Bowling, telephone [phone]    Reason for referral: Multiple Myeloma    Link to Adult BMT algorithm      All relevant clinical notes, labs, imaging, and pathology are available in [facility], Care Everywhere, or scanned into Media.  Diagnostic bone marrow biopsy 6/23/21, PET and CT CAP 6/19/21 - results in Epic  See notes from Dr. Ellis in [facility] chart review and from Dr. Bowling (bookmarked in media tab)    The appropriate disease evaluation form has been emailed to the physician and their continuity clinic fellow to complete.      Patient Care Team       Relationship Specialty Notifications Start End    Bro Herrera MD PCP - General Internal Medicine  10/29/13     Phone: [phone] Fax: [phone] [address] [facility] [address]    Bro Herrera MD Assigned PCP   9/29/13     Phone: [phone] Fax: [phone] [address] [facility] [address]    Wilder Foote MD Assigned Surgical Provider   11/25/20     Phone: [phone] Fax: [phone] [address] [facility] [place] [address]    Carmen Ellis MD Assigned Cancer Care Provider   5/2/21     Phone: [phone] Fax: [phone]         [address] [facility] [address]    Noris Gómez, RN Specialty Care Coordinator Oncology Admissions 7/7/21     phone:  [phone]    Phone: [phone]                 Rosangela Stallworth RN

## 2021-10-14 ENCOUNTER — OFFICE VISIT (OUTPATIENT)
Dept: TRANSPLANT | Facility: CLINIC | Age: 71
End: 2021-10-14
Attending: INTERNAL MEDICINE
Payer: COMMERCIAL

## 2021-10-14 VITALS
HEIGHT: 72 IN | OXYGEN SATURATION: 99 % | BODY MASS INDEX: 19.05 KG/M2 | WEIGHT: 140.6 LBS | DIASTOLIC BLOOD PRESSURE: 85 MMHG | HEART RATE: 72 BPM | RESPIRATION RATE: 16 BRPM | TEMPERATURE: 97.7 F | SYSTOLIC BLOOD PRESSURE: 156 MMHG

## 2021-10-14 DIAGNOSIS — C90.00 MULTIPLE MYELOMA NOT HAVING ACHIEVED REMISSION (H): Primary | ICD-10-CM

## 2021-10-14 PROCEDURE — 99215 OFFICE O/P EST HI 40 MIN: CPT

## 2021-10-14 PROCEDURE — 99417 PROLNG OP E/M EACH 15 MIN: CPT

## 2021-10-14 PROCEDURE — G0463 HOSPITAL OUTPT CLINIC VISIT: HCPCS

## 2021-10-14 RX ORDER — LENALIDOMIDE 25 MG/1
25 CAPSULE ORAL DAILY
COMMUNITY
Start: 2021-10-01 | End: 2021-11-29

## 2021-10-14 RX ORDER — DOCUSATE SODIUM 50 MG AND SENNOSIDES 8.6 MG 8.6; 5 MG/1; MG/1
TABLET, FILM COATED ORAL
COMMUNITY
Start: 2021-07-23 | End: 2021-11-29

## 2021-10-14 RX ORDER — DEXAMETHASONE 4 MG/1
4 TABLET ORAL
COMMUNITY
End: 2021-11-29

## 2021-10-14 RX ORDER — ACYCLOVIR 400 MG/1
400 TABLET ORAL 2 TIMES DAILY
COMMUNITY
Start: 2021-09-30

## 2021-10-14 RX ORDER — DEXAMETHASONE 4 MG/1
4 TABLET ORAL WEEKLY
COMMUNITY
Start: 2021-10-01 | End: 2021-10-14

## 2021-10-14 ASSESSMENT — PAIN SCALES - GENERAL: PAINLEVEL: NO PAIN (0)

## 2021-10-14 ASSESSMENT — MIFFLIN-ST. JEOR: SCORE: 1430.89

## 2021-10-14 NOTE — PROGRESS NOTES
BMT / Cell Therapy Consultation      Hamilton Angulo is a 71 year old male referred by Dr. Bowling and Caleb for IgG kappa multiple myeloma, RISS stage 1, standard risk.      Disease presentation and baseline characteristics:  Standard risk IgG kappa MM presenting as progressive but asymptomatic anemia. WBC 3.3.  Hemoglobin 10.  Platelets 155.  CMP shows normal creatinine and calcium.  Total protein 8.7.  LFTs unremarkable.  Albumin 4.1, Beta-2 microglobulin 1.9. Bone marrow biopsy showed plasma cell myeloma with normocellular marrow, cellularity 20 to 30% with trilineage hematopoiesis and 20- 25% kappa monotypic plasma cells.  Increased marrow storage iron. Bone marrow flow cytometry showed 1.2% plasma cells which express CD38, CD19, CD20 (partial dim), CD45 (dim), CD56  and monotypic cytoplasmic kappa immunoglobulin light chains. FISH Hyperdiploid with gains of chromosomes 5, 9, and/or 15 (85%), gain of 11q (95%), monosomy 13 (46%), loss of IGH (4.5%) (control range 0-2.8%); no rearrangement of IGH. Cytogenetics 46 XY. Baseline M spike of 2.1. Serum IgG level was 2862.  Monoclonal peak in the urine was 57.5 and MANDI showed large monoclonal free kappa and monoclonal IgG kappa. [place] free light chain 57.  Lambda 0.65 and the ratio 88. On 10/1/21, his M spike was down to 0.8 and kappa FLC down to 25 (partial response).    Date Treatment Name Response Side Effects / Toxicities   8/2021 RVd x 5 Partial response Neutropenia, neuropathy                            HPI:  Please see my entry above for hematologic history.  Mr. Angulo is here with his wife for consultation regarding the role of autologous PBSCT for MM.  In all, aside from fatigue and some weight loss, he feels his treatment is going well.  He does have some neuropathy in the hands and feet.  No fevers or infections. No bone pain. Both he and his wife have been fairly isolated due to COVID precautions.      ASSESSMENT AND PLAN:    Hamilton Angulo is a 71 year  old male with a standard indication for autologous HCT: standard-risk multiple myeloma in first remission.   Based upon my assessment of disease risk and comorbidities, Hamilton is a suitable candidate for autologous HCT.      We had a detailed discussion about the rationale for transplant in this situation and the requirements for proceeding, which include insurance approval, ability to collect autologous stem cells (through growth factor mobilization, or with plerixafor if inadequate mobilization, or via chemomobilization if indicated for persistent disease), availability of a full-time caregiver along with residence within 30 minutes of the [facility] through at least day +30 post transplant, and compliance with the supportive care medication regimen prescribed by providers at the [facility].      We had a nathalie discussion about the risks of the transplant itself, including toxicities from the preparative regimen, severe infections, the need for red blood cell and platelet transfusion support, the risk of graft rejection, the potential for severe organ toxicity including lungs, liver, skin, and kidneys, the possibility of long-term disability, and the risk of death due to complications of the transplant.  We discussed the risk of disease relapse despite going forward with a transplant.  Hamilton expressed understanding of these risks.      Under consideration at this time transplant preparation using melphalan conditioning and consideration to lenalidomide or daratumumab maintenance (per CASSEOPEIA trial) after the day +100 evaluation.  Timing of transplant is likely to be after he completes his current cycle of therapy, pending adequate disease control and organ function.    Thank you sincerely for this referral. It was a pleasure meeting Hamilton and his wife today.      ROS:    10 point ROS neg other than the symptoms noted above in the HPI.        Past Medical History:   Diagnosis Date     Bilateral cataracts 2001     Herniated  "disc 1983    Lumbar     Hyperlipidemia LDL goal <130      Hypertension 5/2004     Tear of MCL (medial collateral ligament) of knee 6/20/2001    LT Knee/WC       Past Surgical History:   Procedure Laterality Date     CATARACT IOL, RT/LT  12/2003    Bilateral       Family History   Problem Relation Age of Onset     Cancer Mother      Hypertension Mother      Heart Disease Maternal Grandmother      Cancer Maternal Grandfather        Social History     Tobacco Use     Smoking status: Never Smoker     Smokeless tobacco: Never Used   Substance Use Topics     Alcohol use: Yes     Comment: Ocss.     Drug use: No         Allergies   Allergen Reactions     Shrimp Hives        Current Outpatient Medications   Medication Sig Dispense Refill     acyclovir (ZOVIRAX) 400 MG tablet Take 400 mg by mouth 2 times daily       amLODIPine (NORVASC) 2.5 MG tablet Take 1 tablet (2.5 mg) by mouth daily 90 tablet 3     Artificial Tear Solution (SM ARTIFICIAL TEARS) SOLN Use per label instructions       aspirin (ASPIR-LOW) 81 MG EC tablet Take 81 mg by mouth daily       dexamethasone (DECADRON) 4 MG tablet Take 4 mg by mouth Use as directed by oral route       lisinopril (ZESTRIL) 20 MG tablet Take 1 tablet (20 mg) by mouth daily 90 tablet 3     Multiple Vitamins-Minerals (EQ COMPLETE MULTIVITAMIN-ADULT) TABS Take 1 tablet by mouth daily       pravastatin (PRAVACHOL) 20 MG tablet TAKE 1 TABLET BY MOUTH 3 TIMES A WEEK 36 tablet 3     REVLIMID 25 MG CAPS capsule Take 25 mg by mouth daily       STIMULANT LAXATIVE 8.6-50 MG tablet TAKE 1 TABLET BY MOUTH TWICE DAILY AS NEEDED FOR CONSTIPATION       acetaminophen (TYLENOL) 325 MG tablet Take as needed following label instructions (Patient not taking: Reported on 10/14/2021)           Physical Exam:     Vital Signs: BP (!) 156/85   Pulse 72   Temp 97.7  F (36.5  C)   Resp 16   Ht 1.829 m (6' 0.01\")   Wt 63.8 kg (140 lb 9.6 oz)   SpO2 99%   BMI 19.06 kg/m          KPS:  70      He is pleasant, " interactive, sclerae anicteric, cranial nerves grossly intact, no oral mucosal lesions, normal respiratory effort, no JVD, normal perfusion, abdomen non-distended, skin without rash, no edema, thin/low adipose reserves, low muscle mass, somewhat excitable mood.          Blood Counts       Recent Labs   Lab Test 06/23/21  0942 04/28/21  1214 03/01/21  0730   HGB 10.0* 11.5* 10.8*   HCT 30.6* 34.7* 33.3*   WBC 3.3* 3.4* 4.2    150 191         Chemistries     Basic Panel  Recent Labs   Lab Test 06/23/21  0942 04/28/21  1214 10/30/20  0925    139 139   POTASSIUM 3.6 4.1 4.8   CHLORIDE 108 106 108   CO2 27 30 30   BUN 15 17 16   CR 0.97 1.03 0.87   * 107* 103*        Calcium, Magnesium, Phosphorus  Recent Labs   Lab Test 06/23/21  0942 04/28/21  1214 10/30/20  0925   RAKESH 8.5 9.3 9.1        LFTs  Recent Labs   Lab Test 06/23/21  0942 04/28/21  1214 10/29/19  0907   BILITOTAL 0.6 0.6 0.9   ALKPHOS 46 51 53   AST 11 10 12   ALT 13 18 20   ALBUMIN 4.1 4.5 4.1       LDH  Recent Labs   Lab Test 06/23/21  0942 04/28/21  1214    154       B2-Microglobulin  Recent Labs   Lab Test 06/09/21  0827   IRGU8ETSE 1.9           Immunoglobulins     Recent Labs   Lab Test 06/23/21  0942 04/28/21  1214   IGG 2,530* 2,862*       Recent Labs   Lab Test 06/23/21  0942 04/28/21  1214   IGA 37* 35*       Recent Labs   Lab Test 06/23/21  0942 04/28/21  1214   IGM 30* 31*         Monocloncal Protein Studies     M spike    Recent Labs   Lab Test 06/23/21  0942 04/28/21  1214   ELPM 1.7* 2.1*           Bone Marrow Biopsy       Morphology    Results for orders placed or performed in visit on 06/23/21 (from the past 8760 hour(s))   Bone marrow biopsy   Result Value    Copath Report      Patient Name: BOB RINALDI  MR#: [id number]  Specimen #: [id number]  Collected: 6/23/2021  Received: 6/23/2021  Reported: 6/24/2021 17:21  Ordering Phy(s): AMBER WALLACE  Additional Phy(s): Copy to Cytogenetics    For improved result  formatting, select 'View Enhanced Report Format' under   Linked Documents section.    TEST(S):  Unilateral Bone Marrow Biopsy/Aspiration    FINAL DIAGNOSIS:  Bone marrow, posterior iliac crest, left decalcified trephine biopsy and   touch imprint; left particle crush,  direct aspirate smear, and concentrated aspirate smear; and peripheral   blood smear:    Plasma cell myeloma with the following features:    - Normocellular marrow (cellularity estimated at 20-30%) with trilineage   hematopoietic maturation and 20-25%  kappa-monotypic plasma cells    - Increased marrow storage iron; adequate sideroblastic iron    - Peripheral blood showing moderate normochromic, macrocytic anemia;   slight leukopenia; neutropenia    - See comment    COMMENT:  - The morpholog ic and immunophenotypic findings are consistent with plasma   cell myeloma. Clinical and  radiographic correlation is required to evaluate for evidence of   myeloma-related end organ damage and  determine whether this represents symptomatic plasma cell myeloma or   smoldering (asymptomatic) plasma cell  myeloma.  - Concurrent flow cytometry ([id number]) reported kappa monotypic plasma   cells, along with polytypic B cells,  no increase in myeloid blasts, and no abnormal myeloid blast population.   In addition, a small subset of  unusual T cells was detected (please see separate report for details). By   morphology, CD3 immunohistochemical  stain on the core biopsy shows numerous T cells that are small in size and   show an unremarkable scattered  distribution. Overall, a definitive morphologic correlate to this flow   cytometry finding is not appreciated,  and clinical correlation is recommended.    I have personally reviewed all specimens and/or slides, including the   listed special s tains, and used them  with my medical judgment to determine the final diagnosis.    Electronically signed out by:    Glenis Morillo MD    Technical testing/processing performed at  [place], [place]    CLINICAL HISTORY:  From [facility] electronic medical record; 71-year-old male with neutropenia,   anemia, and an IgG kappa M spike.    REPORT:  Procedure/Gross Description  Aspirate(s) and trephine(s) procured by BECKY Aguilar    Specimen sent for Special Studies:       Flow Cytometry (left aspirate)       Cytogenetics (left aspirate)       Molecular Diagnostics (left aspirate)    Biopsy aspiration site: Left posterior iliac crest                 (Reference Range)         Amount of aspirate              4      mL       Fat and P.V. cell layer           2      %     (1 - 3)       Particles                        2      %       Myeloid-erythroid layer          3      %     (5 - 8)         Clot Section: no    Trephine biopsy site: unilate ral    Designated left (B) posterior iliac crest is 1 cylinder of gritty tissue,   labeled with the patient's name and  hospital number, obtained with 11 gauge needle, and having a length of 23   mm; entirely submitted in 1  cassette; acetic zinc formalin fixed, decalcified, processed, and stained   with hematoxylin and eosin per  laboratory protocol.    PERIPHERAL BLOOD DATA:    CLINICAL LAB RESULTS:  Battery Order No. Lab Test Code Clinical Result Ref. Range Units Result   Date  Hemogram/Diff/PLT [id number]  WBC Count L 3.3 4.0-11.0 10e9/L 6/23/2021   09:58       RBC Count L 2.95 4.4-5.9 10e12/L 6/23/2021 09:58       Hemoglobin L 10.0 13.3-17.7 g/dL 6/23/2021 09:58       Hematocrit L 30.6 40.0-53.0 % 6/23/2021 09:58       MCV H 104  fl 6/23/2021 09:58       MCH H 33.9 26.5-33.0 pg 6/23/2021 09:58       MCHC 32.7 31.5-36.5 g/dL 6/23/2021 09:58       RDW 13.7 10.0-15.0 % 6/23/2021 09:58       Platelet Count 155 150-450 10e9/L 6/23/2021 09:58        SEE TEXT   6/23/2021 09:58        Text/Comments:  Automated Method       % Neutrophils 31.6  % 6/23/2021 09:58       % Lymphocytes 48.8  % 6/23/2021 09:58        % Monocytes 16.6  % 6/23/2021 09:58       % Eosinophils 0.9  % 6/23/2021 09:58       % Basophils 0.3  % 6/23/2021 09:58       % Immature Grans 1.8  % 6/23/2021 09:58       Nucleated RBCs 0 0 /100 6/23/2021 09:58       abs Neutrophils L 1.0 1.6-8.3 10e9/L 6/23/2021 09:58       abs Lymphocytes 1.6 0.8-5.3 10e9/L 6/23/2021 09:58       abs Monocytes 0.5 0.0-1.3 10e9/L 6/23/2021 09:58       abs Eosinophils 0.0 0.0-0.7 10e9/L 6/23/2021 09:58       abs Basophils 0.0 0.0-0.2 10e9/L 6/23/2021 09:58       abs Imm Granulocytes 0.1 0-0.4 10e9/L 6/23/2021 09:58       abs NRBC 0.0   6/23/2021 09:58    MICROSCOPIC DESCRIPTION:  The red cells appear normochromic.  Poikilocytosis includes rare   dacryocytes and rare echinocytes.  Polychromasia is not increased.  Rouleaux formation is not increased. The   morphology of the platelets is  normal.    Bone marrow aspirates and touch  imprints of bone biopsy are reviewed.    BONE MARROW DIFFERENTIAL (500 cells on direct smears)    Percent  Cell (reference range)  0.0 Blasts (0 - 1)  4.4 Neutrophil promyelocytes (2 - 4)  54.2 Neutrophils and precursors (54 - 63)  15.2 Erythroid precursors (18 - 24)  6.8 Monocytes (1 - 1.5)  0.6 Eosinophils (1 - 3)  0.0 Basophils (0 - 1)  12.4 Lymphocytes (8 - 12)  6.4 Plasma cells (0 - 1.5)    Neutrophil maturation is complete. Rare hypogranular neutrophils are   present (5% of 100 total segmented  neutrophils). Erythroid maturation is complete. Megakaryocytes are present   and show a normal morphologic  spectrum.    IRON STAINS: Dacie iron stain on concentrate smears: Iron stains show 22%   sideroblasts.  Prussian blue stain  on particle crush:  Marrow iron stores are increased.    TREPHINE SECTIONS:  The core biopsy is adequate.  The marrow cellularity is 20-30%. The   cellular composition reflects the aspirate  differential, although plasma cells are more abundant than reflected  in   the differential count. Megakaryocytes  are present and show overall  normal number and distribution.    Immunohistochemistry:  Immunohistochemical stains are performed on the paraffin-embedded left   trephine biopsy.    Stains for  and cytoplasmic kappa and lambda immunoglobulin light   chains show kappa monotypic plasma  cells (20-25% of cellularity). Very rare lambda-expressing plasma cells   are also present.  CD3 highlights numerous small T cells, which show a scattered   distribution; neither aggregates nor convincing  evidence of sinusoidal distribution is appreciated.  CD20 stains rare small B cells.    Note:  These immunohistochemical stains are deemed medically necessary.    Some of the antigens may also be  evaluated by flow cytometry.  Concurrent evaluation by   immunohistochemistry on clot and/or trephine sections  is indicated in this case in order to correlate immunophenotype with cell   morphology and determine extent of  involvement, spatial pattern, and focal ity of potential disease   distribution.    CPT Codes:  A: 54297-PWGZF, 05568-VVOP, HBM, 53688-ZBDZ, 36352-OYVYR, 66596-VHGJE,   55764-ZAZRN, 67065-CPFHG, 44464-ICMM.T,  68730-JTD, 04835-YFM, 74036-OPV, 16026-BEY, 50801-PLP    TESTING LAB LOCATION:  [facility], [address]   [id number] [phone]    COLLECTION SITE:  Client:  [facility]  Location:  [facility] ()           Molecular Studies    FISH  RESULTS:   - Gains of chromosomes 5, 9, and/or 15 (85%)   - Gain of 11q (95%)   - Monosomy 13 (46%)   - Loss of IGH (4.5%) (control range 0-2.8%); no rearrangement of IGH   - No gain of 1q   - No losses of 1p or TP53           PET Scan       Results for orders placed during the hospital encounter of 06/19/21    PET ONCOLOGY WHOLE BODY    Status: Normal 6/21/2021    Narrative  Combined Report of:    PET and CT on  6/19/2021 11:05 AM :    1. PET of the neck, chest, abdomen, and pelvis.  2. PET CT  Fusion for Attenuation Correction and Anatomical  Localization:  3. Diagnostic CT scan of the chest, abdomen, and pelvis with  intravenous contrast for interpretation.  3. CT of the chest, abdomen and pelvis obtained for diagnostic  interpretation.  4. 3D MIP and PET-CT fused images were processed on an independent  workstation and archived to PACS and reviewed by a radiologist.    Technique:    1. PET: The patient received 10.19 mCi of F-18-FDG; the serum glucose  was 107 prior to administration, body weight was 67.1 kg. Images were  evaluated in the axial, sagittal, and coronal planes as well as the  rotational whole body MIP. Images were acquired from the Vertex to the  Feet.    UPTAKE WAS MEASURED AT 60 MINUTES.    BACKGROUND:  Liver SUV max= 3.5,   Aorta Blood SUV Max: 2.8.    2. CT: Volumetric acquisition for clinical interpretation of the  chest, abdomen, and pelvis acquired at 3 mm sections after the  uneventful administration of intravenous contrast. The chest, abdomen,  and pelvis were evaluated at 5 mm sections in bone, soft tissue, and  lung windows.    The patient received 91 cc of Isovue 370 intravenously and 500 mL oral  Breeza contrast for the examination.  High resolution images of the neck were obtained with multiple oblique  projection reformats.    3. 3D MIP and PET-CT fused images were processed on an independent  workstation and archived to PACS and reviewed by a radiologist.    INDICATION: Plasma cell neoplasm    ADDITIONAL INFORMATION OBTAINED FROM EMR: 71-year-old male with a  history of anemia and laboratory findings concerning for smoldering  myeloma versus myeloma.    COMPARISON: CT abdomen 10/1/2018    FINDINGS:    HEAD/NECK:  There is no suspicious FDG uptake in the neck.    Mucosal thickening in the right maxillary sinus with possible mucosal  retention cyst. Debris within the ethmoid air cells and sphenoid  sinus. The mastoid air cells are clear. The mucosal pharyngeal space,  the  , prevertebral and carotid spaces are within normal  limits. No masses, mass effect or pathologically enlarged lymph nodes  are evident. The thyroid gland is unremarkable.    CHEST:  There is no suspicious FDG uptake in the chest.    The central tracheobronchial tree is patent. No pleural effusion or  pneumothorax. No acute consolidation. Mild dependent atelectasis. The  heart size is within normal limits. No pericardial effusion. Normal  caliber of the thoracic aorta and main pulmonary artery. No central  pulmonary embolus. No enlarged or hypermetabolic lymph nodes in the  chest. Esophagus is unremarkable.    ABDOMEN AND PELVIS:  There is no suspicious FDG uptake in the abdomen or pelvis.    Incidental subcentimeter focus of hyperenhancement in segment 2 of the  liver measuring 0.7 cm (series 5, image 282). No focal abnormal FDG  uptake to liver at this location. The gallbladder, pancreas, spleen,  and adrenal glands are unremarkable. Symmetric nephrographic  enhancement of the kidneys. Small renal cortical hypodensities  suggesting small cysts. No hydronephrosis. Circumferential thickening  of the bladder wall. This has increased since 10/1/2018. The prostate  is enlarged. No abnormally distended loops of small and large bowel.  Scattered colonic diverticula without evidence of acute  diverticulitis. No free air, free fluid, or fluid collection. Normal  caliber of the abdominal aorta. No enlarged or hypermetabolic lymph  nodes in the abdomen or pelvis.    LOWER EXTREMITIES:  No abnormal masses or hypermetabolic lesions. Incidental intramuscular  lipoma in the left hip.    BONES:  There is no abnormal FDG uptake in the skeleton. Multiple small  nonavid lucencies are demonstrated in the axial skeleton, for example  in the pelvis including the iliac bones and sacrum (example images  series 5, images 399-409). There is a mildly mottled appearance of the  sternal manubrium. No acute osseous abnormality.  Multilevel  degenerative changes in the spine.    Impression  IMPRESSION: In this patient with a concern for plasma cell neoplasm,  smoldering myeloma versus multiple myeloma:    1. No hypermetabolic intraosseous lesion, however multiple nonavid  lucent lesions in the pelvis are suspicious for myeloma, versus less  likely age-related osteopenia.  2. Incidental circumferential thickening of the bladder wall,  nonspecific, may be related to chronic bladder outlet obstruction  given prostatomegaly, versus cystitis.  3. Prostatomegaly.  4. Colonic diverticulosis without evidence of acute diverticulitis.  5. Enhancing focus in segment 2 of the liver, not fully characterized  but may represent flash-filling cavernous hemangioma. Consider further  characterization with contrast enhanced liver MRI.    I have personally reviewed the examination and initial interpretation  and I agree with the findings.    MD Hamilton TAVERAS understood the above assessment and recommendations.  Multiple questions answered.  No barriers to learning identified.       Known issues that I take into account for medical decisions, with salient changes to the plan considering these complexities noted above.    Patient Active Problem List   Diagnosis     HYPERLIPIDEMIA LDL GOAL <130     PSEUDOPHAKIA OU     Advanced directives, counseling/discussion     History of actinic keratoses     PVD (posterior vitreous detachment), bilateral     Nonexudative senile macular degeneration of retina     Elevated glucose     Essential hypertension with goal blood pressure less than 140/90     Elevated prostate specific antigen (PSA)     Dupuytren contracture     Benign prostatic hyperplasia with urinary retention     Multiple myeloma not having achieved remission (H)         I spent 90 minutes in the care of this patient today, which included time necessary for preparation for the visit, obtaining history, ordering medications/tests/procedures as  medically indicated, review of pertinent medical literature, counseling of the patient, communication of recommendations to the care team, and documentation time.    Pepper Elise    Securely message with the Vocera Web Console     ------------------------------------------------------------------------------------------------------------------------------------------------    Patient Care Team       Relationship Specialty Notifications Start End    Bro Herrera MD PCP - General Internal Medicine  10/29/13     Phone: [phone] Fax: [phone] [address] [facility] [address]    Bro Herrera MD Assigned PCP   9/29/13     Phone: [phone] Fax: [phone] [address] [facility] [address]    Wilder Foote MD Assigned Surgical Provider   11/25/20     Phone: [phone] Fax: [phone]         [address] [facility] [address]    Carmen Ellis MD Assigned Cancer Care Provider   5/2/21     Phone: [phone] Fax: [phone]         [address] [facility] [address]    Noris Gómez, RN Specialty Care Coordinator Oncology Admissions 7/7/21     phone:  [phone]    Phone: [phone]

## 2021-10-14 NOTE — LETTER
10/14/2021         RE: Hamilton Angulo  [address]  [place] [address]        Dear Colleague,    Thank you for referring your patient, Hamilton Angulo, to the [facility] BLOOD AND MARROW TRANSPLANT PROGRAM [place]. Please see a copy of my visit note below.           BMT / Cell Therapy Consultation      Hamilton Angulo is a 71 year old male referred by Dr. Bowling and [place] for IgG kappa multiple myeloma, RISS stage 1, standard risk.      Disease presentation and baseline characteristics:  Standard risk IgG kappa MM presenting as progressive but asymptomatic anemia. WBC 3.3.  Hemoglobin 10.  Platelets 155.  CMP shows normal creatinine and calcium.  Total protein 8.7.  LFTs unremarkable.  Albumin 4.1, Beta-2 microglobulin 1.9. Bone marrow biopsy showed plasma cell myeloma with normocellular marrow, cellularity 20 to 30% with trilineage hematopoiesis and 20- 25% kappa monotypic plasma cells.  Increased marrow storage iron. Bone marrow flow cytometry showed 1.2% plasma cells which express CD38, CD19, CD20 (partial dim), CD45 (dim), CD56  and monotypic cytoplasmic kappa immunoglobulin light chains. FISH Hyperdiploid with gains of chromosomes 5, 9, and/or 15 (85%), gain of 11q (95%), monosomy 13 (46%), loss of IGH (4.5%) (control range 0-2.8%); no rearrangement of IGH. Cytogenetics 46 XY. Baseline M spike of 2.1. Serum IgG level was 2862.  Monoclonal peak in the urine was 57.5 and MANDI showed large monoclonal free kappa and monoclonal IgG kappa. Mascot free light chain 57.  Lambda 0.65 and the ratio 88. On 10/1/21, his M spike was down to 0.8 and kappa FLC down to 25 (partial response).    Date Treatment Name Response Side Effects / Toxicities   8/2021 RVd x 5 Partial response Neutropenia, neuropathy                            HPI:  Please see my entry above for hematologic history.  Mr. Angulo is here with his wife for consultation regarding the role of autologous PBSCT for MM.  In all, aside from fatigue  and some weight loss, he feels his treatment is going well.  He does have some neuropathy in the hands and feet.  No fevers or infections. No bone pain. Both he and his wife have been fairly isolated due to COVID precautions.      ASSESSMENT AND PLAN:    Hamilton Angulo is a 71 year old male with a standard indication for autologous HCT: standard-risk multiple myeloma in first remission.   Based upon my assessment of disease risk and comorbidities, Hamilton is a suitable candidate for autologous HCT.      We had a detailed discussion about the rationale for transplant in this situation and the requirements for proceeding, which include insurance approval, ability to collect autologous stem cells (through growth factor mobilization, or with plerixafor if inadequate mobilization, or via chemomobilization if indicated for persistent disease), availability of a full-time caregiver along with residence within 30 minutes of the [facility] through at least day +30 post transplant, and compliance with the supportive care medication regimen prescribed by providers at the  of .      We had a nathalie discussion about the risks of the transplant itself, including toxicities from the preparative regimen, severe infections, the need for red blood cell and platelet transfusion support, the risk of graft rejection, the potential for severe organ toxicity including lungs, liver, skin, and kidneys, the possibility of long-term disability, and the risk of death due to complications of the transplant.  We discussed the risk of disease relapse despite going forward with a transplant.  Hamilton expressed understanding of these risks.      Under consideration at this time transplant preparation using melphalan conditioning and consideration to lenalidomide or daratumumab maintenance (per CASSEOPEIA trial) after the day +100 evaluation.  Timing of transplant is likely to be after he completes his current cycle of therapy, pending adequate disease control  and organ function.    Thank you sincerely for this referral. It was a pleasure meeting Hamilton and his wife today.      ROS:    10 point ROS neg other than the symptoms noted above in the HPI.        Past Medical History:   Diagnosis Date     Bilateral cataracts 2001     Herniated disc 1983    Lumbar     Hyperlipidemia LDL goal <130      Hypertension 5/2004     Tear of MCL (medial collateral ligament) of knee 6/20/2001    LT Knee/WC       Past Surgical History:   Procedure Laterality Date     CATARACT IOL, RT/LT  12/2003    Bilateral       Family History   Problem Relation Age of Onset     Cancer Mother      Hypertension Mother      Heart Disease Maternal Grandmother      Cancer Maternal Grandfather        Social History     Tobacco Use     Smoking status: Never Smoker     Smokeless tobacco: Never Used   Substance Use Topics     Alcohol use: Yes     Comment: Ocss.     Drug use: No         Allergies   Allergen Reactions     Shrimp Hives        Current Outpatient Medications   Medication Sig Dispense Refill     acyclovir (ZOVIRAX) 400 MG tablet Take 400 mg by mouth 2 times daily       amLODIPine (NORVASC) 2.5 MG tablet Take 1 tablet (2.5 mg) by mouth daily 90 tablet 3     Artificial Tear Solution (SM ARTIFICIAL TEARS) SOLN Use per label instructions       aspirin (ASPIR-LOW) 81 MG EC tablet Take 81 mg by mouth daily       dexamethasone (DECADRON) 4 MG tablet Take 4 mg by mouth Use as directed by oral route       lisinopril (ZESTRIL) 20 MG tablet Take 1 tablet (20 mg) by mouth daily 90 tablet 3     Multiple Vitamins-Minerals (EQ COMPLETE MULTIVITAMIN-ADULT) TABS Take 1 tablet by mouth daily       pravastatin (PRAVACHOL) 20 MG tablet TAKE 1 TABLET BY MOUTH 3 TIMES A WEEK 36 tablet 3     REVLIMID 25 MG CAPS capsule Take 25 mg by mouth daily       STIMULANT LAXATIVE 8.6-50 MG tablet TAKE 1 TABLET BY MOUTH TWICE DAILY AS NEEDED FOR CONSTIPATION       acetaminophen (TYLENOL) 325 MG tablet Take as needed following label  "instructions (Patient not taking: Reported on 10/14/2021)           Physical Exam:     Vital Signs: BP (!) 156/85   Pulse 72   Temp 97.7  F (36.5  C)   Resp 16   Ht 1.829 m (6' 0.01\")   Wt 63.8 kg (140 lb 9.6 oz)   SpO2 99%   BMI 19.06 kg/m          KPS:  70      He is pleasant, interactive, sclerae anicteric, cranial nerves grossly intact, no oral mucosal lesions, normal respiratory effort, no JVD, normal perfusion, abdomen non-distended, skin without rash, no edema, thin/low adipose reserves, low muscle mass, somewhat excitable mood.          Blood Counts       Recent Labs   Lab Test 06/23/21  0942 04/28/21  1214 03/01/21  0730   HGB 10.0* 11.5* 10.8*   HCT 30.6* 34.7* 33.3*   WBC 3.3* 3.4* 4.2    150 191         Chemistries     Basic Panel  Recent Labs   Lab Test 06/23/21  0942 04/28/21  1214 10/30/20  0925    139 139   POTASSIUM 3.6 4.1 4.8   CHLORIDE 108 106 108   CO2 27 30 30   BUN 15 17 16   CR 0.97 1.03 0.87   * 107* 103*        Calcium, Magnesium, Phosphorus  Recent Labs   Lab Test 06/23/21  0942 04/28/21  1214 10/30/20  0925   RAKESH 8.5 9.3 9.1        LFTs  Recent Labs   Lab Test 06/23/21  0942 04/28/21  1214 10/29/19  0907   BILITOTAL 0.6 0.6 0.9   ALKPHOS 46 51 53   AST 11 10 12   ALT 13 18 20   ALBUMIN 4.1 4.5 4.1       LDH  Recent Labs   Lab Test 06/23/21  0942 04/28/21  1214    154       B2-Microglobulin  Recent Labs   Lab Test 06/09/21  0827   [id number] 1.9           Immunoglobulins     Recent Labs   Lab Test 06/23/21  0942 04/28/21  1214   IGG 2,530* 2,862*       Recent Labs   Lab Test 06/23/21  0942 04/28/21  1214   IGA 37* 35*       Recent Labs   Lab Test 06/23/21  0942 04/28/21  1214   IGM 30* 31*         Monocloncal Protein Studies     M spike    Recent Labs   Lab Test 06/23/21  0942 04/28/21  1214   ELPM 1.7* 2.1*           Bone Marrow Biopsy       Morphology    Results for orders placed or performed in visit on 06/23/21 (from the past 8760 hour(s))   Bone marrow " biopsy   Result Value    Copath Report      Patient Name: BOB RINALDI  MR#: [id number]  Specimen #: [id number]  Collected: 6/23/2021  Received: 6/23/2021  Reported: 6/24/2021 17:21  Ordering Phy(s): AMBER WALLACE  Additional Phy(s): Copy to Cytogenetics    For improved result formatting, select 'View Enhanced Report Format' under   Linked Documents section.    TEST(S):  Unilateral Bone Marrow Biopsy/Aspiration    FINAL DIAGNOSIS:  Bone marrow, posterior iliac crest, left decalcified trephine biopsy and   touch imprint; left particle crush,  direct aspirate smear, and concentrated aspirate smear; and peripheral   blood smear:    Plasma cell myeloma with the following features:    - Normocellular marrow (cellularity estimated at 20-30%) with trilineage   hematopoietic maturation and 20-25%  kappa-monotypic plasma cells    - Increased marrow storage iron; adequate sideroblastic iron    - Peripheral blood showing moderate normochromic, macrocytic anemia;   slight leukopenia; neutropenia    - See comment    COMMENT:  - The morpholog ic and immunophenotypic findings are consistent with plasma   cell myeloma. Clinical and  radiographic correlation is required to evaluate for evidence of   myeloma-related end organ damage and  determine whether this represents symptomatic plasma cell myeloma or   smoldering (asymptomatic) plasma cell  myeloma.  - Concurrent flow cytometry ([id number]) reported kappa monotypic plasma   cells, along with polytypic B cells,  no increase in myeloid blasts, and no abnormal myeloid blast population.   In addition, a small subset of  unusual T cells was detected (please see separate report for details). By   morphology, CD3 immunohistochemical  stain on the core biopsy shows numerous T cells that are small in size and   show an unremarkable scattered  distribution. Overall, a definitive morphologic correlate to this flow   cytometry finding is not appreciated,  and clinical correlation is  recommended.    I have personally reviewed all specimens and/or slides, including the   listed special s tains, and used them  with my medical judgment to determine the final diagnosis.    Electronically signed out by:    Glenis Morillo MD    Technical testing/processing performed at [place], [place]    CLINICAL HISTORY:  From [facility] electronic medical record; 71-year-old male with neutropenia,   anemia, and an IgG kappa M spike.    REPORT:  Procedure/Gross Description  Aspirate(s) and trephine(s) procured by BECKY Aguilar    Specimen sent for Special Studies:       Flow Cytometry (left aspirate)       Cytogenetics (left aspirate)       Molecular Diagnostics (left aspirate)    Biopsy aspiration site: Left posterior iliac crest                 (Reference Range)         Amount of aspirate              4      mL       Fat and P.V. cell layer           2      %     (1 - 3)       Particles                        2      %       Myeloid-erythroid layer          3      %     (5 - 8)         Clot Section: no    Trephine biopsy site: unilate ral    Designated left (B) posterior iliac crest is 1 cylinder of gritty tissue,   labeled with the patient's name and  hospital number, obtained with 11 gauge needle, and having a length of 23   mm; entirely submitted in 1  cassette; acetic zinc formalin fixed, decalcified, processed, and stained   with hematoxylin and eosin per  laboratory protocol.    PERIPHERAL BLOOD DATA:    CLINICAL LAB RESULTS:  Battery Order No. Lab Test Code Clinical Result Ref. Range Units Result   Date  Hemogram/Diff/PLT N22429  WBC Count L 3.3 4.0-11.0 10e9/L 6/23/2021   09:58       RBC Count L 2.95 4.4-5.9 10e12/L 6/23/2021 09:58       Hemoglobin L 10.0 13.3-17.7 g/dL 6/23/2021 09:58       Hematocrit L 30.6 40.0-53.0 % 6/23/2021 09:58       MCV H 104  fl 6/23/2021 09:58       MCH H 33.9 26.5-33.0 pg 6/23/2021 09:58       MCHC 32.7 31.5-36.5  g/dL 6/23/2021 09:58       RDW 13.7 10.0-15.0 % 6/23/2021 09:58       Platelet Count 155 150-450 10e9/L 6/23/2021 09:58        SEE TEXT   6/23/2021 09:58        Text/Comments:  Automated Method       % Neutrophils 31.6  % 6/23/2021 09:58       % Lymphocytes 48.8  % 6/23/2021 09:58       % Monocytes 16.6  % 6/23/2021 09:58       % Eosinophils 0.9  % 6/23/2021 09:58       % Basophils 0.3  % 6/23/2021 09:58       % Immature Grans 1.8  % 6/23/2021 09:58       Nucleated RBCs 0 0 /100 6/23/2021 09:58       abs Neutrophils L 1.0 1.6-8.3 10e9/L 6/23/2021 09:58       abs Lymphocytes 1.6 0.8-5.3 10e9/L 6/23/2021 09:58       abs Monocytes 0.5 0.0-1.3 10e9/L 6/23/2021 09:58       abs Eosinophils 0.0 0.0-0.7 10e9/L 6/23/2021 09:58       abs Basophils 0.0 0.0-0.2 10e9/L 6/23/2021 09:58       abs Imm Granulocytes 0.1 0-0.4 10e9/L 6/23/2021 09:58       abs NRBC 0.0   6/23/2021 09:58    MICROSCOPIC DESCRIPTION:  The red cells appear normochromic.  Poikilocytosis includes rare   dacryocytes and rare echinocytes.  Polychromasia is not increased.  Rouleaux formation is not increased. The   morphology of the platelets is  normal.    Bone marrow aspirates and touch  imprints of bone biopsy are reviewed.    BONE MARROW DIFFERENTIAL (500 cells on direct smears)    Percent  Cell (reference range)  0.0 Blasts (0 - 1)  4.4 Neutrophil promyelocytes (2 - 4)  54.2 Neutrophils and precursors (54 - 63)  15.2 Erythroid precursors (18 - 24)  6.8 Monocytes (1 - 1.5)  0.6 Eosinophils (1 - 3)  0.0 Basophils (0 - 1)  12.4 Lymphocytes (8 - 12)  6.4 Plasma cells (0 - 1.5)    Neutrophil maturation is complete. Rare hypogranular neutrophils are   present (5% of 100 total segmented  neutrophils). Erythroid maturation is complete. Megakaryocytes are present   and show a normal morphologic  spectrum.    IRON STAINS: Dacie iron stain on concentrate smears: Iron stains show 22%   sideroblasts.  Prussian blue stain  on particle crush:  Marrow iron stores are  increased.    TREPHINE SECTIONS:  The core biopsy is adequate.  The marrow cellularity is 20-30%. The   cellular composition reflects the aspirate  differential, although plasma cells are more abundant than reflected  in   the differential count. Megakaryocytes  are present and show overall normal number and distribution.    Immunohistochemistry:  Immunohistochemical stains are performed on the paraffin-embedded left   trephine biopsy.    Stains for  and cytoplasmic kappa and lambda immunoglobulin light   chains show kappa monotypic plasma  cells (20-25% of cellularity). Very rare lambda-expressing plasma cells   are also present.  CD3 highlights numerous small T cells, which show a scattered   distribution; neither aggregates nor convincing  evidence of sinusoidal distribution is appreciated.  CD20 stains rare small B cells.    Note:  These immunohistochemical stains are deemed medically necessary.    Some of the antigens may also be  evaluated by flow cytometry.  Concurrent evaluation by   immunohistochemistry on clot and/or trephine sections  is indicated in this case in order to correlate immunophenotype with cell   morphology and determine extent of  involvement, spatial pattern, and focal ity of potential disease   distribution.    CPT Codes:  A: 07345-HPOPU, 18587-BSXV, HBM, 03819-MTXM, 74826-RIEZM, 60351-ZXBSM,   05855-GQXOM, 07943-FXURO, 71479-UNBN.T,  40384-NWM, 38429-GZM, 35402-KST, 69101-EKJ, 05213-IEV    TESTING LAB LOCATION:  [facility], [address]   [address] [phone]    COLLECTION SITE:  Client:  [facility]  Location:  [facility] ()           Molecular Studies    FISH  RESULTS:   - Gains of chromosomes 5, 9, and/or 15 (85%)   - Gain of 11q (95%)   - Monosomy 13 (46%)   - Loss of IGH (4.5%) (control range 0-2.8%); no rearrangement of IGH   - No gain of 1q   - No losses of  1p or TP53           PET Scan       Results for orders placed during the hospital encounter of 06/19/21    PET ONCOLOGY WHOLE BODY    Status: Normal 6/21/2021    Narrative  Combined Report of:    PET and CT on  6/19/2021 11:05 AM :    1. PET of the neck, chest, abdomen, and pelvis.  2. PET CT Fusion for Attenuation Correction and Anatomical  Localization:  3. Diagnostic CT scan of the chest, abdomen, and pelvis with  intravenous contrast for interpretation.  3. CT of the chest, abdomen and pelvis obtained for diagnostic  interpretation.  4. 3D MIP and PET-CT fused images were processed on an independent  workstation and archived to PACS and reviewed by a radiologist.    Technique:    1. PET: The patient received 10.19 mCi of F-18-FDG; the serum glucose  was 107 prior to administration, body weight was 67.1 kg. Images were  evaluated in the axial, sagittal, and coronal planes as well as the  rotational whole body MIP. Images were acquired from the Vertex to the  Feet.    UPTAKE WAS MEASURED AT 60 MINUTES.    BACKGROUND:  Liver SUV max= 3.5,   Aorta Blood SUV Max: 2.8.    2. CT: Volumetric acquisition for clinical interpretation of the  chest, abdomen, and pelvis acquired at 3 mm sections after the  uneventful administration of intravenous contrast. The chest, abdomen,  and pelvis were evaluated at 5 mm sections in bone, soft tissue, and  lung windows.    The patient received 91 cc of Isovue 370 intravenously and 500 mL oral  Breeza contrast for the examination.  High resolution images of the neck were obtained with multiple oblique  projection reformats.    3. 3D MIP and PET-CT fused images were processed on an independent  workstation and archived to PACS and reviewed by a radiologist.    INDICATION: Plasma cell neoplasm    ADDITIONAL INFORMATION OBTAINED FROM EMR: 71-year-old male with a  history of anemia and laboratory findings concerning for smoldering  myeloma versus myeloma.    COMPARISON: CT abdomen  10/1/2018    FINDINGS:    HEAD/NECK:  There is no suspicious FDG uptake in the neck.    Mucosal thickening in the right maxillary sinus with possible mucosal  retention cyst. Debris within the ethmoid air cells and sphenoid  sinus. The mastoid air cells are clear. The mucosal pharyngeal space,  the , prevertebral and carotid spaces are within normal  limits. No masses, mass effect or pathologically enlarged lymph nodes  are evident. The thyroid gland is unremarkable.    CHEST:  There is no suspicious FDG uptake in the chest.    The central tracheobronchial tree is patent. No pleural effusion or  pneumothorax. No acute consolidation. Mild dependent atelectasis. The  heart size is within normal limits. No pericardial effusion. Normal  caliber of the thoracic aorta and main pulmonary artery. No central  pulmonary embolus. No enlarged or hypermetabolic lymph nodes in the  chest. Esophagus is unremarkable.    ABDOMEN AND PELVIS:  There is no suspicious FDG uptake in the abdomen or pelvis.    Incidental subcentimeter focus of hyperenhancement in segment 2 of the  liver measuring 0.7 cm (series 5, image 282). No focal abnormal FDG  uptake to liver at this location. The gallbladder, pancreas, spleen,  and adrenal glands are unremarkable. Symmetric nephrographic  enhancement of the kidneys. Small renal cortical hypodensities  suggesting small cysts. No hydronephrosis. Circumferential thickening  of the bladder wall. This has increased since 10/1/2018. The prostate  is enlarged. No abnormally distended loops of small and large bowel.  Scattered colonic diverticula without evidence of acute  diverticulitis. No free air, free fluid, or fluid collection. Normal  caliber of the abdominal aorta. No enlarged or hypermetabolic lymph  nodes in the abdomen or pelvis.    LOWER EXTREMITIES:  No abnormal masses or hypermetabolic lesions. Incidental intramuscular  lipoma in the left hip.    BONES:  There is no abnormal FDG  uptake in the skeleton. Multiple small  nonavid lucencies are demonstrated in the axial skeleton, for example  in the pelvis including the iliac bones and sacrum (example images  series 5, images 399-409). There is a mildly mottled appearance of the  sternal manubrium. No acute osseous abnormality. Multilevel  degenerative changes in the spine.    Impression  IMPRESSION: In this patient with a concern for plasma cell neoplasm,  smoldering myeloma versus multiple myeloma:    1. No hypermetabolic intraosseous lesion, however multiple nonavid  lucent lesions in the pelvis are suspicious for myeloma, versus less  likely age-related osteopenia.  2. Incidental circumferential thickening of the bladder wall,  nonspecific, may be related to chronic bladder outlet obstruction  given prostatomegaly, versus cystitis.  3. Prostatomegaly.  4. Colonic diverticulosis without evidence of acute diverticulitis.  5. Enhancing focus in segment 2 of the liver, not fully characterized  but may represent flash-filling cavernous hemangioma. Consider further  characterization with contrast enhanced liver MRI.    I have personally reviewed the examination and initial interpretation  and I agree with the findings.    MD Hamilton TAVERAS understood the above assessment and recommendations.  Multiple questions answered.  No barriers to learning identified.       Known issues that I take into account for medical decisions, with salient changes to the plan considering these complexities noted above.    Patient Active Problem List   Diagnosis     HYPERLIPIDEMIA LDL GOAL <130     PSEUDOPHAKIA OU     Advanced directives, counseling/discussion     History of actinic keratoses     PVD (posterior vitreous detachment), bilateral     Nonexudative senile macular degeneration of retina     Elevated glucose     Essential hypertension with goal blood pressure less than 140/90     Elevated prostate specific antigen (PSA)     Dupuytren contracture      Benign prostatic hyperplasia with urinary retention     Multiple myeloma not having achieved remission (H)         I spent 90 minutes in the care of this patient today, which included time necessary for preparation for the visit, obtaining history, ordering medications/tests/procedures as medically indicated, review of pertinent medical literature, counseling of the patient, communication of recommendations to the care team, and documentation time.    Pepper Elise    Securely message with the Vocera Web Console     ------------------------------------------------------------------------------------------------------------------------------------------------    Patient Care Team       Relationship Specialty Notifications Start End    Bro Herrera MD PCP - General Internal Medicine  10/29/13     Phone: [phone] Fax: [phone] [address] [facility] [address]    Bro Herrera MD Assigned PCP   9/29/13     Phone: [phone] Fax: [phone] [address] [facility] [address]    Wilder Foote MD Assigned Surgical Provider   11/25/20     Phone: [phone] Fax: [phone] [address] [facility] [address]    Carmen Ellis MD Assigned Cancer Care Provider   5/2/21     Phone: [phone] Fax: [phone]         [address] [facility] [address]    Noris Gómez, RN Specialty Care Coordinator Oncology Admissions 7/7/21     phone:  [phone]    Phone: [phone]                   Again, thank you for allowing me to participate in the care of your patient.        Sincerely,        BMT DOM

## 2021-10-14 NOTE — NURSING NOTE
"Oncology Rooming Note    October 14, 2021 3:13 PM   Hamilton Angulo is a 71 year old male who presents for:    Chief Complaint   Patient presents with     Oncology Clinic Visit     Multiple myeloma not having achieved remission      Initial Vitals: BP (!) 156/85   Pulse 72   Temp 97.7  F (36.5  C)   Resp 16   Ht 1.829 m (6' 0.01\")   Wt 63.8 kg (140 lb 9.6 oz)   SpO2 99%   BMI 19.06 kg/m   Estimated body mass index is 19.06 kg/m  as calculated from the following:    Height as of this encounter: 1.829 m (6' 0.01\").    Weight as of this encounter: 63.8 kg (140 lb 9.6 oz). Body surface area is 1.8 meters squared.  No Pain (0) Comment: Data Unavailable   No LMP for male patient.  Allergies reviewed: Yes  Medications reviewed: Yes    Medications: Medication refills not needed today.  Pharmacy name entered into [facility]acts: [id number] - [place], [place] - [address] AT [facility] [address]    Clinical concerns: New patient       Jaya Olguin MA            "

## 2021-10-14 NOTE — LETTER
10/14/2021         RE: Hamilton Angulo  [address] [address]             BMT / Cell Therapy Consultation      Hamilton Angulo is a 71 year old male referred by Dr. Bowling and [place] for IgG kappa multiple myeloma, RISS stage 1, standard risk.      Disease presentation and baseline characteristics:  Standard risk IgG kappa MM presenting as progressive but asymptomatic anemia. WBC 3.3.  Hemoglobin 10.  Platelets 155.  CMP shows normal creatinine and calcium.  Total protein 8.7.  LFTs unremarkable.  Albumin 4.1, Beta-2 microglobulin 1.9. Bone marrow biopsy showed plasma cell myeloma with normocellular marrow, cellularity 20 to 30% with trilineage hematopoiesis and 20- 25% kappa monotypic plasma cells.  Increased marrow storage iron. Bone marrow flow cytometry showed 1.2% plasma cells which express CD38, CD19, CD20 (partial dim), CD45 (dim), CD56  and monotypic cytoplasmic kappa immunoglobulin light chains. FISH Hyperdiploid with gains of chromosomes 5, 9, and/or 15 (85%), gain of 11q (95%), monosomy 13 (46%), loss of IGH (4.5%) (control range 0-2.8%); no rearrangement of IGH. Cytogenetics 46 XY. Baseline M spike of 2.1. Serum IgG level was 2862.  Monoclonal peak in the urine was 57.5 and MANDI showed large monoclonal free kappa and monoclonal IgG kappa. Carrizales free light chain 57.  Lambda 0.65 and the ratio 88. On 10/1/21, his M spike was down to 0.8 and kappa FLC down to 25 (partial response).    Date Treatment Name Response Side Effects / Toxicities   8/2021 RVd x 5 Partial response Neutropenia, neuropathy                            HPI:  Please see my entry above for hematologic history.  Mr. Angulo is here with his wife for consultation regarding the role of autologous PBSCT for MM.  In all, aside from fatigue and some weight loss, he feels his treatment is going well.  He does have some neuropathy in the hands and feet.  No fevers or infections. No bone pain. Both he and his wife have been fairly  isolated due to COVID precautions.      ASSESSMENT AND PLAN:    Hamilton Angulo is a 71 year old male with a standard indication for autologous HCT: standard-risk multiple myeloma in first remission.   Based upon my assessment of disease risk and comorbidities, Hamilton is a suitable candidate for autologous HCT.      We had a detailed discussion about the rationale for transplant in this situation and the requirements for proceeding, which include insurance approval, ability to collect autologous stem cells (through growth factor mobilization, or with plerixafor if inadequate mobilization, or via chemomobilization if indicated for persistent disease), availability of a full-time caregiver along with residence within 30 minutes of the [facility] through at least day +30 post transplant, and compliance with the supportive care medication regimen prescribed by providers at the [facility].      We had a nathalie discussion about the risks of the transplant itself, including toxicities from the preparative regimen, severe infections, the need for red blood cell and platelet transfusion support, the risk of graft rejection, the potential for severe organ toxicity including lungs, liver, skin, and kidneys, the possibility of long-term disability, and the risk of death due to complications of the transplant.  We discussed the risk of disease relapse despite going forward with a transplant.  Hamilton expressed understanding of these risks.      Under consideration at this time transplant preparation using melphalan conditioning and consideration to lenalidomide or daratumumab maintenance (per CASSEOPEIA trial) after the day +100 evaluation.  Timing of transplant is likely to be after he completes his current cycle of therapy, pending adequate disease control and organ function.    Thank you sincerely for this referral. It was a pleasure meeting Hamitlon and his wife today.      ROS:    10 point ROS neg other than the symptoms noted above in the HPI.  "       Past Medical History:   Diagnosis Date     Bilateral cataracts 2001     Herniated disc 1983    Lumbar     Hyperlipidemia LDL goal <130      Hypertension 5/2004     Tear of MCL (medial collateral ligament) of knee 6/20/2001    LT Knee/WC       Past Surgical History:   Procedure Laterality Date     CATARACT IOL, RT/LT  12/2003    Bilateral       Family History   Problem Relation Age of Onset     Cancer Mother      Hypertension Mother      Heart Disease Maternal Grandmother      Cancer Maternal Grandfather        Social History     Tobacco Use     Smoking status: Never Smoker     Smokeless tobacco: Never Used   Substance Use Topics     Alcohol use: Yes     Comment: Ocss.     Drug use: No         Allergies   Allergen Reactions     Shrimp Hives        Current Outpatient Medications   Medication Sig Dispense Refill     acyclovir (ZOVIRAX) 400 MG tablet Take 400 mg by mouth 2 times daily       amLODIPine (NORVASC) 2.5 MG tablet Take 1 tablet (2.5 mg) by mouth daily 90 tablet 3     Artificial Tear Solution (SM ARTIFICIAL TEARS) SOLN Use per label instructions       aspirin (ASPIR-LOW) 81 MG EC tablet Take 81 mg by mouth daily       dexamethasone (DECADRON) 4 MG tablet Take 4 mg by mouth Use as directed by oral route       lisinopril (ZESTRIL) 20 MG tablet Take 1 tablet (20 mg) by mouth daily 90 tablet 3     Multiple Vitamins-Minerals (EQ COMPLETE MULTIVITAMIN-ADULT) TABS Take 1 tablet by mouth daily       pravastatin (PRAVACHOL) 20 MG tablet TAKE 1 TABLET BY MOUTH 3 TIMES A WEEK 36 tablet 3     REVLIMID 25 MG CAPS capsule Take 25 mg by mouth daily       STIMULANT LAXATIVE 8.6-50 MG tablet TAKE 1 TABLET BY MOUTH TWICE DAILY AS NEEDED FOR CONSTIPATION       acetaminophen (TYLENOL) 325 MG tablet Take as needed following label instructions (Patient not taking: Reported on 10/14/2021)           Physical Exam:     Vital Signs: BP (!) 156/85   Pulse 72   Temp 97.7  F (36.5  C)   Resp 16   Ht 1.829 m (6' 0.01\")   Wt " 63.8 kg (140 lb 9.6 oz)   SpO2 99%   BMI 19.06 kg/m          KPS:  70      He is pleasant, interactive, sclerae anicteric, cranial nerves grossly intact, no oral mucosal lesions, normal respiratory effort, no JVD, normal perfusion, abdomen non-distended, skin without rash, no edema, thin/low adipose reserves, low muscle mass, somewhat excitable mood.          Blood Counts       Recent Labs   Lab Test 06/23/21  0942 04/28/21  1214 03/01/21  0730   HGB 10.0* 11.5* 10.8*   HCT 30.6* 34.7* 33.3*   WBC 3.3* 3.4* 4.2    150 191         Chemistries     Basic Panel  Recent Labs   Lab Test 06/23/21  0942 04/28/21  1214 10/30/20  0925    139 139   POTASSIUM 3.6 4.1 4.8   CHLORIDE 108 106 108   CO2 27 30 30   BUN 15 17 16   CR 0.97 1.03 0.87   * 107* 103*        Calcium, Magnesium, Phosphorus  Recent Labs   Lab Test 06/23/21  0942 04/28/21  1214 10/30/20  0925   RAKESH 8.5 9.3 9.1        LFTs  Recent Labs   Lab Test 06/23/21  0942 04/28/21  1214 10/29/19  0907   BILITOTAL 0.6 0.6 0.9   ALKPHOS 46 51 53   AST 11 10 12   ALT 13 18 20   ALBUMIN 4.1 4.5 4.1       LDH  Recent Labs   Lab Test 06/23/21  0942 04/28/21  1214    154       B2-Microglobulin  Recent Labs   Lab Test 06/09/21  0827   YRUQ0CVGN 1.9           Immunoglobulins     Recent Labs   Lab Test 06/23/21  0942 04/28/21  1214   IGG 2,530* 2,862*       Recent Labs   Lab Test 06/23/21  0942 04/28/21  1214   IGA 37* 35*       Recent Labs   Lab Test 06/23/21  0942 04/28/21  1214   IGM 30* 31*         Monocloncal Protein Studies     M spike    Recent Labs   Lab Test 06/23/21  0942 04/28/21  1214   ELPM 1.7* 2.1*           Bone Marrow Biopsy       Morphology    Results for orders placed or performed in visit on 06/23/21 (from the past 8760 hour(s))   Bone marrow biopsy   Result Value    Copath Report      Patient Name: BOB RINALDI  MR#: [id number]  Specimen #: [id number]  Collected: 6/23/2021  Received: 6/23/2021  Reported: 6/24/2021 17:21  Ordering  Phy(s): AMBER WALLACE  Additional Phy(s): Copy to Cytogenetics    For improved result formatting, select 'View Enhanced Report Format' under   Linked Documents section.    TEST(S):  Unilateral Bone Marrow Biopsy/Aspiration    FINAL DIAGNOSIS:  Bone marrow, posterior iliac crest, left decalcified trephine biopsy and   touch imprint; left particle crush,  direct aspirate smear, and concentrated aspirate smear; and peripheral   blood smear:    Plasma cell myeloma with the following features:    - Normocellular marrow (cellularity estimated at 20-30%) with trilineage   hematopoietic maturation and 20-25%  kappa-monotypic plasma cells    - Increased marrow storage iron; adequate sideroblastic iron    - Peripheral blood showing moderate normochromic, macrocytic anemia;   slight leukopenia; neutropenia    - See comment    COMMENT:  - The morpholog ic and immunophenotypic findings are consistent with plasma   cell myeloma. Clinical and  radiographic correlation is required to evaluate for evidence of   myeloma-related end organ damage and  determine whether this represents symptomatic plasma cell myeloma or   smoldering (asymptomatic) plasma cell  myeloma.  - Concurrent flow cytometry ([id number]) reported kappa monotypic plasma   cells, along with polytypic B cells,  no increase in myeloid blasts, and no abnormal myeloid blast population.   In addition, a small subset of  unusual T cells was detected (please see separate report for details). By   morphology, CD3 immunohistochemical  stain on the core biopsy shows numerous T cells that are small in size and   show an unremarkable scattered  distribution. Overall, a definitive morphologic correlate to this flow   cytometry finding is not appreciated,  and clinical correlation is recommended.    I have personally reviewed all specimens and/or slides, including the   listed special s tains, and used them  with my medical judgment to determine the final  diagnosis.    Electronically signed out by:    Glenis Morillo MD    Technical testing/processing performed at [facility], [place], [place]    CLINICAL HISTORY:  From [facility] electronic medical record; 71-year-old male with neutropenia,   anemia, and an IgG kappa M spike.    REPORT:  Procedure/Gross Description  Aspirate(s) and trephine(s) procured by BECKY Aguilar    Specimen sent for Special Studies:       Flow Cytometry (left aspirate)       Cytogenetics (left aspirate)       Molecular Diagnostics (left aspirate)    Biopsy aspiration site: Left posterior iliac crest                 (Reference Range)         Amount of aspirate              4      mL       Fat and P.V. cell layer           2      %     (1 - 3)       Particles                        2      %       Myeloid-erythroid layer          3      %     (5 - 8)         Clot Section: no    Trephine biopsy site: unilate ral    Designated left (B) posterior iliac crest is 1 cylinder of gritty tissue,   labeled with the patient's name and  hospital number, obtained with 11 gauge needle, and having a length of 23   mm; entirely submitted in 1  cassette; acetic zinc formalin fixed, decalcified, processed, and stained   with hematoxylin and eosin per  laboratory protocol.    PERIPHERAL BLOOD DATA:    CLINICAL LAB RESULTS:  Battery Order No. Lab Test Code Clinical Result Ref. Range Units Result   Date  Hemogram/Diff/PLT [id number]  WBC Count L 3.3 4.0-11.0 10e9/L 6/23/2021   09:58       RBC Count L 2.95 4.4-5.9 10e12/L 6/23/2021 09:58       Hemoglobin L 10.0 13.3-17.7 g/dL 6/23/2021 09:58       Hematocrit L 30.6 40.0-53.0 % 6/23/2021 09:58       MCV H 104  fl 6/23/2021 09:58       MCH H 33.9 26.5-33.0 pg 6/23/2021 09:58       MCHC 32.7 31.5-36.5 g/dL 6/23/2021 09:58       RDW 13.7 10.0-15.0 % 6/23/2021 09:58       Platelet Count 155 150-450 10e9/L 6/23/2021 09:58        SEE TEXT   6/23/2021 09:58         Text/Comments:  Automated Method       % Neutrophils 31.6  % 6/23/2021 09:58       % Lymphocytes 48.8  % 6/23/2021 09:58       % Monocytes 16.6  % 6/23/2021 09:58       % Eosinophils 0.9  % 6/23/2021 09:58       % Basophils 0.3  % 6/23/2021 09:58       % Immature Grans 1.8  % 6/23/2021 09:58       Nucleated RBCs 0 0 /100 6/23/2021 09:58       abs Neutrophils L 1.0 1.6-8.3 10e9/L 6/23/2021 09:58       abs Lymphocytes 1.6 0.8-5.3 10e9/L 6/23/2021 09:58       abs Monocytes 0.5 0.0-1.3 10e9/L 6/23/2021 09:58       abs Eosinophils 0.0 0.0-0.7 10e9/L 6/23/2021 09:58       abs Basophils 0.0 0.0-0.2 10e9/L 6/23/2021 09:58       abs Imm Granulocytes 0.1 0-0.4 10e9/L 6/23/2021 09:58       abs NRBC 0.0   6/23/2021 09:58    MICROSCOPIC DESCRIPTION:  The red cells appear normochromic.  Poikilocytosis includes rare   dacryocytes and rare echinocytes.  Polychromasia is not increased.  Rouleaux formation is not increased. The   morphology of the platelets is  normal.    Bone marrow aspirates and touch  imprints of bone biopsy are reviewed.    BONE MARROW DIFFERENTIAL (500 cells on direct smears)    Percent  Cell (reference range)  0.0 Blasts (0 - 1)  4.4 Neutrophil promyelocytes (2 - 4)  54.2 Neutrophils and precursors (54 - 63)  15.2 Erythroid precursors (18 - 24)  6.8 Monocytes (1 - 1.5)  0.6 Eosinophils (1 - 3)  0.0 Basophils (0 - 1)  12.4 Lymphocytes (8 - 12)  6.4 Plasma cells (0 - 1.5)    Neutrophil maturation is complete. Rare hypogranular neutrophils are   present (5% of 100 total segmented  neutrophils). Erythroid maturation is complete. Megakaryocytes are present   and show a normal morphologic  spectrum.    IRON STAINS: Dacie iron stain on concentrate smears: Iron stains show 22%   sideroblasts.  Prussian blue stain  on particle crush:  Marrow iron stores are increased.    TREPHINE SECTIONS:  The core biopsy is adequate.  The marrow cellularity is 20-30%. The   cellular composition reflects the aspirate  differential,  although plasma cells are more abundant than reflected  in   the differential count. Megakaryocytes  are present and show overall normal number and distribution.    Immunohistochemistry:  Immunohistochemical stains are performed on the paraffin-embedded left   trephine biopsy.    Stains for  and cytoplasmic kappa and lambda immunoglobulin light   chains show kappa monotypic plasma  cells (20-25% of cellularity). Very rare lambda-expressing plasma cells   are also present.  CD3 highlights numerous small T cells, which show a scattered   distribution; neither aggregates nor convincing  evidence of sinusoidal distribution is appreciated.  CD20 stains rare small B cells.    Note:  These immunohistochemical stains are deemed medically necessary.    Some of the antigens may also be  evaluated by flow cytometry.  Concurrent evaluation by   immunohistochemistry on clot and/or trephine sections  is indicated in this case in order to correlate immunophenotype with cell   morphology and determine extent of  involvement, spatial pattern, and focal ity of potential disease   distribution.    CPT Codes:  A: 14549-BPVJP, 12966-KREC, HBM, 98291-NEUT, 43779-TUIIN, 65728-MKIEL,   98179-LZHCZ, 86241-WNMAC, 06746-LDEH.T,  82233-IMZ, 25737-IGQ, 76761-SKT, 55754-MKM, 52676-BPJ    TESTING LAB LOCATION:  [facility], [facility] [address] [place] [address]   [id number] [phone]    COLLECTION SITE:  Client:  [facility]  Location:  [facility] ()           Molecular Studies    FISH  RESULTS:   - Gains of chromosomes 5, 9, and/or 15 (85%)   - Gain of 11q (95%)   - Monosomy 13 (46%)   - Loss of IGH (4.5%) (control range 0-2.8%); no rearrangement of IGH   - No gain of 1q   - No losses of 1p or TP53           PET Scan       Results for orders placed during the hospital encounter of 06/19/21    PET ONCOLOGY WHOLE BODY    Status: Normal  6/21/2021    Narrative  Combined Report of:    PET and CT on  6/19/2021 11:05 AM :    1. PET of the neck, chest, abdomen, and pelvis.  2. PET CT Fusion for Attenuation Correction and Anatomical  Localization:  3. Diagnostic CT scan of the chest, abdomen, and pelvis with  intravenous contrast for interpretation.  3. CT of the chest, abdomen and pelvis obtained for diagnostic  interpretation.  4. 3D MIP and PET-CT fused images were processed on an independent  workstation and archived to PACS and reviewed by a radiologist.    Technique:    1. PET: The patient received 10.19 mCi of F-18-FDG; the serum glucose  was 107 prior to administration, body weight was 67.1 kg. Images were  evaluated in the axial, sagittal, and coronal planes as well as the  rotational whole body MIP. Images were acquired from the Vertex to the  Feet.    UPTAKE WAS MEASURED AT 60 MINUTES.    BACKGROUND:  Liver SUV max= 3.5,   Aorta Blood SUV Max: 2.8.    2. CT: Volumetric acquisition for clinical interpretation of the  chest, abdomen, and pelvis acquired at 3 mm sections after the  uneventful administration of intravenous contrast. The chest, abdomen,  and pelvis were evaluated at 5 mm sections in bone, soft tissue, and  lung windows.    The patient received 91 cc of Isovue 370 intravenously and 500 mL oral  Breeza contrast for the examination.  High resolution images of the neck were obtained with multiple oblique  projection reformats.    3. 3D MIP and PET-CT fused images were processed on an independent  workstation and archived to PACS and reviewed by a radiologist.    INDICATION: Plasma cell neoplasm    ADDITIONAL INFORMATION OBTAINED FROM EMR: 71-year-old male with a  history of anemia and laboratory findings concerning for smoldering  myeloma versus myeloma.    COMPARISON: CT abdomen 10/1/2018    FINDINGS:    HEAD/NECK:  There is no suspicious FDG uptake in the neck.    Mucosal thickening in the right maxillary sinus with possible  mucosal  retention cyst. Debris within the ethmoid air cells and sphenoid  sinus. The mastoid air cells are clear. The mucosal pharyngeal space,  the , prevertebral and carotid spaces are within normal  limits. No masses, mass effect or pathologically enlarged lymph nodes  are evident. The thyroid gland is unremarkable.    CHEST:  There is no suspicious FDG uptake in the chest.    The central tracheobronchial tree is patent. No pleural effusion or  pneumothorax. No acute consolidation. Mild dependent atelectasis. The  heart size is within normal limits. No pericardial effusion. Normal  caliber of the thoracic aorta and main pulmonary artery. No central  pulmonary embolus. No enlarged or hypermetabolic lymph nodes in the  chest. Esophagus is unremarkable.    ABDOMEN AND PELVIS:  There is no suspicious FDG uptake in the abdomen or pelvis.    Incidental subcentimeter focus of hyperenhancement in segment 2 of the  liver measuring 0.7 cm (series 5, image 282). No focal abnormal FDG  uptake to liver at this location. The gallbladder, pancreas, spleen,  and adrenal glands are unremarkable. Symmetric nephrographic  enhancement of the kidneys. Small renal cortical hypodensities  suggesting small cysts. No hydronephrosis. Circumferential thickening  of the bladder wall. This has increased since 10/1/2018. The prostate  is enlarged. No abnormally distended loops of small and large bowel.  Scattered colonic diverticula without evidence of acute  diverticulitis. No free air, free fluid, or fluid collection. Normal  caliber of the abdominal aorta. No enlarged or hypermetabolic lymph  nodes in the abdomen or pelvis.    LOWER EXTREMITIES:  No abnormal masses or hypermetabolic lesions. Incidental intramuscular  lipoma in the left hip.    BONES:  There is no abnormal FDG uptake in the skeleton. Multiple small  nonavid lucencies are demonstrated in the axial skeleton, for example  in the pelvis including the iliac bones and  sacrum (example images  series 5, images 399-409). There is a mildly mottled appearance of the  sternal manubrium. No acute osseous abnormality. Multilevel  degenerative changes in the spine.    Impression  IMPRESSION: In this patient with a concern for plasma cell neoplasm,  smoldering myeloma versus multiple myeloma:    1. No hypermetabolic intraosseous lesion, however multiple nonavid  lucent lesions in the pelvis are suspicious for myeloma, versus less  likely age-related osteopenia.  2. Incidental circumferential thickening of the bladder wall,  nonspecific, may be related to chronic bladder outlet obstruction  given prostatomegaly, versus cystitis.  3. Prostatomegaly.  4. Colonic diverticulosis without evidence of acute diverticulitis.  5. Enhancing focus in segment 2 of the liver, not fully characterized  but may represent flash-filling cavernous hemangioma. Consider further  characterization with contrast enhanced liver MRI.    I have personally reviewed the examination and initial interpretation  and I agree with the findings.    MD Hamilton TAVERAS understood the above assessment and recommendations.  Multiple questions answered.  No barriers to learning identified.       Known issues that I take into account for medical decisions, with salient changes to the plan considering these complexities noted above.    Patient Active Problem List   Diagnosis     HYPERLIPIDEMIA LDL GOAL <130     PSEUDOPHAKIA OU     Advanced directives, counseling/discussion     History of actinic keratoses     PVD (posterior vitreous detachment), bilateral     Nonexudative senile macular degeneration of retina     Elevated glucose     Essential hypertension with goal blood pressure less than 140/90     Elevated prostate specific antigen (PSA)     Dupuytren contracture     Benign prostatic hyperplasia with urinary retention     Multiple myeloma not having achieved remission (H)         I spent 90 minutes in the care of  this patient today, which included time necessary for preparation for the visit, obtaining history, ordering medications/tests/procedures as medically indicated, review of pertinent medical literature, counseling of the patient, communication of recommendations to the care team, and documentation time.    Pepper Elise    Securely message with the Vocera Web Console     ------------------------------------------------------------------------------------------------------------------------------------------------    Patient Care Team       Relationship Specialty Notifications Start End    Bro Herrera MD PCP - General Internal Medicine  10/29/13     Phone: [phone] Fax: [phone]         [address] [place] [address]    Bro Herrera MD Assigned PCP   9/29/13     Phone: [phone] Fax: [phone]         [address] [place] [address]    Wilder Foote MD Assigned Surgical Provider   11/25/20     Phone: [phone] Fax: [phone]         [address] [facility] [address]    Carmen Ellis MD Assigned Cancer Care Provider   5/2/21     Phone: [phone] Fax: [phone]         [address] [facility] [address]    Noris Gómez, RN Specialty Care Coordinator Oncology Admissions 7/7/21     phone:  [phone]    Phone: [phone]                     [facility]

## 2021-10-15 ENCOUNTER — VIRTUAL VISIT (OUTPATIENT)
Dept: TRANSPLANT | Facility: CLINIC | Age: 71
End: 2021-10-15
Attending: INTERNAL MEDICINE
Payer: COMMERCIAL

## 2021-10-15 DIAGNOSIS — C90.00 MULTIPLE MYELOMA NOT HAVING ACHIEVED REMISSION (H): Primary | ICD-10-CM

## 2021-10-15 DIAGNOSIS — Z71.9 VISIT FOR COUNSELING: Primary | ICD-10-CM

## 2021-10-15 NOTE — PROGRESS NOTES
Blood and Marrow Transplant   New Transplant Visit with   Clinical     Assessment completed on 10/15/21 via phone as part of the COVID 19 protocol. Assessment of living situation, support system, financial status, functional status, coping, stressors, need for resources and social work intervention provided as needed. Information for this assessment was provided by pt and pt's spouse's report in addition to medical chart review and consultation with medical team.     Present:  Patient: Hamilton Angulo  Spouse: Heather Angulo  : ARNOLD Armas, [facility]    Medical Team   Nurse Coordinator: Rosangela Stallworth RN  BMT Physician: Pepper Mclaughlin MD  Referring Physician: Yusef Bowling MD    Presenting Information:  Pt is a 71 year old male diagnosed with Multiple Myeloma. Pt was diagnosed on 2021. Pt presents for an autologous stem cell transplant discussion.    Contact Information:  Cell Phone: [phone]  Wife's Cell Phone: [phone]    Special Needs:    No needs identified at this time.     Relocation Requirement:   Pt lives in [place], [place] (approximately 19 min; 14[place]4 [place] [facility] which [place] the required distance of the hospital. Pt does not need to relocate.     Living Situation:   Pt lives in [place], [place] w/ his spouse Heather.    Family Information:   Spouse: Heather Angulo  Parents:   Siblings: N/A  Children: N/A    Education/Employment:  Pt retired 17 years ago from his career w/ [employer] and Pt's wife is a retired .    Insurance:   [employer]. No insurance concerns identified at this time. FRANCOISE provided information regarding the insurance authorization process and the role of the [employer] . FRANCOISE provided contact info for the [employer]  and referred pt to them for future insurance questions.     Finances:   Pt and wife are supported by social security [employer] income. No financial concerns identified  at this time. SW discussed benjie options and asked pt to let SW know if they would like to apply in the future. Pt rec'd [facility].    Caregiver:   SW discussed with the patient and spouse the caregiver role and expectation at length. Pt is agreeable to having a full time caregiver for the minimum of 30 days until cleared by the BMT Physician. Pt's identified caregiver is his wife Heather. Caregiver education and information provided. No caregiver concerns identified.     Healthcare Directive:  No. SW provided education and forms. SW encouraged pt to have discussions with their family regarding their health care wishes.  In the absence of a healthcare document, SW discussed the [place] Policy on who would make decisions on his behalf if he did not have the capacity to make healthcare decisions.    Resources Provided:  -BMT Information Book  -BMT Resources Packet  -Healthcare Directive  -Honoring Choices - Your Rights: Making Your Own Health Care Treatment Decisions  -Caregiver Contract/Description  -Transplant Unit Description and Information   -Lodging Resources    Identified Concerns:  No concerns identified at this time.     Summary:  Pt presents to [facility] regarding an autologous stem cell transplant. Pt and pt's spouse asked good/appropriate questions regarding psychosocial factors related to BMT; all questions were addressed. Pt presented as pleasant, calm, and engaged. Pt's affect was appropriate. Family's affect was appropriate and supportive. Pt lives in [place], [place] (approximately 19 min; 14.4 miles from [facility]) which is within the required distance of the hospital. Pt does not need to relocate. Pt's identified caregiver is his wife Heather. Pt and wife were unable to identify additional support people in their lives as secondary assistance.     Plan:   FRANCOISE provided contact information and encouraged pt to contact FRANCOISE with any additional questions, concerns, resources  and/or for support. SW will continue to follow pt to provide support and guidance with resources as needed.     ARNOLD Armas, [facility]  Adult Blood & Marrow Transplant   Phone: [phone]  Pager: [phone]

## 2021-10-15 NOTE — PROGRESS NOTES
Spoke with Hamilton and wife following new transplant visit with Dr. Elise. Reviewed plan of care per NT conversation for Stem Cell Transplant. Explained role of the Nurse Coordinator throughout the BMT process as well as general time line and expectations for transplant. Discussed necessity of caregiver and program's proximity requirements. All questions were answered.     Plan: Autologous Transplant, pending response to current therapy    Contact information provided for :  yes    HLA typing drawn: no    PRA typing drawn:  no    CMV-IgG and ABO-Rh drawn or in record:  no    Contact information provided for :  no    Financial Release for URD search obtained:  no    0447 Consent Signed: no    EOC Reason updated: yes

## 2021-10-15 NOTE — LETTER
10/15/2021         RE: Hamilton Angulo  [address] [place] [address]        Dear Colleague,    Thank you for referring your patient, Hamilton Angulo, to the [facility] BLOOD AND MARROW TRANSPLANT PROGRAM [place]. Please see a copy of my visit note below.    Spoke with Hamilton and wife following new transplant visit with Dr. Elise. Reviewed plan of care per NT conversation for Stem Cell Transplant. Explained role of the Nurse Coordinator throughout the BMT process as well as general time line and expectations for transplant. Discussed necessity of caregiver and program's proximity requirements. All questions were answered.     Plan: Autologous Transplant, pending response to current therapy    Contact information provided for :  yes    HLA typing drawn: no    PRA typing drawn:  no    CMV-IgG and ABO-Rh drawn or in record:  no    Contact information provided for :  no    Financial Release for URD search obtained:  no    9542 Consent Signed: no    EOC Reason updated: yes          Again, thank you for allowing me to participate in the care of your patient.        Sincerely,        BMT Nurse Coordinator

## 2021-10-15 NOTE — LETTER
10/15/2021         RE: Hamilton Angulo  [address] [address]        Dear Colleague,    Thank you for referring your patient, Hamilton Angulo, to the [facility] BLOOD AND MARROW TRANSPLANT PROGRAM [place]. Please see a copy of my visit note below.    Blood and Marrow Transplant   New Transplant Visit with   Clinical     Assessment completed on 10/15/21 via phone as part of the COVID 19 protocol. Assessment of living situation, support system, financial status, functional status, coping, stressors, need for resources and social work intervention provided as needed. Information for this assessment was provided by pt and pt's spouse's report in addition to medical chart review and consultation with medical team.     Present:  Patient: Hamilton Angulo  Spouse: Heather Angulo  : ARNOLD Armas, [facility]    Medical Team   Nurse Coordinator: Rosangela Stallworth RN  BMT Physician: Pepper Mclaughlin MD  Referring Physician: Yusef Bowling MD    Presenting Information:  Pt is a 71 year old male diagnosed with Multiple Myeloma. Pt was diagnosed on 2021. Pt presents for an autologous stem cell transplant discussion.    Contact Information:  Cell Phone: [phone]  Wife's Cell Phone: [phone]    Special Needs:    No needs identified at this time.     Relocation Requirement:   Pt lives in [place], [place] (approximately 19 min; 14[place]4 miles from [facility] which is within the required distance of the hospital. Pt does not need to relocate.     Living Situation:   Pt lives in [place], [place] w/ his spouse Heather.    Family Information:   Spouse: Heather Angulo  Parents:   Siblings: N/A  Children: N/A    Education/Employment:  Pt retired 17 years ago from his career w/ [employer] and Pt's wife is a retired .    Insurance:   [employer]. No insurance concerns identified at this time. SW provided information regarding the insurance authorization process  and the role of the [employer] . SW provided contact info for the [employer]  and referred pt to them for future insurance questions.     Finances:   Pt and wife are supported by social security [facility] income. No financial concerns identified at this time. SW discussed benjie options and asked pt to let SW know if they would like to apply in the future. Pt rec'd [facility].    Caregiver:   SW discussed with the patient and spouse the caregiver role and expectation at length. Pt is agreeable to having a full time caregiver for the minimum of 30 days until cleared by the BMT Physician. Pt's identified caregiver is his wife Heather. Caregiver education and information provided. No caregiver concerns identified.     Healthcare Directive:  No. SW provided education and forms. SW encouraged pt to have discussions with their family regarding their health care wishes.  In the absence of a healthcare document, SW discussed the [place] Policy on who would make decisions on his behalf if he did not have the capacity to make healthcare decisions.    Resources Provided:  -BMT Information Book  -BMT Resources Packet  -Healthcare Directive  -Honoring Choices - Your Rights: Making Your Own Health Care Treatment Decisions  -Caregiver Contract/Description  -Transplant Unit Description and Information   -Lodging Resources    Identified Concerns:  No concerns identified at this time.     Summary:  Pt presents to [facility] regarding an autologous stem cell transplant. Pt and pt's spouse asked good/appropriate questions regarding psychosocial factors related to BMT; all questions were addressed. Pt presented as pleasant, calm, and engaged. Pt's affect was appropriate. Family's affect was appropriate and supportive. Pt lives in [place], [place] (approximately 19 min; [place]4 [place] from [facility] which is within the required distance of the hospital. Pt does not need to  relocate. Pt's identified caregiver is his wife Heather. Pt and wife were unable to identify additional support people in their lives as secondary assistance.     Plan:   SW provided contact information and encouraged pt to contact SW with any additional questions, concerns, resources and/or for support. SW will continue to follow pt to provide support and guidance with resources as needed.     ARNOLD Armas, [facility]  Adult Blood & Marrow Transplant   Phone: [phone]  Pager: [phone]        Again, thank you for allowing me to participate in the care of your patient.        Sincerely,        ARNOLD Cancino

## 2021-10-17 DIAGNOSIS — I10 ESSENTIAL HYPERTENSION WITH GOAL BLOOD PRESSURE LESS THAN 140/90: ICD-10-CM

## 2021-10-19 RX ORDER — AMLODIPINE BESYLATE 2.5 MG/1
TABLET ORAL
Qty: 90 TABLET | Refills: 3 | Status: SHIPPED | OUTPATIENT
Start: 2021-10-19 | End: 2022-10-10

## 2021-10-19 NOTE — TELEPHONE ENCOUNTER
Routing refill request to provider for review/approval because:  BP Readings from Last 3 Encounters:   10/14/21 (!) 156/85   06/23/21 (!) 186/85   04/28/21 (!) 194/88        Kristen Quinonez, RN, BSN, PHN  [facility]: [place]

## 2021-10-29 ENCOUNTER — TRANSFERRED RECORDS (OUTPATIENT)
Dept: HEALTH INFORMATION MANAGEMENT | Facility: CLINIC | Age: 71
End: 2021-10-29

## 2021-11-03 ENCOUNTER — MEDICAL CORRESPONDENCE (OUTPATIENT)
Dept: TRANSPLANT | Facility: CLINIC | Age: 71
End: 2021-11-03
Payer: COMMERCIAL

## 2021-11-05 ENCOUNTER — TRANSFERRED RECORDS (OUTPATIENT)
Dept: HEALTH INFORMATION MANAGEMENT | Facility: CLINIC | Age: 71
End: 2021-11-05
Payer: COMMERCIAL

## 2021-11-17 DIAGNOSIS — C90.00 MYELOMA (H): ICD-10-CM

## 2021-11-17 DIAGNOSIS — Z79.899 OTHER LONG TERM (CURRENT) DRUG THERAPY: ICD-10-CM

## 2021-11-17 DIAGNOSIS — Z86.2 PERSONAL HISTORY OF DISEASES OF BLOOD AND BLOOD-FORMING ORGANS: ICD-10-CM

## 2021-11-17 DIAGNOSIS — Z01.818 EXAMINATION PRIOR TO CHEMOTHERAPY: Primary | ICD-10-CM

## 2021-11-22 DIAGNOSIS — Z01.818 EXAMINATION PRIOR TO CHEMOTHERAPY: Primary | ICD-10-CM

## 2021-11-24 DIAGNOSIS — C90.00 MULTIPLE MYELOMA NOT HAVING ACHIEVED REMISSION (H): Primary | ICD-10-CM

## 2021-11-24 RX ORDER — HEPARIN SODIUM,PORCINE 10 UNIT/ML
5 VIAL (ML) INTRAVENOUS
Status: CANCELLED | OUTPATIENT
Start: 2021-11-30

## 2021-11-24 RX ORDER — HEPARIN SODIUM (PORCINE) LOCK FLUSH IV SOLN 100 UNIT/ML 100 UNIT/ML
5 SOLUTION INTRAVENOUS
Status: CANCELLED | OUTPATIENT
Start: 2021-11-30

## 2021-11-29 ENCOUNTER — OFFICE VISIT (OUTPATIENT)
Dept: INTERNAL MEDICINE | Facility: CLINIC | Age: 71
End: 2021-11-29
Payer: COMMERCIAL

## 2021-11-29 VITALS
OXYGEN SATURATION: 99 % | TEMPERATURE: 98 F | SYSTOLIC BLOOD PRESSURE: 134 MMHG | BODY MASS INDEX: 19.12 KG/M2 | DIASTOLIC BLOOD PRESSURE: 71 MMHG | WEIGHT: 141 LBS | HEART RATE: 91 BPM

## 2021-11-29 DIAGNOSIS — Z01.818 EXAMINATION PRIOR TO CHEMOTHERAPY: ICD-10-CM

## 2021-11-29 DIAGNOSIS — Z00.00 ENCOUNTER FOR MEDICARE ANNUAL WELLNESS EXAM: Primary | ICD-10-CM

## 2021-11-29 DIAGNOSIS — R73.09 ELEVATED GLUCOSE: ICD-10-CM

## 2021-11-29 DIAGNOSIS — E78.5 HYPERLIPIDEMIA LDL GOAL <130: ICD-10-CM

## 2021-11-29 DIAGNOSIS — I10 ESSENTIAL HYPERTENSION WITH GOAL BLOOD PRESSURE LESS THAN 140/90: ICD-10-CM

## 2021-11-29 DIAGNOSIS — C90.00 MULTIPLE MYELOMA NOT HAVING ACHIEVED REMISSION (H): ICD-10-CM

## 2021-11-29 DIAGNOSIS — D69.2 SENILE PURPURA (H): ICD-10-CM

## 2021-11-29 LAB
ANION GAP SERPL CALCULATED.3IONS-SCNC: 4 MMOL/L (ref 3–14)
BUN SERPL-MCNC: 16 MG/DL (ref 7–30)
CALCIUM SERPL-MCNC: 9.3 MG/DL (ref 8.5–10.1)
CHLORIDE BLD-SCNC: 110 MMOL/L (ref 94–109)
CHOLEST SERPL-MCNC: 147 MG/DL
CO2 SERPL-SCNC: 27 MMOL/L (ref 20–32)
CREAT SERPL-MCNC: 0.94 MG/DL (ref 0.66–1.25)
FASTING STATUS PATIENT QL REPORTED: YES
GFR SERPL CREATININE-BSD FRML MDRD: 81 ML/MIN/1.73M2
GLUCOSE BLD-MCNC: 106 MG/DL (ref 70–99)
HDLC SERPL-MCNC: 63 MG/DL
LDLC SERPL CALC-MCNC: 62 MG/DL
NONHDLC SERPL-MCNC: 84 MG/DL
POTASSIUM BLD-SCNC: 4.3 MMOL/L (ref 3.4–5.3)
SODIUM SERPL-SCNC: 141 MMOL/L (ref 133–144)
TRIGL SERPL-MCNC: 111 MG/DL

## 2021-11-29 PROCEDURE — 80061 LIPID PANEL: CPT | Performed by: INTERNAL MEDICINE

## 2021-11-29 PROCEDURE — 99397 PER PM REEVAL EST PAT 65+ YR: CPT | Performed by: INTERNAL MEDICINE

## 2021-11-29 PROCEDURE — 80048 BASIC METABOLIC PNL TOTAL CA: CPT | Performed by: INTERNAL MEDICINE

## 2021-11-29 PROCEDURE — 82274 ASSAY TEST FOR BLOOD FECAL: CPT | Performed by: INTERNAL MEDICINE

## 2021-11-29 PROCEDURE — 36415 COLL VENOUS BLD VENIPUNCTURE: CPT | Performed by: INTERNAL MEDICINE

## 2021-11-29 RX ORDER — LISINOPRIL 20 MG/1
20 TABLET ORAL DAILY
Qty: 90 TABLET | Refills: 3 | Status: SHIPPED | OUTPATIENT
Start: 2021-11-29 | End: 2022-11-28

## 2021-11-29 ASSESSMENT — ACTIVITIES OF DAILY LIVING (ADL): CURRENT_FUNCTION: NO ASSISTANCE NEEDED

## 2021-11-29 NOTE — LETTER
November 30, 2021      Hamilton Angulo  [address]  [place] [address]        Dear ,    We are writing to inform you of your test results.    All of these tests are within acceptable limits , things look good !          Resulted Orders   Basic metabolic panel  (Ca, Cl, CO2, Creat, Gluc, K, Na, BUN)   Result Value Ref Range    Sodium 141 133 - 144 mmol/L    Potassium 4.3 3.4 - 5.3 mmol/L    Chloride 110 (H) 94 - 109 mmol/L    Carbon Dioxide (CO2) 27 20 - 32 mmol/L    Anion Gap 4 3 - 14 mmol/L    Urea Nitrogen 16 7 - 30 mg/dL    Creatinine 0.94 0.66 - 1.25 mg/dL    Calcium 9.3 8.5 - 10.1 mg/dL    Glucose 106 (H) 70 - 99 mg/dL    GFR Estimate 81 >60 mL/min/1.73m2      Comment:      As of July 11, 2021, eGFR is calculated by the CKD-EPI creatinine equation, without race adjustment. eGFR can be influenced by muscle mass, exercise, and diet. The reported eGFR is an estimation only and is only applicable if the renal function is stable.   Lipid panel reflex to direct LDL Fasting   Result Value Ref Range    Cholesterol 147 <200 mg/dL    Triglycerides 111 <150 mg/dL    Direct Measure HDL 63 >=40 mg/dL    LDL Cholesterol Calculated 62 <=100 mg/dL    Non HDL Cholesterol 84 <130 mg/dL    Patient Fasting > 8hrs? Yes     Narrative    Cholesterol  Desirable:  <200 mg/dL    Triglycerides  Normal:  Less than 150 mg/dL  Borderline High:  150-199 mg/dL  High:  200-499 mg/dL  Very High:  Greater than or equal to 500 mg/dL    Direct Measure HDL  Female:  Greater than or equal to 50 mg/dL   Male:  Greater than or equal to 40 mg/dL    LDL Cholesterol  Desirable:  <100mg/dL  Above Desirable:  100-129 mg/dL   Borderline High:  130-159 mg/dL   High:  160-189 mg/dL   Very High:  >= 190 mg/dL    Non HDL Cholesterol  Desirable:  130 mg/dL  Above Desirable:  130-159 mg/dL  Borderline High:  160-189 mg/dL  High:  190-219 mg/dL  Very High:  Greater than or equal to 220 mg/dL       If you have any questions or concerns, please call  the clinic at the number listed above.       Sincerely,      Bro Herrera MD[facility]

## 2021-11-29 NOTE — PROGRESS NOTES
The patient was provided with suggestions to help him develop a healthy physical lifestyle.  The patient was counseled and encouraged to consider modifying their diet and eating habits. He was provided with information on recommended healthy diet options.  The patient was provided with written information regarding signs of hearing loss.

## 2021-11-29 NOTE — PATIENT INSTRUCTIONS
Patient Education   Personalized Prevention Plan  You are due for the preventive services outlined below.  Your care team is available to assist you in scheduling these services.  If you have already completed any of these items, please share that information with your care team to update in your medical record.  Health Maintenance Due   Topic Date Due     ANNUAL REVIEW OF  ORDERS  Never done     Zoster (Shingles) Vaccine (2 of 3) 11/15/2013     PHQ-2  01/01/2021     FALL RISK ASSESSMENT  10/30/2021     Colorectal Cancer Screening  10/30/2021     Eye Exam  11/13/2021     Your Health Risk Assessment indicates you feel you are not in good health    A healthy lifestyle helps keep the body fit and the mind alert. It helps protect you from disease, helps you fight disease, and helps prevent chronic disease (disease that doesn't go away) from getting worse. This is important as you get older and begin to notice twinges in muscles and joints and a decline in the strength and stamina you once took for granted. A healthy lifestyle includes good healthcare, good nutrition, weight control, recreation, and regular exercise. Avoid harmful substances and do what you can to keep safe. Another part of a healthy lifestyle is stay mentally active and socially involved.    Good healthcare     Have a wellness visit every year.     If you have new symptoms, let us know right away. Don't wait until the next checkup.     Take medicines exactly as prescribed and keep your medicines in a safe place. Tell us if your medicine causes problems.   Healthy diet and weight control     Eat 3 or 4 small, nutritious, low-fat, high-fiber meals a day. Include a variety of fruits, vegetables, and whole-grain foods.     Make sure you get enough calcium in your diet. Calcium, vitamin D, and exercise help prevent osteoporosis (bone thinning).     If you live alone, try eating with others when you can. That way you get a good meal and have company while  you eat it.     Try to keep a healthy weight. If you eat more calories than your body uses for energy, it will be stored as fat and you will gain weight.     Recreation   Recreation is not limited to sports and team events. It includes any activity that provides relaxation, interest, enjoyment, and exercise. Recreation provides an outlet for physical, mental, and social energy. It can give a sense of worth and achievement. It can help you stay healthy.    Mental Exercise and Social Involvement  Mental and emotional health is as important as physical health. Keep in touch with friends and family. Stay as active as possible. Continue to learn and challenge yourself.   Things you can do to stay mentally active are:    Learn something new, like a foreign language or musical instrument.     Play SCRABBLE or do crossword puzzles. If you cannot find people to play these games with you at home, you can play them with others on your computer through the Internet.     Join a games club--anything from card games to chess or checkers or lawn bowling.     Start a new hobby.     Go back to school.     Volunteer.     Read.   Keep up with world events.    Understanding USDA MyPlate  The USDA has guidelines to help you make healthy food choices. These are called MyPlate. MyPlate shows the food groups that make up healthy meals using the image of a place setting. Before you eat, think about the healthiest choices for what to put on your plate or in your cup or bowl. To learn more about building a healthy plate, visit www.choosemyplate.gov.    The food groups    Fruits. Any fruit or 100% fruit juice counts as part of the Fruit Group. Fruits may be fresh, canned, frozen, or dried, and may be whole, cut-up, or pureed. Make 1/2 of your plate fruits and vegetables.    Vegetables. Any vegetable or 100% vegetable juice counts as a member of the Vegetable Group. Vegetables may be fresh, frozen, canned, or dried. They can be served raw or  cooked and may be whole, cut-up, or mashed. Make 1/2 of your plate fruits and vegetables.    Grains. All foods made from grains are part of the Grains Group. These include wheat, rice, oats, cornmeal, and barley. Grains are often used to make foods such as bread, pasta, oatmeal, cereal, tortillas, and grits. Grains should be no more than 1/4 of your plate. At least half of your grains should be whole grains.    Protein. This group includes meat, poultry, seafood, beans and peas, eggs, processed soy products (such as tofu), nuts (including nut butters), and seeds. Make protein choices no more than 1/4 of your plate. Meat and poultry choices should be lean or low fat.    Dairy. The Dairy Group includes all fluid milk products and foods made from milk that contain calcium, such as yogurt and cheese. (Foods that have little calcium, such as cream, butter, and cream cheese, are not part of this group.) Most dairy choices should be low-fat or fat-free.    Oils. Oils aren't a food group, but they do contain essential nutrients. However it's important to watch your intake of oils. These are fats that are liquid at room temperature. They include canola, corn, olive, soybean, vegetable, and sunflower oil. Foods that are mainly oil include mayonnaise, certain salad dressings, and soft margarines. You likely already get your daily oil allowance from the foods you eat.  Things to limit  Eating healthy also means limiting these things in your diet:       Salt (sodium). Many processed foods have a lot of sodium. To keep sodium intake down, eat fresh vegetables, meats, poultry, and seafood when possible. Purchase low-sodium, reduced-sodium, or no-salt-added food products at the store. And don't add salt to your meals at home. Instead, season them with herbs and spices such as dill, oregano, cumin, and paprika. Or try adding flavor with lemon or lime zest and juice.    Saturated fat. Saturated fats are most often found in animal  products such as beef, pork, and chicken. They are often solid at room temperature, such as butter. To reduce your saturated fat intake, choose leaner cuts of meat and poultry. And try healthier cooking methods such as grilling, broiling, roasting, or baking. For a simple lower-fat swap, use plain nonfat yogurt instead of mayonnaise when making potato salad or macaroni salad.    Added sugars. These are sugars added to foods. They are in foods such as ice cream, candy, soda, fruit drinks, sports drinks, energy drinks, cookies, pastries, jams, and syrups. Cut down on added sugars by sharing sweet treats with a family member or friend. You can also choose fruit for dessert, and drink water or other unsweetened beverages.     [employer] last reviewed this educational content on 6/1/2020 2000-2021 [employer]. All rights reserved. This information is not intended as a substitute for professional medical care. Always follow your healthcare professional's instructions.          Signs of Hearing Loss      Hearing much better with one ear can be a sign of hearing loss.   Hearing loss is a problem shared by many people. In fact, it is one of the most common health problems, particularly as people age. Most people age 65 and older have some hearing loss. By age 80, almost everyone does. Hearing loss often occurs slowly over the years. So you may not realize your hearing has gotten worse.  Have your hearing checked  Call your healthcare provider if you:    Have to strain to hear normal conversation    Have to watch other people s faces very carefully to follow what they re saying    Need to ask people to repeat what they ve said    Often misunderstand what people are saying    Turn the volume of the television or radio up so high that others complain    Feel that people are mumbling when they re talking to you    Find that the effort to hear leaves you feeling tired and irritated    Notice, when using the phone,  that you hear better with one ear than the other  [employer] last reviewed this educational content on 1/1/2020 2000-2021 [employer]. All rights reserved. This information is not intended as a substitute for professional medical care. Always follow your healthcare professional's instructions.

## 2021-11-29 NOTE — PROGRESS NOTES
"SUBJECTIVE:   Hamilton Angulo is a 71 year old male who presents for Preventive Visit.      Patient has been advised of split billing requirements and indicates understanding: Yes   Are you in the first 12 months of your Medicare coverage?  No    Healthy Habits:    In general, how would you rate your overall health?  Fair    Frequency of exercise:  2-3 days/week    Duration of exercise:  15-30 minutes    Do you usually eat at least 4 servings of fruit and vegetables a day, include whole grains    & fiber and avoid regularly eating high fat or \"junk\" foods?  No    Taking medications regularly:  Yes    Barriers to taking medications:  None    Medication side effects:  None    Ability to successfully perform activities of daily living:  No assistance needed    Home Safety:  No safety concerns identified    Hearing Impairment:  Need to ask people to speak up or repeat themselves    In the past 6 months, have you been bothered by leaking of urine?  No    In general, how would you rate your overall mental or emotional health?  Good      PHQ-2 Total Score:    Additional concerns today:  No    Due to his ongoing struggle with multiple myeloma he is to begin a stem cell therapy with [facility] . He's getting care with the [facility], all medical records in [employer] electronic medical records.    Do you feel safe in your environment? Yes    Have you ever done Advance Care Planning? (For example, a Health Directive, POLST, or a discussion with a medical provider or your loved ones about your wishes): Yes, advance care planning is on file.       Fall risk  Fallen 2 or more times in the past year?: No  Any fall with injury in the past year?: No    Cognitive Screening   1) Repeat 3 items (Leader, Season, Table)    2) Clock draw: NORMAL  3) 3 item recall: Recalls 2 objects   Results: NORMAL clock, 1-2 items recalled: COGNITIVE IMPAIRMENT LESS LIKELY    Mini-CogTM " Copyright MATT Flores. Licensed by the author for use in Health system; reprinted with permission (mick@[facility]). All rights reserved.      Do you have sleep apnea, excessive snoring or daytime drowsiness?: no    Reviewed and updated as needed this visit by clinical staff              Reviewed and updated as needed this visit by Provider               Social History     Tobacco Use     Smoking status: Never Smoker     Smokeless tobacco: Never Used   Substance Use Topics     Alcohol use: Yes     Comment: Ocss.     If you drink alcohol do you typically have >3 drinks per day or >7 drinks per week? No    Current providers sharing in care for this patient include:   Patient Care Team:  Bro Herrera MD as PCP - General (Internal Medicine)  Bro Herrera MD as Assigned PCP  Wilder Foote MD as Assigned Surgical Provider  Carmen Ellis MD as Assigned Cancer Care Provider  Noris Gómez RN as Specialty Care Coordinator (Oncology)  Yusef Bowling MD as MD (Internal Medicine)    The following health maintenance items are reviewed in Epic and correct as of today:  Health Maintenance Due   Topic Date Due     ANNUAL REVIEW OF HM ORDERS  Never done     ZOSTER IMMUNIZATION (2 of 3) 11/15/2013     PHQ-2  01/01/2021     FALL RISK ASSESSMENT  10/30/2021     COLORECTAL CANCER SCREENING  10/30/2021     EYE EXAM  11/13/2021     MEDICARE ANNUAL WELLNESS VISIT  10/30/2021     Lab work is in process  Labs reviewed in EPIC  BP Readings from Last 3 Encounters:   11/29/21 134/71   10/14/21 (!) 156/85   06/23/21 (!) 186/85    Wt Readings from Last 3 Encounters:   11/29/21 64 kg (141 lb)   10/14/21 63.8 kg (140 lb 9.6 oz)   07/05/21 66.7 kg (147 lb)                  Patient Active Problem List   Diagnosis     HYPERLIPIDEMIA LDL GOAL <130     PSEUDOPHAKIA OU     Advanced directives, counseling/discussion     History of actinic keratoses     PVD (posterior vitreous detachment), bilateral     Nonexudative senile macular  degeneration of retina     Elevated glucose     Essential hypertension with goal blood pressure less than 140/90     Elevated prostate specific antigen (PSA)     Dupuytren contracture     Benign prostatic hyperplasia with urinary retention     Multiple myeloma not having achieved remission (H)     Past Surgical History:   Procedure Laterality Date     CATARACT IOL, RT/LT  12/2003    Bilateral       Social History     Tobacco Use     Smoking status: Never Smoker     Smokeless tobacco: Never Used   Substance Use Topics     Alcohol use: Yes     Comment: Ocss.     Family History   Problem Relation Age of Onset     Cancer Mother      Hypertension Mother      Heart Disease Maternal Grandmother      Cancer Maternal Grandfather          Current Outpatient Medications   Medication Sig Dispense Refill     acetaminophen (TYLENOL) 325 MG tablet Take as needed following label instructions       acyclovir (ZOVIRAX) 400 MG tablet Take 400 mg by mouth 2 times daily       amLODIPine (NORVASC) 2.5 MG tablet TAKE 1 TABLET(2.5 MG) BY MOUTH DAILY 90 tablet 3     Artificial Tear Solution (SM ARTIFICIAL TEARS) SOLN Use per label instructions       aspirin (ASPIR-LOW) 81 MG EC tablet Take 81 mg by mouth daily       lisinopril (ZESTRIL) 20 MG tablet Take 1 tablet (20 mg) by mouth daily 90 tablet 3     Multiple Vitamins-Minerals (EQ COMPLETE MULTIVITAMIN-ADULT) TABS Take 1 tablet by mouth daily       pravastatin (PRAVACHOL) 20 MG tablet TAKE 1 TABLET BY MOUTH 3 TIMES A WEEK 36 tablet 3     dexamethasone (DECADRON) 4 MG tablet Take 4 mg by mouth Use as directed by oral route (Patient not taking: Reported on 11/29/2021)       REVLIMID 25 MG CAPS capsule Take 25 mg by mouth daily (Patient not taking: Reported on 11/29/2021)       STIMULANT LAXATIVE 8.6-50 MG tablet TAKE 1 TABLET BY MOUTH TWICE DAILY AS NEEDED FOR CONSTIPATION (Patient not taking: Reported on 11/29/2021)       Allergies   Allergen Reactions     Shrimp Hives     Recent Labs   Lab  "Test 06/23/21  0942 04/28/21  1214 10/30/20  0925 10/29/19  0907 10/22/18  0929 09/28/15  0925 09/22/14  0910   A1C  --   --  5.2 5.0 5.2   < >  --    LDL  --   --  55 75 75   < > 123   HDL  --   --  56 60 60   < > 56   TRIG  --   --  153* 120 112   < > 129   ALT 13 18  --  20  --   --  24   CR 0.97 1.03 0.87 0.94 1.02   < > 0.99   GFRESTIMATED 78 73 87 82 73   < > 76   GFRESTBLACK >90 84 >90 >90 88   < > >90   GFR Calc     POTASSIUM 3.6 4.1 4.8 4.9 5.1   < > 4.7   TSH  --  1.36  --   --   --   --  1.58    < > = values in this interval not displayed.            Review of Systems  Constitutional, HEENT, cardiovascular, pulmonary, gi and gu systems are negative, except as otherwise noted.    OBJECTIVE:   /71 (BP Location: Right arm, Patient Position: Chair, Cuff Size: Adult Regular)   Pulse 91   Temp 98  F (36.7  C) (Oral)   Wt 64 kg (141 lb)   SpO2 99%   BMI 19.12 kg/m   Estimated body mass index is 19.06 kg/m  as calculated from the following:    Height as of 10/14/21: 1.829 m (6' 0.01\").    Weight as of 10/14/21: 63.8 kg (140 lb 9.6 oz).  Physical Exam  GENERAL: healthy, alert and no distress, appears generally somewhat underweight at this point   EYES: Eyes grossly normal to inspection, PERRL and conjunctivae and sclerae normal  HENT: ear canals and TM's normal, nose and mouth without ulcers or lesions  NECK: no adenopathy, no asymmetry, masses, or scars and thyroid normal to palpation  RESP: lungs clear to auscultation - no rales, rhonchi or wheezes  CV: regular rate and rhythm, normal S1 S2, no S3 or S4, no murmur, click or rub, no peripheral edema and peripheral pulses strong  ABDOMEN: soft, nontender, no hepatosplenomegaly, no masses and bowel sounds normal  MS: no gross musculoskeletal defects noted, no edema  SKIN: no suspicious lesions or rashes, some scattered senile purpura with upper arms  NEURO: Normal strength and tone, mentation intact and speech normal  PSYCH: mentation " "appears normal, affect normal/bright    Diagnostic Test Results:  Labs reviewed in Epic  Orders Placed This Encounter   Procedures     Lipid panel reflex to direct LDL Fasting         ASSESSMENT / PLAN:     (Z00.00) Encounter for Medicare annual wellness exam  (primary encounter diagnosis)  Comment: routine screening issues   Plan: see orders section of this encounter     (I10) Essential hypertension with goal blood pressure less than 140/90  Comment: he continues treatment with Amlodipine [Norvasc] and lisinopril and has well controlled hypertension .   Plan: lisinopril (ZESTRIL) 20 MG tablet  Basic metabolic panel         Follow up as indicated on results     (D69.2) Senile purpura (H)  Comment: completely benign   Plan: reassurance given    (E78.5) Hyperlipidemia LDL goal <130  Comment: continue current plan of care  , refills provided already   Plan: Lipid panel reflex to direct LDL Fasting            (R73.09) Elevated glucose  Comment: there's just no indication of diabetes mellitus and an hemoglobin a1c  [ diabetes test ] is not drawn today   Plan:   Lab Results   Component Value Date    A1C 5.2 10/30/2020    A1C 5.0 10/29/2019    A1C 5.2 10/22/2018    A1C 5.2 10/13/2017    A1C 5.4 10/07/2016         (C90.00) Multiple myeloma not having achieved remission (H)  Comment: noted as a point of historical importance , see Epic electronic medical records   Plan:      Patient has been advised of split billing requirements and indicates understanding: Yes  COUNSELING:  Reviewed preventive health counseling, as reflected in patient instructions    Estimated body mass index is 19.06 kg/m  as calculated from the following:    Height as of 10/14/21: 1.829 m (6' 0.01\").    Weight as of 10/14/21: 63.8 kg (140 lb 9.6 oz).    Wt Readings from Last 5 Encounters:   11/29/21 64 kg (141 lb)   10/14/21 63.8 kg (140 lb 9.6 oz)   07/05/21 66.7 kg (147 lb)   06/23/21 67.1 kg (147 lb 14.4 oz)   06/11/21 67.1 kg (148 lb)     Body mass " index is 19.12 kg/m .      Weight management plan noted, stable and monitoring    He reports that he has never smoked. He has never used smokeless tobacco.      Appropriate preventive services were discussed with this patient, including applicable screening as appropriate for cardiovascular disease, diabetes, osteopenia/osteoporosis, and glaucoma.  As appropriate for age/gender, discussed screening for colorectal cancer, prostate cancer, breast cancer, and cervical cancer. Checklist reviewing preventive services available has been given to the patient.    Reviewed patients plan of care and provided an AVS. The Basic Care Plan (routine screening as documented in Health Maintenance) for Hamilton meets the Care Plan requirement. This Care Plan has been established and reviewed with the Patient.    Counseling Resources:  ATP IV Guidelines  Pooled Cohorts Equation Calculator  Breast Cancer Risk Calculator  Breast Cancer: Medication to Reduce Risk  FRAX Risk Assessment  ICSI Preventive Guidelines  Dietary Guidelines for Americans, 2010  USDA's MyPlate  ASA Prophylaxis  Lung CA Screening    Bro Herrera MD  [facility]    Identified Health Risks:

## 2021-11-30 ENCOUNTER — ALLIED HEALTH/NURSE VISIT (OUTPATIENT)
Dept: TRANSPLANT | Facility: CLINIC | Age: 71
End: 2021-11-30
Attending: INTERNAL MEDICINE
Payer: COMMERCIAL

## 2021-11-30 ENCOUNTER — ONCOLOGY VISIT (OUTPATIENT)
Dept: TRANSPLANT | Facility: CLINIC | Age: 71
End: 2021-11-30
Attending: NURSE PRACTITIONER
Payer: COMMERCIAL

## 2021-11-30 VITALS
TEMPERATURE: 98.2 F | RESPIRATION RATE: 16 BRPM | HEIGHT: 71 IN | DIASTOLIC BLOOD PRESSURE: 95 MMHG | WEIGHT: 143.3 LBS | OXYGEN SATURATION: 98 % | SYSTOLIC BLOOD PRESSURE: 169 MMHG | BODY MASS INDEX: 20.06 KG/M2 | HEART RATE: 98 BPM

## 2021-11-30 DIAGNOSIS — Z86.2 PERSONAL HISTORY OF DISEASES OF BLOOD AND BLOOD-FORMING ORGANS: ICD-10-CM

## 2021-11-30 DIAGNOSIS — C90.00 MULTIPLE MYELOMA NOT HAVING ACHIEVED REMISSION (H): ICD-10-CM

## 2021-11-30 DIAGNOSIS — C90.00 MYELOMA (H): ICD-10-CM

## 2021-11-30 DIAGNOSIS — Z79.899 OTHER LONG TERM (CURRENT) DRUG THERAPY: ICD-10-CM

## 2021-11-30 DIAGNOSIS — C90.00 MULTIPLE MYELOMA NOT HAVING ACHIEVED REMISSION (H): Primary | ICD-10-CM

## 2021-11-30 LAB
ABO/RH(D): NORMAL
ALBUMIN SERPL-MCNC: 3.8 G/DL (ref 3.4–5)
ALBUMIN UR-MCNC: NEGATIVE MG/DL
ALP SERPL-CCNC: 49 U/L (ref 40–150)
ALT SERPL W P-5'-P-CCNC: 22 U/L (ref 0–70)
ANION GAP SERPL CALCULATED.3IONS-SCNC: 6 MMOL/L (ref 3–14)
ANTIBODY SCREEN: NEGATIVE
APPEARANCE UR: ABNORMAL
APTT PPP: 27 SECONDS (ref 22–38)
AST SERPL W P-5'-P-CCNC: 11 U/L (ref 0–45)
BACTERIA #/AREA URNS HPF: ABNORMAL /HPF
BASOPHILS # BLD AUTO: 0 10E3/UL (ref 0–0.2)
BASOPHILS NFR BLD AUTO: 1 %
BILIRUB SERPL-MCNC: 0.6 MG/DL (ref 0.2–1.3)
BILIRUB UR QL STRIP: NEGATIVE
BUN SERPL-MCNC: 17 MG/DL (ref 7–30)
CALCIUM SERPL-MCNC: 9.3 MG/DL (ref 8.5–10.1)
CHLORIDE BLD-SCNC: 108 MMOL/L (ref 94–109)
CO2 SERPL-SCNC: 29 MMOL/L (ref 20–32)
COLOR UR AUTO: YELLOW
CREAT SERPL-MCNC: 0.87 MG/DL (ref 0.66–1.25)
DEPRECATED CALCIDIOL+CALCIFEROL SERPL-MC: 32 UG/L (ref 20–75)
EOSINOPHIL # BLD AUTO: 0 10E3/UL (ref 0–0.7)
EOSINOPHIL NFR BLD AUTO: 1 %
ERYTHROCYTE [DISTWIDTH] IN BLOOD BY AUTOMATED COUNT: 16.7 % (ref 10–15)
GFR SERPL CREATININE-BSD FRML MDRD: 87 ML/MIN/1.73M2
GLUCOSE BLD-MCNC: 109 MG/DL (ref 70–99)
GLUCOSE UR STRIP-MCNC: NEGATIVE MG/DL
HCT VFR BLD AUTO: 30.9 % (ref 40–53)
HGB BLD-MCNC: 10.4 G/DL (ref 13.3–17.7)
HGB UR QL STRIP: ABNORMAL
HOLD SPECIMEN: NORMAL
IMM GRANULOCYTES # BLD: 0 10E3/UL
IMM GRANULOCYTES NFR BLD: 1 %
INR PPP: 1.04 (ref 0.85–1.15)
KETONES UR STRIP-MCNC: NEGATIVE MG/DL
LDH SERPL L TO P-CCNC: 176 U/L (ref 85–227)
LEUKOCYTE ESTERASE UR QL STRIP: ABNORMAL
LYMPHOCYTES # BLD AUTO: 1.9 10E3/UL (ref 0.8–5.3)
LYMPHOCYTES NFR BLD AUTO: 62 %
MAGNESIUM SERPL-MCNC: 2.2 MG/DL (ref 1.6–2.3)
MCH RBC QN AUTO: 31.9 PG (ref 26.5–33)
MCHC RBC AUTO-ENTMCNC: 33.7 G/DL (ref 31.5–36.5)
MCV RBC AUTO: 95 FL (ref 78–100)
MONOCYTES # BLD AUTO: 0.3 10E3/UL (ref 0–1.3)
MONOCYTES NFR BLD AUTO: 11 %
MUCOUS THREADS #/AREA URNS LPF: PRESENT /LPF
NEUTROPHILS # BLD AUTO: 0.7 10E3/UL (ref 1.6–8.3)
NEUTROPHILS NFR BLD AUTO: 24 %
NITRATE UR QL: POSITIVE
NRBC # BLD AUTO: 0 10E3/UL
NRBC BLD AUTO-RTO: 0 /100
PH UR STRIP: 6 [PH] (ref 5–7)
PHOSPHATE SERPL-MCNC: 3.2 MG/DL (ref 2.5–4.5)
PLATELET # BLD AUTO: 196 10E3/UL (ref 150–450)
POTASSIUM BLD-SCNC: 3.8 MMOL/L (ref 3.4–5.3)
PROT SERPL-MCNC: 7.4 G/DL (ref 6.8–8.8)
RBC # BLD AUTO: 3.26 10E6/UL (ref 4.4–5.9)
RBC URINE: 7 /HPF
SODIUM SERPL-SCNC: 143 MMOL/L (ref 133–144)
SP GR UR STRIP: 1.01 (ref 1–1.03)
SPECIMEN EXPIRATION DATE: NORMAL
TOTAL PROTEIN SERUM FOR ELP: 7.4 G/DL (ref 6.8–8.8)
TROPONIN I SERPL HS-MCNC: 4 NG/L
URATE SERPL-MCNC: 4.5 MG/DL (ref 3.5–7.2)
UROBILINOGEN UR STRIP-MCNC: NORMAL MG/DL
WBC # BLD AUTO: 3.1 10E3/UL (ref 4–11)
WBC CLUMPS #/AREA URNS HPF: PRESENT /HPF
WBC URINE: >182 /HPF

## 2021-11-30 PROCEDURE — 85025 COMPLETE CBC W/AUTO DIFF WBC: CPT

## 2021-11-30 PROCEDURE — 94726 PLETHYSMOGRAPHY LUNG VOLUMES: CPT | Performed by: INTERNAL MEDICINE

## 2021-11-30 PROCEDURE — 82232 ASSAY OF BETA-2 PROTEIN: CPT

## 2021-11-30 PROCEDURE — 86665 EPSTEIN-BARR CAPSID VCA: CPT

## 2021-11-30 PROCEDURE — 84484 ASSAY OF TROPONIN QUANT: CPT

## 2021-11-30 PROCEDURE — 84155 ASSAY OF PROTEIN SERUM: CPT

## 2021-11-30 PROCEDURE — 84165 PROTEIN E-PHORESIS SERUM: CPT | Mod: TC | Performed by: PATHOLOGY

## 2021-11-30 PROCEDURE — 86696 HERPES SIMPLEX TYPE 2 TEST: CPT

## 2021-11-30 PROCEDURE — 86334 IMMUNOFIX E-PHORESIS SERUM: CPT | Mod: TC

## 2021-11-30 PROCEDURE — 99214 OFFICE O/P EST MOD 30 MIN: CPT

## 2021-11-30 PROCEDURE — 83883 ASSAY NEPHELOMETRY NOT SPEC: CPT

## 2021-11-30 PROCEDURE — 84100 ASSAY OF PHOSPHORUS: CPT

## 2021-11-30 PROCEDURE — 87516 HEPATITIS B DNA AMP PROBE: CPT

## 2021-11-30 PROCEDURE — 81001 URINALYSIS AUTO W/SCOPE: CPT

## 2021-11-30 PROCEDURE — 85610 PROTHROMBIN TIME: CPT

## 2021-11-30 PROCEDURE — 88240 CELL CRYOPRESERVE/STORAGE: CPT

## 2021-11-30 PROCEDURE — 94729 DIFFUSING CAPACITY: CPT | Performed by: INTERNAL MEDICINE

## 2021-11-30 PROCEDURE — 83615 LACTATE (LD) (LDH) ENZYME: CPT

## 2021-11-30 PROCEDURE — U0005 INFEC AGEN DETEC AMPLI PROBE: HCPCS

## 2021-11-30 PROCEDURE — 36415 COLL VENOUS BLD VENIPUNCTURE: CPT

## 2021-11-30 PROCEDURE — 86900 BLOOD TYPING SEROLOGIC ABO: CPT

## 2021-11-30 PROCEDURE — 82784 ASSAY IGA/IGD/IGG/IGM EACH: CPT

## 2021-11-30 PROCEDURE — 82306 VITAMIN D 25 HYDROXY: CPT

## 2021-11-30 PROCEDURE — 84550 ASSAY OF BLOOD/URIC ACID: CPT

## 2021-11-30 PROCEDURE — 85730 THROMBOPLASTIN TIME PARTIAL: CPT

## 2021-11-30 PROCEDURE — 83021 HEMOGLOBIN CHROMOTOGRAPHY: CPT

## 2021-11-30 PROCEDURE — 93010 ELECTROCARDIOGRAM REPORT: CPT | Performed by: INTERNAL MEDICINE

## 2021-11-30 PROCEDURE — 94375 RESPIRATORY FLOW VOLUME LOOP: CPT | Performed by: INTERNAL MEDICINE

## 2021-11-30 PROCEDURE — 82785 ASSAY OF IGE: CPT

## 2021-11-30 PROCEDURE — 83735 ASSAY OF MAGNESIUM: CPT

## 2021-11-30 PROCEDURE — 86803 HEPATITIS C AB TEST: CPT

## 2021-11-30 RX ORDER — HYDROCODONE BITARTRATE AND ACETAMINOPHEN 5; 325 MG/1; MG/1
TABLET ORAL
COMMUNITY
Start: 2021-10-27 | End: 2021-12-03

## 2021-11-30 ASSESSMENT — MIFFLIN-ST. JEOR: SCORE: 1428.13

## 2021-11-30 ASSESSMENT — PAIN SCALES - GENERAL: PAINLEVEL: NO PAIN (0)

## 2021-11-30 NOTE — NURSING NOTE
EKG was performed today per order written by Dr. Elise.  Name and  verified with patient.        Harrismarguerite DiazCHINYERE andrews 2021 12:47 PM

## 2021-11-30 NOTE — PROGRESS NOTES
Patient here for work up day. Height, weight, vital signs obtained. Med/allergy review completed. Calendar reviewed and patient was educated on tests/procedures. Blood thinners assessed. Consents obtained. Labs drawn via venipuncture. Patient provided UA and covid 19 swab was obtained. He states that he sent in his fecal colorectal cancer screening. Pt was given materials for 24hr urine drop off, plans to return it to clinic on Thursday 12/2- CrCl drawn today, will be within 72hrs at time of drop off. Patient discharged in the care of self to next work up appointment.  Patient aware of next appointment.            BMT Teaching Flowsheet    Hamilton Angulo is a 71 year old male  Diagnoses of Myeloma (H), Personal history of diseases of blood and blood-forming organs, Other long term (current) drug therapy , and Multiple myeloma not having achieved remission (H) were pertinent to this visit.    Teaching Topic:work up routine  Person(s) involved in teaching: Patient  Motivation Level  Asks Questions: Yes  Eager to Learn: Yes  Cooperative: Yes  Receptive (willing/able to accept information): Yes    Patient demonstrates understanding of the following:  - Reason for the appointment, diagnosis and treatment plan: Yes     - Which situations necessitate calling provider and whom to contact: Yes    Teaching concerns addressed: reviewed calendar and explained all unfamiliar tests and locations of procedures  Pt instructed to check with  daily for schedule changes    Patient instructed on hand hygiene: Yes      Instructional Materials Used/Given:verbal, copy of calendar, map of campus.     Specific Concerns: NA

## 2021-11-30 NOTE — LETTER
11/30/2021         RE: Hamilton Angulo  [address] [place] [address]        Dear Colleague,    Thank you for referring your patient, Hamilton Angulo, to the [facility]. Please see a copy of my visit note below.        [facility]  BMTCT OPEN VISIT    November 30, 2021        Hamilton Angulo is a 71 year old male undergoing evaluation prior to hematopoietic cell transplant or immune effector cell therapy.    Reason for BMTCT: MM, standard risk    Recent chemotherapy: 7/2021 RVD, last 2 weeks ago    Recent infections: no    Blood thinner use? If yes, why? No    Treatment for diabetes? No          Today, the patient notes the following symptoms:  Review Of Systems  Skin: negative  Eyes: negative  Ears/Nose/Throat: negative  Respiratory: No shortness of breath, dyspnea on exertion, cough, or hemoptysis  Cardiovascular: negative  Gastrointestinal: negative  Genitourinary: negative  Musculoskeletal: negative  Neurologic: negative  Psychiatric: negative  Hematologic/Lymphatic/Immunologic: negative  Endocrine: negative      Hamilton Angulo's History    Past Medical History:   Diagnosis Date     Bilateral cataracts 2001     Herniated disc 1983    Lumbar     Hyperlipidemia LDL goal <130      Hypertension 5/2004     Tear of MCL (medial collateral ligament) of knee 6/20/2001    LT Knee/WC       Past Surgical History:   Procedure Laterality Date     CATARACT IOL, RT/LT  12/2003    Bilateral       Family History   Problem Relation Age of Onset     Cancer Mother      Hypertension Mother      Heart Disease Maternal Grandmother      Cancer Maternal Grandfather        Social History     Tobacco Use     Smoking status: Never Smoker     Smokeless tobacco: Never Used   Substance Use Topics     Alcohol use: Yes     Comment: Ocss.           Hamilton Angulo's Medications and Allergies    Current Outpatient Medications   Medication     acetaminophen (TYLENOL) 325 MG tablet      acyclovir (ZOVIRAX) 400 MG tablet     amLODIPine (NORVASC) 2.5 MG tablet     Artificial Tear Solution (SM ARTIFICIAL TEARS) SOLN     HYDROcodone-acetaminophen (NORCO) 5-325 MG tablet     lisinopril (ZESTRIL) 20 MG tablet     Multiple Vitamins-Minerals (EQ COMPLETE MULTIVITAMIN-ADULT) TABS     pravastatin (PRAVACHOL) 20 MG tablet     No current facility-administered medications for this visit.          Allergies   Allergen Reactions     Shrimp Hives           Physical Examination    There were no vitals taken for this visit.    Exam:  Constitutional: healthy, alert and no distress  Head: negative  ENT: ENT exam normal, no neck nodes or sinus tenderness and neck without nodes  Cardiovascular: negative  Respiratory: negative  Gastrointestinal: negative  : Deferred  Musculoskeletal: extremities normal- no gross deformities noted, gait normal and normal muscle tone  Skin: no suspicious lesions or rashes  Neurologic: negative  Psychiatric: mentation appears normal and affect normal/bright        Frailty Screening    1. Weight loss: Have you lost >10 pounds (or >5% body weight) unintentionally over the last year? Yes      2. Exhaustion: How often in the past week did you feel that:  o I feel that everything I do is an effort : .Exhaustion: 0 = rarely or none of the time (<1 day)  o I feel I cannot get going: Exhaustion: 0 = rarely or none of the time (<1 day)    3. Weakness:  Hand  strength (measured by MA; calculate average): 37     Male BMI Frailty Criteria for  Male  Strength Female BMI Frailty Criteria for  Female  Strength   ?24 ?29 ?23 ?17   24.1 - 26 ?30 23.1 - 26 ?17.3   26.1 - 28 ?30 26.1 - 29 ?18   >28 ?32 >29 ?21     4. Slowness:  15 foot walk time (measured by MA):  6    Height Frailty Criteria for  15 Foot Walk Time   Men   ?173 cm ? 7 seconds    >173 cm  ? 6 seconds   Women   ?159 cm ? 7 seconds   >159 cm  ? 6 seconds     5. Physical activity:     *Please complete 2 calculations for kcal (see  frailty worksheet for equations)     Energy expenditure for frailty: 968.5 kcal expended per week     Gender Frailty Criteria for Low Physical Activity   Male <383 kcal/week   Female <270 kcal/weel     IPAQ score: 910 MET minutes per week       Hamilton Angulo met the following criteria for prefrailty (score 1-2) or frailty (score 3+): Frailty: Weight Loss and Slowness  Frailty Score is: 2      Additional assessments not to be used in frailty calculation:   What types of physical activity can you tolerate? Walking, ADLs    Sit to stand test (time to complete 5 reps): 17 seconds    Standing balance in 10 seconds:  Record the Total number of seconds(0-30)--add a+b+c ( take best attempt for each)    First attempt: 30 seconds    Second attempt: NA            Overall Assessment      I have reviewed the diagnostic data, medications, frailty screening, and general processes prior to BMTCT.  I have notified the Primary BMT Physician/and or Attending Physician in the clinic of any issues. We also discussed in detail the database and biorepository research for which Hamilton Angulo is eligible. We discussed the potential risks and potential benefits of each of these protocols individually. We explained potential alternatives to the protocols discussed. We explained to the patient that participation is voluntary and that consent may be withdrawn at any time.       Consents Signed:    Blood transfusion consent form    Ethnicity form    [facility]    [facility]    Present during the discussion was pt. Copies of the signed consent forms will be provided to the patient on admission. No procedures specific to any studies were performed prior to the patient signing the consent form.    Hamilton Angulo had the opportunity to ask questions, and I answered all of the questions to the best of my ability.      NAOMI Gardiner CNP

## 2021-11-30 NOTE — PROGRESS NOTES
[facility]  BMTCT OPEN VISIT    November 30, 2021        Hamilton Angulo is a 71 year old male undergoing evaluation prior to hematopoietic cell transplant or immune effector cell therapy.    Reason for BMTCT: MM, standard risk    Recent chemotherapy: 7/2021 RVD, last 2 weeks ago    Recent infections: no    Blood thinner use? If yes, why? No    Treatment for diabetes? No          Today, the patient notes the following symptoms:  Review Of Systems  Skin: negative  Eyes: negative  Ears/Nose/Throat: negative  Respiratory: No shortness of breath, dyspnea on exertion, cough, or hemoptysis  Cardiovascular: negative  Gastrointestinal: negative  Genitourinary: negative  Musculoskeletal: negative  Neurologic: negative  Psychiatric: negative  Hematologic/Lymphatic/Immunologic: negative  Endocrine: negative      Hamilton Angulo's History    Past Medical History:   Diagnosis Date     Bilateral cataracts 2001     Herniated disc 1983    Lumbar     Hyperlipidemia LDL goal <130      Hypertension 5/2004     Tear of MCL (medial collateral ligament) of knee 6/20/2001    LT Knee/WC       Past Surgical History:   Procedure Laterality Date     CATARACT IOL, RT/LT  12/2003    Bilateral       Family History   Problem Relation Age of Onset     Cancer Mother      Hypertension Mother      Heart Disease Maternal Grandmother      Cancer Maternal Grandfather        Social History     Tobacco Use     Smoking status: Never Smoker     Smokeless tobacco: Never Used   Substance Use Topics     Alcohol use: Yes     Comment: Ocss.           Hamilton Angulo's Medications and Allergies    Current Outpatient Medications   Medication     acetaminophen (TYLENOL) 325 MG tablet     acyclovir (ZOVIRAX) 400 MG tablet     amLODIPine (NORVASC) 2.5 MG tablet     Artificial Tear Solution (SM ARTIFICIAL TEARS) SOLN     HYDROcodone-acetaminophen (NORCO) 5-325 MG tablet     lisinopril (ZESTRIL) 20 MG tablet     Multiple Vitamins-Minerals (EQ COMPLETE  MULTIVITAMIN-ADULT) TABS     pravastatin (PRAVACHOL) 20 MG tablet     No current facility-administered medications for this visit.          Allergies   Allergen Reactions     Shrimp Hives           Physical Examination    There were no vitals taken for this visit.    Exam:  Constitutional: healthy, alert and no distress  Head: negative  ENT: ENT exam normal, no neck nodes or sinus tenderness and neck without nodes  Cardiovascular: negative  Respiratory: negative  Gastrointestinal: negative  : Deferred  Musculoskeletal: extremities normal- no gross deformities noted, gait normal and normal muscle tone  Skin: no suspicious lesions or rashes  Neurologic: negative  Psychiatric: mentation appears normal and affect normal/bright        Frailty Screening    1. Weight loss: Have you lost >10 pounds (or >5% body weight) unintentionally over the last year? Yes      2. Exhaustion: How often in the past week did you feel that:  o I feel that everything I do is an effort : .Exhaustion: 0 = rarely or none of the time (<1 day)  o I feel I cannot get going: Exhaustion: 0 = rarely or none of the time (<1 day)    3. Weakness:  Hand  strength (measured by MA; calculate average): 37     Male BMI Frailty Criteria for  Male  Strength Female BMI Frailty Criteria for  Female  Strength   ?24 ?29 ?23 ?17   24.1 - 26 ?30 23.1 - 26 ?17.3   26.1 - 28 ?30 26.1 - 29 ?18   >28 ?32 >29 ?21     4. Slowness:  15 foot walk time (measured by MA):  6    Height Frailty Criteria for  15 Foot Walk Time   Men   ?173 cm ? 7 seconds    >173 cm  ? 6 seconds   Women   ?159 cm ? 7 seconds   >159 cm  ? 6 seconds     5. Physical activity:     *Please complete 2 calculations for kcal (see frailty worksheet for equations)     Energy expenditure for frailty: 968.5 kcal expended per week     Gender Frailty Criteria for Low Physical Activity   Male <383 kcal/week   Female <270 kcal/weel     IPAQ score: 910 MET minutes per week       Hamilton Angulo met the  following criteria for prefrailty (score 1-2) or frailty (score 3+): Frailty: Weight Loss and Slowness  Frailty Score is: 2      Additional assessments not to be used in frailty calculation:   What types of physical activity can you tolerate? Walking, ADLs    Sit to stand test (time to complete 5 reps): 17 seconds    Standing balance in 10 seconds:  Record the Total number of seconds(0-30)--add a+b+c ( take best attempt for each)    First attempt: 30 seconds    Second attempt: NA            Overall Assessment      I have reviewed the diagnostic data, medications, frailty screening, and general processes prior to BMTCT.  I have notified the Primary BMT Physician/and or Attending Physician in the clinic of any issues. We also discussed in detail the database and biorepository research for which Hamilton Angulo is eligible. We discussed the potential risks and potential benefits of each of these protocols individually. We explained potential alternatives to the protocols discussed. We explained to the patient that participation is voluntary and that consent may be withdrawn at any time.       Consents Signed:    Blood transfusion consent form    Ethnicity form    [facility] database    [facility] BMTCT Database    Present during the discussion was pt. Copies of the signed consent forms will be provided to the patient on admission. No procedures specific to any studies were performed prior to the patient signing the consent form.    Hamilton Angulo had the opportunity to ask questions, and I answered all of the questions to the best of my ability.      NAOMI Gardiner CNP

## 2021-11-30 NOTE — NURSING NOTE
Frailty assessment completed in clinic today per provider order. Patient was able to complete the assessment without complication.       Yulisa Chavarria LPN November 30, 2021 12:49 PM

## 2021-12-01 ENCOUNTER — HOSPITAL ENCOUNTER (OUTPATIENT)
Dept: GENERAL RADIOLOGY | Facility: CLINIC | Age: 71
End: 2021-12-01
Attending: INTERNAL MEDICINE
Payer: COMMERCIAL

## 2021-12-01 ENCOUNTER — HOSPITAL ENCOUNTER (OUTPATIENT)
Dept: PET IMAGING | Facility: CLINIC | Age: 71
End: 2021-12-01
Attending: INTERNAL MEDICINE
Payer: COMMERCIAL

## 2021-12-01 ENCOUNTER — APPOINTMENT (OUTPATIENT)
Dept: LAB | Facility: CLINIC | Age: 71
End: 2021-12-01
Attending: PHYSICIAN ASSISTANT
Payer: COMMERCIAL

## 2021-12-01 ENCOUNTER — OFFICE VISIT (OUTPATIENT)
Dept: TRANSPLANT | Facility: CLINIC | Age: 71
End: 2021-12-01
Attending: PHYSICIAN ASSISTANT
Payer: COMMERCIAL

## 2021-12-01 VITALS
TEMPERATURE: 97.9 F | HEART RATE: 67 BPM | OXYGEN SATURATION: 98 % | WEIGHT: 141.4 LBS | DIASTOLIC BLOOD PRESSURE: 79 MMHG | SYSTOLIC BLOOD PRESSURE: 153 MMHG | BODY MASS INDEX: 19.69 KG/M2 | RESPIRATION RATE: 18 BRPM

## 2021-12-01 DIAGNOSIS — Z86.2 PERSONAL HISTORY OF DISEASES OF BLOOD AND BLOOD-FORMING ORGANS: ICD-10-CM

## 2021-12-01 DIAGNOSIS — C90.00 MYELOMA (H): ICD-10-CM

## 2021-12-01 LAB
ALBUMIN SERPL ELPH-MCNC: 4.4 G/DL (ref 3.7–5.1)
ALPHA1 GLOB SERPL ELPH-MCNC: 0.3 G/DL (ref 0.2–0.4)
ALPHA2 GLOB SERPL ELPH-MCNC: 0.8 G/DL (ref 0.5–0.9)
ATRIAL RATE - MUSE: 65 BPM
B-GLOBULIN SERPL ELPH-MCNC: 0.7 G/DL (ref 0.6–1)
B2 MICROGLOB TUMOR MARKER SER-MCNC: 1.7 MG/L
BASOPHILS # BLD AUTO: 0 10E3/UL (ref 0–0.2)
BASOPHILS NFR BLD AUTO: 1 %
DIASTOLIC BLOOD PRESSURE - MUSE: NORMAL MMHG
DLCOCOR-%PRED-PRE: 106 %
DLCOCOR-PRE: 28.33 ML/MIN/MMHG
DLCOUNC-%PRED-PRE: 91 %
DLCOUNC-PRE: 24.29 ML/MIN/MMHG
DLCOUNC-PRED: 26.56 ML/MIN/MMHG
EBV VCA IGG SER IA-ACNC: >750 U/ML
EBV VCA IGG SER IA-ACNC: POSITIVE
EOSINOPHIL # BLD AUTO: 0 10E3/UL (ref 0–0.7)
EOSINOPHIL NFR BLD AUTO: 1 %
ERV-%PRED-PRE: 189 %
ERV-PRE: 2.01 L
ERV-PRED: 1.06 L
ERYTHROCYTE [DISTWIDTH] IN BLOOD BY AUTOMATED COUNT: 17.1 % (ref 10–15)
EXPTIME-PRE: 6.12 SEC
FEF2575-%PRED-PRE: 145 %
FEF2575-PRE: 3.57 L/SEC
FEF2575-PRED: 2.46 L/SEC
FEFMAX-%PRED-PRE: 85 %
FEFMAX-PRE: 7.27 L/SEC
FEFMAX-PRED: 8.49 L/SEC
FEV1-%PRED-PRE: 118 %
FEV1-PRE: 3.9 L
FEV1FEV6-PRE: 78 %
FEV1FEV6-PRED: 78 %
FEV1FVC-PRE: 77 %
FEV1FVC-PRED: 76 %
FEV1SVC-PRE: 80 %
FEV1SVC-PRED: 75 %
FIFMAX-PRE: 5.93 L/SEC
FRCPLETH-%PRED-PRE: 139 %
FRCPLETH-PRE: 5.24 L
FRCPLETH-PRED: 3.77 L
FVC-%PRED-PRE: 116 %
FVC-PRE: 5.09 L
FVC-PRED: 4.36 L
GAMMA GLOB SERPL ELPH-MCNC: 1.2 G/DL (ref 0.7–1.6)
HCT VFR BLD AUTO: 33.3 % (ref 40–53)
HEMOCCULT STL QL IA: NEGATIVE
HGB BLD-MCNC: 10.8 G/DL (ref 13.3–17.7)
HGB S BLD QL: NEGATIVE
HSV1 IGG SERPL QL IA: 0.49 INDEX
HSV1 IGG SERPL QL IA: NORMAL
HSV2 IGG SERPL QL IA: 0.1 INDEX
HSV2 IGG SERPL QL IA: NORMAL
IC-%PRED-PRE: 87 %
IC-PRE: 2.88 L
IC-PRED: 3.3 L
IGA SERPL-MCNC: 50 MG/DL (ref 84–499)
IGE SERPL-ACNC: 23 KU/L (ref 0–114)
IGG SERPL-MCNC: 1247 MG/DL (ref 610–1616)
IGM SERPL-MCNC: 41 MG/DL (ref 35–242)
IMM GRANULOCYTES # BLD: 0 10E3/UL
IMM GRANULOCYTES NFR BLD: 0 %
INTERPRETATION ECG - MUSE: NORMAL
KAPPA LC FREE SER-MCNC: 27.07 MG/DL (ref 0.33–1.94)
KAPPA LC FREE/LAMBDA FREE SER NEPH: 22.37 {RATIO} (ref 0.26–1.65)
LAMBDA LC FREE SERPL-MCNC: 1.21 MG/DL (ref 0.57–2.63)
LYMPHOCYTES # BLD AUTO: 2.2 10E3/UL (ref 0.8–5.3)
LYMPHOCYTES NFR BLD AUTO: 66 %
M PROTEIN SERPL ELPH-MCNC: 0.7 G/DL
MCH RBC QN AUTO: 32.2 PG (ref 26.5–33)
MCHC RBC AUTO-ENTMCNC: 32.4 G/DL (ref 31.5–36.5)
MCV RBC AUTO: 99 FL (ref 78–100)
MONOCYTES # BLD AUTO: 0.4 10E3/UL (ref 0–1.3)
MONOCYTES NFR BLD AUTO: 11 %
NEUTROPHILS # BLD AUTO: 0.7 10E3/UL (ref 1.6–8.3)
NEUTROPHILS NFR BLD AUTO: 21 %
NRBC # BLD AUTO: 0 10E3/UL
NRBC BLD AUTO-RTO: 0 /100
P AXIS - MUSE: 68 DEGREES
PLATELET # BLD AUTO: 196 10E3/UL (ref 150–450)
PR INTERVAL - MUSE: 124 MS
PROT PATTERN SERPL ELPH-IMP: ABNORMAL
PROT PATTERN SERPL IFE-IMP: NORMAL
QRS DURATION - MUSE: 100 MS
QT - MUSE: 412 MS
QTC - MUSE: 428 MS
R AXIS - MUSE: 45 DEGREES
RBC # BLD AUTO: 3.35 10E6/UL (ref 4.4–5.9)
RVPLETH-%PRED-PRE: 119 %
RVPLETH-PRE: 3.23 L
RVPLETH-PRED: 2.69 L
SARS-COV-2 RNA RESP QL NAA+PROBE: NEGATIVE
SYSTOLIC BLOOD PRESSURE - MUSE: NORMAL MMHG
T AXIS - MUSE: 68 DEGREES
TLCPLETH-%PRED-PRE: 110 %
TLCPLETH-PRE: 8.12 L
TLCPLETH-PRED: 7.33 L
VA-%PRED-PRE: 98 %
VA-PRE: 6.52 L
VC-%PRED-PRE: 112 %
VC-PRE: 4.89 L
VC-PRED: 4.36 L
VENTRICULAR RATE- MUSE: 65 BPM
WBC # BLD AUTO: 3.3 10E3/UL (ref 4–11)

## 2021-12-01 PROCEDURE — 88187 FLOWCYTOMETRY/READ 2-8: CPT | Performed by: PATHOLOGY

## 2021-12-01 PROCEDURE — 36415 COLL VENOUS BLD VENIPUNCTURE: CPT | Performed by: PHYSICIAN ASSISTANT

## 2021-12-01 PROCEDURE — 85025 COMPLETE CBC W/AUTO DIFF WBC: CPT | Performed by: PHYSICIAN ASSISTANT

## 2021-12-01 PROCEDURE — 88184 FLOWCYTOMETRY/ TC 1 MARKER: CPT | Performed by: PATHOLOGY

## 2021-12-01 PROCEDURE — 88368 INSITU HYBRIDIZATION MANUAL: CPT | Mod: 26 | Performed by: MEDICAL GENETICS

## 2021-12-01 PROCEDURE — 88237 TISSUE CULTURE BONE MARROW: CPT

## 2021-12-01 PROCEDURE — 88271 CYTOGENETICS DNA PROBE: CPT

## 2021-12-01 PROCEDURE — 88369 M/PHMTRC ALYSISHQUANT/SEMIQ: CPT | Mod: 26 | Performed by: MEDICAL GENETICS

## 2021-12-01 PROCEDURE — 88342 IMHCHEM/IMCYTCHM 1ST ANTB: CPT | Mod: TC | Performed by: INTERNAL MEDICINE

## 2021-12-01 PROCEDURE — 71046 X-RAY EXAM CHEST 2 VIEWS: CPT | Mod: 26 | Performed by: STUDENT IN AN ORGANIZED HEALTH CARE EDUCATION/TRAINING PROGRAM

## 2021-12-01 PROCEDURE — 88185 FLOWCYTOMETRY/TC ADD-ON: CPT | Performed by: INTERNAL MEDICINE

## 2021-12-01 PROCEDURE — 86334 IMMUNOFIX E-PHORESIS SERUM: CPT | Mod: 26 | Performed by: PATHOLOGY

## 2021-12-01 PROCEDURE — 78816 PET IMAGE W/CT FULL BODY: CPT | Mod: 26 | Performed by: RADIOLOGY

## 2021-12-01 PROCEDURE — 71046 X-RAY EXAM CHEST 2 VIEWS: CPT

## 2021-12-01 PROCEDURE — A9552 F18 FDG: HCPCS | Performed by: INTERNAL MEDICINE

## 2021-12-01 PROCEDURE — 88184 FLOWCYTOMETRY/ TC 1 MARKER: CPT | Performed by: INTERNAL MEDICINE

## 2021-12-01 PROCEDURE — 88311 DECALCIFY TISSUE: CPT | Mod: TC | Performed by: INTERNAL MEDICINE

## 2021-12-01 PROCEDURE — 343N000001 HC RX 343: Performed by: INTERNAL MEDICINE

## 2021-12-01 PROCEDURE — 88275 CYTOGENETICS 100-300: CPT

## 2021-12-01 PROCEDURE — 78816 PET IMAGE W/CT FULL BODY: CPT | Mod: PS

## 2021-12-01 PROCEDURE — 38222 DX BONE MARROW BX & ASPIR: CPT

## 2021-12-01 PROCEDURE — 84165 PROTEIN E-PHORESIS SERUM: CPT | Mod: 26 | Performed by: PATHOLOGY

## 2021-12-01 RX ADMIN — FLUDEOXYGLUCOSE F-18 10.05 MCI.: 500 INJECTION, SOLUTION INTRAVENOUS at 09:06

## 2021-12-01 ASSESSMENT — PAIN SCALES - GENERAL: PAINLEVEL: NO PAIN (0)

## 2021-12-01 NOTE — LETTER
12/1/2021         RE: Hamilton Angulo  [address] [place] [address]        Dear Colleague,    Thank you for referring your patient, Hamilton Angulo, to the [facility]. Please see a copy of my visit note below.    BMBx completed. Procedural consents witnessed. Patient laid supine 30 minutes post procedure. VSS, A&Ox4. Patient denies procedural pain. Discharge teaching provided. Dressing CDI. Accompanied by family member.    BMT ONC Adult Bone Marrow Biopsy Procedure Note  December 1, 2021  BP (!) 153/79   Pulse 67   Temp 97.9  F (36.6  C)   Resp 18   Wt 64.1 kg (141 lb 6.4 oz)   SpO2 98%   BMI 19.69 kg/m       Learning needs assessment complete within 12 months? YES    DIAGNOSIS: Multiple myeloma     PROCEDURE: Unilateral Bone Marrow Biopsy and Unilateral Aspirate    [place] [facility] [place]    Patient s identification was positively verified by verbal identification and invasive procedure safety checklist was completed. Informed consent was obtained. Without pre-medication, patient was placed in the prone position and prepped and draped in a sterile manner. Approximately 10 cc of 1% Lidocaine was used over the left posterior iliac spine. Following this a 3 mm incision was made. Trephine bone marrow core(s) was (were) obtained from the [facility]. Bone marrow aspirates were obtained from the IC. Aspirates were sent for morphology, immunophenotyping, cytogenetics and molecular diagnostics RFLP. A total of approximately 20 ml of marrow was aspirated. Following this procedure a sterile dressing was applied to the bone marrow biopsy site(s). The patient was placed in the supine position to maintain pressure on the biopsy site. Post-procedure wound care instructions were given.     Complications: NO    Pre-procedural pain: 0 out of 10 on the numeric pain rating scale.     Procedural pain: 2 out of 10 on the numeric pain rating scale.     Post-procedural  pain assessment: 0 out of 10 on the numeric pain rating scale.     Interventions: NO    Length of procedure:20 minutes or less      Procedure performed by: Stephanie Sanz PAC  [phone]        Again, thank you for allowing me to participate in the care of your patient.        Sincerely,        [facility]

## 2021-12-01 NOTE — PROGRESS NOTES
BMT ONC Adult Bone Marrow Biopsy Procedure Note  December 1, 2021  BP (!) 153/79   Pulse 67   Temp 97.9  F (36.6  C)   Resp 18   Wt 64.1 kg (141 lb 6.4 oz)   SpO2 98%   BMI 19.69 kg/m       Learning needs assessment complete within 12 months? YES    DIAGNOSIS: Multiple myeloma     PROCEDURE: Unilateral Bone Marrow Biopsy and Unilateral Aspirate    LOCATION: [facility] [place] 2nd [facility]    Patient s identification was positively verified by verbal identification and invasive procedure safety checklist was completed. Informed consent was obtained. Without pre-medication, patient was placed in the prone position and prepped and draped in a sterile manner. Approximately 10 cc of 1% Lidocaine was used over the left posterior iliac spine. Following this a 3 mm incision was made. Trephine bone marrow core(s) was (were) obtained from the IC. Bone marrow aspirates were obtained from the IC. Aspirates were sent for morphology, immunophenotyping, cytogenetics and molecular diagnostics RFLP. A total of approximately 20 ml of marrow was aspirated. Following this procedure a sterile dressing was applied to the bone marrow biopsy site(s). The patient was placed in the supine position to maintain pressure on the biopsy site. Post-procedure wound care instructions were given.     Complications: NO    Pre-procedural pain: 0 out of 10 on the numeric pain rating scale.     Procedural pain: 2 out of 10 on the numeric pain rating scale.     Post-procedural pain assessment: 0 out of 10 on the numeric pain rating scale.     Interventions: NO    Length of procedure:20 minutes or less      Procedure performed by: Stephanie Sanz PAC  [phone]

## 2021-12-01 NOTE — NURSING NOTE
"Oncology Rooming Note    December 1, 2021 1:48 PM   Hamilton Angulo is a 71 year old male who presents for:    Chief Complaint   Patient presents with     Bone Marrow Biopsy     Hx MM here for workup BMBx     Initial Vitals: BP (!) 153/79   Pulse 67   Temp 97.9  F (36.6  C)   Resp 18   Wt 64.1 kg (141 lb 6.4 oz)   SpO2 98%   BMI 19.69 kg/m   Estimated body mass index is 19.69 kg/m  as calculated from the following:    Height as of 11/30/21: 1.805 m (5' 11.06\").    Weight as of this encounter: 64.1 kg (141 lb 6.4 oz). Body surface area is 1.79 meters squared.  No Pain (0) Comment: Data Unavailable   No LMP for male patient.  Allergies reviewed: Yes  Medications reviewed: Yes    Medications: Medication refills not needed today.  Pharmacy name entered into [employer]: [id number] [place], [place] - [address] AT [facility] & [address]    Clinical concerns: N/A      Vera Yarbrough RN            "

## 2021-12-01 NOTE — NURSING NOTE
Hx MM here for BMBx. VSS, A&Ox4. RN and provider explained procedure, no questions at this time. Provider obtained consent prior to procedure. Patient lying prone, call light within reach. Hem tech and provider at bedside.

## 2021-12-01 NOTE — PROGRESS NOTES
BMBx completed. Procedural consents witnessed. Patient laid supine 30 minutes post procedure. VSS, A&Ox4. Patient denies procedural pain. Discharge teaching provided. Dressing CDI. Accompanied by family member.

## 2021-12-01 NOTE — LETTER
Date:December 28, 2021      Provider requested that no letter be sent. Do not send.       [facility]

## 2021-12-02 ENCOUNTER — TELEPHONE (OUTPATIENT)
Dept: TRANSPLANT | Facility: CLINIC | Age: 71
End: 2021-12-02
Payer: COMMERCIAL

## 2021-12-02 ENCOUNTER — OFFICE VISIT (OUTPATIENT)
Dept: EDUCATION SERVICES | Facility: CLINIC | Age: 71
End: 2021-12-02
Attending: INTERNAL MEDICINE
Payer: COMMERCIAL

## 2021-12-02 ENCOUNTER — HOSPITAL ENCOUNTER (OUTPATIENT)
Dept: LAB | Facility: CLINIC | Age: 71
End: 2021-12-02
Attending: INTERNAL MEDICINE
Payer: COMMERCIAL

## 2021-12-02 VITALS
RESPIRATION RATE: 18 BRPM | SYSTOLIC BLOOD PRESSURE: 112 MMHG | TEMPERATURE: 98.6 F | DIASTOLIC BLOOD PRESSURE: 67 MMHG | HEART RATE: 77 BPM

## 2021-12-02 DIAGNOSIS — Z86.2 PERSONAL HISTORY OF DISEASES OF BLOOD AND BLOOD-FORMING ORGANS: ICD-10-CM

## 2021-12-02 DIAGNOSIS — C90.00 MULTIPLE MYELOMA NOT HAVING ACHIEVED REMISSION (H): Primary | ICD-10-CM

## 2021-12-02 DIAGNOSIS — C90.00 MYELOMA (H): ICD-10-CM

## 2021-12-02 LAB
COLLECT DURATION TIME UR: 24 H
CREAT 24H UR-MRATE: 1.31 G/SPEC (ref 1–2)
CREAT UR-MCNC: 109 MG/DL
DONOR CYTOMEGALOVIRUS ABY: POSITIVE
DONOR HEP B CORE ABY: ABNORMAL
DONOR HEP B SURF AGN: ABNORMAL
DONOR HEPATITIS C ABY: ABNORMAL
DONOR HTLV 1&2 ANTIBODY: ABNORMAL
DONOR TREPONEMA PAL ABY: ABNORMAL
HBV DNA SERPL QL NAA+PROBE: NORMAL
HCV RNA SERPL QL NAA+PROBE: NORMAL
HIV1+2 AB SERPL QL IA: ABNORMAL
HIV1+2 RNA SERPL QL NAA+PROBE: NORMAL
HOLD SPECIMEN: NORMAL
INTERPRETATION: NORMAL
PATH REPORT.COMMENTS IMP SPEC: NORMAL
PATH REPORT.FINAL DX SPEC: NORMAL
PATH REPORT.FINAL DX SPEC: NORMAL
PATH REPORT.GROSS SPEC: NORMAL
PATH REPORT.MICROSCOPIC SPEC OTHER STN: NORMAL
PATH REPORT.RELEVANT HX SPEC: NORMAL
PATH REPORT.RELEVANT HX SPEC: NORMAL
PROT 24H UR-MRATE: 0.4 G/SPEC (ref 0.04–0.23)
PROT UR-MCNC: 0.33 G/L
PROT/CREAT 24H UR: 0.3 G/G CR (ref 0–0.2)
SPECIMEN VOL UR: 1198 ML
TRYPANOSOMA CRUZI: ABNORMAL
WNV RNA SERPL DONR QL NAA+PROBE: NORMAL

## 2021-12-02 PROCEDURE — 85060 BLOOD SMEAR INTERPRETATION: CPT | Performed by: PATHOLOGY

## 2021-12-02 PROCEDURE — 88341 IMHCHEM/IMCYTCHM EA ADD ANTB: CPT | Mod: 26 | Performed by: PATHOLOGY

## 2021-12-02 PROCEDURE — 85097 BONE MARROW INTERPRETATION: CPT | Performed by: PATHOLOGY

## 2021-12-02 PROCEDURE — 84166 PROTEIN E-PHORESIS/URINE/CSF: CPT | Mod: TC | Performed by: PATHOLOGY

## 2021-12-02 PROCEDURE — 88342 IMHCHEM/IMCYTCHM 1ST ANTB: CPT | Mod: 26 | Performed by: PATHOLOGY

## 2021-12-02 PROCEDURE — 88305 TISSUE EXAM BY PATHOLOGIST: CPT | Mod: 26 | Performed by: PATHOLOGY

## 2021-12-02 PROCEDURE — 84156 ASSAY OF PROTEIN URINE: CPT

## 2021-12-02 PROCEDURE — 81050 URINALYSIS VOLUME MEASURE: CPT

## 2021-12-02 PROCEDURE — 88311 DECALCIFY TISSUE: CPT | Mod: 26 | Performed by: PATHOLOGY

## 2021-12-02 PROCEDURE — 82575 CREATININE CLEARANCE TEST: CPT | Mod: TEL,95

## 2021-12-02 PROCEDURE — 86335 IMMUNFIX E-PHORSIS/URINE/CSF: CPT | Mod: TC | Performed by: INTERNAL MEDICINE

## 2021-12-02 PROCEDURE — G0463 HOSPITAL OUTPT CLINIC VISIT: HCPCS

## 2021-12-02 NOTE — CONSULTS
Transfusion Medicine Consultation    Hamilton Angulo [id number]   YOB: 1950 Age: 71 year old   Date of Consult: 12/2/2021     Reason for consult: Autologous Hematopoietic Progenitor Cell (HPC) Collection           Assessment and Plan:     71-year-old male presents for consultation for autologous hematopoietic progenitor cell (HPC) collection for autologous HPC transplantation for the treatment of his multiple myeloma. The plan is to collect for 1 to 3 days or until the target goal is met.  A central line will be placed and will be used for access for the procedure.          Chief Complaint:     Transfusion medicine consultation.         History of Present Illness:     71-year-old male presents for consultation for autologous HPC collection. His past medical history includes HTN, HLD, and multiple myeloma. The workup for his multiple myeloma began after an 'abnormal blood test' and he was diagnosed on 07/09/2021 and started treatment on 07/16/2021. He underwent 6 cycles of chemotherapy. He experienced loss of taste, appetite, and weight during his treatment. Of note, he felt particularly energized when he received steroids. He is currently well. The patient confirms a recent flu vaccination and 3 Pfizer COVID vaccinations. No significant travel history. The patient has no identifiable risk factors for infectious disease. The procedure, risks/benefits were discussed with the patient and all of his questions were addressed at this time.             Past Medical History:     The below was reviewed with Mr. Angulo.    Past Medical History:   Diagnosis Date     Bilateral cataracts 2001     Herniated disc 1983    Lumbar     Hyperlipidemia      Hypertension 5/2004     Tear of MCL (medial collateral ligament) of knee 6/20/2001    LT Knee/WC   Enlarged prostate  Bulging disc (in the same place as his herniated disc)  Dupuytren's contractures         Past Surgical History:     The below was reviewed with   Adele.    Past Surgical History:   Procedure Laterality Date     CATARACT IOL, RT/LT  12/2003    Bilateral   Prostate laser surgery (presumably holmium laser enucleation of the prostate)         Social History:     The below was reviewed with Mr. Angulo.    Social History     Tobacco Use     Smoking status: Never Smoker     Smokeless tobacco: Never Used   Substance Use Topics     Alcohol use: Yes     Comment: Ocss.   He reported that he has not drank in a long time, however, that he last drank a glass of wine at [facility]  Denied illicit drug use  Denied tattoos or piercings           Family History:     The below was reviewed with Mr. Angulo.    Family History   Problem Relation Age of Onset     Breast Cancer Mother      Hypertension Mother         She potentially had this.     Other - See Comments Father         He potentially had liver issues and dementia.     Heart Disease Maternal Grandmother         She potentially had this.     Cancer Maternal Grandfather         Unknown type of cancer.     Other - See Comments Paternal Half-Brother         He potentially had dementia.   Atrial fibrillation runs in his family         Immunizations:     The below was reviewed with Mr. Angulo.      COVID-19,PF,Pfizer (12+ Yrs) (3 vaccinations)  Recent flu vaccination          Allergies:     The below was reviewed with Mr. Angulo.    Allergies   Allergen Reactions     Shrimp Hives   He only occasionally has hives  He denied any allergy to contrast dye          Medications:     The below was reviewed with Mr. Angulo.    Current Outpatient Medications   Medication Sig     acetaminophen (TYLENOL) 325 MG tablet Take as needed following label instructions (Patient not taking: Reported on 11/30/2021)     acyclovir (ZOVIRAX) 400 MG tablet Take 400 mg by mouth 2 times daily     amLODIPine (NORVASC) 2.5 MG tablet TAKE 1 TABLET(2.5 MG) BY MOUTH DAILY     Artificial Tear Solution (SM ARTIFICIAL TEARS) SOLN Use per label instructions      HYDROcodone-acetaminophen (NORCO) 5-325 MG tablet TAKE 1 TABLET BY MOUTH EVERY 4 HOURS AS NEEDED FOR PAIN OR CANCER PAIN (Patient not taking: Reported on 11/30/2021)     lisinopril (ZESTRIL) 20 MG tablet Take 1 tablet (20 mg) by mouth daily     Multiple Vitamins-Minerals (EQ COMPLETE MULTIVITAMIN-ADULT) TABS Take 1 tablet by mouth daily     pravastatin (PRAVACHOL) 20 MG tablet TAKE 1 TABLET BY MOUTH 3 TIMES A WEEK          Review of Systems:     Denied fevers, chills, or night sweats  Denied change in vision  Denied change in hearing  Denied rhinorrhea  Denied laryngitis or odynophagia  Denied enlarged cervical lymph nodes  Denied chest pain or palpitations  Denied shortness of breath, cough, or wheeze  Denied abdominal pain, diarrhea, nausea, or vomiting  Denied heat or cold intolerance  Denied arthralgias  Denied paraesthesias, denied ever having a fever          Vital Signs:     12/02/21 at 1045: Temp 98.6 F (37 C) Pulse 77 /67 Resp 18            Data:     CBC RESULTS: Recent Labs   Lab Test 12/01/21  1357   WBC 3.3*   RBC 3.35*   HGB 10.8*   HCT 33.3*   MCV 99   MCH 32.2   MCHC 32.4   RDW 17.1*        Antibody Screen   Date Value Ref Range Status   11/30/2021 Negative Negative Final     Prince Shearer MD  [phone]      ATTESTATION:   I have reviewed the patient's chart and discussed the above consultation note with Dr. Shearer, and agree with the assessment and plan.

## 2021-12-02 NOTE — CONSULTS
APHERESIS INITIAL CONSULT CHECKLIST    Current Encounter Information  Current Encounter Information: Reason for Visit, Allergies and Current Meds  Procedure Requested: MNC/PBSC Collection  History of: (Reason for Apheresis): Multiple Myeloma    Access Assessment  Access Assessment  Needs a catheter placed for Apheresis?: Yes, transfusion medicine physician informed.    Vital Signs  Vital Signs  BP: 112/67  Pulse: 77  Temp: 98.6  F (37  C)  Temp src: Oral  Resp: 18    Reviewed   Review With Patient  Have you read the brochure Getting ready for Apheresis?: Yes  Have you had any invasive procedures, surgery, biopsy, bleeding in the last month?: Bone marrow biopsy yesterday  Review medications and allergies: Yes  Have you ever been transfused?: No    Additional Information  Notes, needs and time spent with patient  Explain procedure, side effects or reactions, instructions: Yes I advised the patient to follow a low fat diet during the collection process.  I met with the patient and his wife.  I explained that no visitors are allowed in the Apheresis area during the collection.  The patient and his wife expressed frustration about needing to pay to park each day when I explained that he would be needing filgrastim injections for 5 days prior to starting the stem cell collections.    Patient has special need?: No  Time spent: 30 minutes

## 2021-12-03 ENCOUNTER — VIRTUAL VISIT (OUTPATIENT)
Dept: TRANSPLANT | Facility: CLINIC | Age: 71
End: 2021-12-03
Attending: INTERNAL MEDICINE
Payer: COMMERCIAL

## 2021-12-03 DIAGNOSIS — Z86.2 PERSONAL HISTORY OF DISEASES OF BLOOD AND BLOOD-FORMING ORGANS: ICD-10-CM

## 2021-12-03 DIAGNOSIS — C90.00 MYELOMA (H): ICD-10-CM

## 2021-12-03 DIAGNOSIS — C90.00 MYELOMA (H): Primary | ICD-10-CM

## 2021-12-03 LAB
ALBUMIN MFR UR ELPH: 13.5 %
ALPHA1 GLOB MFR UR ELPH: 8.8 %
ALPHA2 GLOB MFR UR ELPH: 10.1 %
B-GLOBULIN MFR UR ELPH: 54.4 %
GAMMA GLOB MFR UR ELPH: 13.2 %
IGD SER-MCNC: <1.3 MG/DL
M PROTEIN MFR UR ELPH: 34.8 %
PROT ELPH PNL UR ELPH: NORMAL
PROT PATTERN UR ELPH-IMP: ABNORMAL

## 2021-12-03 PROCEDURE — 84166 PROTEIN E-PHORESIS/URINE/CSF: CPT | Mod: 26 | Performed by: PATHOLOGY

## 2021-12-03 PROCEDURE — 86335 IMMUNFIX E-PHORSIS/URINE/CSF: CPT | Mod: 26 | Performed by: PATHOLOGY

## 2021-12-03 NOTE — PROGRESS NOTES
"Pharmacy Assessment - Pre-Stem Cell Transplant    Assessments & Recommendations:  1) Use ice cryotherapy during melphalan to decrease the severity of mucositis.    If this patient is admitted under observation, the patient may bring in their own supply of the following medication for use in the hospital:  1) All current medications are low cost.  All medication will be provided by the hospital pharmacy if admitted under observation.  -Per \"Medications Not Supplied by Pharmacy\" policy (available on [employer])    History of Present Illness:  Hamilton Angulo is a 71 year old year old male diagnosed with Multiple Myeloma.  He has been treated with RVd.  He is now being work up for Autologous Stem Cell Transplant on protocol 2016-35, which utilizes HD Melphalan as a conditioning regimen.    Pertinent labs/tests:  Viral Testing:  CMV(+) / HSV(-) / EBV(+)  Ejection Fraction:  pending  QTc: 428 msec (11/30/21)    Weights:   Wt Readings from Last 3 Encounters:   12/01/21 64.1 kg (141 lb 6.4 oz)   11/30/21 65 kg (143 lb 4.8 oz)   11/29/21 64 kg (141 lb)   Ideal body weight: 75.4 kg (166 lb 5.2 oz)  % IBW:  Currently under IBW  There is no height or weight on file to calculate BMI.    Primary BMT Physician: Dr. Elise  BMT RN Coordinator:  Mac    Past Medical History:  Past Medical History:   Diagnosis Date     Bilateral cataracts 2001     Herniated disc 1983    Lumbar     Hyperlipidemia LDL goal <130      Hypertension 5/2004     Tear of MCL (medial collateral ligament) of knee 6/20/2001    LT Knee/WC     Medication Allergies:  Allergies   Allergen Reactions     Shrimp Hives     Current Medications (pre-admit):  Current Outpatient Medications   Medication Sig Dispense Refill     acetaminophen (TYLENOL) 325 MG tablet Take 325-650 mg by mouth every 4 hours as needed Take as needed following label instructions       acyclovir (ZOVIRAX) 400 MG tablet Take 400 mg by mouth 2 times daily       amLODIPine (NORVASC) 2.5 MG tablet TAKE " 1 TABLET(2.5 MG) BY MOUTH DAILY (Patient taking differently: Take 2.5 mg by mouth daily ) 90 tablet 3     Artificial Tear Solution (SM ARTIFICIAL TEARS) SOLN Apply 1 Dose to eye every 2 hours as needed Use per label instructions       lisinopril (ZESTRIL) 20 MG tablet Take 1 tablet (20 mg) by mouth daily 90 tablet 3     Multiple Vitamins-Minerals (EQ COMPLETE MULTIVITAMIN-ADULT) TABS Take 1 tablet by mouth daily       pravastatin (PRAVACHOL) 20 MG tablet TAKE 1 TABLET BY MOUTH 3 TIMES A WEEK (Patient taking differently: Take 20 mg by mouth Every Mon, Wed, Fri Morning ) 36 tablet 3     Herbal Medication/Nutritional Supplements:  None    Summary:  I met with RahLAM Angulo for approximately 25 minutes.  We discussed current medications, stem cell collection process including possibility of chemotherapy, use of HD filgrastim, transplant conditioning chemotherapy, side effects, risk of infection, use of antimicrobials, use of a pill pox after transplant, and expectations post transplant.  Hamilton and his wife voiced understanding of our conversation.    Thank you,  Andy J. Kurtzweil, PharmD

## 2021-12-03 NOTE — LETTER
12/3/2021         RE: Hamilton Angulo  [address] [place] [address]        Dear Colleague,    Thank you for referring your patient, Hamilton Angulo, to the [facility] BLOOD AND MARROW TRANSPLANT PROGRAM [place]. Please see a copy of my visit note below.    BMT Teaching Flowsheet    Hamilton Angulo is a 71 year old male  Diagnoses of Myeloma (H) and Personal history of diseases of blood and blood-forming organs were pertinent to this visit.    Teaching Topic: [id number]    Person(s) involved in teaching: Patient and Spouse  Motivation Level  Asks Questions: Yes  Eager to Learn: Yes  Cooperative: Yes  Receptive (willing/able to accept information): Yes  Any cultural factors/Latter-day beliefs that may influence understanding or compliance? No    Patient and Family demonstrates understanding of the following:  - Reason for the appointment, diagnosis and treatment plan: Yes  - Knowledge of proper use of medications and conditions for which they are ordered (with special attention to potential side effects or drug interactions): Yes  - Which situations necessitate calling provider and whom to contact: Yes    Teaching concerns addressed: None identified    Proper use and care of (medical equipment, care aids, etc.) NA  Pain management techniques: Yes  Patient instructed on hand hygiene: Yes  How and/when to access community resources: No, explain: Patient and spouse (Heather) met with [facility]    Infection Control:  Patient and Family demonstrates understanding of the following:  Surgical procedure site care taught NA  Signs and symptoms of infection taught Yes  Wound care taught NA  Central venous catheter care taught Yes    Instructional Materials Used/Given:   Patient was given and reviewed BMT Teaching Binder, including medication pamphlets, sample treatment calendars, consents, contact phone numbers, hospital and discharge guidelines.  Patient verbalizes understanding of the material and was encouraged to  "call with any additional questions.     Research participant Hamilton Angulo was provided the information on the Research Participant Information Sheet by Mac Shukla RN on December 3, 2021. This document contains information regarding the risks of participating in a research study during the COVID-19 pandemic. Receipt of the information sheet was confirmed by study personnel prior to the visit.    Time spent with patient: 105 minutes.      Specific Concerns: Yes, patient seems to be having doubts/concerns about moving forward with transplant.  He was very interested in knowing what the alternative options are.  Additionally, he mentioned that two different providers mentioned that his myeloma was \"curable\".  Would recommend that these items be addressed during his work up closing appointment so he has the best understanding of his disease before moving forward.        BMT Nurse Coordinator  "

## 2021-12-03 NOTE — PROGRESS NOTES
BMT Teaching Flowsheet    Hamilton Angulo is a 71 year old male  Diagnoses of Myeloma (H) and Personal history of diseases of blood and blood-forming organs were pertinent to this visit.    Teaching Topic: [id number]    Person(s) involved in teaching: Patient and Spouse  Motivation Level  Asks Questions: Yes  Eager to Learn: Yes  Cooperative: Yes  Receptive (willing/able to accept information): Yes  Any cultural factors/Protestant beliefs that may influence understanding or compliance? No    Patient and Family demonstrates understanding of the following:  - Reason for the appointment, diagnosis and treatment plan: Yes  - Knowledge of proper use of medications and conditions for which they are ordered (with special attention to potential side effects or drug interactions): Yes  - Which situations necessitate calling provider and whom to contact: Yes    Teaching concerns addressed: None identified    Proper use and care of (medical equipment, care aids, etc.) NA  Pain management techniques: Yes  Patient instructed on hand hygiene: Yes  How and/when to access community resources: No, explain: Patient and spouse (Heather) met with [facility]    Infection Control:  Patient and Family demonstrates understanding of the following:  Surgical procedure site care taught NA  Signs and symptoms of infection taught Yes  Wound care taught NA  Central venous catheter care taught Yes    Instructional Materials Used/Given:   Patient was given and reviewed BMT Teaching Binder, including medication pamphlets, sample treatment calendars, consents, contact phone numbers, hospital and discharge guidelines.  Patient verbalizes understanding of the material and was encouraged to call with any additional questions.     Research participant Hamilton Angulo was provided the information on the Research Participant Information Sheet by Mac Shukla RN on December 3, 2021. This document contains information regarding the risks of participating in a research  "study during the COVID-19 pandemic. Receipt of the information sheet was confirmed by study personnel prior to the visit.    Time spent with patient: 105 minutes.      Specific Concerns: Yes, patient seems to be having doubts/concerns about moving forward with transplant.  He was very interested in knowing what the alternative options are.  Additionally, he mentioned that two different providers mentioned that his myeloma was \"curable\".  Would recommend that these items be addressed during his work up closing appointment so he has the best understanding of his disease before moving forward.  "

## 2021-12-03 NOTE — LETTER
"    12/3/2021         RE: Hamilton Angulo  [address] [facility] [address]        Dear Colleague,    Thank you for referring your patient, Hamilton Angulo, to the [facility] BLOOD AND MARROW TRANSPLANT PROGRAM [place]. Please see a copy of my visit note below.    Pharmacy Assessment - Pre-Stem Cell Transplant    Assessments & Recommendations:  1) Use ice cryotherapy during melphalan to decrease the severity of mucositis.    If this patient is admitted under observation, the patient may bring in their own supply of the following medication for use in the hospital:  1) All current medications are low cost.  All medication will be provided by the hospital pharmacy if admitted under observation.  -Per \"Medications Not Supplied by Pharmacy\" policy (available on [employer])    History of Present Illness:  Hamilton Angulo is a 71 year old year old male diagnosed with Multiple Myeloma.  He has been treated with RVd.  He is now being work up for Autologous Stem Cell Transplant on protocol 2016-35, which utilizes HD Melphalan as a conditioning regimen.    Pertinent labs/tests:  Viral Testing:  CMV(+) / HSV(-) / EBV(+)  Ejection Fraction:  pending  QTc: 428 msec (11/30/21)    Weights:   Wt Readings from Last 3 Encounters:   12/01/21 64.1 kg (141 lb 6.4 oz)   11/30/21 65 kg (143 lb 4.8 oz)   11/29/21 64 kg (141 lb)   Ideal body weight: 75.4 kg (166 lb 5.2 oz)  % IBW:  Currently under IBW  There is no height or weight on file to calculate BMI.    Primary BMT Physician: Dr. Elise  BMT RN Coordinator:  Mac    Past Medical History:  Past Medical History:   Diagnosis Date     Bilateral cataracts 2001     Herniated disc 1983    Lumbar     Hyperlipidemia LDL goal <130      Hypertension 5/2004     Tear of MCL (medial collateral ligament) of knee 6/20/2001    LT Knee/WC     Medication Allergies:  Allergies   Allergen Reactions     Shrimp Hives     Current Medications (pre-admit):  Current Outpatient Medications "   Medication Sig Dispense Refill     acetaminophen (TYLENOL) 325 MG tablet Take 325-650 mg by mouth every 4 hours as needed Take as needed following label instructions       acyclovir (ZOVIRAX) 400 MG tablet Take 400 mg by mouth 2 times daily       amLODIPine (NORVASC) 2.5 MG tablet TAKE 1 TABLET(2.5 MG) BY MOUTH DAILY (Patient taking differently: Take 2.5 mg by mouth daily ) 90 tablet 3     Artificial Tear Solution (SM ARTIFICIAL TEARS) SOLN Apply 1 Dose to eye every 2 hours as needed Use per label instructions       lisinopril (ZESTRIL) 20 MG tablet Take 1 tablet (20 mg) by mouth daily 90 tablet 3     Multiple Vitamins-Minerals (EQ COMPLETE MULTIVITAMIN-ADULT) TABS Take 1 tablet by mouth daily       pravastatin (PRAVACHOL) 20 MG tablet TAKE 1 TABLET BY MOUTH 3 TIMES A WEEK (Patient taking differently: Take 20 mg by mouth Every Mon, Wed, Fri Morning ) 36 tablet 3     Herbal Medication/Nutritional Supplements:  None    Summary:  I met with Hamilton Angulo for approximately 25 minutes.  We discussed current medications, stem cell collection process including possibility of chemotherapy, use of HD filgrastim, transplant conditioning chemotherapy, side effects, risk of infection, use of antimicrobials, use of a pill pox after transplant, and expectations post transplant.  Hamilton and his wife voiced understanding of our conversation.    Thank you,  Andy J. Kurtzweil, LolyD

## 2021-12-07 ENCOUNTER — VIRTUAL VISIT (OUTPATIENT)
Dept: TRANSPLANT | Facility: CLINIC | Age: 71
End: 2021-12-07
Attending: INTERNAL MEDICINE
Payer: COMMERCIAL

## 2021-12-07 DIAGNOSIS — C90.00 MYELOMA (H): ICD-10-CM

## 2021-12-07 DIAGNOSIS — Z71.9 VISIT FOR COUNSELING: Primary | ICD-10-CM

## 2021-12-07 DIAGNOSIS — Z86.2 PERSONAL HISTORY OF DISEASES OF BLOOD AND BLOOD-FORMING ORGANS: ICD-10-CM

## 2021-12-07 ASSESSMENT — ANXIETY QUESTIONNAIRES
1. FEELING NERVOUS, ANXIOUS, OR ON EDGE: SEVERAL DAYS
7. FEELING AFRAID AS IF SOMETHING AWFUL MIGHT HAPPEN: SEVERAL DAYS
6. BECOMING EASILY ANNOYED OR IRRITABLE: SEVERAL DAYS
5. BEING SO RESTLESS THAT IT IS HARD TO SIT STILL: NOT AT ALL
3. WORRYING TOO MUCH ABOUT DIFFERENT THINGS: NOT AT ALL
GAD7 TOTAL SCORE: 3
2. NOT BEING ABLE TO STOP OR CONTROL WORRYING: NOT AT ALL

## 2021-12-07 ASSESSMENT — PATIENT HEALTH QUESTIONNAIRE - PHQ9: 5. POOR APPETITE OR OVEREATING: NOT AT ALL

## 2021-12-07 NOTE — LETTER
12/7/2021         RE: Hamilton Angulo  [address] [place] [address]        Dear Colleague,    Thank you for referring your patient, Hamilton Angulo, to the [facility] BLOOD AND MARROW TRANSPLANT PROGRAM [place]. Please see a copy of my visit note below.    CLINICAL SOCIAL WORK   PSYCHOSOCIAL ASSESSMENT  BLOOD AND MARROW TRANSPLANT SERVICE      Assessment completed on December 7, 2021 of living situation, support system, financial status, functional status, coping, stressors, need for resources and social work intervention provided as needed.  Information for this assessment was provided by Pt and spouse report in addition to medical chart review and consultation with medical team.     Present at assessment: Patient, Hamilton Angulo and Heather Angulo were present for this assessment conducted by Lexus Ha, [facility] .     Diagnosis: Multiple Myeloma (MM)    Date of Diagnosis: 7/2021    Transplant type: Autologous    Donor: Autologous     Physician: Pepper Elise MD    Nurse Coordinator: Naomi Griffin RN    : ARNOLD Gray, [facility]     Permanent Address:   [address] [place] [address]    Contact Information:  Pt Home Phone: [phone]  Pt Cell Phone: n/a  Pt Email: [email or web address]  Pt's wife Heather's Phone: [phone]    Presenting Information:  Hamilton is a 71 year old male diagnosed with MM who presents for evaluation for an autologous transplant at the [facility] ([facility]).  Pt was accompanied to today's telephone visit by his wife Heather.     Decision Making:   Self     Health Care Directive:   Will bring in copy     Relationship Status:    to his wife Heather for 44 years. They indicate their relationship as stable and supportive.    Special Lodging Needs: None identified at this time.     Family/Support System: Pt endorsed a good support system including family and close friends who will be available to support Pt  throughout transplant process.     Spouse: Heather Angulo    Children: n/a    Grandchildren: n/a    Parents:     Siblings: n/a    Friends: Jimbo ESPINOSA (cousin) and Cornelia Adames (Heather's aunt)    Caregiver: SW discussed with pt the caregiver role and expectation at length. Pt is agreeable to having a full time caregiver for a minimum of 30 days until cleared by the BMT physician. Pt's identified caregivers are his wife Heather. Pt and Pt's wife confirmed understanding caregiving requirement, including driving restrictions, as discussed during psychosocial assessment. Hamilton and Heather both expressed frustrations with the expectation of the caregiver being 24 hrs a day. SW reviewed the expectations and discussed the reasoning behind the expectations. Pt expressed understanding and willingness to follow the expectations.    Name & Numbers  Heather Angulo- [phone]    Transportation Mode:  Private Car . Pt is aware of driving restrictions post-BMT and the need for the caregiver is to drive until cleared to drive by the  BMT physician. SW provided information on parking info and monthly parking pass options. Pt will utilize the [employer] for transportation to and from the [facility] and BMT Clinic/Hospital.    Insurance:  No Insurance issues identified.  Pt denied specific insurance concerns at this time. SW reiterated information about the BMT Financial  should specific insurance questions arise as Pt moves through transplant process.     Sources of Income:  No income concerns identified  Hamilton is supported by savings and senior care. Pt denied anticipation of financial hardship related to BMT at this time.  SW encouraged Pt to contact this SW for additional potential resources should financial situation change.     Employment:   Employer: [employer]  Last Day of Work: 17 years ago     Spouse's Employment:  Employer:  retired    Mental Health: No mental health issues identified   "     PHQ-2 assessment, score was 0 ,which indicates no current signs of depression.  GAD7 assessment, score was 3, which indicates no current signs of anxiety.     We talked about how some patients may see an increase in feelings of anxiety or depression while hospitalized for extended periods along with isolation. Encouraged Hamilton and Heather to let us know if they are noticing an increase in symptoms. We talked about the variety of modalities available to use as coping mechanisms (including but not limited to guided imagery, relaxation techniques, progressive muscle relaxation, counseling/talk therapy and medication).    Chemical Use: No issues identified.  Hamilton denies the use of tobacco, marijuana or other drugs. Hamilton does drink a couple times a week, but does note he has no concerns stopping during the BMT process.   Based on the information provided, there appear to be no specific risks or concerns identified at this time.     Trauma/Loss/Abuse History: Multiple losses associated with cancer diagnosis and treatment, including health, employment, changes to physical appearance, etc.     Spirituality:  Patient identifies with sarah community. Hamilton identifies that he is [place] Jewish. He is not interested in a blessing ceremony.     Coping: Pt noted that he is currently feeling \"concerned about starting/going through BMT and feeling good\".  Pt shared that his main coping mechanisms are going for walks.  Pt noted that he also marisol by talking to his wife. SW and Pt discussed additional positive coping mechanisms that Pt can utilize while in the hospital.     Caregiver Coping: Pt's wife noted that she is feeling \"nervous\" at this time.  Heather noted that she marisol by talking to Hamilton.     Education Provided: Transplant process expectations, Caregiver requirements, Caregiver self-care, Financial issues related to transplant, Financial resources/grants available, Common psychosocial stressors pre/post transplant, " Support group(s) available, Tour/layout of the inpatient unit/non-use of cell phones, Hospital resources available, Web site information, Resources for transplant patients and their families as well as the Clinical Social Work role.     Interventions Provided: Supportive counseling and education     Recreation/Leisure Activities:  Hamilton shared that [place], going out to eat, and using his bike.    Plans for Hospital Stay Leisure:  While IP Hamilton plans to watch TV.    Assessment and Recommendations for Team:  Pt is a 71 year old male diagnosed with MM who is here undergoing preparation for a planned autologous transplant.     Pt is a pleasant and articulate male who feels comfortable communicating with the medical team. Pt has a limited support team who are involved.     The pt did discuss his concerns about moving forward with the BMT process including how this is impacting his life, not being given alternative treatment options and the frustrations with the caregiving requirements. SW that many of his questions/concerns should be discussed with the MD later this week. SW validated the pt's concerns regarding the impact that transplant can have on patients and families lives and discussed that the pt can choose to not move forward with transplant if he felt this was the best decision for him and his family. SW did encourage the pt to wait and talk over these decisions with the provider before making a final decision so he could have all of his questions addressed prior to his decisions being made.     Pt may benefit from ongoing psychosocial support in regards to coping with the adjustment to the BMT process. Pt's family may benefit from ongoing psychosocial support in regards to coping with the adjustment to the BMT process and may also benefit from attending the BMT Caregiver Support Group that meets weekly on the inpatient unit.     Pt has a limited, but good support system and a  good caregiver plan. Pt verbalizes understanding of the transplant process and wanting to proceed. SW provided contact information and encouraged Pt to contact SW with questions, concerns, resources and for support. Per this assessment, I did not identify any barriers to this patient moving forward with transplant      Important Information:   - Hamilton is uncertain if he would like to move forward with transplant, but is still very interested pending more information from the MD.  - Hamilton is not interested in a blessing ceremony.       Follow up Planned:   Psychosocial support    ARNOLD Gray, OPHELIA  [facility]  Pager: [phone]  Phone: [phone]                  Again, thank you for allowing me to participate in the care of your patient.        Sincerely,        OPHELIA Nixon

## 2021-12-07 NOTE — PROGRESS NOTES
CLINICAL SOCIAL WORK   PSYCHOSOCIAL ASSESSMENT  BLOOD AND MARROW TRANSPLANT SERVICE      Assessment completed on 2021 of living situation, support system, financial status, functional status, coping, stressors, need for resources and social work intervention provided as needed.  Information for this assessment was provided by Pt and spouse report in addition to medical chart review and consultation with medical team.     Present at assessment: Patient, Hamilton Angulo and Heather Angulo were present for this assessment conducted by CHRIS Gray .     Diagnosis: Multiple Myeloma (MM)    Date of Diagnosis: 2021    Transplant type: Autologous    Donor: Autologous     Physician: Pepper Elise MD    Nurse Coordinator: Naomi Griffin RN    : ARNOLD Gray, [facility]     Permanent Address:   [address] [place] [id number]    Contact Information:  Pt Home Phone: [phone]  Pt Cell Phone: n/a  Pt Email: [email or web address]  Pt's wife Heather's Phone: [phone]    Presenting Information:  Hamilton is a 71 year old male diagnosed with MM who presents for evaluation for an autologous transplant at the [facility] ([facility]).  Pt was accompanied to today's telephone visit by his wife Heather.     Decision Making:   Self     Health Care Directive:   Will bring in copy     Relationship Status:    to his wife Heather for 44 years. They indicate their relationship as stable and supportive.    Special Lodging Needs: None identified at this time.     Family/Support System: Pt endorsed a good support system including family and close friends who will be available to support Pt throughout transplant process.     Spouse: Heather Angulo    Children: n/a    Grandchildren: n/a    Parents:     Siblings: n/a    Friends: Jimbo ESPINOSA (cousin) and Cornelia Adames (Heather's aunt)    Caregiver: SW discussed with pt the caregiver role and expectation at length. Pt is  agreeable to having a full time caregiver for a minimum of 30 days until cleared by the BMT physician. Pt's identified caregivers are his wife Heather. Pt and Pt's wife confirmed understanding caregiving requirement, including driving restrictions, as discussed during psychosocial assessment. Hamilton and Heather both expressed frustrations with the expectation of the caregiver being 24 hrs a day. SW reviewed the expectations and discussed the reasoning behind the expectations. Pt expressed understanding and willingness to follow the expectations.    Name & Numbers  Heather Angulo- [phone]    Transportation Mode:  Private Car . Pt is aware of driving restrictions post-BMT and the need for the caregiver is to drive until cleared to drive by the  BMT physician. SW provided information on parking info and monthly parking pass options. Pt will utilize the [employer] for transportation to and from the [facility] and BMT Clinic/Hospital.    Insurance:  No Insurance issues identified.  Pt denied specific insurance concerns at this time. SW reiterated information about the BMT Financial  should specific insurance questions arise as Pt moves through transplant process.     Sources of Income:  No income concerns identified  Hamilton is supported by savings and care home. Pt denied anticipation of financial hardship related to BMT at this time.  SW encouraged Pt to contact this SW for additional potential resources should financial situation change.     Employment:   Employer: [employer]  Last Day of Work: 17 years ago     Spouse's Employment:  Employer:  retired    Mental Health: No mental health issues identified       PHQ-2 assessment, score was 0 ,which indicates no current signs of depression.  GAD7 assessment, score was 3, which indicates no current signs of anxiety.     We talked about how some patients may see an increase in feelings of anxiety or depression while hospitalized for  "extended periods along with isolation. Encouraged Hamilton and Heather to let us know if they are noticing an increase in symptoms. We talked about the variety of modalities available to use as coping mechanisms (including but not limited to guided imagery, relaxation techniques, progressive muscle relaxation, counseling/talk therapy and medication).    Chemical Use: No issues identified.  Hamilton denies the use of tobacco, marijuana or other drugs. Hamilton does drink a couple times a week, but does note he has no concerns stopping during the BMT process.   Based on the information provided, there appear to be no specific risks or concerns identified at this time.     Trauma/Loss/Abuse History: Multiple losses associated with cancer diagnosis and treatment, including health, employment, changes to physical appearance, etc.     Spirituality:  Patient identifies with sarah community. Hamilton identifies that he is Delroy Rastafari. He is not interested in a blessing ceremony.     Coping: Pt noted that he is currently feeling \"concerned about starting/going through BMT and feeling good\".  Pt shared that his main coping mechanisms are going for walks.  Pt noted that he also marisol by talking to his wife. SW and Pt discussed additional positive coping mechanisms that Pt can utilize while in the hospital.     Caregiver Coping: Pt's wife noted that she is feeling \"nervous\" at this time.  Heather noted that she marisol by talking to Hamilton.     Education Provided: Transplant process expectations, Caregiver requirements, Caregiver self-care, Financial issues related to transplant, Financial resources/grants available, Common psychosocial stressors pre/post transplant, Support group(s) available, Tour/layout of the inpatient unit/non-use of cell phones, Hospital resources available, Web site information, Resources for transplant patients and their families as well as the Clinical Social Work role.     Interventions Provided: Supportive counseling " and education     Recreation/Leisure Activities:  Hamilton shared that he [place], going out to eat, and using his bike.    Plans for Hospital Stay Leisure:  While IP Hamilton plans to watch TV.    Assessment and Recommendations for Team:  Pt is a 71 year old male diagnosed with MM who is here undergoing preparation for a planned autologous transplant.     Pt is a pleasant and articulate male who feels comfortable communicating with the medical team. Pt has a limited support team who are involved.     The pt did discuss his concerns about moving forward with the BMT process including how this is impacting his life, not being given alternative treatment options and the frustrations with the caregiving requirements. SW that many of his questions/concerns should be discussed with the MD later this week. SW validated the pt's concerns regarding the impact that transplant can have on patients and families lives and discussed that the pt can choose to not move forward with transplant if he felt this was the best decision for him and his family. SW did encourage the pt to wait and talk over these decisions with the provider before making a final decision so he could have all of his questions addressed prior to his decisions being made.     Pt may benefit from ongoing psychosocial support in regards to coping with the adjustment to the BMT process. Pt's family may benefit from ongoing psychosocial support in regards to coping with the adjustment to the BMT process and may also benefit from attending the BMT Caregiver Support Group that meets weekly on the inpatient unit.     Pt has a limited, but good support system and a good caregiver plan. Pt verbalizes understanding of the transplant process and wanting to proceed. SW provided contact information and encouraged Pt to contact SW with questions, concerns, resources and for support. Per this assessment, I did not identify any barriers to this patient  moving forward with transplant      Important Information:   - Hamilton is uncertain if he would like to move forward with transplant, but is still very interested pending more information from the MD.  - Hamilton is not interested in a blessing ceremony.       Follow up Planned:   Psychosocial support    ARNOLD Gray, [facility]  Pager: [phone]  Phone: [phone]

## 2021-12-07 NOTE — PROGRESS NOTES
CV[facility] education competed with [place].    Literature given: Handwashing and Skin Care, Your Central Venous Catheter, Caring for Your Central Venous Catheter at Home, Changing the End Cap, Flushing the Line with Heparin, Saline or Citrate, Changing Your Bandage.

## 2021-12-07 NOTE — LETTER
Date:January 1, 2022      Provider requested that no letter be sent. Do not send.       [facility]

## 2021-12-08 ENCOUNTER — OFFICE VISIT (OUTPATIENT)
Dept: TRANSPLANT | Facility: CLINIC | Age: 71
End: 2021-12-08
Attending: INTERNAL MEDICINE
Payer: COMMERCIAL

## 2021-12-08 ENCOUNTER — HOSPITAL ENCOUNTER (OUTPATIENT)
Dept: CARDIOLOGY | Facility: CLINIC | Age: 71
End: 2021-12-08
Attending: INTERNAL MEDICINE
Payer: COMMERCIAL

## 2021-12-08 ENCOUNTER — APPOINTMENT (OUTPATIENT)
Dept: LAB | Facility: CLINIC | Age: 71
End: 2021-12-08
Attending: INTERNAL MEDICINE
Payer: COMMERCIAL

## 2021-12-08 VITALS
HEART RATE: 82 BPM | SYSTOLIC BLOOD PRESSURE: 143 MMHG | OXYGEN SATURATION: 99 % | WEIGHT: 144.1 LBS | RESPIRATION RATE: 16 BRPM | TEMPERATURE: 98.2 F | BODY MASS INDEX: 20.06 KG/M2 | DIASTOLIC BLOOD PRESSURE: 75 MMHG

## 2021-12-08 DIAGNOSIS — C90.00 MULTIPLE MYELOMA NOT HAVING ACHIEVED REMISSION (H): Primary | ICD-10-CM

## 2021-12-08 DIAGNOSIS — Z86.2 PERSONAL HISTORY OF DISEASES OF BLOOD AND BLOOD-FORMING ORGANS: ICD-10-CM

## 2021-12-08 DIAGNOSIS — C90.00 MYELOMA (H): ICD-10-CM

## 2021-12-08 DIAGNOSIS — Z01.818 EXAMINATION PRIOR TO CHEMOTHERAPY: ICD-10-CM

## 2021-12-08 LAB
3D LVEF ECHO: NORMAL
COLLECT DURATION TIME UR: 24 H
CREAT 24H UR-MRATE: 1.35 G/SPEC (ref 1–2)
CREAT CL 24H UR+SERPL-VRATE: 108 ML/MIN
CREAT CL/1.73 SQ M 24H UR+SERPL-ARVRAT: 104 ML/MIN/1.7M2
CREAT SERPL-MCNC: 0.89 MG/DL (ref 0.66–1.25)
CREAT UR-MCNC: 113 MG/DL
CREATININE (SYNCED VALUE): 0.87 MG/DL
LVEF ECHO: NORMAL
SPECIMEN VOL UR: 1198 ML

## 2021-12-08 PROCEDURE — 36415 COLL VENOUS BLD VENIPUNCTURE: CPT | Mod: TEL,95

## 2021-12-08 PROCEDURE — 93306 TTE W/DOPPLER COMPLETE: CPT | Mod: 26 | Performed by: INTERNAL MEDICINE

## 2021-12-08 PROCEDURE — G0463 HOSPITAL OUTPT CLINIC VISIT: HCPCS

## 2021-12-08 PROCEDURE — 82565 ASSAY OF CREATININE: CPT | Mod: TEL,95

## 2021-12-08 PROCEDURE — 99215 OFFICE O/P EST HI 40 MIN: CPT | Performed by: INTERNAL MEDICINE

## 2021-12-08 ASSESSMENT — PAIN SCALES - GENERAL: PAINLEVEL: NO PAIN (0)

## 2021-12-08 ASSESSMENT — ANXIETY QUESTIONNAIRES: GAD7 TOTAL SCORE: 3

## 2021-12-08 NOTE — PROGRESS NOTES
BMT/Cell Therapy Consultation      Workup Nurse Coordinator: Andrea  Primary BMT Physician: Arik  Closing BMT Physician (if different): Arik  Date of Summary:  12/08/2021  Reason for Transplant: MM  Protocol: 2016-35 auto MM      Hamilton Angulo is a 71 year old male referred by Dr. Bowling and Alma for IgG kappa multiple myeloma, RISS stage 1, standard risk.      Diagnosis and Treatment Summary         Disease presentation and baseline characteristics:  Standard risk IgG kappa MM presenting as progressive but asymptomatic anemia. WBC 3.3.  Hemoglobin 10.  Platelets 155. CMP showed normal creatinine and calcium.  Total protein 8.7.  LFTs unremarkable.  Albumin 4.1, Beta-2 microglobulin 1.9. Bone marrow biopsy showed plasma cell myeloma with normocellular marrow, cellularity 20 to 30% with trilineage hematopoiesis and 20- 25% kappa monotypic plasma cells.  Increased marrow storage iron. Bone marrow flow cytometry showed 1.2% plasma cells which express CD38, CD19, CD20 (partial dim), CD45 (dim), CD56  and monotypic cytoplasmic kappa immunoglobulin light chains. FISH Hyperdiploid with gains of chromosomes 5, 9, and/or 15 (85%), gain of 11q (95%), monosomy 13 (46%), loss of IGH (4.5%) (control range 0-2.8%); no rearrangement of IGH. Cytogenetics 46 XY. Baseline M spike of 2.1. Serum IgG level was 2862.  Monoclonal peak in the urine was 57.5 and MANDI showed large monoclonal free kappa and monoclonal IgG kappa. Alma free light chain 57.  Lambda 0.65 and the ratio 88. On 10/1/21, his M spike was down to 0.8 and kappa FLC down to 25 (partial response).  Kappa FLC plateaued at 27 at pre-autologous transplant workup.    Date Treatment Name Response Side Effects / Toxicities   8/2021 RVd x 6 Partial response Neutropenia, neuropathy                            HPI:  Please see my entry above for disease and treatment history.  Hamilton is here to discuss the results of his workup.  Overall, he is feeling well.  Gaining his  appetite again while his myeloma therapy has been on hold. Mild neuropathy symptoms.  No fevers, chills, vomiting, diarrhea, dyspnea, rash, or other concerns.      ROS:    10 point ROS neg other than the symptoms noted above in the HPI.        Past Medical History:   Diagnosis Date     Bilateral cataracts 2001     Herniated disc 1983    Lumbar     Hyperlipidemia LDL goal <130      Hypertension 5/2004     Tear of MCL (medial collateral ligament) of knee 6/20/2001    LT Knee/WC       Past Surgical History:   Procedure Laterality Date     CATARACT IOL, RT/LT  12/2003    Bilateral       Family History   Problem Relation Age of Onset     Breast Cancer Mother      Hypertension Mother         She potentially had this.     Other - See Comments Father         He potentially had liver issues and dementia.     Heart Disease Maternal Grandmother         She potentially had this.     Cancer Maternal Grandfather         Unknown type of cancer.     Other - See Comments Paternal Half-Brother         He potentially had dementia.       Social History     Tobacco Use     Smoking status: Never Smoker     Smokeless tobacco: Never Used   Vaping Use     Vaping Use: Never used   Substance Use Topics     Alcohol use: Yes     Comment: [place].     Drug use: No         Allergies   Allergen Reactions     Shrimp Hives        Current Outpatient Medications   Medication Sig Dispense Refill     acetaminophen (TYLENOL) 325 MG tablet Take 325-650 mg by mouth every 4 hours as needed Take as needed following label instructions       acyclovir (ZOVIRAX) 400 MG tablet Take 400 mg by mouth 2 times daily       amLODIPine (NORVASC) 2.5 MG tablet TAKE 1 TABLET(2.5 MG) BY MOUTH DAILY (Patient taking differently: Take 2.5 mg by mouth daily ) 90 tablet 3     Artificial Tear Solution (SM ARTIFICIAL TEARS) SOLN Apply 1 Dose to eye every 2 hours as needed Use per label instructions       lisinopril (ZESTRIL) 20 MG tablet Take 1 tablet (20 mg) by mouth daily 90  tablet 3     Multiple Vitamins-Minerals (EQ COMPLETE MULTIVITAMIN-ADULT) TABS Take 1 tablet by mouth daily       pravastatin (PRAVACHOL) 20 MG tablet TAKE 1 TABLET BY MOUTH 3 TIMES A WEEK (Patient taking differently: Take 20 mg by mouth Every Mon, Wed, Fri Morning ) 36 tablet 3         Physical Exam:     Vital Signs: BP (!) 143/75 (BP Location: Right arm, Patient Position: Sitting, Cuff Size: Adult Regular)   Pulse 82   Temp 98.2  F (36.8  C) (Oral)   Resp 16   Wt 65.4 kg (144 lb 1.6 oz)   SpO2 99%   BMI 20.06 kg/m       He is thin, pleasant, interactive, sclerae anicteric, cranial nerves grossly intact, no oral mucosal lesions, normal respiratory effort, no JVD, normal perfusion, abdomen non-distended, skin without rash, no edema, normal affect.          BMT and Cell Therapy Informed Consent Discussion     Mr. Angulo completed his workup and passed his assessments, but he asks to delay transplant for 4-6 months in order to be more confident in management given the challenges the winter poses, the stress on his wife, and to work toward obtaining a deeper remission.  This is reasonable, as the primary benefit of transplant is in delaying progression and achieving time off intensive therapy. In the long-term follow up of [facility] 2009, after a median follow-up of 89.8 months, median progression-free survival was 47.2 months after ASCT and 35 months with RVd. Owing to effective second-line therapies and beyond, more than 60% of patients in both arms were alive after 8 years of follow-up (https://jennyfer.confex.com/[email or web address]/[email or web address].html).  He understands that there is an approximate 12% risk of becoming transplantation ineligible when opting for delayed ASCT as a result of worsening performance status, comorbidities, and aggressive disease relapse (Jimy QUEVEDO, Marvin PACE, Alfred MEAD, et al: Analysis of clinical factors and outcomes associated with non-utilization of collected peripheral blood stem cells for  autologous stem cell transplants in transplant eligible patients with multiple myeloma. Biol Blood Marrow Transplant 24:3003-8474, 2018).    Since his myeloma has plateaued on his current regimen, I suggest considering a switch to a different regimen such as daratumumab, ixazomib, and dexamethasone. I would try to avoid further IMiDs for now due to potentially reducing his stem cell mobilization capabilities in the next 4-6 months.    I am happy to see him back in about 4 months' time to reconsider autologous transplantation.     Known issues that I take into account for medical decisions, with salient changes to the plan considering these complexities noted above.    Patient Active Problem List   Diagnosis     HYPERLIPIDEMIA LDL GOAL <130     PSEUDOPHAKIA OU     Advanced directives, counseling/discussion     History of actinic keratoses     PVD (posterior vitreous detachment), bilateral     Nonexudative senile macular degeneration of retina     Elevated glucose     Essential hypertension with goal blood pressure less than 140/90     Elevated prostate specific antigen (PSA)     Dupuytren contracture     Benign prostatic hyperplasia with urinary retention     Multiple myeloma not having achieved remission (H)         I spent 50 minutes in the care of this patient today, which included time necessary for preparation for the visit, obtaining history, ordering medications/tests/procedures as medically indicated, review of pertinent medical literature, counseling of the patient, communication of recommendations to the care team, and documentation time.    Pepper Elise    Communicate with me securely using Vocera        Blood Counts       Recent Labs   Lab Test 12/01/21  1357 11/30/21  1148 06/23/21  0942   HGB 10.8* 10.4* 10.0*   HCT 33.3* 30.9* 30.6*   WBC 3.3* 3.1* 3.3*   ANEUTAUTO 0.7* 0.7*  --    ALYMPAUTO 2.2 1.9  --    AMONOAUTO 0.4 0.3  --    AEOSAUTO 0.0 0.0  --    ABSBASO 0.0 0.0  --    NRBCMAN 0.0 0.0  --      196 155       ABO/RH    Recent Labs   Lab Test 11/30/21  1148   ABORH A POS       Hemoglobin S Screening    Recent Labs   Lab Test 11/30/21  1148   HGBS Negative         Chemistries     Basic Panel  Recent Labs   Lab Test 12/08/21  1521 11/30/21  1148 11/29/21  0930 06/23/21  0942   NA  --  143 141 140   POTASSIUM  --  3.8 4.3 3.6   CHLORIDE  --  108 110* 108   CO2  --  29 27 27   BUN  --  17 16 15   CR 0.89 0.87 0.94 0.97   GLC  --  109* 106* 100*        Calcium, Magnesium, Phosphorus  Recent Labs   Lab Test 11/30/21  1148 11/29/21  0930 06/23/21  0942   RAKESH 9.3 9.3 8.5   MAG 2.2  --   --    PHOS 3.2  --   --         LFTs  Recent Labs   Lab Test 11/30/21  1148 06/23/21  0942 04/28/21  1214   BILITOTAL 0.6 0.6 0.6   ALKPHOS 49 46 51   AST 11 11 10   ALT 22 13 18   ALBUMIN 3.8 4.1 4.5       LDH  Recent Labs   Lab Test 11/30/21  1148 06/23/21  0942 04/28/21  1214    155 154       B2-Microglobulin  Recent Labs   Lab Test 11/30/21  1148 06/09/21  0827   [id number] 1.7 1.9       Vitamin D  Recent Labs   Lab Test 11/30/21  1148   VITDT 32         Urine Studies       Recent Labs   Lab Test 11/30/21  1159   COLOR Yellow   APPEARANCE Cloudy*   URINEGLC Negative   URINEBILI Negative   URINEKETONE Negative   SG 1.011   UBLD Small*   URINEPH 6.0   PROTEIN Negative   UUROI Normal   NITRITE Positive*   LEUKEST Large*   MUCUS Present*   RBCU 7*   WBCU >182*       Creatinine Clearance    Recent Labs   Lab Test 12/02/21  0545      WT 64.0         VOL 1,198  1,198   DUR 24.0  24.0   CREATININE 0.87   UCRR 113  109   UCR24 1.35  1.31         Infectious Disease Markers     [facility] IDM    Recent Labs   Lab Test 11/30/21  1148   DCMIG Positive*   DHBSAG Non-Reactive   DHBCAB Non-Reactive   DHIVAB Non-Reactive   DHCVAB Non-Reactive   DHTLVA Non-Reactive   TCRUZI Non-Reactive   DTRPAB Non-Reactive         Hepatitis and HIV    Recent Labs   Lab Test 10/07/16  0929   HCVAB Nonreactive    Assay performance characteristics have not been established for newborns,   infants, and children           CMV  No lab results found.      EBV    Recent Labs   Lab Test 11/30/21  1148   EBVCAG Positive*       HSV 1/2    Recent Labs   Lab Test 11/30/21  1148   V0DUHBF 0.49   H1IGG No HSV-1 IgG antibodies detected.   [id number] 0.10   H2IGG No HSV-2 IgG antibodies detected.         VZV    No lab results found.      HTLV    Recent Labs   Lab Test 11/30/21  1148   DHTLVA Non-Reactive         Toxoplasma  (not routinely checked)      COVID    Recent Labs   Lab Test 11/30/21  1159   [id number] Negative         Immunoglobulins     Recent Labs   Lab Test 11/30/21  1148 06/23/21  0942 04/28/21  1214   IGG 1,247 2,530* 2,862*       Recent Labs   Lab Test 11/30/21  1148 06/23/21  0942 04/28/21  1214   IGA 50* 37* 35*       Recent Labs   Lab Test 11/30/21  1148 06/23/21  0942 04/28/21  1214   IGM 41 30* 31*         Monocloncal Protein Studies     M spike    Recent Labs   Lab Test 11/30/21  1148 06/23/21  0942 04/28/21  1214   ELPM 0.7* 1.7* 2.1*       [place] FLC    Recent Labs   Lab Test 11/30/21  1148   KFLCA 27.07*       Lambda FLC    Recent Labs   Lab Test 11/30/21  1148   LFLCA 1.21       FLC Ratio    Recent Labs   Lab Test 11/30/21  1148   KLRA 22.37*           Bone Marrow Biopsy       Morphology    Results for orders placed or performed in visit on 12/01/21 (from the past 8760 hour(s))   Bone marrow biopsy   Result Value    Final Diagnosis      Bone marrow, posterior iliac crest, left decalcified trephine biopsy and touch imprint; left direct aspirate smear, and concentrated aspirate smear; and peripheral smear:  -Slightly hypocellular marrow (cellularity estimated at 20% on average) with trilineage hematopoiesis, no increase in blasts  - Persistent/recurrent plasma cell myeloma with approximately 5% kappa monotypic plasma cells (see comment)  - Peripheral blood showing moderate normochromic, normocytic anemia; slight  leukopenia; neutropenia        Comment      Concurrent flow cytometry (UF ) revealed kappa monotypic plasma cells.  Please correlate also with results of other ancillary studies      Clinical Information      From Epic electronic medical record; 71-year-old male is being worked up for autologous peripheral blood stem cell transplant for IgG kappa multiple myeloma.        Peripheral Hematologic Data      CBC WITH DIFFERENTIAL (12/01/2021 02:07 PM CST):     RESULT VALUE REF. RANGE UNITS    WBC Count    Hemoglobin    Hematocrit   Platelet Count   RBC Count   MCV  MCH  MCHC   RDW   3.3  ( L )     10.8  ( L )      33.3  ( L )  196 (NORMAL)   3.35  ( L )       99 (NORMAL)     32.2 (NORMAL)     32.4 (NORMAL)      17.1  ( H ) 4.0-11.0  13.3-17.7  40.0-53.0  150-450  4.40-5.90    26.5-33.0  31.5-36.5  10.0-15.0 10e3/uL  g/dL  %  10e3/uL  10e6/uL  fL  pg  g/dL  %   % Neutrophils  % Lymphocytes  % Monocytes  % Eosinophils  % Basophils  % Immature Granulocytes   Absolute Neutrophils   Absolute Lymphocytes  Absolute Monocytes  Absolute Eosinophils  Absolute Basophils  Absolute Immature Granulocytes  NRBCs per 100 WBC  Absolute NRBCs 21  66  11  1  1  0   0.7  ( L )  2.2 (NORMAL)  0.4 (NORMAL)  0.0 (NORMAL)  0.0 (NORMAL)  0.0 (NORMAL)  0 (NORMAL)  0.0 ( ) N/A  N/A  N/A  N/A  N/A  N/A  1.6-8.3  0.8-5.3  0.0-1.3  0.0-0.7  0.0-0.2  <=0.4  <1  <=0.0 %  %  %  %  %  %  10e3/uL  10e3/uL  10e3/uL  10e3/uL  10e3/uL  10e3/uL  /100  10e3/uL          Microscopic Description      PERIPHERAL BLOOD SMEAR MORPHOLOGY:  The red blood cells appear normochromic.  Poikilocytosis includes rare dacryocytes, occasional echinocytes and occasional elliptocytes.  Polychromasia is not increased.  Rouleaux formation is not increased. The morphology of the platelets is normal.  Small platelet clumps are present.  Rare reactive appearing lymphocytes are seen.    Bone marrow aspirates and trephine core biopsy touch imprints are reviewed.    BONE  MARROW DIFFERENTIAL (500 cells on touch imprints)  Percent (%) Cell Population Reference Range (%)   1 Blasts  (0 - 1)   2 Neutrophil promyelocytes (2 - 4)   61 Neutrophils and precursors (54 - 63)   19 Erythroid precursors (18 - 24)   3 Monocytes (1 - 1.5)   0 Eosinophils (1 - 3))   0 Basophils (0 - 1))   12 Lymphocytes (8 - 12)   2 Plasma cells (0 - 1.5)     The aspirate smears are possibly hemodilute.    Neutrophil maturation is complete, with normal cytoplasmic granulation. Erythroid maturation is complete. Megakaryocytes are present.  Majority of the megakaryocytes show normal morphology, but a very rare dysplastic form with widely spaced  small nuclear lobes is seen.  Plasma cells are scattered in the touch imprints.    TREPHINE SECTIONS:  Hematoxylin and eosin stains are reviewed. . The quality of the trephine core biopsy is very good. The marrow cellularity is slightly variable, average estimated at 20%. The cellular composition reflects the touch imprint differential. The number of megakaryocytes appears consistent with the degree of marrow cellularity, with normal distribution without pathologic clustering.  Plasma cells are seen scattered, also present in the hypocellular areas.    IMMUNOHISTOCHEMISTRY:  Immunohistochemical stains are performed on the paraffin-embedded trephine core with appropriate controls.     staining shows increased plasma cells, scattered and forming small clusters but without large aggregates and without sheets.  The percentage of plasma cells is difficult to evaluate, approximately 5%.  Kappa and lambda immunohistochemical staining shows that the plasma cells are kappa monotypic.    Note: These immunohistochemical stains are deemed medically necessary. Some of the antigens may also be evaluated by flow cytometry. Concurrent evaluation by immunohistochemistry on clot and/or trephine sections is indicated in this case in order to correlate immunophenotype with cell  morphology and determine extent of involvement, spatial pattern, and focality of potential disease distribution.         Gross Description      Procedure/Gross Description   Aspirate(s) and trephine(s) procured by BEC[facility] Elliott    Specimen sent for Special Studies:         Flow Cytometry: left aspirate        Cytogenetics: left aspirate        Molecular Diagnostics: left aspirate          Biopsy aspiration site: left posterior iliac crest                                                      (Reference Range)          Amount of aspirate           5.4   mL        Fat and P.V. cell layer        2    %               (1 - 3)        Particles                             0   %        Myeloid-erythroid layer    trace    %               (5 - 8)          Clot Section: no    Trephine biopsy site: left posterior iliac crest    Designated left posterior iliac crest is 1 cylinder of gritty tissue, labeled with the patient's name and hospital number, obtained with 11 gauge needle and a length of 25 mm; entirely submitted in 1 cassette; acetic zinc formalin fixed, decalcified, processed, and stained for hematoxylin and eosin per laboratory protocol.          Performing Labs      The technical component of this testing was completed at [facility]             Chest X-Ray - 2 view     Results for orders placed during the hospital encounter of 12/01/21    XR CHEST 2 VW    Status: Normal 12/1/2021    Narrative  XR CHEST 2 VW  12/1/2021 10:41 AM    HISTORY: Myeloma (H); Personal history of diseases of blood and  blood-forming organs    COMPARISON: 6/19/2021 PET/CT    FINDINGS: Upright, AP and lateral views of the chest.    The cardiac silhouette size is within normal limits. No significant  pleural effusion or pneumothorax. No airspace consolidation. The  visualized upper abdomen and osseus structures appear normal.    Impression  IMPRESSION: No acute airspace  opacity.    I have personally reviewed the examination and initial interpretation  and I agree with the findings.    RIKKI ZARAGOZA MD      SYSTEM ID:  [id number]          PFTs     FVC%  Recent Labs   Lab Test 21 116       FEV1%  Recent Labs   Lab Test 21 118       DLCO%  Recent Labs   Lab Test 21 106           EKG       ECG results from 21   EKG 12-lead complete w/read - Clinics     Value    Systolic Blood Pressure     Diastolic Blood Pressure     Ventricular Rate 65    Atrial Rate 65    IN Interval 124    QRS Duration 100        QTc 428    P Axis 68    R AXIS 45    T Axis 68    Interpretation ECG      Sinus rhythm  Normal ECG  No previous ECGs available  Confirmed by MD CHAUNCEY, DOROTHY () on 2021 9:52:52 AM           ECHOCARDIOGRAM       Results for orders placed during the hospital encounter of 21    ECHOCARDIOGRAM COMPLETE    Status: Normal 2021    Narrative  [id number]  [id number]  [id number]  [id number]^SHU^MADIHA^LEAH    [facility],[place]  Echocardiography Laboratory  [address] [place] [id number]    Name: BOB RINALDI  MRN: [id number]  : 1950  Study Date: 2021 07:50 AM  Age: 71 yrs  Gender: Male  Patient Location: [facility]  Reason For Study: Examination prior to chemotherapy, Myeloma (H), Personal  history  Ordering Physician: MADIHA IRELAND  Referring Physician: MADIHA IRELAND  Performed By: Lilly Devi RDCS, RVT    BSA: 1.8 m2  Height: 71 in  Weight: 141 lb  BP: 152/65 mmHg  ______________________________________________________________________________  Procedure  Echocardiogram with two-dimensional, color and spectral Doppler performed.  ______________________________________________________________________________  Interpretation Summary  Normal left and right ventricular size, thickness, global, and regional  systolic function. 3D LVEF volumetric analysis is  64%. Global peak LV  longitudinal strain is averaged at -22.1%. This is within reported normal  limits (normal <-18%).  No significant valvular abnormalities are noted.  No pericardial effusion is present.  Previous study not available for comparison.  ______________________________________________________________________________  Left Ventricle  Global and regional left ventricular function is normal with an EF of 60-65%.  3D LVEF volumetric analysis is 64%. Left ventricular size is normal. Left  ventricular wall thickness is normal. Left ventricular diastolic function is  indeterminate. Global peak LV longitudinal strain is averaged at -22.1%. This  is within reported normal limits (normal <-18%).    Right Ventricle  Right ventricular function, chamber size, wall motion, and thickness are  normal.    Atria  Mild biatrial enlargement is present.    Mitral Valve  The mitral valve is normal. Trace mitral insufficiency is present.    Aortic Valve  The aortic valve is tricuspid. Mild aortic valve calcification is present.    Tricuspid Valve  The tricuspid valve is normal. Trace tricuspid insufficiency is present. The  right ventricular systolic pressure is approximated at 29.8 mmHg plus the  right atrial pressure.    Pulmonic Valve  The pulmonic valve is normal. Trace pulmonic insufficiency is present.    Vessels  The aorta root is normal. The thoracic aorta is normal. The pulmonary artery  cannot be assessed. The inferior vena cava was normal in size with preserved  respiratory variability. IVC diameter <2.1 cm collapsing >50% with sniff  suggests a normal RA pressure of 3 mmHg.    Pericardium  No pericardial effusion is present.    Compared to Previous Study  Previous study not available for comparison.  ______________________________________________________________________________  MMode/2D Measurements & Calculations    IVSd: 0.71 cm  LVIDd: 5.0 cm  LVIDs: 3.2 cm  LVPWd: 0.71 cm  FS: 36.3 %  LV mass(C)d: 116.1  grams  LV mass(C)dI: 63.9 grams/m2  Ao root diam: 2.9 cm  asc Aorta Diam: 3.1 cm  LVOT diam: 2.1 cm  LVOT area: 3.5 cm2  LA Volume (BP): 70.1 ml  LA Volume Index (BP): 38.5 ml/m2  RWT: 0.28    Doppler Measurements & Calculations  MV E max cammy: 71.9 cm/sec  MV A max cammy: 61.2 cm/sec  MV E/A: 1.2  MV dec slope: 306.0 cm/sec2  MV dec time: 0.24 sec  PA acc time: 0.15 sec  TR max cammy: 272.8 cm/sec  TR max P.8 mmHg  E/E' av.2  Lateral E/e': 6.0  Medial E/e': 8.5    ______________________________________________________________________________  Report approved by: Alley Washington 2021 09:03 AM        PET Scan       Results for orders placed during the hospital encounter of 21    PET ONCOLOGY WHOLE BODY    Status: Normal 2021    Narrative  Combined Report of:    PET and CT on  2021 10:20 AM :    1. PET of the neck, chest, abdomen, and pelvis.  2. PET CT Fusion for Attenuation Correction and Anatomical  Localization:  3. 3D MIP and PET-CT fused images were processed on an independent  workstation and archived to PACS and reviewed by a radiologist.    Technique:    1. PET: The patient received 10.05 mCi of F-18-FDG; the serum glucose  was 111 prior to administration, body weight was 65 kg. Images were  evaluated in the axial, sagittal, and coronal planes as well as the  rotational whole body MIP. Images were acquired from the Vertex to the  Feet.    UPTAKE WAS MEASURED AT 66 MINUTES.    BACKGROUND:  Liver SUV max= 2.77,   Aorta Blood SUV Max: 2.3.    2. CT: CT only obtained for attenuation correction and not diagnostic  purposes.    INDICATION: Multiple myeloma, follow up; MM; Myeloma (H); Personal  history of diseases of blood and blood-forming organs    ADDITIONAL INFORMATION OBTAINED FROM EMR: IgG kappa multiple myeloma,  RISS stage 1, standard risk.    COMPARISON: PET/CT 2021.    FINDINGS:    HEAD/NECK:  There is no suspicious FDG uptake in the neck.    Symmetric FDG uptake by  the scalene muscles is physiologic, due to  muscle contraction.    CHEST:  There is no suspicious FDG uptake in the chest.    FDG uptake by bilateral intercostal muscles is physiologic.    ABDOMEN AND PELVIS:  There is no suspicious FDG uptake in the abdomen or pelvis.  FDG uptake along inferior aspects of bilateral iliopsoas muscles more  on the left is physiologic/due to muscle contraction.    LOWER EXTREMITIES:  No abnormal masses or hypermetabolic lesions.    BONES:  There is no abnormal FDG uptake in the skeleton. Scattered small  non-FDG avid lucencies in the pelvic bone, sacrum and sternum.    Impression  IMPRESSION: In this patient with multiple myeloma;  1. No evidence of active disease in the skeleton.  2. Scattered small non-FDG avid lucencies in the pelvic bone, sacrum  and sternum, may represent treated multiple myeloma versus inherently  non-metabolic myeloma lesions.    I have personally reviewed the examination and initial interpretation  and I agree with the findings.    TORIN JAY MD      SYSTEM ID:  [id number]

## 2021-12-08 NOTE — NURSING NOTE
Chief Complaint   Patient presents with     Blood Draw     Labs drawn via  by rn in lab. VS taken.     Labs collected from venipuncture by RN. Vitals taken. Checked in for appointment(s).    Hamilton Manley RN

## 2021-12-08 NOTE — NURSING NOTE
"Oncology Rooming Note    December 8, 2021 3:35 PM   Hamilton Angulo is a 71 year old male who presents for:    Chief Complaint   Patient presents with     Blood Draw     Labs drawn via  by rn in lab. VS taken.     Oncology Clinic Visit     [facility] RETURN - MYELOMA     Initial Vitals: BP (!) 143/75 (BP Location: Right arm, Patient Position: Sitting, Cuff Size: Adult Regular)   Pulse 82   Temp 98.2  F (36.8  C) (Oral)   Resp 16   Wt 65.4 kg (144 lb 1.6 oz)   SpO2 99%   BMI 20.06 kg/m   Estimated body mass index is 20.06 kg/m  as calculated from the following:    Height as of 11/30/21: 1.805 m (5' 11.06\").    Weight as of this encounter: 65.4 kg (144 lb 1.6 oz). Body surface area is 1.81 meters squared.  No Pain (0) Comment: Data Unavailable   No LMP for male patient.  Allergies reviewed: Yes  Medications reviewed: Yes    Medications: Medication refills not needed today.  Pharmacy name entered into IP[facility]: [id number] [place], [place] - [address] AT [facility] & [address]    Clinical concerns: No new concerns. Arik was notified.      Terence Osuna LPN            "

## 2021-12-08 NOTE — LETTER
12/8/2021         RE: Hamilton Angulo  [address] [facility] [facility] [address]        Dear Colleague,    Thank you for referring your patient, Hamilton nAgulo, to the [facility] BLOOD AND MARROW TRANSPLANT PROGRAM [place]. Please see a copy of my visit note below.    BMT/Cell Therapy Consultation      Workup Nurse Coordinator: Andrea  Primary BMT Physician: Arik  Closing BMT Physician (if different): Arik  Date of Summary:  12/08/2021  Reason for Transplant: MM  Protocol: 2016-35 auto MM      Hamilton Angulo is a 71 year old male referred by Dr. Bowling and Caleb for IgG kappa multiple myeloma, RISS stage 1, standard risk.      Diagnosis and Treatment Summary         Disease presentation and baseline characteristics:  Standard risk IgG kappa MM presenting as progressive but asymptomatic anemia. WBC 3.3.  Hemoglobin 10.  Platelets 155. CMP showed normal creatinine and calcium.  Total protein 8.7.  LFTs unremarkable.  Albumin 4.1, Beta-2 microglobulin 1.9. Bone marrow biopsy showed plasma cell myeloma with normocellular marrow, cellularity 20 to 30% with trilineage hematopoiesis and 20- 25% kappa monotypic plasma cells.  Increased marrow storage iron. Bone marrow flow cytometry showed 1.2% plasma cells which express CD38, CD19, CD20 (partial dim), CD45 (dim), CD56  and monotypic cytoplasmic kappa immunoglobulin light chains. FISH Hyperdiploid with gains of chromosomes 5, 9, and/or 15 (85%), gain of 11q (95%), monosomy 13 (46%), loss of IGH (4.5%) (control range 0-2.8%); no rearrangement of IGH. Cytogenetics 46 XY. Baseline M spike of 2.1. Serum IgG level was 2862.  Monoclonal peak in the urine was 57.5 and MANDI showed large monoclonal free kappa and monoclonal IgG kappa. Asotin free light chain 57.  Lambda 0.65 and the ratio 88. On 10/1/21, his M spike was down to 0.8 and kappa FLC down to 25 (partial response).  Kappa FLC plateaued at 27 at pre-autologous transplant workup.    Date Treatment Name Response  Side Effects / Toxicities   8/2021 RVd x 6 Partial response Neutropenia, neuropathy                            HPI:  Please see my entry above for disease and treatment history.  Hamilton is here to discuss the results of his workup.  Overall, he is feeling well.  Gaining his appetite again while his myeloma therapy has been on hold. Mild neuropathy symptoms.  No fevers, chills, vomiting, diarrhea, dyspnea, rash, or other concerns.      ROS:    10 point ROS neg other than the symptoms noted above in the HPI.        Past Medical History:   Diagnosis Date     Bilateral cataracts 2001     Herniated disc 1983    Lumbar     Hyperlipidemia LDL goal <130      Hypertension 5/2004     Tear of MCL (medial collateral ligament) of knee 6/20/2001    LT Knee/WC       Past Surgical History:   Procedure Laterality Date     CATARACT IOL, RT/LT  12/2003    Bilateral       Family History   Problem Relation Age of Onset     Breast Cancer Mother      Hypertension Mother         She potentially had this.     Other - See Comments Father         He potentially had liver issues and dementia.     Heart Disease Maternal Grandmother         She potentially had this.     Cancer Maternal Grandfather         Unknown type of cancer.     Other - See Comments Paternal Half-Brother         He potentially had dementia.       Social History     Tobacco Use     Smoking status: Never Smoker     Smokeless tobacco: Never Used   Vaping Use     Vaping Use: Never used   Substance Use Topics     Alcohol use: Yes     Comment: [place].     Drug use: No         Allergies   Allergen Reactions     Shrimp Hives        Current Outpatient Medications   Medication Sig Dispense Refill     acetaminophen (TYLENOL) 325 MG tablet Take 325-650 mg by mouth every 4 hours as needed Take as needed following label instructions       acyclovir (ZOVIRAX) 400 MG tablet Take 400 mg by mouth 2 times daily       amLODIPine (NORVASC) 2.5 MG tablet TAKE 1 TABLET(2.5 MG) BY MOUTH DAILY (Patient  taking differently: Take 2.5 mg by mouth daily ) 90 tablet 3     Artificial Tear Solution (SM ARTIFICIAL TEARS) SOLN Apply 1 Dose to eye every 2 hours as needed Use per label instructions       lisinopril (ZESTRIL) 20 MG tablet Take 1 tablet (20 mg) by mouth daily 90 tablet 3     Multiple Vitamins-Minerals (EQ COMPLETE MULTIVITAMIN-ADULT) TABS Take 1 tablet by mouth daily       pravastatin (PRAVACHOL) 20 MG tablet TAKE 1 TABLET BY MOUTH 3 TIMES A WEEK (Patient taking differently: Take 20 mg by mouth Every Mon, Wed, Fri Morning ) 36 tablet 3         Physical Exam:     Vital Signs: BP (!) 143/75 (BP Location: Right arm, Patient Position: Sitting, Cuff Size: Adult Regular)   Pulse 82   Temp 98.2  F (36.8  C) (Oral)   Resp 16   Wt 65.4 kg (144 lb 1.6 oz)   SpO2 99%   BMI 20.06 kg/m       He is thin, pleasant, interactive, sclerae anicteric, cranial nerves grossly intact, no oral mucosal lesions, normal respiratory effort, no JVD, normal perfusion, abdomen non-distended, skin without rash, no edema, normal affect.          BMT and Cell Therapy Informed Consent Discussion     Mr. Angulo completed his workup and passed his assessments, but he asks to delay transplant for 4-6 months in order to be more confident in management given the challenges the winter poses, the stress on his wife, and to work toward obtaining a deeper remission.  This is reasonable, as the primary benefit of transplant is in delaying progression and achieving time off intensive therapy. In the long-term follow up of [facility] 2009, after a median follow-up of 89.8 months, median progression-free survival was 47.2 months after ASCT and 35 months with RVd. Owing to effective second-line therapies and beyond, more than 60% of patients in both arms were alive after 8 years of follow-up (https://jennyfer.confex.com/jennyfer/2020/webprogram/Axiit522613.html).  He understands that there is an approximate 12% risk of becoming transplantation ineligible when opting for  delayed ASCT as a result of worsening performance status, comorbidities, and aggressive disease relapse (Jimy QUEVEDO, Marvin [place], Alfred MEAD, et al: Analysis of clinical factors and outcomes associated with non-utilization of collected peripheral blood stem cells for autologous stem cell transplants in transplant eligible patients with multiple myeloma. Biol Blood Marrow Transplant 24:5942-0973, 2018).    Since his myeloma has plateaued on his current regimen, I suggest considering a switch to a different regimen such as daratumumab, ixazomib, and dexamethasone. I would try to avoid further IMiDs for now due to potentially reducing his stem cell mobilization capabilities in the next 4-6 months.    I am happy to see him back in about 4 months' time to reconsider autologous transplantation.     Known issues that I take into account for medical decisions, with salient changes to the plan considering these complexities noted above.    Patient Active Problem List   Diagnosis     HYPERLIPIDEMIA LDL GOAL <130     PSEUDOPHAKIA OU     Advanced directives, counseling/discussion     History of actinic keratoses     PVD (posterior vitreous detachment), bilateral     Nonexudative senile macular degeneration of retina     Elevated glucose     Essential hypertension with goal blood pressure less than 140/90     Elevated prostate specific antigen (PSA)     Dupuytren contracture     Benign prostatic hyperplasia with urinary retention     Multiple myeloma not having achieved remission (H)         I spent 50 minutes in the care of this patient today, which included time necessary for preparation for the visit, obtaining history, ordering medications/tests/procedures as medically indicated, review of pertinent medical literature, counseling of the patient, communication of recommendations to the care team, and documentation time.    Pepper Elise    Communicate with me securely using Vocera        Blood Counts       Recent Labs    Lab Test 12/01/21  1357 11/30/21  1148 06/23/21  0942   HGB 10.8* 10.4* 10.0*   HCT 33.3* 30.9* 30.6*   WBC 3.3* 3.1* 3.3*   ANEUTAUTO 0.7* 0.7*  --    ALYMPAUTO 2.2 1.9  --    AMONOAUTO 0.4 0.3  --    AEOSAUTO 0.0 0.0  --    ABSBASO 0.0 0.0  --    NRBCMAN 0.0 0.0  --     196 155       ABO/RH    Recent Labs   Lab Test 11/30/21  1148   ABORH A POS       Hemoglobin S Screening    Recent Labs   Lab Test 11/30/21  1148   HGBS Negative         Chemistries     Basic Panel  Recent Labs   Lab Test 12/08/21  1521 11/30/21  1148 11/29/21  0930 06/23/21  0942   NA  --  143 141 140   POTASSIUM  --  3.8 4.3 3.6   CHLORIDE  --  108 110* 108   CO2  --  29 27 27   BUN  --  17 16 15   CR 0.89 0.87 0.94 0.97   GLC  --  109* 106* 100*        Calcium, Magnesium, Phosphorus  Recent Labs   Lab Test 11/30/21  1148 11/29/21  0930 06/23/21  0942   RAKESH 9.3 9.3 8.5   MAG 2.2  --   --    PHOS 3.2  --   --         LFTs  Recent Labs   Lab Test 11/30/21  1148 06/23/21  0942 04/28/21  1214   BILITOTAL 0.6 0.6 0.6   ALKPHOS 49 46 51   AST 11 11 10   ALT 22 13 18   ALBUMIN 3.8 4.1 4.5       LDH  Recent Labs   Lab Test 11/30/21  1148 06/23/21  0942 04/28/21  1214    155 154       B2-Microglobulin  Recent Labs   Lab Test 11/30/21  1148 06/09/21  0827   ADAV2VITK 1.7 1.9       Vitamin D  Recent Labs   Lab Test 11/30/21  1148   VITDT 32         Urine Studies       Recent Labs   Lab Test 11/30/21  1159   COLOR Yellow   APPEARANCE Cloudy*   URINEGLC Negative   URINEBILI Negative   URINEKETONE Negative   SG 1.011   UBLD Small*   URINEPH 6.0   PROTEIN Negative   UUROI Normal   NITRITE Positive*   LEUKEST Large*   MUCUS Present*   RBCU 7*   WBCU >182*       Creatinine Clearance    Recent Labs   Lab Test 12/02/21  0545      WT 64.0         VOL 1,198  1,198   DUR 24.0  24.0   CREATININE 0.87   UCRR 113  109   UCR24 1.35  1.31         Infectious Disease Markers     [facility] IDM    Recent Labs   Lab Test  11/30/21  1148   DCMIG Positive*   DHBSAG Non-Reactive   DHBCAB Non-Reactive   DHIVAB Non-Reactive   DHCVAB Non-Reactive   DHTLVA Non-Reactive   TCRUZI Non-Reactive   DTRPAB Non-Reactive         Hepatitis and HIV    Recent Labs   Lab Test 10/07/16  0929   HCVAB Nonreactive   Assay performance characteristics have not been established for newborns,   infants, and children           CMV  No lab results found.      EBV    Recent Labs   Lab Test 11/30/21  1148   EBVCAG Positive*       HSV 1/2    Recent Labs   Lab Test 11/30/21  1148   [id number] 0.49   H1IGG No HSV-1 IgG antibodies detected.   [id number] 0.10   H2IGG No HSV-2 IgG antibodies detected.         VZV    No lab results found.      HTLV    Recent Labs   Lab Test 11/30/21  1148   DHTLVA Non-Reactive         Toxoplasma  (not routinely checked)      COVID    Recent Labs   Lab Test 11/30/21  1159   [id number] Negative         Immunoglobulins     Recent Labs   Lab Test 11/30/21  1148 06/23/21  0942 04/28/21  1214   IGG 1,247 2,530* 2,862*       Recent Labs   Lab Test 11/30/21  1148 06/23/21  0942 04/28/21  1214   IGA 50* 37* 35*       Recent Labs   Lab Test 11/30/21  1148 06/23/21  0942 04/28/21  1214   IGM 41 30* 31*         Monocloncal Protein Studies     M spike    Recent Labs   Lab Test 11/30/21  1148 06/23/21  0942 04/28/21  1214   ELPM 0.7* 1.7* 2.1*       Ivey FLC    Recent Labs   Lab Test 11/30/21  1148   KFLCA 27.07*       Lambda FLC    Recent Labs   Lab Test 11/30/21  1148   LFLCA 1.21       FLC Ratio    Recent Labs   Lab Test 11/30/21  1148   KLRA 22.37*           Bone Marrow Biopsy       Morphology    Results for orders placed or performed in visit on 12/01/21 (from the past 8760 hour(s))   Bone marrow biopsy   Result Value    Final Diagnosis      Bone marrow, posterior iliac crest, left decalcified trephine biopsy and touch imprint; left direct aspirate smear, and concentrated aspirate smear; and peripheral smear:  -Slightly hypocellular marrow (cellularity  estimated at 20% on average) with trilineage hematopoiesis, no increase in blasts  - Persistent/recurrent plasma cell myeloma with approximately 5% kappa monotypic plasma cells (see comment)  - Peripheral blood showing moderate normochromic, normocytic anemia; slight leukopenia; neutropenia        Comment      Concurrent flow cytometry (UF ) revealed kappa monotypic plasma cells.  Please correlate also with results of other ancillary studies      Clinical Information      From Epic electronic medical record; 71-year-old male is being worked up for autologous peripheral blood stem cell transplant for IgG kappa multiple myeloma.        Peripheral Hematologic Data      CBC WITH DIFFERENTIAL (12/01/2021 02:07 PM CST):     RESULT VALUE REF. RANGE UNITS    WBC Count    Hemoglobin    Hematocrit   Platelet Count   RBC Count   MCV  MCH  MCHC   RDW   3.3  ( L )     10.8  ( L )      33.3  ( L )  196 (NORMAL)   3.35  ( L )       99 (NORMAL)     32.2 (NORMAL)     32.4 (NORMAL)      17.1  ( H ) 4.0-11.0  13.3-17.7  40.0-53.0  150-450  4.40-5.90    26.5-33.0  31.5-36.5  10.0-15.0 10e3/uL  g/dL  %  10e3/uL  10e6/uL  fL  pg  g/dL  %   % Neutrophils  % Lymphocytes  % Monocytes  % Eosinophils  % Basophils  % Immature Granulocytes   Absolute Neutrophils   Absolute Lymphocytes  Absolute Monocytes  Absolute Eosinophils  Absolute Basophils  Absolute Immature Granulocytes  NRBCs per 100 WBC  Absolute NRBCs 21  66  11  1  1  0   0.7  ( L )  2.2 (NORMAL)  0.4 (NORMAL)  0.0 (NORMAL)  0.0 (NORMAL)  0.0 (NORMAL)  0 (NORMAL)  0.0 ( ) N/A  N/A  N/A  N/A  N/A  N/A  1.6-8.3  0.8-5.3  0.0-1.3  0.0-0.7  0.0-0.2  <=0.4  <1  <=0.0 %  %  %  %  %  %  10e3/uL  10e3/uL  10e3/uL  10e3/uL  10e3/uL  10e3/uL  /100  10e3/uL          Microscopic Description      PERIPHERAL BLOOD SMEAR MORPHOLOGY:  The red blood cells appear normochromic.  Poikilocytosis includes rare dacryocytes, occasional echinocytes and occasional elliptocytes.  Polychromasia is  not increased.  Rouleaux formation is not increased. The morphology of the platelets is normal.  Small platelet clumps are present.  Rare reactive appearing lymphocytes are seen.    Bone marrow aspirates and trephine core biopsy touch imprints are reviewed.    BONE MARROW DIFFERENTIAL (500 cells on touch imprints)  Percent (%) Cell Population Reference Range (%)   1 Blasts  (0 - 1)   2 Neutrophil promyelocytes (2 - 4)   61 Neutrophils and precursors (54 - 63)   19 Erythroid precursors (18 - 24)   3 Monocytes (1 - 1.5)   0 Eosinophils (1 - 3))   0 Basophils (0 - 1))   12 Lymphocytes (8 - 12)   2 Plasma cells (0 - 1.5)     The aspirate smears are possibly hemodilute.    Neutrophil maturation is complete, with normal cytoplasmic granulation. Erythroid maturation is complete. Megakaryocytes are present.  Majority of the megakaryocytes show normal morphology, but a very rare dysplastic form with widely spaced  small nuclear lobes is seen.  Plasma cells are scattered in the touch imprints.    TREPHINE SECTIONS:  Hematoxylin and eosin stains are reviewed. . The quality of the trephine core biopsy is very good. The marrow cellularity is slightly variable, average estimated at 20%. The cellular composition reflects the touch imprint differential. The number of megakaryocytes appears consistent with the degree of marrow cellularity, with normal distribution without pathologic clustering.  Plasma cells are seen scattered, also present in the hypocellular areas.    IMMUNOHISTOCHEMISTRY:  Immunohistochemical stains are performed on the paraffin-embedded trephine core with appropriate controls.     staining shows increased plasma cells, scattered and forming small clusters but without large aggregates and without sheets.  The percentage of plasma cells is difficult to evaluate, approximately 5%.  Kappa and lambda immunohistochemical staining shows that the plasma cells are kappa monotypic.    Note: These  immunohistochemical stains are deemed medically necessary. Some of the antigens may also be evaluated by flow cytometry. Concurrent evaluation by immunohistochemistry on clot and/or trephine sections is indicated in this case in order to correlate immunophenotype with cell morphology and determine extent of involvement, spatial pattern, and focality of potential disease distribution.         Gross Description      Procedure/Gross Description   Aspirate(s) and trephine(s) procured by BEC[facility] Elliott    Specimen sent for Special Studies:         Flow Cytometry: left aspirate        Cytogenetics: left aspirate        Molecular Diagnostics: left aspirate          Biopsy aspiration site: left posterior iliac crest                                                      (Reference Range)          Amount of aspirate           5.4   mL        Fat and P.V. cell layer        2    %               (1 - 3)        Particles                             0   %        Myeloid-erythroid layer    trace    %               (5 - 8)          Clot Section: no    Trephine biopsy site: left posterior iliac crest    Designated left posterior iliac crest is 1 cylinder of gritty tissue, labeled with the patient's name and hospital number, obtained with 11 gauge needle and a length of 25 mm; entirely submitted in 1 cassette; acetic zinc formalin fixed, decalcified, processed, and stained for hematoxylin and eosin per laboratory protocol.          Performing Labs      The technical component of this testing was completed at [facility]             Chest X-Ray - 2 view     Results for orders placed during the hospital encounter of 12/01/21    XR CHEST 2 VW    Status: Normal 12/1/2021    Narrative  XR CHEST 2 VW  12/1/2021 10:41 AM    HISTORY: Myeloma (H); Personal history of diseases of blood and  blood-forming organs    COMPARISON: 6/19/2021 PET/CT    FINDINGS: Upright, AP and  lateral views of the chest.    The cardiac silhouette size is within normal limits. No significant  pleural effusion or pneumothorax. No airspace consolidation. The  visualized upper abdomen and osseus structures appear normal.    Impression  IMPRESSION: No acute airspace opacity.    I have personally reviewed the examination and initial interpretation  and I agree with the findings.    RIKKI ZARAGOZA MD      SYSTEM ID:  [id number]          PFTs     FVC%  Recent Labs   Lab Test 21 116       FEV1%  Recent Labs   Lab Test 21 118       DLCO%  Recent Labs   Lab Test 21 106           EKG       ECG results from 21   EKG 12-lead complete w/read - Clinics     Value    Systolic Blood Pressure     Diastolic Blood Pressure     Ventricular Rate 65    Atrial Rate 65    WI Interval 124    QRS Duration 100        QTc 428    P Axis 68    R AXIS 45    T Axis 68    Interpretation ECG      Sinus rhythm  Normal ECG  No previous ECGs available  Confirmed by MD GROSSMAN DAVID () on 2021 9:52:52 AM           ECHOCARDIOGRAM       Results for orders placed during the hospital encounter of 21    ECHOCARDIOGRAM COMPLETE    Status: Normal 2021    Narrative  [id number]  [id number]  [id number]  [id number]^SHU^MADIHA^LEAH    [facility]  Echocardiography Laboratory  [address] [place] [address]    Name: BOB RINALDI  MRN: [id number]  : 1950  Study Date: 2021 07:50 AM  Age: 71 yrs  Gender: Male  Patient Location: [facility]  Reason For Study: Examination prior to chemotherapy, Myeloma (H), Personal  history  Ordering Physician: MADIHA IRELAND  Referring Physician: MADIHA IRELAND  Performed By: Lilly eDvi RDCS, RVT    BSA: 1.8 m2  Height: 71 in  Weight: 141 lb  BP: 152/65 mmHg  ______________________________________________________________________________  Procedure  Echocardiogram with  two-dimensional, color and spectral Doppler performed.  ______________________________________________________________________________  Interpretation Summary  Normal left and right ventricular size, thickness, global, and regional  systolic function. 3D LVEF volumetric analysis is 64%. Global peak LV  longitudinal strain is averaged at -22.1%. This is within reported normal  limits (normal <-18%).  No significant valvular abnormalities are noted.  No pericardial effusion is present.  Previous study not available for comparison.  ______________________________________________________________________________  Left Ventricle  Global and regional left ventricular function is normal with an EF of 60-65%.  3D LVEF volumetric analysis is 64%. Left ventricular size is normal. Left  ventricular wall thickness is normal. Left ventricular diastolic function is  indeterminate. Global peak LV longitudinal strain is averaged at -22.1%. This  is within reported normal limits (normal <-18%).    Right Ventricle  Right ventricular function, chamber size, wall motion, and thickness are  normal.    Atria  Mild biatrial enlargement is present.    Mitral Valve  The mitral valve is normal. Trace mitral insufficiency is present.    Aortic Valve  The aortic valve is tricuspid. Mild aortic valve calcification is present.    Tricuspid Valve  The tricuspid valve is normal. Trace tricuspid insufficiency is present. The  right ventricular systolic pressure is approximated at 29.8 mmHg plus the  right atrial pressure.    Pulmonic Valve  The pulmonic valve is normal. Trace pulmonic insufficiency is present.    Vessels  The aorta root is normal. The thoracic aorta is normal. The pulmonary artery  cannot be assessed. The inferior vena cava was normal in size with preserved  respiratory variability. IVC diameter <2.1 cm collapsing >50% with sniff  suggests a normal RA pressure of 3 mmHg.    Pericardium  No pericardial effusion is  present.    Compared to Previous Study  Previous study not available for comparison.  ______________________________________________________________________________  MMode/2D Measurements & Calculations    IVSd: 0.71 cm  LVIDd: 5.0 cm  LVIDs: 3.2 cm  LVPWd: 0.71 cm  FS: 36.3 %  LV mass(C)d: 116.1 grams  LV mass(C)dI: 63.9 grams/m2  Ao root diam: 2.9 cm  asc Aorta Diam: 3.1 cm  LVOT diam: 2.1 cm  LVOT area: 3.5 cm2  LA Volume (BP): 70.1 ml  LA Volume Index (BP): 38.5 ml/m2  RWT: 0.28    Doppler Measurements & Calculations  MV E max cammy: 71.9 cm/sec  MV A max cammy: 61.2 cm/sec  MV E/A: 1.2  MV dec slope: 306.0 cm/sec2  MV dec time: 0.24 sec  PA acc time: 0.15 sec  TR max cammy: 272.8 cm/sec  TR max P.8 mmHg  E/E' av.2  Lateral E/e': 6.0  Medial E/e': 8.5    ______________________________________________________________________________  Report approved by: Alley Washington 2021 09:03 AM        PET Scan       Results for orders placed during the hospital encounter of 21    PET ONCOLOGY WHOLE BODY    Status: Normal 2021    Narrative  Combined Report of:    PET and CT on  2021 10:20 AM :    1. PET of the neck, chest, abdomen, and pelvis.  2. PET CT Fusion for Attenuation Correction and Anatomical  Localization:  3. 3D MIP and PET-CT fused images were processed on an independent  workstation and archived to PACS and reviewed by a radiologist.    Technique:    1. PET: The patient received 10.05 mCi of F-18-FDG; the serum glucose  was 111 prior to administration, body weight was 65 kg. Images were  evaluated in the axial, sagittal, and coronal planes as well as the  rotational whole body MIP. Images were acquired from the Vertex to the  Feet.    UPTAKE WAS MEASURED AT 66 MINUTES.    BACKGROUND:  Liver SUV max= 2.77,   Aorta Blood SUV Max: 2.3.    2. CT: CT only obtained for attenuation correction and not diagnostic  purposes.    INDICATION: Multiple myeloma, follow up; MM; Myeloma (H);  Personal  history of diseases of blood and blood-forming organs    ADDITIONAL INFORMATION OBTAINED FROM EMR: IgG kappa multiple myeloma,  RISS stage 1, standard risk.    COMPARISON: PET/CT 6/19/2021.    FINDINGS:    HEAD/NECK:  There is no suspicious FDG uptake in the neck.    Symmetric FDG uptake by the scalene muscles is physiologic, due to  muscle contraction.    CHEST:  There is no suspicious FDG uptake in the chest.    FDG uptake by bilateral intercostal muscles is physiologic.    ABDOMEN AND PELVIS:  There is no suspicious FDG uptake in the abdomen or pelvis.  FDG uptake along inferior aspects of bilateral iliopsoas muscles more  on the left is physiologic/due to muscle contraction.    LOWER EXTREMITIES:  No abnormal masses or hypermetabolic lesions.    BONES:  There is no abnormal FDG uptake in the skeleton. Scattered small  non-FDG avid lucencies in the pelvic bone, sacrum and sternum.    Impression  IMPRESSION: In this patient with multiple myeloma;  1. No evidence of active disease in the skeleton.  2. Scattered small non-FDG avid lucencies in the pelvic bone, sacrum  and sternum, may represent treated multiple myeloma versus inherently  non-metabolic myeloma lesions.    I have personally reviewed the examination and initial interpretation  and I agree with the findings.    TORIN JAY MD      SYSTEM ID:  [id number]         Pepper Elise MD

## 2021-12-08 NOTE — LETTER
12/8/2021         RE: Hamilton Angulo  [address]  [place] [address]      BMT/Cell Therapy Consultation      Workup Nurse Coordinator: Andrea  Primary BMT Physician: Arik  Closing BMT Physician (if different): Arik  Date of Summary:  12/08/2021  Reason for Transplant: MM  Protocol: 2016-35 auto MM      Hamilton Angulo is a 71 year old male referred by Dr. Bowling and Caleb for IgG kappa multiple myeloma, RISS stage 1, standard risk.      Diagnosis and Treatment Summary         Disease presentation and baseline characteristics:  Standard risk IgG kappa MM presenting as progressive but asymptomatic anemia. WBC 3.3.  Hemoglobin 10.  Platelets 155. CMP showed normal creatinine and calcium.  Total protein 8.7.  LFTs unremarkable.  Albumin 4.1, Beta-2 microglobulin 1.9. Bone marrow biopsy showed plasma cell myeloma with normocellular marrow, cellularity 20 to 30% with trilineage hematopoiesis and 20- 25% kappa monotypic plasma cells.  Increased marrow storage iron. Bone marrow flow cytometry showed 1.2% plasma cells which express CD38, CD19, CD20 (partial dim), CD45 (dim), CD56  and monotypic cytoplasmic kappa immunoglobulin light chains. FISH Hyperdiploid with gains of chromosomes 5, 9, and/or 15 (85%), gain of 11q (95%), monosomy 13 (46%), loss of IGH (4.5%) (control range 0-2.8%); no rearrangement of IGH. Cytogenetics 46 XY. Baseline M spike of 2.1. Serum IgG level was 2862.  Monoclonal peak in the urine was 57.5 and MANDI showed large monoclonal free kappa and monoclonal IgG kappa. [place] free light chain 57.  Lambda 0.65 and the ratio 88. On 10/1/21, his M spike was down to 0.8 and kappa FLC down to 25 (partial response).  Kappa FLC plateaued at 27 at pre-autologous transplant workup.    Date Treatment Name Response Side Effects / Toxicities   8/2021 RVd x 6 Partial response Neutropenia, neuropathy                            HPI:  Please see my entry above for disease and treatment history.  Hamilton is here  to discuss the results of his workup.  Overall, he is feeling well.  Gaining his appetite again while his myeloma therapy has been on hold. Mild neuropathy symptoms.  No fevers, chills, vomiting, diarrhea, dyspnea, rash, or other concerns.      ROS:    10 point ROS neg other than the symptoms noted above in the HPI.        Past Medical History:   Diagnosis Date     Bilateral cataracts 2001     Herniated disc 1983    Lumbar     Hyperlipidemia LDL goal <130      Hypertension 5/2004     Tear of MCL (medial collateral ligament) of knee 6/20/2001    LT Knee/WC       Past Surgical History:   Procedure Laterality Date     CATARACT IOL, RT/LT  12/2003    Bilateral       Family History   Problem Relation Age of Onset     Breast Cancer Mother      Hypertension Mother         She potentially had this.     Other - See Comments Father         He potentially had liver issues and dementia.     Heart Disease Maternal Grandmother         She potentially had this.     Cancer Maternal Grandfather         Unknown type of cancer.     Other - See Comments Paternal Half-Brother         He potentially had dementia.       Social History     Tobacco Use     Smoking status: Never Smoker     Smokeless tobacco: Never Used   Vaping Use     Vaping Use: Never used   Substance Use Topics     Alcohol use: Yes     Comment: [place].     Drug use: No         Allergies   Allergen Reactions     Shrimp Hives        Current Outpatient Medications   Medication Sig Dispense Refill     acetaminophen (TYLENOL) 325 MG tablet Take 325-650 mg by mouth every 4 hours as needed Take as needed following label instructions       acyclovir (ZOVIRAX) 400 MG tablet Take 400 mg by mouth 2 times daily       amLODIPine (NORVASC) 2.5 MG tablet TAKE 1 TABLET(2.5 MG) BY MOUTH DAILY (Patient taking differently: Take 2.5 mg by mouth daily ) 90 tablet 3     Artificial Tear Solution (SM ARTIFICIAL TEARS) SOLN Apply 1 Dose to eye every 2 hours as needed Use per label instructions        lisinopril (ZESTRIL) 20 MG tablet Take 1 tablet (20 mg) by mouth daily 90 tablet 3     Multiple Vitamins-Minerals (EQ COMPLETE MULTIVITAMIN-ADULT) TABS Take 1 tablet by mouth daily       pravastatin (PRAVACHOL) 20 MG tablet TAKE 1 TABLET BY MOUTH 3 TIMES A WEEK (Patient taking differently: Take 20 mg by mouth Every Mon, Wed, Fri Morning ) 36 tablet 3         Physical Exam:     Vital Signs: BP (!) 143/75 (BP Location: Right arm, Patient Position: Sitting, Cuff Size: Adult Regular)   Pulse 82   Temp 98.2  F (36.8  C) (Oral)   Resp 16   Wt 65.4 kg (144 lb 1.6 oz)   SpO2 99%   BMI 20.06 kg/m       He is thin, pleasant, interactive, sclerae anicteric, cranial nerves grossly intact, no oral mucosal lesions, normal respiratory effort, no JVD, normal perfusion, abdomen non-distended, skin without rash, no edema, normal affect.          BMT and Cell Therapy Informed Consent Discussion     Mr. Angulo completed his workup and passed his assessments, but he asks to delay transplant for 4-6 months in order to be more confident in management given the challenges the winter poses, the stress on his wife, and to work toward obtaining a deeper remission.  This is reasonable, as the primary benefit of transplant is in delaying progression and achieving time off intensive therapy. In the long-term follow up of [facility] 2009, after a median follow-up of 89.8 months, median progression-free survival was 47.2 months after ASCT and 35 months with RVd. Owing to effective second-line therapies and beyond, more than 60% of patients in both arms were alive after 8 years of follow-up (https://jennyfer.confex.com/[email or web address]/webprogram/Ezkik922834.html).  He understands that there is an approximate 12% risk of becoming transplantation ineligible when opting for delayed ASCT as a result of worsening performance status, comorbidities, and aggressive disease relapse (Jimy QUEVEDO, Marvin PACE, Alfred MEAD, et al: Analysis of clinical factors and outcomes  associated with non-utilization of collected peripheral blood stem cells for autologous stem cell transplants in transplant eligible patients with multiple myeloma. Biol Blood Marrow Transplant 24:8693-5273, 2018).    Since his myeloma has plateaued on his current regimen, I suggest considering a switch to a different regimen such as daratumumab, ixazomib, and dexamethasone. I would try to avoid further IMiDs for now due to potentially reducing his stem cell mobilization capabilities in the next 4-6 months.    I am happy to see him back in about 4 months' time to reconsider autologous transplantation.     Known issues that I take into account for medical decisions, with salient changes to the plan considering these complexities noted above.    Patient Active Problem List   Diagnosis     HYPERLIPIDEMIA LDL GOAL <130     PSEUDOPHAKIA OU     Advanced directives, counseling/discussion     History of actinic keratoses     PVD (posterior vitreous detachment), bilateral     Nonexudative senile macular degeneration of retina     Elevated glucose     Essential hypertension with goal blood pressure less than 140/90     Elevated prostate specific antigen (PSA)     Dupuytren contracture     Benign prostatic hyperplasia with urinary retention     Multiple myeloma not having achieved remission (H)         I spent 50 minutes in the care of this patient today, which included time necessary for preparation for the visit, obtaining history, ordering medications/tests/procedures as medically indicated, review of pertinent medical literature, counseling of the patient, communication of recommendations to the care team, and documentation time.    Pepper Elise    Communicate with me securely using Vocera        Blood Counts       Recent Labs   Lab Test 12/01/21  1357 11/30/21  1148 06/23/21  0942   HGB 10.8* 10.4* 10.0*   HCT 33.3* 30.9* 30.6*   WBC 3.3* 3.1* 3.3*   ANEUTAUTO 0.7* 0.7*  --    ALYMPAUTO 2.2 1.9  --    AMONOAUTO 0.4  0.3  --    AEOSAUTO 0.0 0.0  --    ABSBASO 0.0 0.0  --    NRBCMAN 0.0 0.0  --     196 155       ABO/RH    Recent Labs   Lab Test 11/30/21  1148   ABORH A POS       Hemoglobin S Screening    Recent Labs   Lab Test 11/30/21  1148   HGBS Negative         Chemistries     Basic Panel  Recent Labs   Lab Test 12/08/21  1521 11/30/21  1148 11/29/21  0930 06/23/21  0942   NA  --  143 141 140   POTASSIUM  --  3.8 4.3 3.6   CHLORIDE  --  108 110* 108   CO2  --  29 27 27   BUN  --  17 16 15   CR 0.89 0.87 0.94 0.97   GLC  --  109* 106* 100*        Calcium, Magnesium, Phosphorus  Recent Labs   Lab Test 11/30/21  1148 11/29/21  0930 06/23/21  0942   RAKESH 9.3 9.3 8.5   MAG 2.2  --   --    PHOS 3.2  --   --         LFTs  Recent Labs   Lab Test 11/30/21  1148 06/23/21  0942 04/28/21  1214   BILITOTAL 0.6 0.6 0.6   ALKPHOS 49 46 51   AST 11 11 10   ALT 22 13 18   ALBUMIN 3.8 4.1 4.5       LDH  Recent Labs   Lab Test 11/30/21  1148 06/23/21  0942 04/28/21  1214    155 154       B2-Microglobulin  Recent Labs   Lab Test 11/30/21  1148 06/09/21  0827   [id number] 1.7 1.9       Vitamin D  Recent Labs   Lab Test 11/30/21  1148   VITDT 32         Urine Studies       Recent Labs   Lab Test 11/30/21  1159   COLOR Yellow   APPEARANCE Cloudy*   URINEGLC Negative   URINEBILI Negative   URINEKETONE Negative   SG 1.011   UBLD Small*   URINEPH 6.0   PROTEIN Negative   UUROI Normal   NITRITE Positive*   LEUKEST Large*   MUCUS Present*   RBCU 7*   WBCU >182*       Creatinine Clearance    Recent Labs   Lab Test 12/02/21  0545      WT 64.0         VOL 1,198  1,198   DUR 24.0  24.0   CREATININE 0.87   UCRR 113  109   UCR24 1.35  1.31         Infectious Disease Markers     [facility] IDM    Recent Labs   Lab Test 11/30/21  1148   DCMIG Positive*   DHBSAG Non-Reactive   DHBCAB Non-Reactive   DHIVAB Non-Reactive   DHCVAB Non-Reactive   DHTLVA Non-Reactive   TCRUZI Non-Reactive   DTRPAB Non-Reactive          Hepatitis and HIV    Recent Labs   Lab Test 10/07/16  0929   HCVAB Nonreactive   Assay performance characteristics have not been established for newborns,   infants, and children           CMV  No lab results found.      EBV    Recent Labs   Lab Test 11/30/21  1148   EBVCAG Positive*       HSV 1/2    Recent Labs   Lab Test 11/30/21  1148   I1YPNVO 0.49   H1IGG No HSV-1 IgG antibodies detected.   F0VXQMP 0.10   H2IGG No HSV-2 IgG antibodies detected.         VZV    No lab results found.      HTLV    Recent Labs   Lab Test 11/30/21  1148   DHTLVA Non-Reactive         Toxoplasma  (not routinely checked)      COVID    Recent Labs   Lab Test 11/30/21  1159   [id number] Negative         Immunoglobulins     Recent Labs   Lab Test 11/30/21  1148 06/23/21  0942 04/28/21  1214   IGG 1,247 2,530* 2,862*       Recent Labs   Lab Test 11/30/21  1148 06/23/21  0942 04/28/21  1214   IGA 50* 37* 35*       Recent Labs   Lab Test 11/30/21  1148 06/23/21  0942 04/28/21  1214   IGM 41 30* 31*         Monocloncal Protein Studies     M spike    Recent Labs   Lab Test 11/30/21  1148 06/23/21  0942 04/28/21  1214   ELPM 0.7* 1.7* 2.1*       [place] FLC    Recent Labs   Lab Test 11/30/21  1148   KFLCA 27.07*       Lambda FLC    Recent Labs   Lab Test 11/30/21  1148   LFLCA 1.21       FLC Ratio    Recent Labs   Lab Test 11/30/21  1148   KLRA 22.37*           Bone Marrow Biopsy       Morphology    Results for orders placed or performed in visit on 12/01/21 (from the past 8760 hour(s))   Bone marrow biopsy   Result Value    Final Diagnosis      Bone marrow, posterior iliac crest, left decalcified trephine biopsy and touch imprint; left direct aspirate smear, and concentrated aspirate smear; and peripheral smear:  -Slightly hypocellular marrow (cellularity estimated at 20% on average) with trilineage hematopoiesis, no increase in blasts  - Persistent/recurrent plasma cell myeloma with approximately 5% kappa monotypic plasma cells (see  comment)  - Peripheral blood showing moderate normochromic, normocytic anemia; slight leukopenia; neutropenia        Comment      Concurrent flow cytometry (UF ) revealed kappa monotypic plasma cells.  Please correlate also with results of other ancillary studies      Clinical Information      From Epic electronic medical record; 71-year-old male is being worked up for autologous peripheral blood stem cell transplant for IgG kappa multiple myeloma.        Peripheral Hematologic Data      CBC WITH DIFFERENTIAL (12/01/2021 02:07 PM CST):     RESULT VALUE REF. RANGE UNITS    WBC Count    Hemoglobin    Hematocrit   Platelet Count   RBC Count   MCV  MCH  MCHC   RDW   3.3  ( L )     10.8  ( L )      33.3  ( L )  196 (NORMAL)   3.35  ( L )       99 (NORMAL)     32.2 (NORMAL)     32.4 (NORMAL)      17.1  ( H ) 4.0-11.0  13.3-17.7  40.0-53.0  150-450  4.40-5.90    26.5-33.0  31.5-36.5  10.0-15.0 10e3/uL  g/dL  %  10e3/uL  10e6/uL  fL  pg  g/dL  %   % Neutrophils  % Lymphocytes  % Monocytes  % Eosinophils  % Basophils  % Immature Granulocytes   Absolute Neutrophils   Absolute Lymphocytes  Absolute Monocytes  Absolute Eosinophils  Absolute Basophils  Absolute Immature Granulocytes  NRBCs per 100 WBC  Absolute NRBCs 21  66  11  1  1  0   0.7  ( L )  2.2 (NORMAL)  0.4 (NORMAL)  0.0 (NORMAL)  0.0 (NORMAL)  0.0 (NORMAL)  0 (NORMAL)  0.0 ( ) N/A  N/A  N/A  N/A  N/A  N/A  1.6-8.3  0.8-5.3  0.0-1.3  0.0-0.7  0.0-0.2  <=0.4  <1  <=0.0 %  %  %  %  %  %  10e3/uL  10e3/uL  10e3/uL  10e3/uL  10e3/uL  10e3/uL  /100  10e3/uL          Microscopic Description      PERIPHERAL BLOOD SMEAR MORPHOLOGY:  The red blood cells appear normochromic.  Poikilocytosis includes rare dacryocytes, occasional echinocytes and occasional elliptocytes.  Polychromasia is not increased.  Rouleaux formation is not increased. The morphology of the platelets is normal.  Small platelet clumps are present.  Rare reactive appearing lymphocytes are  seen.    Bone marrow aspirates and trephine core biopsy touch imprints are reviewed.    BONE MARROW DIFFERENTIAL (500 cells on touch imprints)  Percent (%) Cell Population Reference Range (%)   1 Blasts  (0 - 1)   2 Neutrophil promyelocytes (2 - 4)   61 Neutrophils and precursors (54 - 63)   19 Erythroid precursors (18 - 24)   3 Monocytes (1 - 1.5)   0 Eosinophils (1 - 3))   0 Basophils (0 - 1))   12 Lymphocytes (8 - 12)   2 Plasma cells (0 - 1.5)     The aspirate smears are possibly hemodilute.    Neutrophil maturation is complete, with normal cytoplasmic granulation. Erythroid maturation is complete. Megakaryocytes are present.  Majority of the megakaryocytes show normal morphology, but a very rare dysplastic form with widely spaced  small nuclear lobes is seen.  Plasma cells are scattered in the touch imprints.    TREPHINE SECTIONS:  Hematoxylin and eosin stains are reviewed. . The quality of the trephine core biopsy is very good. The marrow cellularity is slightly variable, average estimated at 20%. The cellular composition reflects the touch imprint differential. The number of megakaryocytes appears consistent with the degree of marrow cellularity, with normal distribution without pathologic clustering.  Plasma cells are seen scattered, also present in the hypocellular areas.    IMMUNOHISTOCHEMISTRY:  Immunohistochemical stains are performed on the paraffin-embedded trephine core with appropriate controls.     staining shows increased plasma cells, scattered and forming small clusters but without large aggregates and without sheets.  The percentage of plasma cells is difficult to evaluate, approximately 5%.  Kappa and lambda immunohistochemical staining shows that the plasma cells are kappa monotypic.    Note: These immunohistochemical stains are deemed medically necessary. Some of the antigens may also be evaluated by flow cytometry. Concurrent evaluation by immunohistochemistry on clot and/or  trephine sections is indicated in this case in order to correlate immunophenotype with cell morphology and determine extent of involvement, spatial pattern, and focality of potential disease distribution.         Gross Description      Procedure/Gross Description   Aspirate(s) and trephine(s) procured by B[facility] Elliott    Specimen sent for Special Studies:         Flow Cytometry: left aspirate        Cytogenetics: left aspirate        Molecular Diagnostics: left aspirate          Biopsy aspiration site: left posterior iliac crest                                                      (Reference Range)          Amount of aspirate           5.4   mL        Fat and P.V. cell layer        2    %               (1 - 3)        Particles                             0   %        Myeloid-erythroid layer    trace    %               (5 - 8)          Clot Section: no    Trephine biopsy site: left posterior iliac crest    Designated left posterior iliac crest is 1 cylinder of gritty tissue, labeled with the patient's name and hospital number, obtained with 11 gauge needle and a length of 25 mm; entirely submitted in 1 cassette; acetic zinc formalin fixed, decalcified, processed, and stained for hematoxylin and eosin per laboratory protocol.          Performing Labs      The technical component of this testing was completed at [facility]             Chest X-Ray - 2 view     Results for orders placed during the hospital encounter of 12/01/21    XR CHEST 2 VW    Status: Normal 12/1/2021    Narrative  XR CHEST 2 VW  12/1/2021 10:41 AM    HISTORY: Myeloma (H); Personal history of diseases of blood and  blood-forming organs    COMPARISON: 6/19/2021 PET/CT    FINDINGS: Upright, AP and lateral views of the chest.    The cardiac silhouette size is within normal limits. No significant  pleural effusion or pneumothorax. No airspace consolidation. The  visualized upper  abdomen and osseus structures appear normal.    Impression  IMPRESSION: No acute airspace opacity.    I have personally reviewed the examination and initial interpretation  and I agree with the findings.    RIKKI ZARAGOZA MD      SYSTEM ID:  [id number]          PFTs     FVC%  Recent Labs   Lab Test 21 116       FEV1%  Recent Labs   Lab Test 21 118       DLCO%  Recent Labs   Lab Test 21 106           EKG       ECG results from 21   EKG 12-lead complete w/read - Clinics     Value    Systolic Blood Pressure     Diastolic Blood Pressure     Ventricular Rate 65    Atrial Rate 65    KS Interval 124    QRS Duration 100        QTc 428    P Axis 68    R AXIS 45    T Axis 68    Interpretation ECG      Sinus rhythm  Normal ECG  No previous ECGs available  Confirmed by MD CHAUNCEY, DOROTHY () on 2021 9:52:52 AM           ECHOCARDIOGRAM       Results for orders placed during the hospital encounter of 21    ECHOCARDIOGRAM COMPLETE    Status: Normal 2021    Narrative  [id number]  [id number]  [id number]  [id number]^SHU^MADIHA^LEAH    [facility]  [facility]  [address] [place] [id number]    Name: BOB RINALDI  MRN: [id number]  : 1950  Study Date: 2021 07:50 AM  Age: 71 yrs  Gender: Male  Patient Location: [facility]  Reason For Study: Examination prior to chemotherapy, Myeloma (H), Personal  history  Ordering Physician: MADIHA IRELAND  Referring Physician: MADIHA IRELAND  Performed By: Lilly Devi RDCS, RVT    BSA: 1.8 m2  Height: 71 in  Weight: 141 lb  BP: 152/65 mmHg  ______________________________________________________________________________  Procedure  Echocardiogram with two-dimensional, color and spectral Doppler performed.  ______________________________________________________________________________  Interpretation Summary  Normal left and right ventricular  size, thickness, global, and regional  systolic function. 3D LVEF volumetric analysis is 64%. Global peak LV  longitudinal strain is averaged at -22.1%. This is within reported normal  limits (normal <-18%).  No significant valvular abnormalities are noted.  No pericardial effusion is present.  Previous study not available for comparison.  ______________________________________________________________________________  Left Ventricle  Global and regional left ventricular function is normal with an EF of 60-65%.  3D LVEF volumetric analysis is 64%. Left ventricular size is normal. Left  ventricular wall thickness is normal. Left ventricular diastolic function is  indeterminate. Global peak LV longitudinal strain is averaged at -22.1%. This  is within reported normal limits (normal <-18%).    Right Ventricle  Right ventricular function, chamber size, wall motion, and thickness are  normal.    Atria  Mild biatrial enlargement is present.    Mitral Valve  The mitral valve is normal. Trace mitral insufficiency is present.    Aortic Valve  The aortic valve is tricuspid. Mild aortic valve calcification is present.    Tricuspid Valve  The tricuspid valve is normal. Trace tricuspid insufficiency is present. The  right ventricular systolic pressure is approximated at 29.8 mmHg plus the  right atrial pressure.    Pulmonic Valve  The pulmonic valve is normal. Trace pulmonic insufficiency is present.    Vessels  The aorta root is normal. The thoracic aorta is normal. The pulmonary artery  cannot be assessed. The inferior vena cava was normal in size with preserved  respiratory variability. IVC diameter <2.1 cm collapsing >50% with sniff  suggests a normal RA pressure of 3 mmHg.    Pericardium  No pericardial effusion is present.    Compared to Previous Study  Previous study not available for comparison.  ______________________________________________________________________________  MMode/2D Measurements & Calculations    IVSd:  0.71 cm  LVIDd: 5.0 cm  LVIDs: 3.2 cm  LVPWd: 0.71 cm  FS: 36.3 %  LV mass(C)d: 116.1 grams  LV mass(C)dI: 63.9 grams/m2  Ao root diam: 2.9 cm  asc Aorta Diam: 3.1 cm  LVOT diam: 2.1 cm  LVOT area: 3.5 cm2  LA Volume (BP): 70.1 ml  LA Volume Index (BP): 38.5 ml/m2  RWT: 0.28    Doppler Measurements & Calculations  MV E max cammy: 71.9 cm/sec  MV A max cammy: 61.2 cm/sec  MV E/A: 1.2  MV dec slope: 306.0 cm/sec2  MV dec time: 0.24 sec  PA acc time: 0.15 sec  TR max cammy: 272.8 cm/sec  TR max P.8 mmHg  E/E' av.2  Lateral E/e': 6.0  Medial E/e': 8.5    ______________________________________________________________________________  Report approved by: Alley Washington 2021 09:03 AM        PET Scan       Results for orders placed during the hospital encounter of 21    PET ONCOLOGY WHOLE BODY    Status: Normal 2021    Narrative  Combined Report of:    PET and CT on  2021 10:20 AM :    1. PET of the neck, chest, abdomen, and pelvis.  2. PET CT Fusion for Attenuation Correction and Anatomical  Localization:  3. 3D MIP and PET-CT fused images were processed on an independent  workstation and archived to PACS and reviewed by a radiologist.    Technique:    1. PET: The patient received 10.05 mCi of F-18-FDG; the serum glucose  was 111 prior to administration, body weight was 65 kg. Images were  evaluated in the axial, sagittal, and coronal planes as well as the  rotational whole body MIP. Images were acquired from the Vertex to the  Feet.    UPTAKE WAS MEASURED AT 66 MINUTES.    BACKGROUND:  Liver SUV max= 2.77,   Aorta Blood SUV Max: 2.3.    2. CT: CT only obtained for attenuation correction and not diagnostic  purposes.    INDICATION: Multiple myeloma, follow up; MM; Myeloma (H); Personal  history of diseases of blood and blood-forming organs    ADDITIONAL INFORMATION OBTAINED FROM EMR: IgG kappa multiple myeloma,  RISS stage 1, standard risk.    COMPARISON: PET/CT  6/19/2021.    FINDINGS:    HEAD/NECK:  There is no suspicious FDG uptake in the neck.    Symmetric FDG uptake by the scalene muscles is physiologic, due to  muscle contraction.    CHEST:  There is no suspicious FDG uptake in the chest.    FDG uptake by bilateral intercostal muscles is physiologic.    ABDOMEN AND PELVIS:  There is no suspicious FDG uptake in the abdomen or pelvis.  FDG uptake along inferior aspects of bilateral iliopsoas muscles more  on the left is physiologic/due to muscle contraction.    LOWER EXTREMITIES:  No abnormal masses or hypermetabolic lesions.    BONES:  There is no abnormal FDG uptake in the skeleton. Scattered small  non-FDG avid lucencies in the pelvic bone, sacrum and sternum.    Impression  IMPRESSION: In this patient with multiple myeloma;  1. No evidence of active disease in the skeleton.  2. Scattered small non-FDG avid lucencies in the pelvic bone, sacrum  and sternum, may represent treated multiple myeloma versus inherently  non-metabolic myeloma lesions.    I have personally reviewed the examination and initial interpretation  and I agree with the findings.    TORIN JAY MD      SYSTEM ID:  [id number]      Pepper Elise MD

## 2021-12-20 DIAGNOSIS — E78.5 HYPERLIPIDEMIA LDL GOAL <130: ICD-10-CM

## 2021-12-22 RX ORDER — PRAVASTATIN SODIUM 20 MG
TABLET ORAL
Qty: 36 TABLET | Refills: 3 | OUTPATIENT
Start: 2021-12-22

## 2021-12-30 ENCOUNTER — TRANSFERRED RECORDS (OUTPATIENT)
Dept: HEALTH INFORMATION MANAGEMENT | Facility: CLINIC | Age: 71
End: 2021-12-30
Payer: COMMERCIAL

## 2022-01-06 ENCOUNTER — TRANSFERRED RECORDS (OUTPATIENT)
Dept: HEALTH INFORMATION MANAGEMENT | Facility: CLINIC | Age: 72
End: 2022-01-06
Payer: COMMERCIAL

## 2022-01-14 DIAGNOSIS — E78.5 HYPERLIPIDEMIA LDL GOAL <130: ICD-10-CM

## 2022-01-16 RX ORDER — PRAVASTATIN SODIUM 20 MG
TABLET ORAL
Qty: 36 TABLET | Refills: 3 | OUTPATIENT
Start: 2022-01-16

## 2022-01-20 ENCOUNTER — TRANSFERRED RECORDS (OUTPATIENT)
Dept: HEALTH INFORMATION MANAGEMENT | Facility: CLINIC | Age: 72
End: 2022-01-20
Payer: COMMERCIAL

## 2022-01-27 ENCOUNTER — TRANSFERRED RECORDS (OUTPATIENT)
Dept: HEALTH INFORMATION MANAGEMENT | Facility: CLINIC | Age: 72
End: 2022-01-27
Payer: COMMERCIAL

## 2022-02-07 ENCOUNTER — TRANSFERRED RECORDS (OUTPATIENT)
Dept: HEALTH INFORMATION MANAGEMENT | Facility: CLINIC | Age: 72
End: 2022-02-07
Payer: COMMERCIAL

## 2022-02-10 ENCOUNTER — TRANSFERRED RECORDS (OUTPATIENT)
Dept: HEALTH INFORMATION MANAGEMENT | Facility: CLINIC | Age: 72
End: 2022-02-10
Payer: COMMERCIAL

## 2022-02-13 PROBLEM — C90.00 MULTIPLE MYELOMA NOT HAVING ACHIEVED REMISSION (H): Status: ACTIVE | Noted: 2021-06-23

## 2022-02-24 ENCOUNTER — TRANSFERRED RECORDS (OUTPATIENT)
Dept: HEALTH INFORMATION MANAGEMENT | Facility: CLINIC | Age: 72
End: 2022-02-24
Payer: COMMERCIAL

## 2022-03-24 ENCOUNTER — MEDICAL CORRESPONDENCE (OUTPATIENT)
Dept: TRANSPLANT | Facility: CLINIC | Age: 72
End: 2022-03-24
Payer: COMMERCIAL

## 2022-03-24 ENCOUNTER — TRANSFERRED RECORDS (OUTPATIENT)
Dept: HEALTH INFORMATION MANAGEMENT | Facility: CLINIC | Age: 72
End: 2022-03-24
Payer: COMMERCIAL

## 2022-04-21 DIAGNOSIS — Z86.2 PERSONAL HISTORY OF DISEASES OF BLOOD AND BLOOD-FORMING ORGANS: ICD-10-CM

## 2022-04-21 DIAGNOSIS — Z01.818 EXAMINATION PRIOR TO CHEMOTHERAPY: Primary | ICD-10-CM

## 2022-04-21 DIAGNOSIS — C90.00 MYELOMA (H): ICD-10-CM

## 2022-04-21 DIAGNOSIS — Z79.899 OTHER LONG TERM (CURRENT) DRUG THERAPY: ICD-10-CM

## 2022-04-21 LAB
BMT WORKUP IRRADIATED BLOOD REQUIRED: NORMAL
SPECIMEN EXPIRATION DATE: NORMAL

## 2022-05-02 DIAGNOSIS — C90.00 MULTIPLE MYELOMA NOT HAVING ACHIEVED REMISSION (H): Primary | ICD-10-CM

## 2022-05-04 ENCOUNTER — ALLIED HEALTH/NURSE VISIT (OUTPATIENT)
Dept: TRANSPLANT | Facility: CLINIC | Age: 72
End: 2022-05-04
Attending: INTERNAL MEDICINE
Payer: COMMERCIAL

## 2022-05-04 ENCOUNTER — MEDICAL CORRESPONDENCE (OUTPATIENT)
Dept: TRANSPLANT | Facility: CLINIC | Age: 72
End: 2022-05-04
Payer: COMMERCIAL

## 2022-05-04 VITALS
RESPIRATION RATE: 18 BRPM | DIASTOLIC BLOOD PRESSURE: 78 MMHG | TEMPERATURE: 98.1 F | BODY MASS INDEX: 20.09 KG/M2 | OXYGEN SATURATION: 100 % | HEIGHT: 71 IN | HEART RATE: 82 BPM | WEIGHT: 143.5 LBS | SYSTOLIC BLOOD PRESSURE: 167 MMHG

## 2022-05-04 VITALS
WEIGHT: 143.52 LBS | OXYGEN SATURATION: 100 % | DIASTOLIC BLOOD PRESSURE: 78 MMHG | SYSTOLIC BLOOD PRESSURE: 167 MMHG | TEMPERATURE: 98.1 F | HEART RATE: 82 BPM | BODY MASS INDEX: 20.09 KG/M2 | HEIGHT: 71 IN

## 2022-05-04 DIAGNOSIS — Z79.899 OTHER LONG TERM (CURRENT) DRUG THERAPY: ICD-10-CM

## 2022-05-04 DIAGNOSIS — Z01.818 EXAMINATION PRIOR TO CHEMOTHERAPY: ICD-10-CM

## 2022-05-04 DIAGNOSIS — Z86.2 PERSONAL HISTORY OF DISEASES OF BLOOD AND BLOOD-FORMING ORGANS: ICD-10-CM

## 2022-05-04 DIAGNOSIS — C90.00 MYELOMA (H): ICD-10-CM

## 2022-05-04 DIAGNOSIS — R82.998 OTHER ABNORMAL FINDINGS IN URINE: ICD-10-CM

## 2022-05-04 DIAGNOSIS — C90.00 MYELOMA (H): Primary | ICD-10-CM

## 2022-05-04 DIAGNOSIS — C90.00 MULTIPLE MYELOMA NOT HAVING ACHIEVED REMISSION (H): ICD-10-CM

## 2022-05-04 DIAGNOSIS — C90.00 MULTIPLE MYELOMA NOT HAVING ACHIEVED REMISSION (H): Primary | ICD-10-CM

## 2022-05-04 LAB
ABO/RH(D): ABNORMAL
ALBUMIN SERPL-MCNC: 4.3 G/DL (ref 3.4–5)
ALBUMIN UR-MCNC: NEGATIVE MG/DL
ALP SERPL-CCNC: 53 U/L (ref 40–150)
ALT SERPL W P-5'-P-CCNC: 19 U/L (ref 0–70)
ANION GAP SERPL CALCULATED.3IONS-SCNC: 7 MMOL/L (ref 3–14)
ANTIBODY ID: NORMAL
ANTIBODY SCREEN, TUBE: NORMAL
ANTIBODY SCREEN: POSITIVE
APPEARANCE UR: ABNORMAL
APTT PPP: 21 SECONDS (ref 22–38)
AST SERPL W P-5'-P-CCNC: 15 U/L (ref 0–45)
B2 MICROGLOB TUMOR MARKER SER-MCNC: 2.3 MG/L
BASOPHILS # BLD AUTO: 0 10E3/UL (ref 0–0.2)
BASOPHILS NFR BLD AUTO: 0 %
BILIRUB SERPL-MCNC: 0.6 MG/DL (ref 0.2–1.3)
BILIRUB UR QL STRIP: NEGATIVE
BUN SERPL-MCNC: 13 MG/DL (ref 7–30)
CALCIUM SERPL-MCNC: 8.8 MG/DL (ref 8.5–10.1)
CHLORIDE BLD-SCNC: 108 MMOL/L (ref 94–109)
CO2 SERPL-SCNC: 24 MMOL/L (ref 20–32)
COLOR UR AUTO: YELLOW
CREAT SERPL-MCNC: 0.93 MG/DL (ref 0.66–1.25)
DEPRECATED CALCIDIOL+CALCIFEROL SERPL-MC: 34 UG/L (ref 20–75)
EBV VCA IGG SER IA-ACNC: >750 U/ML
EBV VCA IGG SER IA-ACNC: POSITIVE
EOSINOPHIL # BLD AUTO: 0 10E3/UL (ref 0–0.7)
EOSINOPHIL NFR BLD AUTO: 1 %
ERYTHROCYTE [DISTWIDTH] IN BLOOD BY AUTOMATED COUNT: 14 % (ref 10–15)
GFR SERPL CREATININE-BSD FRML MDRD: 88 ML/MIN/1.73M2
GLUCOSE BLD-MCNC: 107 MG/DL (ref 70–99)
GLUCOSE UR STRIP-MCNC: NEGATIVE MG/DL
HCT VFR BLD AUTO: 32.5 % (ref 40–53)
HGB BLD-MCNC: 10.7 G/DL (ref 13.3–17.7)
HGB UR QL STRIP: ABNORMAL
HOLD SPECIMEN: NORMAL
HSV1 IGG SERPL QL IA: 4.82 INDEX
HSV1 IGG SERPL QL IA: ABNORMAL
HSV2 IGG SERPL QL IA: 0.11 INDEX
HSV2 IGG SERPL QL IA: ABNORMAL
IGA SERPL-MCNC: 22 MG/DL (ref 84–499)
IGG SERPL-MCNC: 546 MG/DL (ref 610–1616)
IGM SERPL-MCNC: 16 MG/DL (ref 35–242)
IMM GRANULOCYTES # BLD: 0 10E3/UL
IMM GRANULOCYTES NFR BLD: 0 %
INR PPP: 0.97 (ref 0.85–1.15)
KAPPA LC FREE SER-MCNC: 2.25 MG/DL (ref 0.33–1.94)
KAPPA LC FREE/LAMBDA FREE SER NEPH: 5 {RATIO} (ref 0.26–1.65)
KETONES UR STRIP-MCNC: NEGATIVE MG/DL
LAMBDA LC FREE SERPL-MCNC: 0.45 MG/DL (ref 0.57–2.63)
LDH SERPL L TO P-CCNC: 176 U/L (ref 85–227)
LEUKOCYTE ESTERASE UR QL STRIP: ABNORMAL
LYMPHOCYTES # BLD AUTO: 1.2 10E3/UL (ref 0.8–5.3)
LYMPHOCYTES NFR BLD AUTO: 42 %
MAGNESIUM SERPL-MCNC: 2.2 MG/DL (ref 1.6–2.3)
MCH RBC QN AUTO: 36.4 PG (ref 26.5–33)
MCHC RBC AUTO-ENTMCNC: 32.9 G/DL (ref 31.5–36.5)
MCV RBC AUTO: 111 FL (ref 78–100)
MONOCYTES # BLD AUTO: 0.6 10E3/UL (ref 0–1.3)
MONOCYTES NFR BLD AUTO: 21 %
NEUTROPHILS # BLD AUTO: 1.1 10E3/UL (ref 1.6–8.3)
NEUTROPHILS NFR BLD AUTO: 36 %
NITRATE UR QL: POSITIVE
NRBC # BLD AUTO: 0 10E3/UL
NRBC BLD AUTO-RTO: 0 /100
PH UR STRIP: 5 [PH] (ref 5–7)
PHOSPHATE SERPL-MCNC: 3.4 MG/DL (ref 2.5–4.5)
PLAT MORPH BLD: NORMAL
PLATELET # BLD AUTO: 144 10E3/UL (ref 150–450)
POTASSIUM BLD-SCNC: 4 MMOL/L (ref 3.4–5.3)
PROT SERPL-MCNC: 7 G/DL (ref 6.8–8.8)
RBC # BLD AUTO: 2.94 10E6/UL (ref 4.4–5.9)
RBC MORPH BLD: NORMAL
RBC URINE: 14 /HPF
SARS-COV-2 RNA RESP QL NAA+PROBE: NEGATIVE
SODIUM SERPL-SCNC: 139 MMOL/L (ref 133–144)
SP GR UR STRIP: 1.01 (ref 1–1.03)
SPECIMEN EXPIRATION DATE: ABNORMAL
SPECIMEN EXPIRATION DATE: NORMAL
SPECIMEN EXPIRATION DATE: NORMAL
TOTAL PROTEIN SERUM FOR ELP: 6.7 G/DL (ref 6.8–8.8)
TRANSITIONAL EPI: 1 /HPF
TROPONIN I SERPL HS-MCNC: 4 NG/L
URATE SERPL-MCNC: 3.8 MG/DL (ref 3.5–7.2)
UROBILINOGEN UR STRIP-MCNC: NORMAL MG/DL
WBC # BLD AUTO: 2.9 10E3/UL (ref 4–11)
WBC CLUMPS #/AREA URNS HPF: PRESENT /HPF
WBC URINE: >182 /HPF

## 2022-05-04 PROCEDURE — 36415 COLL VENOUS BLD VENIPUNCTURE: CPT

## 2022-05-04 PROCEDURE — 85730 THROMBOPLASTIN TIME PARTIAL: CPT

## 2022-05-04 PROCEDURE — 82040 ASSAY OF SERUM ALBUMIN: CPT

## 2022-05-04 PROCEDURE — 94375 RESPIRATORY FLOW VOLUME LOOP: CPT | Performed by: INTERNAL MEDICINE

## 2022-05-04 PROCEDURE — 84165 PROTEIN E-PHORESIS SERUM: CPT | Mod: TC | Performed by: PATHOLOGY

## 2022-05-04 PROCEDURE — 86850 RBC ANTIBODY SCREEN: CPT

## 2022-05-04 PROCEDURE — 82784 ASSAY IGA/IGD/IGG/IGM EACH: CPT

## 2022-05-04 PROCEDURE — 94729 DIFFUSING CAPACITY: CPT | Performed by: INTERNAL MEDICINE

## 2022-05-04 PROCEDURE — G0463 HOSPITAL OUTPT CLINIC VISIT: HCPCS

## 2022-05-04 PROCEDURE — 84100 ASSAY OF PHOSPHORUS: CPT

## 2022-05-04 PROCEDURE — 80053 COMPREHEN METABOLIC PANEL: CPT

## 2022-05-04 PROCEDURE — 83615 LACTATE (LD) (LDH) ENZYME: CPT

## 2022-05-04 PROCEDURE — 82232 ASSAY OF BETA-2 PROTEIN: CPT

## 2022-05-04 PROCEDURE — 86703 HIV-1/HIV-2 1 RESULT ANTBDY: CPT

## 2022-05-04 PROCEDURE — 86696 HERPES SIMPLEX TYPE 2 TEST: CPT

## 2022-05-04 PROCEDURE — 99215 OFFICE O/P EST HI 40 MIN: CPT

## 2022-05-04 PROCEDURE — 86901 BLOOD TYPING SEROLOGIC RH(D): CPT

## 2022-05-04 PROCEDURE — 86665 EPSTEIN-BARR CAPSID VCA: CPT

## 2022-05-04 PROCEDURE — 86334 IMMUNOFIX E-PHORESIS SERUM: CPT | Performed by: PATHOLOGY

## 2022-05-04 PROCEDURE — U0005 INFEC AGEN DETEC AMPLI PROBE: HCPCS

## 2022-05-04 PROCEDURE — 83735 ASSAY OF MAGNESIUM: CPT

## 2022-05-04 PROCEDURE — 88240 CELL CRYOPRESERVE/STORAGE: CPT

## 2022-05-04 PROCEDURE — 82785 ASSAY OF IGE: CPT

## 2022-05-04 PROCEDURE — 93005 ELECTROCARDIOGRAM TRACING: CPT

## 2022-05-04 PROCEDURE — 86870 RBC ANTIBODY IDENTIFICATION: CPT

## 2022-05-04 PROCEDURE — 83521 IG LIGHT CHAINS FREE EACH: CPT

## 2022-05-04 PROCEDURE — 85025 COMPLETE CBC W/AUTO DIFF WBC: CPT

## 2022-05-04 PROCEDURE — 0001U RBC DNA HEA 35 AG 11 BLD GRP: CPT

## 2022-05-04 PROCEDURE — 81001 URINALYSIS AUTO W/SCOPE: CPT

## 2022-05-04 PROCEDURE — 85610 PROTHROMBIN TIME: CPT

## 2022-05-04 PROCEDURE — 82306 VITAMIN D 25 HYDROXY: CPT

## 2022-05-04 PROCEDURE — 84484 ASSAY OF TROPONIN QUANT: CPT

## 2022-05-04 PROCEDURE — 87086 URINE CULTURE/COLONY COUNT: CPT

## 2022-05-04 PROCEDURE — 84155 ASSAY OF PROTEIN SERUM: CPT

## 2022-05-04 PROCEDURE — 94726 PLETHYSMOGRAPHY LUNG VOLUMES: CPT | Performed by: INTERNAL MEDICINE

## 2022-05-04 PROCEDURE — 84550 ASSAY OF BLOOD/URIC ACID: CPT

## 2022-05-04 PROCEDURE — 87798 DETECT AGENT NOS DNA AMP: CPT

## 2022-05-04 ASSESSMENT — PAIN SCALES - GENERAL
PAINLEVEL: NO PAIN (0)
PAINLEVEL: NO PAIN (0)

## 2022-05-04 NOTE — NURSING NOTE
"Oncology Rooming Note    May 4, 2022 10:08 AM   Hamilton Angulo is a 71 year old male who presents for:    Chief Complaint   Patient presents with     Oncology Clinic Visit     myeloma     Initial Vitals: BP (!) 167/78   Pulse 82   Temp 98.1  F (36.7  C) (Oral)   Ht 1.803 m (5' 10.98\")   Wt 65.1 kg (143 lb 8.3 oz)   SpO2 100%   BMI 20.03 kg/m   Estimated body mass index is 20.03 kg/m  as calculated from the following:    Height as of this encounter: 1.803 m (5' 10.98\").    Weight as of this encounter: 65.1 kg (143 lb 8.3 oz). Body surface area is 1.81 meters squared.  No Pain (0) Comment: Data Unavailable   No LMP for male patient.  Allergies reviewed: Yes  Medications reviewed: Yes    Medications: Medication refills not needed today.  Pharmacy name entered into [employer]: [facility] #[id number] [place], [place] - [address] AT [facility] & [address]    Clinical concerns: none       Beckie Art CMA            "

## 2022-05-04 NOTE — PROGRESS NOTES
BMT Teaching Flowsheet    Hamilton Angulo is a 71 year old male  Diagnoses of Examination prior to chemotherapy, Other long term (current) drug therapy , Myeloma (H), Personal history of diseases of blood and blood-forming organs, and Multiple myeloma not having achieved remission (H) were pertinent to this visit.    Teaching Topic: bmt work up for MM    Person(s) involved in teaching: Patient and Spouse  Motivation Level  Asks Questions: Yes  Eager to Learn: Yes  Cooperative: Yes  Receptive (willing/able to accept information): Yes  Any cultural factors/Nondenominational beliefs that may influence understanding or compliance? No    Patient and Family demonstrates understanding of the following:  - Reason for the appointment, diagnosis and treatment plan: Yes  - Knowledge of proper use of medications and conditions for which they are ordered (with special attention to potential side effects or drug interactions): Yes  - Which situations necessitate calling provider and whom to contact: Yes    Teaching concerns addressed:        Signed consents, reviewed and updated med list allergy assessment, reviewed bmt workup calendar including discussion of all tests and procedures associated with work up, labs drawn by RN, venipuncture, covid swab obtained, ua collected, frailty questionnaire completed    Proper use and care of (medical equipment, care aids, etc.) NA  Pain management techniques: NA  Patient instructed on hand hygiene: yes  How and/when to access community resources: Yes    Infection Control:  Patient and Family demonstrates understanding of the following:  Surgical procedure site care taught NA  Signs and symptoms of infection taught NA  Wound care taught NA  Central venous catheter care taught NA    Instructional Materials Used/Given:   bmt work up calendar and 24 hr urine collection kit    For [place]/CAR-T patient wallet card given. Patient instructed to remain within close proximity (at least two hours) for  at least four weeks post infusion. CAR-T patient is instructed not to operate motorized vehicle or heavy machinery for a period of 8 weeks.    Research participant Hamilton Angulo was provided the information on the Research Participant Information Sheet by Laurel Self RN on May 4, 2022. This document contains information regarding the risks of participating in a research study during the COVID-19 pandemic. Receipt of the information sheet was confirmed by study personnel prior to the visit.    Time spent with patient: 50 minutes.      Specific Concerns: NA

## 2022-05-04 NOTE — LETTER
5/4/2022         RE: Hamilton Angulo  [address] [place] [address]        Dear Colleague,    Thank you for referring your patient, Hamilton Angulo, to the [facility]ARR[facility]ANSPL[facility]. Please see a copy of my visit note below.        [facility]  BMTCT OPEN VISIT    May 4, 2022        Hamilton Angulo is a 71 year old male undergoing evaluation prior to hematopoietic cell transplant or immune effector cell therapy.    Reason for BMTCT: MT 2016-35 MM/melphalan    Recent chemotherapy: No    Recent infections: No    Blood thinner use? If yes, why? No    Treatment for diabetes? No    Today, the patient notes the following symptoms:    Really no major medical complaints other then noisy/gassy stomach. No N/V/D. No weight loss. Good appetite.     Remainder of 10 point ROS negative other then noted in HPI.      Hamilton Angulo's History    Past Medical History:   Diagnosis Date     Bilateral cataracts 2001     Herniated disc 1983    Lumbar     Hyperlipidemia LDL goal <130      Hypertension 5/2004     Tear of MCL (medial collateral ligament) of knee 6/20/2001    LT Knee/WC   MM    Past Surgical History:   Procedure Laterality Date     CATARACT IOL, RT/LT  12/2003    Bilateral   BPH surgery    Family History:  Mom Breast Cancer, HTN  Dad Alcohol, dementia      Social History:  No tobacco use.  No alcohol use.  RAEANN Michelle  No kids     Retired--[employer].     Hamilton Angulo's Medications and Allergies    Current Outpatient Medications   Medication     acetaminophen (TYLENOL) 325 MG tablet     acyclovir (ZOVIRAX) 400 MG tablet     amLODIPine (NORVASC) 2.5 MG tablet     Artificial Tear Solution (SM ARTIFICIAL TEARS) SOLN     lisinopril (ZESTRIL) 20 MG tablet     Multiple Vitamins-Minerals (EQ COMPLETE MULTIVITAMIN-ADULT) TABS     No current facility-administered medications for this visit.     Allergies   Allergen Reactions     Shrimp Hives     Physical Examination    BP  "(!) 167/78   Pulse 82   Temp 98.1  F (36.7  C) (Oral)   Ht 1.803 m (5' 10.98\")   Wt 65.1 kg (143 lb 8.3 oz)   SpO2 100%   BMI 20.03 kg/m      Exam:  Constitutional: healthy, alert and no distress  Head: Normocephalic. No masses, lesions, tenderness or abnormalities  ENT: ENT exam normal, no neck nodes or sinus tenderness. Dentition fair.   Cardiovascular: RRR. No G/R/M.   Respiratory: BCTA.   Musculoskeletal: extremities normal- no gross deformities noted, gait normal and normal muscle tone  Skin: No rash.   Neurologic: No focal deficits.   Lymph: No Edema.   Psychiatric: mentation appears normal and affect normal/bright    Frailty Screening    1. Weight loss: Have you lost >10 pounds (or >5% body weight) unintentionally over the last year? Yes      2. Exhaustion: How often in the past week did you feel that:  o I feel that everything I do is an effort : .Exhaustion: 0 = rarely or none of the time (<1 day)  o I feel I cannot get going: Exhaustion: 0 = rarely or none of the time (<1 day)    3. Weakness:  Hand  strength (measured by MA; calculate average): 36     Male BMI Frailty Criteria for  Male  Strength Female BMI Frailty Criteria for  Female  Strength   ?24 ?29 ?23 ?17   24.1 - 26 ?30 23.1 - 26 ?17.3   26.1 - 28 ?30 26.1 - 29 ?18   >28 ?32 >29 ?21     4. Slowness:  15 foot walk time (measured by MA):  5    Height Frailty Criteria for  15 Foot Walk Time   Men   ?173 cm ? 7 seconds    >173 cm  ? 6 seconds   Women   ?159 cm ? 7 seconds   >159 cm  ? 6 seconds     5. Physical activity:     *Please complete 2 calculations for kcal (see frailty worksheet for equations)     Energy expenditure for frailty: 465 kcal expended per week     Gender Frailty Criteria for Low Physical Activity   Male <383 kcal/week   Female <270 kcal/weel     IPAQ score: 450 MET minutes per week     Hamilton Angulo met the following criteria for prefrailty (score 1-2) or frailty (score 3+): Frailty: Weight Loss  Frailty Score is: " 1      Additional assessments not to be used in frailty calculation:   Sit to stand test (time to complete 5 reps): 16 seconds    Standing balance in 10 seconds:  Record the Total number of seconds(0-30)--add a+b+c ( take best attempt for each)    First attempt: 30 seconds    Second attempt: not recorded      Overall Assessment    I have reviewed the diagnostic data, medications, frailty screening, and general processes prior to BMTCT.  I have notified the Primary BMT Physician/and or Attending Physician in the clinic of any issues. We also discussed in detail the database and biorepository research for which Hamilton Angulo is eligible. We discussed the potential risks and potential benefits of each of these protocols individually. We explained potential alternatives to the protocols discussed. We explained to the patient that participation is voluntary and that consent may be withdrawn at any time.       Consents Signed:    Blood transfusion consent form    Ethnicity form    [facility] database    Present during the discussion were patient and wife. Copies of the signed consent forms will be provided to the patient on admission. No procedures specific to any studies were performed prior to the patient signing the consent form.    Hamilton Angulo had the opportunity to ask questions, and I answered all of the questions to the best of my ability.    45 minutes spent on the date of the encounter doing chart review, history and exam, documentation and further activities per the note      Mary Metzger PA-C          Again, thank you for allowing me to participate in the care of your patient.      Sincerely,    BMT Advanced Practice Provider

## 2022-05-04 NOTE — NURSING NOTE
"Oncology Rooming Note    May 4, 2022 9:52 AM   Hamilton Angulo is a 71 year old male who presents for:    Chief Complaint   Patient presents with     RECHECK     Bmt work up for MM      Initial Vitals: BP (!) 167/78   Pulse 82   Temp 98.1  F (36.7  C)   Resp 18   Ht 1.803 m (5' 11\")   Wt 65.1 kg (143 lb 8 oz)   SpO2 100%   BMI 20.01 kg/m   Estimated body mass index is 20.01 kg/m  as calculated from the following:    Height as of this encounter: 1.803 m (5' 11\").    Weight as of this encounter: 65.1 kg (143 lb 8 oz). Body surface area is 1.81 meters squared.  No Pain (0) Comment: Data Unavailable   No LMP for male patient.  Allergies reviewed: Yes  Medications reviewed: Yes    Medications: Medication refills not needed today.  Pharmacy name entered into [employer]: [facility] #[id number] [place], [place] - [address] AT [facility] & [address]    Clinical concerns: none         Laurel Self RN              "

## 2022-05-04 NOTE — NURSING NOTE
Frailty testing done in clinic, pt tolerated. Results given to provider.    Beckie Art CMA on 5/4/2022 at 10:09 AM

## 2022-05-04 NOTE — NURSING NOTE
EKG done in clinic, pt tolerated. Results scanned to patient chart.    Beckie Art CMA on 5/4/2022 at 10:09 AM

## 2022-05-04 NOTE — PROGRESS NOTES
"[facility]  BMTCT OPEN VISIT    May 4, 2022        Hamilton Angulo is a 71 year old male undergoing evaluation prior to hematopoietic cell transplant or immune effector cell therapy.    Reason for BMTCT: MT 2016-35 MM/melphalan    Recent chemotherapy: No    Recent infections: No    Blood thinner use? If yes, why? No    Treatment for diabetes? No    Today, the patient notes the following symptoms:    Really no major medical complaints other then noisy/gassy stomach. No N/V/D. No weight loss. Good appetite.     Remainder of 10 point ROS negative other then noted in HPI.      Hamilton Angulo's History    Past Medical History:   Diagnosis Date     Bilateral cataracts 2001     Herniated disc 1983    Lumbar     Hyperlipidemia LDL goal <130      Hypertension 5/2004     Tear of MCL (medial collateral ligament) of knee 6/20/2001    LT Knee/WC   MM    Past Surgical History:   Procedure Laterality Date     CATARACT IOL, RT/LT  12/2003    Bilateral   BPH surgery    Family History:  Mom Breast Cancer, HTN  Dad Alcohol, dementia      Social History:  No tobacco use.  No alcohol use.  RAEANN Michelle  No kids     Retired--[employer].     Hamilton Angulo's Medications and Allergies    Current Outpatient Medications   Medication     acetaminophen (TYLENOL) 325 MG tablet     acyclovir (ZOVIRAX) 400 MG tablet     amLODIPine (NORVASC) 2.5 MG tablet     Artificial Tear Solution (SM ARTIFICIAL TEARS) SOLN     lisinopril (ZESTRIL) 20 MG tablet     Multiple Vitamins-Minerals (EQ COMPLETE MULTIVITAMIN-ADULT) TABS     No current facility-administered medications for this visit.     Allergies   Allergen Reactions     Shrimp Hives     Physical Examination    BP (!) 167/78   Pulse 82   Temp 98.1  F (36.7  C) (Oral)   Ht 1.803 m (5' 10.98\")   Wt 65.1 kg (143 lb 8.3 oz)   SpO2 100%   BMI 20.03 kg/m      Exam:  Constitutional: healthy, alert and no distress  Head: Normocephalic. No masses, lesions, tenderness or " abnormalities  ENT: ENT exam normal, no neck nodes or sinus tenderness. Dentition fair.   Cardiovascular: RRR. No G/R/M.   Respiratory: BCTA.   Musculoskeletal: extremities normal- no gross deformities noted, gait normal and normal muscle tone  Skin: No rash.   Neurologic: No focal deficits.   Lymph: No Edema.   Psychiatric: mentation appears normal and affect normal/bright    Frailty Screening    1. Weight loss: Have you lost >10 pounds (or >5% body weight) unintentionally over the last year? Yes      2. Exhaustion: How often in the past week did you feel that:  o I feel that everything I do is an effort : .Exhaustion: 0 = rarely or none of the time (<1 day)  o I feel I cannot get going: Exhaustion: 0 = rarely or none of the time (<1 day)    3. Weakness:  Hand  strength (measured by MA; calculate average): 36     Male BMI Frailty Criteria for  Male  Strength Female BMI Frailty Criteria for  Female  Strength   ?24 ?29 ?23 ?17   24.1 - 26 ?30 23.1 - 26 ?17.3   26.1 - 28 ?30 26.1 - 29 ?18   >28 ?32 >29 ?21     4. Slowness:  15 foot walk time (measured by MA):  5    Height Frailty Criteria for  15 Foot Walk Time   Men   ?173 cm ? 7 seconds    >173 cm  ? 6 seconds   Women   ?159 cm ? 7 seconds   >159 cm  ? 6 seconds     5. Physical activity:     *Please complete 2 calculations for kcal (see frailty worksheet for equations)     Energy expenditure for frailty: 465 kcal expended per week     Gender Frailty Criteria for Low Physical Activity   Male <383 kcal/week   Female <270 kcal/weel     IPAQ score: 450 MET minutes per week     Hamilton Angulo met the following criteria for prefrailty (score 1-2) or frailty (score 3+): Frailty: Weight Loss  Frailty Score is: 1      Additional assessments not to be used in frailty calculation:   Sit to stand test (time to complete 5 reps): 16 seconds    Standing balance in 10 seconds:  Record the Total number of seconds(0-30)--add a+b+c ( take best attempt for each)    First  attempt: 30 seconds    Second attempt: not recorded      Overall Assessment    I have reviewed the diagnostic data, medications, frailty screening, and general processes prior to BMTCT.  I have notified the Primary BMT Physician/and or Attending Physician in the clinic of any issues. We also discussed in detail the database and biorepository research for which Hamilton Angulo is eligible. We discussed the potential risks and potential benefits of each of these protocols individually. We explained potential alternatives to the protocols discussed. We explained to the patient that participation is voluntary and that consent may be withdrawn at any time.       Consents Signed:    Blood transfusion consent form    Ethnicity form    [facility] database    Present during the discussion were patient and wife. Copies of the signed consent forms will be provided to the patient on admission. No procedures specific to any studies were performed prior to the patient signing the consent form.    Hamilton Angulo had the opportunity to ask questions, and I answered all of the questions to the best of my ability.    45 minutes spent on the date of the encounter doing chart review, history and exam, documentation and further activities per the note      Mary Metzger PA-C

## 2022-05-05 ENCOUNTER — HOSPITAL ENCOUNTER (OUTPATIENT)
Dept: LAB | Facility: CLINIC | Age: 72
Discharge: HOME | End: 2022-05-05
Attending: PHYSICIAN ASSISTANT
Payer: COMMERCIAL

## 2022-05-05 ENCOUNTER — OFFICE VISIT (OUTPATIENT)
Dept: TRANSPLANT | Facility: CLINIC | Age: 72
End: 2022-05-05
Attending: INTERNAL MEDICINE
Payer: COMMERCIAL

## 2022-05-05 ENCOUNTER — OFFICE VISIT (OUTPATIENT)
Dept: EDUCATION SERVICES | Facility: CLINIC | Age: 72
End: 2022-05-05
Attending: INTERNAL MEDICINE
Payer: COMMERCIAL

## 2022-05-05 VITALS
RESPIRATION RATE: 18 BRPM | BODY MASS INDEX: 20.03 KG/M2 | WEIGHT: 143.52 LBS | HEART RATE: 83 BPM | DIASTOLIC BLOOD PRESSURE: 52 MMHG | TEMPERATURE: 96.3 F | SYSTOLIC BLOOD PRESSURE: 103 MMHG

## 2022-05-05 DIAGNOSIS — Z01.818 EXAMINATION PRIOR TO CHEMOTHERAPY: ICD-10-CM

## 2022-05-05 DIAGNOSIS — C90.00 MYELOMA (H): ICD-10-CM

## 2022-05-05 DIAGNOSIS — Z86.2 PERSONAL HISTORY OF DISEASES OF BLOOD AND BLOOD-FORMING ORGANS: ICD-10-CM

## 2022-05-05 DIAGNOSIS — Z79.899 OTHER LONG TERM (CURRENT) DRUG THERAPY: ICD-10-CM

## 2022-05-05 LAB
ALBUMIN SERPL ELPH-MCNC: 4.7 G/DL (ref 3.7–5.1)
ALPHA1 GLOB SERPL ELPH-MCNC: 0.3 G/DL (ref 0.2–0.4)
ALPHA2 GLOB SERPL ELPH-MCNC: 0.6 G/DL (ref 0.5–0.9)
B-GLOBULIN SERPL ELPH-MCNC: 0.5 G/DL (ref 0.6–1)
GAMMA GLOB SERPL ELPH-MCNC: 0.5 G/DL (ref 0.7–1.6)
IGD SER-MCNC: <1.3 MG/DL
IGE SERPL-ACNC: 22 KU/L (ref 0–114)
M PROTEIN SERPL ELPH-MCNC: 0.3 G/DL
PROT PATTERN SERPL ELPH-IMP: ABNORMAL
PROT PATTERN SERPL IFE-IMP: NORMAL

## 2022-05-05 PROCEDURE — 86334 IMMUNOFIX E-PHORESIS SERUM: CPT | Mod: 26

## 2022-05-05 PROCEDURE — 84165 PROTEIN E-PHORESIS SERUM: CPT | Mod: 26

## 2022-05-05 PROCEDURE — G0463 HOSPITAL OUTPT CLINIC VISIT: HCPCS

## 2022-05-05 NOTE — PROGRESS NOTES
Met with patient and his wife, Heather, for CVC education. Demonstrated flushing, cap change, dressing change, and use of the emergency clamp. Both Hamilton and Heather were able to return demonstrate flushing and cap change with minimal assistance. They verbalized feeling fairly apprehensive about doing any line cares at home. I encouraged them to verbalize their concerns to their care team. I also encouraged them to watch and/or assist the nurses in clinic with line cares as able in order to gain confidence with cares. Both verbalized understanding of the information given.     Literature given: Handwashing and Skin Care, Your Central Venous Catheter, Caring for Your Central Venous Catheter at Home, Changing the End Cap, Flushing the Line with Heparin, Saline or Citrate, Changing Your Bandage.

## 2022-05-05 NOTE — CONSULTS
Transfusion Medicine Consultation    Hamilton Angulo [id number]   YOB: 1950 Age: 71 year old   Date of Consult: 5/5/2022     Reason for consult: Autologous Mononuclear Cell (MNC)  Collection           Assessment and Plan:   71 year old male presents for consultation for autologous MNC collection for multiple myeloma. The plan is to collect for 1 to 3 days or until the target goal is met.  A central line will be placed and will be used for access for the procedure.          Chief Complaint:   Transfusion medicine consultation.         History of Present Illness:   71 year old male presents for consultation for autologous  MNC  collection.  His past medical history includes multiple myeloma, cataracts, herniated discs (lumbar), HLD, and HTN.  He is currently well.  The patient denies any back pain that would prevent him from tolerating the procedure.  The patient confirms a recent flu and COID vaccination.  No significant travel history.  The patient has no identifiable risk factors for infectious disease.  The procedure, risks/benefits were discussed with the patient and all of his questions were addressed at this time.             Past Medical History:     Past Medical History:   Diagnosis Date     Bilateral cataracts 2001     Herniated disc 1983    Lumbar     Hyperlipidemia LDL goal <130      Hypertension 5/2004     Tear of MCL (medial collateral ligament) of knee 6/20/2001    LT Knee/WC             Past Surgical History:     Past Surgical History:   Procedure Laterality Date     CATARACT IOL, RT/LT  12/2003    Bilateral              Social History:     Social History     Tobacco Use     Smoking status: Never Smoker     Smokeless tobacco: Never Used   Substance Use Topics     Alcohol use: Yes     Comment: [employer].             Family History:     Family History   Problem Relation Age of Onset     Breast Cancer Mother      Hypertension Mother         She potentially had this.     Other - See Comments  Father         He potentially had liver issues and dementia.     Heart Disease Maternal Grandmother         She potentially had this.     Cancer Maternal Grandfather         Unknown type of cancer.     Other - See Comments Paternal Half-Brother         He potentially had dementia.             Immunizations:     Immunization History   Administered Date(s) Administered     COVID-19,PF,Pfizer (12+ Yrs) 03/09/2021, 03/30/2021, 10/05/2021     FLU 6-35 months 11/11/2006, 10/27/2007, 10/25/2008, 11/06/2009, 10/23/2010, 09/09/2011, 10/02/2012     Influenza (H1N1) 02/25/2010     Influenza (High Dose) 3 valent vaccine 09/28/2015, 09/22/2016, 10/09/2017, 10/22/2018, 09/18/2019     Influenza (IIV3) PF 10/26/2002, 10/25/2003, 11/06/2009, 10/23/2010, 09/09/2011, 10/02/2012     Influenza Vaccine IM > 6 months Valent IIV4 (Alfuria,Fluzone) 10/29/2013, 09/22/2014     Influenza, Quad, High Dose, Pf, 65yr+ (Fluzone HD) 09/10/2020, 10/19/2021     Pneumo Conj 13-V (2010&after) 09/28/2015     Pneumococcal 23 valent 10/07/2016     TD (ADULT, 7+) 10/13/2017     TDAP Vaccine (Adacel) 06/27/2007     Td (Adult), Adsorbed 10/13/2017     Zoster vaccine, live 09/20/2013             Allergies:     Allergies   Allergen Reactions     Shrimp Hives             Medications:     Current Outpatient Medications   Medication Sig     amLODIPine (NORVASC) 2.5 MG tablet TAKE 1 TABLET(2.5 MG) BY MOUTH DAILY (Patient taking differently: Take 2.5 mg by mouth daily)     lisinopril (ZESTRIL) 20 MG tablet Take 1 tablet (20 mg) by mouth daily     Multiple Vitamins-Minerals (EQ COMPLETE MULTIVITAMIN-ADULT) TABS Take 1 tablet by mouth daily     acetaminophen (TYLENOL) 325 MG tablet Take 325-650 mg by mouth every 4 hours as needed Take as needed following label instructions (Patient not taking: Reported on 5/4/2022)     acyclovir (ZOVIRAX) 400 MG tablet Take 400 mg by mouth 2 times daily     Artificial Tear Solution (SM ARTIFICIAL TEARS) SOLN Apply 1 Dose to eye every  2 hours as needed Use per label instructions     No current facility-administered medications for this encounter.             Review of Systems:     CONSTITUTIONAL:  negative for  fevers, chills, sweats and fatigue  EYES:  negative for  double vision, blurred vision and visual disturbance  HEENT:  negative for  hearing loss and tinnitus  RESPIRATORY:  negative for  dry cough, cough with sputum, dyspnea, wheezing and chest pain  CARDIOVASCULAR:  negative for  chest pain, dyspnea, palpitations, exertional chest pressure/discomfort, fatigue, syncope  GASTROINTESTINAL:  negative for nausea, vomiting, diarrhea and constipation  MUSCULOSKELETAL:  negative for  myalgias and arthralgias  NEUROLOGICAL:  negative for headaches, dizziness, seizures, numbness, pain and tingling           Vital Signs:   /52   Pulse 83   Temp (!) 96.3  F (35.7  C) (Oral)   Resp 18   Wt 65.1 kg (143 lb 8.3 oz)   BMI 20.03 kg/m              Data:     CBC RESULTS: Recent Labs   Lab Test 05/04/22  0955   WBC 2.9*   RBC 2.94*   HGB 10.7*   HCT 32.5*   *   MCH 36.4*   MCHC 32.9   RDW 14.0   *     Antibody Screen   Date Value Ref Range Status   05/04/2022 Positive (A) Negative Final     Attending Attestation    I have reviewed, discussed, & agree with the Resident's Note    Jose Swenson MD   Division of Transfusion Medicine   [place] [address]   Pager: [phone]

## 2022-05-05 NOTE — CONSULTS
APHERESIS INITIAL CONSULT CHECKLIST    Current Encounter Information  Current Encounter Information: Reason for Visit, Allergies and Current Meds  Procedure Requested: MNC/PBSC Collection  History of: (Reason for Apheresis): Multiple Myeloma    Access Assessment  Access Assessment  Vein Assessment:  Veins are adequate: Yes  Needs a catheter placed for Apheresis?: Yes, transfusion medicine physician informed.    Vital Signs  Vital Signs  BP: 103/52  Pulse: 83  Temp: (!) 96.3  F (35.7  C)  Temp src: Oral  Resp: 18  Height:  (180.3cm)  Weight: 65.1 kg (143 lb 8.3 oz)    Reviewed   Review With Patient  Have you read the brochure Getting ready for Apheresis?: Yes  Have you had any invasive procedures, surgery, biopsy, bleeding in the last month?: No  Review medications and allergies: Yes  Have you ever been transfused?: No  Patient given tour of the unit: No (consult room used)    Additional Information  Notes, needs and time spent with patient  Explain procedure, side effects or reactions, instructions: Yes  Patient has special need?: No  Time spent: 20 minutes spent face to face for medical history review. Reviewed need to follow low fat diet before collection. Reviewed that family is currently not allowed in the room during collection process.

## 2022-05-05 NOTE — PROGRESS NOTES
"Pharmacy Assessment - Pre-Stem Cell Transplant    Assessments & Recommendations:  1) Hold MVI around chemotherapy  2) Ice cryotherapy 2-4 hours around Melphalan  3) 24 hour crcl pending   4) If receivers cyclophosphamide priming , he was told to bring his home supply of lisinopril and amlodipine with him.      If this patient is admitted under observation, the patient may bring in their own supply of the following medication for use in the hospital:  1) Lisinopril and amlodipine   -Per \"Medications Not Supplied by Pharmacy\" policy (available on [employer])    History of Present Illness:  Hamilton Angulo is a 71 year old year old male diagnosed with IgG kappa MM.  he has been treated with RVd.  he is now being work up for autologous Stem Cell Transplant on protocol [id number], which utilizes melphalan as a conditioning regimen.    Pertinent labs/tests:  Viral Testing:  CMV(+) / HSV(-) / EBV(+) / VZV (?)  Ejection Fraction: ____% (future date)  QTc: 427msec (5/4)    Weights:   Wt Readings from Last 3 Encounters:   05/04/22 65.1 kg (143 lb 8.3 oz)   05/04/22 65.1 kg (143 lb 8 oz)   12/08/21 65.4 kg (144 lb 1.6 oz)   Ideal body weight: 75.3 kg (165 lb 14.8 oz)  % IBW:  86  There is no height or weight on file to calculate BMI.    Primary BMT Physician:   BMT RN Coordinator:  Angel Noel    Past Medical History:  Past Medical History:   Diagnosis Date     Bilateral cataracts 2001     Herniated disc 1983    Lumbar     Hyperlipidemia LDL goal <130      Hypertension 5/2004     Tear of MCL (medial collateral ligament) of knee 6/20/2001    LT Knee/WC       Medication Allergies:  Allergies   Allergen Reactions     Shrimp Hives       Current Medications (pre-admit):  Current Outpatient Medications   Medication Sig Dispense Refill     acetaminophen (TYLENOL) 325 MG tablet Take 325-650 mg by mouth every 4 hours as needed Take as needed following label instructions (Patient not taking: Reported on 5/4/2022)       acyclovir " (ZOVIRAX) 400 MG tablet Take 400 mg by mouth 2 times daily       amLODIPine (NORVASC) 2.5 MG tablet TAKE 1 TABLET(2.5 MG) BY MOUTH DAILY (Patient taking differently: Take 2.5 mg by mouth daily) 90 tablet 3     Artificial Tear Solution (SM ARTIFICIAL TEARS) SOLN Apply 1 Dose to eye every 2 hours as needed Use per label instructions       lisinopril (ZESTRIL) 20 MG tablet Take 1 tablet (20 mg) by mouth daily 90 tablet 3     Multiple Vitamins-Minerals (EQ COMPLETE MULTIVITAMIN-ADULT) TABS Take 1 tablet by mouth daily         Herbal Medication/Nutritional Supplements:  No issues     Smoking/Past Drug Use:  No issues     Nausea/Vomiting, Pain, or other issues:  No issues     Summary:  I met with Hamilton Angulo for approximately 40 minutes.  We discussed his current and transplant medications including the priming options ( filgrastim vs Cyclophosphamide/filgrastim), conditioning (melphalan), antiemetics (ondansetron/dexamethasone), prophylactic antibiotics (fluconazole, Bactrim, Acyclovir, levofloxacin) and miscellaneous medications (filgrastim, claritin and vaccinations).

## 2022-05-05 NOTE — LETTER
"    5/5/2022         RE: Hamilton Angulo  [address] [facility]  [place] [address]        Dear Colleague,    Thank you for referring your patient, Hamilton Angulo, to the [facility] BLOOD AND MARROW TRANSPLANT PROGRAM [place]. Please see a copy of my visit note below.    Pharmacy Assessment - Pre-Stem Cell Transplant    Assessments & Recommendations:  1) Hold MVI around chemotherapy  2) Ice cryotherapy 2-4 hours around Melphalan  3) 24 hour crcl pending   4) If receivers cyclophosphamide priming , he was told to bring his home supply of lisinopril and amlodipine with him.      If this patient is admitted under observation, the patient may bring in their own supply of the following medication for use in the hospital:  1) Lisinopril and amlodipine   -Per \"Medications Not Supplied by Pharmacy\" policy (available on [employer])    History of Present Illness:  Hamilton Angulo is a 71 year old year old male diagnosed with IgG kappa MM.  he has been treated with RVd.  he is now being work up for autologous Stem Cell Transplant on protocol [id number], which utilizes melphalan as a conditioning regimen.    Pertinent labs/tests:  Viral Testing:  CMV(+) / HSV(-) / EBV(+) / VZV (?)  Ejection Fraction: ____% (future date)  QTc: 427msec (5/4)    Weights:   Wt Readings from Last 3 Encounters:   05/04/22 65.1 kg (143 lb 8.3 oz)   05/04/22 65.1 kg (143 lb 8 oz)   12/08/21 65.4 kg (144 lb 1.6 oz)   Ideal body weight: 75.3 kg (165 lb 14.8 oz)  % IBW:  86  There is no height or weight on file to calculate BMI.    Primary BMT Physician:   BMT RN Coordinator:  Angel Noel    Past Medical History:  Past Medical History:   Diagnosis Date     Bilateral cataracts 2001     Herniated disc 1983    Lumbar     Hyperlipidemia LDL goal <130      Hypertension 5/2004     Tear of MCL (medial collateral ligament) of knee 6/20/2001    LT Knee/WC       Medication Allergies:  Allergies   Allergen Reactions     Shrimp Hives       Current " Medications (pre-admit):  Current Outpatient Medications   Medication Sig Dispense Refill     acetaminophen (TYLENOL) 325 MG tablet Take 325-650 mg by mouth every 4 hours as needed Take as needed following label instructions (Patient not taking: Reported on 5/4/2022)       acyclovir (ZOVIRAX) 400 MG tablet Take 400 mg by mouth 2 times daily       amLODIPine (NORVASC) 2.5 MG tablet TAKE 1 TABLET(2.5 MG) BY MOUTH DAILY (Patient taking differently: Take 2.5 mg by mouth daily) 90 tablet 3     Artificial Tear Solution (SM ARTIFICIAL TEARS) SOLN Apply 1 Dose to eye every 2 hours as needed Use per label instructions       lisinopril (ZESTRIL) 20 MG tablet Take 1 tablet (20 mg) by mouth daily 90 tablet 3     Multiple Vitamins-Minerals (EQ COMPLETE MULTIVITAMIN-ADULT) TABS Take 1 tablet by mouth daily         Herbal Medication/Nutritional Supplements:  No issues     Smoking/Past Drug Use:  No issues     Nausea/Vomiting, Pain, or other issues:  No issues     Summary:  I met with Hamilton Angulo for approximately 40 minutes.  We discussed his current and transplant medications including the priming options ( filgrastim vs Cyclophosphamide/filgrastim), conditioning (melphalan), antiemetics (ondansetron/dexamethasone), prophylactic antibiotics (fluconazole, Bactrim, Acyclovir, levofloxacin) and miscellaneous medications (filgrastim, claritin and vaccinations).      Again, thank you for allowing me to participate in the care of your patient.        Sincerely,        BMT Pharm D, [facility]

## 2022-05-06 ENCOUNTER — HOSPITAL ENCOUNTER (OUTPATIENT)
Dept: CARDIOLOGY | Facility: CLINIC | Age: 72
Discharge: HOME | End: 2022-05-06
Attending: INTERNAL MEDICINE
Payer: COMMERCIAL

## 2022-05-06 ENCOUNTER — HOSPITAL ENCOUNTER (OUTPATIENT)
Dept: PET IMAGING | Facility: CLINIC | Age: 72
Discharge: HOME | End: 2022-05-06
Attending: INTERNAL MEDICINE
Payer: COMMERCIAL

## 2022-05-06 ENCOUNTER — HOSPITAL ENCOUNTER (OUTPATIENT)
Dept: GENERAL RADIOLOGY | Facility: CLINIC | Age: 72
Discharge: HOME | End: 2022-05-06
Attending: INTERNAL MEDICINE
Payer: COMMERCIAL

## 2022-05-06 DIAGNOSIS — Z86.2 PERSONAL HISTORY OF DISEASES OF BLOOD AND BLOOD-FORMING ORGANS: ICD-10-CM

## 2022-05-06 DIAGNOSIS — C90.00 MYELOMA (H): ICD-10-CM

## 2022-05-06 DIAGNOSIS — Z01.818 EXAMINATION PRIOR TO CHEMOTHERAPY: ICD-10-CM

## 2022-05-06 DIAGNOSIS — Z79.899 OTHER LONG TERM (CURRENT) DRUG THERAPY: ICD-10-CM

## 2022-05-06 LAB
BACTERIA UR CULT: NORMAL
DLCOCOR-%PRED-PRE: 113 %
DLCOCOR-PRE: 30.25 ML/MIN/MMHG
DLCOUNC-%PRED-PRE: 99 %
DLCOUNC-PRE: 26.3 ML/MIN/MMHG
DLCOUNC-PRED: 26.56 ML/MIN/MMHG
DONOR CYTOMEGALOVIRUS ABY: POSITIVE
DONOR HEP B CORE ABY: ABNORMAL
DONOR HEP B SURF AGN: ABNORMAL
DONOR HEPATITIS C ABY: ABNORMAL
DONOR HTLV 1&2 ANTIBODY: ABNORMAL
DONOR TREPONEMA PAL ABY: ABNORMAL
ERV-%PRED-PRE: 165 %
ERV-PRE: 2.67 L
ERV-PRED: 1.62 L
EXPTIME-PRE: 7.06 SEC
FEF2575-%PRED-PRE: 157 %
FEF2575-PRE: 3.88 L/SEC
FEF2575-PRED: 2.46 L/SEC
FEFMAX-%PRED-PRE: 99 %
FEFMAX-PRE: 8.41 L/SEC
FEFMAX-PRED: 8.49 L/SEC
FEV1-%PRED-PRE: 125 %
FEV1-PRE: 4.13 L
FEV1FEV6-PRE: 80 %
FEV1FEV6-PRED: 78 %
FEV1FVC-PRE: 80 %
FEV1FVC-PRED: 76 %
FEV1SVC-PRE: 87 %
FEV1SVC-PRED: 67 %
FIFMAX-PRE: 5.33 L/SEC
FRCPLETH-%PRED-PRE: 142 %
FRCPLETH-PRE: 5.37 L
FRCPLETH-PRED: 3.77 L
FVC-%PRED-PRE: 118 %
FVC-PRE: 5.15 L
FVC-PRED: 4.36 L
HBV DNA SERPL QL NAA+PROBE: NORMAL
HCV RNA SERPL QL NAA+PROBE: NORMAL
HIV1+2 AB SERPL QL IA: ABNORMAL
HIV1+2 RNA SERPL QL NAA+PROBE: NORMAL
IC-%PRED-PRE: 63 %
IC-PRE: 2.1 L
IC-PRED: 3.3 L
LVEF ECHO: NORMAL
RVPLETH-%PRED-PRE: 100 %
RVPLETH-PRE: 2.7 L
RVPLETH-PRED: 2.69 L
TLCPLETH-%PRED-PRE: 101 %
TLCPLETH-PRE: 7.47 L
TLCPLETH-PRED: 7.33 L
TRYPANOSOMA CRUZI: ABNORMAL
VA-%PRED-PRE: 101 %
VA-PRE: 6.75 L
VC-%PRED-PRE: 97 %
VC-PRE: 4.77 L
VC-PRED: 4.91 L
WNV RNA SERPL DONR QL NAA+PROBE: NORMAL

## 2022-05-06 PROCEDURE — 78816 PET IMAGE W/CT FULL BODY: CPT | Mod: 26 | Performed by: RADIOLOGY

## 2022-05-06 PROCEDURE — 93306 TTE W/DOPPLER COMPLETE: CPT

## 2022-05-06 PROCEDURE — 343N000001 HC RX 343: Performed by: INTERNAL MEDICINE

## 2022-05-06 PROCEDURE — A9552 F18 FDG: HCPCS | Performed by: INTERNAL MEDICINE

## 2022-05-06 PROCEDURE — 78816 PET IMAGE W/CT FULL BODY: CPT | Mod: PS

## 2022-05-06 PROCEDURE — 71046 X-RAY EXAM CHEST 2 VIEWS: CPT | Mod: 26 | Performed by: RADIOLOGY

## 2022-05-06 PROCEDURE — 71046 X-RAY EXAM CHEST 2 VIEWS: CPT

## 2022-05-06 PROCEDURE — 93306 TTE W/DOPPLER COMPLETE: CPT | Mod: 26 | Performed by: INTERNAL MEDICINE

## 2022-05-06 RX ADMIN — FLUDEOXYGLUCOSE F-18 9.72 MCI.: 500 INJECTION, SOLUTION INTRAVENOUS at 10:56

## 2022-05-08 LAB
ATRIAL RATE - MUSE: 72 BPM
DIASTOLIC BLOOD PRESSURE - MUSE: NORMAL MMHG
INTERPRETATION ECG - MUSE: NORMAL
P AXIS - MUSE: 83 DEGREES
PR INTERVAL - MUSE: 132 MS
QRS DURATION - MUSE: 104 MS
QT - MUSE: 390 MS
QTC - MUSE: 427 MS
R AXIS - MUSE: 52 DEGREES
SYSTOLIC BLOOD PRESSURE - MUSE: NORMAL MMHG
T AXIS - MUSE: 68 DEGREES
VENTRICULAR RATE- MUSE: 72 BPM

## 2022-05-09 DIAGNOSIS — C90.00 MULTIPLE MYELOMA NOT HAVING ACHIEVED REMISSION (H): Primary | ICD-10-CM

## 2022-05-10 ENCOUNTER — OFFICE VISIT (OUTPATIENT)
Dept: TRANSPLANT | Facility: CLINIC | Age: 72
End: 2022-05-10
Attending: INTERNAL MEDICINE
Payer: COMMERCIAL

## 2022-05-10 ENCOUNTER — LAB (OUTPATIENT)
Dept: LAB | Facility: CLINIC | Age: 72
End: 2022-05-10
Attending: PHYSICIAN ASSISTANT
Payer: COMMERCIAL

## 2022-05-10 ENCOUNTER — OFFICE VISIT (OUTPATIENT)
Dept: TRANSPLANT | Facility: CLINIC | Age: 72
End: 2022-05-10
Attending: PHYSICIAN ASSISTANT
Payer: COMMERCIAL

## 2022-05-10 VITALS
TEMPERATURE: 97.7 F | BODY MASS INDEX: 20.11 KG/M2 | HEART RATE: 72 BPM | DIASTOLIC BLOOD PRESSURE: 77 MMHG | SYSTOLIC BLOOD PRESSURE: 130 MMHG | WEIGHT: 144.1 LBS

## 2022-05-10 DIAGNOSIS — Z79.899 OTHER LONG TERM (CURRENT) DRUG THERAPY: ICD-10-CM

## 2022-05-10 DIAGNOSIS — Z86.2 PERSONAL HISTORY OF DISEASES OF BLOOD AND BLOOD-FORMING ORGANS: ICD-10-CM

## 2022-05-10 DIAGNOSIS — Z01.818 EXAMINATION PRIOR TO CHEMOTHERAPY: ICD-10-CM

## 2022-05-10 DIAGNOSIS — C90.00 MULTIPLE MYELOMA NOT HAVING ACHIEVED REMISSION (H): Primary | ICD-10-CM

## 2022-05-10 DIAGNOSIS — C90.00 MYELOMA (H): ICD-10-CM

## 2022-05-10 DIAGNOSIS — I50.32 CHRONIC DIASTOLIC CONGESTIVE HEART FAILURE (H): ICD-10-CM

## 2022-05-10 LAB
BASOPHILS # BLD AUTO: 0 10E3/UL (ref 0–0.2)
BASOPHILS NFR BLD AUTO: 0 %
COLLECT DURATION TIME UR: 24 H
COLLECT DURATION TIME UR: 24 H
CREAT 24H UR-MRATE: 1.1 G/SPEC (ref 1–2)
CREAT 24H UR-MRATE: 1.1 G/SPEC (ref 1–2)
CREAT CL 24H UR+SERPL-VRATE: 74 ML/MIN
CREAT CL/1.73 SQ M 24H UR+SERPL-ARVRAT: 71 ML/MIN/1.7M2
CREAT SERPL-MCNC: 1.03 MG/DL (ref 0.66–1.25)
CREAT SERPL-MCNC: 1.03 MG/DL (ref 0.66–1.25)
CREAT UR-MCNC: 76 MG/DL
CREAT UR-MCNC: 76 MG/DL
EOSINOPHIL # BLD AUTO: 0 10E3/UL (ref 0–0.7)
EOSINOPHIL NFR BLD AUTO: 1 %
ERYTHROCYTE [DISTWIDTH] IN BLOOD BY AUTOMATED COUNT: 13.7 % (ref 10–15)
GFR SERPL CREATININE-BSD FRML MDRD: 78 ML/MIN/1.73M2
HCT VFR BLD AUTO: 31.8 % (ref 40–53)
HGB BLD-MCNC: 10.7 G/DL (ref 13.3–17.7)
IMM GRANULOCYTES # BLD: 0 10E3/UL
IMM GRANULOCYTES NFR BLD: 1 %
LYMPHOCYTES # BLD AUTO: 1.6 10E3/UL (ref 0.8–5.3)
LYMPHOCYTES NFR BLD AUTO: 39 %
MCH RBC QN AUTO: 35.8 PG (ref 26.5–33)
MCHC RBC AUTO-ENTMCNC: 33.6 G/DL (ref 31.5–36.5)
MCV RBC AUTO: 106 FL (ref 78–100)
MONOCYTES # BLD AUTO: 0.6 10E3/UL (ref 0–1.3)
MONOCYTES NFR BLD AUTO: 15 %
NEUTROPHILS # BLD AUTO: 1.8 10E3/UL (ref 1.6–8.3)
NEUTROPHILS NFR BLD AUTO: 44 %
NRBC # BLD AUTO: 0 10E3/UL
NRBC BLD AUTO-RTO: 0 /100
PLATELET # BLD AUTO: 209 10E3/UL (ref 150–450)
PROT 24H UR-MRATE: 0.42 G/SPEC (ref 0.04–0.23)
PROT UR-MCNC: 0.29 G/L
PROT/CREAT 24H UR: 0.38 G/G CR (ref 0–0.2)
RBC # BLD AUTO: 2.99 10E6/UL (ref 4.4–5.9)
SCANNED LAB RESULT: NORMAL
SPECIMEN VOL UR: 1452 ML
SPECIMEN VOL UR: 1452 ML
WBC # BLD AUTO: 4 10E3/UL (ref 4–11)

## 2022-05-10 PROCEDURE — 82575 CREATININE CLEARANCE TEST: CPT

## 2022-05-10 PROCEDURE — 88188 FLOWCYTOMETRY/READ 9-15: CPT | Mod: GC | Performed by: STUDENT IN AN ORGANIZED HEALTH CARE EDUCATION/TRAINING PROGRAM

## 2022-05-10 PROCEDURE — 85025 COMPLETE CBC W/AUTO DIFF WBC: CPT

## 2022-05-10 PROCEDURE — 88271 CYTOGENETICS DNA PROBE: CPT | Performed by: INTERNAL MEDICINE

## 2022-05-10 PROCEDURE — 86335 IMMUNFIX E-PHORSIS/URINE/CSF: CPT | Performed by: STUDENT IN AN ORGANIZED HEALTH CARE EDUCATION/TRAINING PROGRAM

## 2022-05-10 PROCEDURE — 38222 DX BONE MARROW BX & ASPIR: CPT | Performed by: PHYSICIAN ASSISTANT

## 2022-05-10 PROCEDURE — 88291 CYTO/MOLECULAR REPORT: CPT | Performed by: MEDICAL GENETICS

## 2022-05-10 PROCEDURE — 88368 INSITU HYBRIDIZATION MANUAL: CPT | Mod: 26 | Performed by: MEDICAL GENETICS

## 2022-05-10 PROCEDURE — 88237 TISSUE CULTURE BONE MARROW: CPT | Performed by: INTERNAL MEDICINE

## 2022-05-10 PROCEDURE — 88305 TISSUE EXAM BY PATHOLOGIST: CPT | Mod: TC | Performed by: INTERNAL MEDICINE

## 2022-05-10 PROCEDURE — 82565 ASSAY OF CREATININE: CPT

## 2022-05-10 PROCEDURE — 83880 ASSAY OF NATRIURETIC PEPTIDE: CPT

## 2022-05-10 PROCEDURE — 88185 FLOWCYTOMETRY/TC ADD-ON: CPT | Performed by: INTERNAL MEDICINE

## 2022-05-10 PROCEDURE — 81050 URINALYSIS VOLUME MEASURE: CPT | Performed by: STUDENT IN AN ORGANIZED HEALTH CARE EDUCATION/TRAINING PROGRAM

## 2022-05-10 PROCEDURE — 84166 PROTEIN E-PHORESIS/URINE/CSF: CPT | Performed by: STUDENT IN AN ORGANIZED HEALTH CARE EDUCATION/TRAINING PROGRAM

## 2022-05-10 PROCEDURE — 84156 ASSAY OF PROTEIN URINE: CPT

## 2022-05-10 PROCEDURE — 36415 COLL VENOUS BLD VENIPUNCTURE: CPT

## 2022-05-10 PROCEDURE — 38222 DX BONE MARROW BX & ASPIR: CPT | Mod: LT | Performed by: PHYSICIAN ASSISTANT

## 2022-05-10 ASSESSMENT — PAIN SCALES - GENERAL: PAINLEVEL: NO PAIN (0)

## 2022-05-10 NOTE — LETTER
5/10/2022         RE: Hamilton Angulo  [address] [place] [address]        Dear Colleague,    Thank you for referring your patient, Hamilton Angulo, to the [facility] BLOOD AND MARROW TRANSPLANT PROGRAM [place]. Please see a copy of my visit note below.    BMT ONC Adult Bone Marrow Biopsy Procedure Note  May 10, 2022  /77   Pulse 72   Temp 97.7  F (36.5  C)   Wt 65.4 kg (144 lb 1.6 oz)   BMI 20.11 kg/m       Learning needs assessment complete within 12 months? YES    DIAGNOSIS: MM     PROCEDURE: Unilateral Bone Marrow Biopsy and Unilateral Aspirate    LOCATION: [facility] 2nd Floor  Patient s identification was positively verified by verbal identification and invasive procedure safety checklist was completed. Informed consent was obtained. Following the administration of nothing as pre-medication, patient was placed in the prone position and prepped and draped in a sterile manner. Approximately 10 cc of 1% Lidocaine was used over the left posterior iliac spine. Following this a 3 mm incision was made. Trephine bone marrow core(s) was (were) obtained from the [facility]. Bone marrow aspirates were obtained from the [facility]. Aspirates were sent for morphology, immunophenotyping, cytogenetics and molecular diagnostics gene rearrangement. A total of approximately 20 ml of marrow was aspirated. Following this procedure a sterile dressing was applied to the bone marrow biopsy site(s). The patient was placed in the supine position to maintain pressure on the biopsy site. Post-procedure wound care instructions were given.     Complications: NO    Length of procedure:20 minutes or less     Procedure performed by: Mary Metzger PA-C [phone]         Again, thank you for allowing me to participate in the care of your patient.      Sincerely,     BONE MARROW BIOPSY

## 2022-05-10 NOTE — NURSING NOTE
BMT Teaching Flowsheet   Teaching Topic: post biopsy instructions    Person(s) involved in teaching: Patient  Motivation Level  Asks Questions: Yes  Eager to Learn: Yes  Cooperative: Yes  Receptive (willing/able to accept information): Yes    Patient demonstrates understanding of the following:   - Reason for the appointment, diagnosis and treatment plan: Yes  - Knowledge of proper use of medications and conditions for which they are ordered (with special attention to potential side effects or drug interactions): Yes  - Which situations necessitate calling provider and whom to contact: Yes    Teaching concerns addressed: reviewed activity restrictions if received premeds, potential for bleeding and actions to take if develops any of the issues below    Proper use and care of (medical equipment, care aids, etc.) Yes  Pain management techniques: Yes  Patient instructed on hand hygiene: Yes  How and/when to access community resources: Yes    Infection Control:  Patient demonstrates understanding of the following:   Surgical procedure site care taught NA  Signs and symptoms of infection taught Yes  Wound care taught Yes  Central venous catheter care taught NA    Instructional Materials Used/Given: verbal, print out of post biopsy instructions.     Patient supine for 30 minutes following biopsy. After 30 minutes, dressing clean, dry and intact. Vital signs stable. See DOC flow sheets for details. Left ambulatory with family member.    Time spent with patient: 10 minutes.    Specific Concerns: LUKE Davenport RN

## 2022-05-10 NOTE — PROGRESS NOTES
BMT Teaching Flowsheet    Hamilton Angulo is a 71 year old male  Diagnoses of Examination prior to chemotherapy, Other long term (current) drug therapy , Myeloma (H), and Personal history of diseases of blood and blood-forming organs were pertinent to this visit.    Teaching Topic: 2016-35    Person(s) involved in teaching: Patient and Spouse  Motivation Level  Asks Questions: Yes  Eager to Learn: Yes  Cooperative: Yes  Receptive (willing/able to accept information): Yes  Any cultural factors/Anabaptist beliefs that may influence understanding or compliance? No    Patient and Caregiver demonstrates understanding of the following:  - Reason for the appointment, diagnosis and treatment plan: Yes  - Knowledge of proper use of medications and conditions for which they are ordered (with special attention to potential side effects or drug interactions): Yes  - Which situations necessitate calling provider and whom to contact: Yes    Teaching concerns addressed: none identified    Proper use and care of (medical equipment, care aids, etc.) Yes  Pain management techniques: Yes  Patient instructed on hand hygiene: Yes  How and/when to access community resources: Yes    Infection Control:  Patient and Caregiver demonstrates understanding of the following:  Surgical procedure site care taught NA  Signs and symptoms of infection taught Yes  Wound care taught NA  Central venous catheter care taught No, explain: Patient and caregiver had PLC class. However, patient and wife prefer daily clinic appointments for line care instead of performing at home.    Instructional Materials Used/Given:   Patient was given and reviewed BMT Teaching Binder, including medication pamphlets, sample treatment calendars, consents, contact phone numbers, hospital and discharge guidelines.  Patient verbalizes understanding of the material and was encouraged to call with any additional questions.     Research participant Hamilton Angulo was provided the  information on the Research Participant Information Sheet by Beckie Villafana RN on May 10, 2022. This document contains information regarding the risks of participating in a research study during the COVID-19 pandemic. Receipt of the information sheet was confirmed by study personnel prior to the visit.    Time spent with patient: 60 minutes.      Specific Concerns: LUKE Villafnaa RN ([place]) Care Coordinator - BMT  Phone: [phone]  Pager: [phone]

## 2022-05-10 NOTE — PROGRESS NOTES
BMT ONC Adult Bone Marrow Biopsy Procedure Note  May 10, 2022  /77   Pulse 72   Temp 97.7  F (36.5  C)   Wt 65.4 kg (144 lb 1.6 oz)   BMI 20.11 kg/m       Learning needs assessment complete within 12 months? YES    DIAGNOSIS: MM     PROCEDURE: Unilateral Bone Marrow Biopsy and Unilateral Aspirate    LOCATION: [facility] 2nd Floor  Patient s identification was positively verified by verbal identification and invasive procedure safety checklist was completed. Informed consent was obtained. Following the administration of nothing as pre-medication, patient was placed in the prone position and prepped and draped in a sterile manner. Approximately 10 cc of 1% Lidocaine was used over the left posterior iliac spine. Following this a 3 mm incision was made. Trephine bone marrow core(s) was (were) obtained from the [facility]. Bone marrow aspirates were obtained from the IC. Aspirates were sent for morphology, immunophenotyping, cytogenetics and molecular diagnostics gene rearrangement. A total of approximately 20 ml of marrow was aspirated. Following this procedure a sterile dressing was applied to the bone marrow biopsy site(s). The patient was placed in the supine position to maintain pressure on the biopsy site. Post-procedure wound care instructions were given.     Complications: NO    Length of procedure:20 minutes or less     Procedure performed by: Mary Metzger PA-C [phone]

## 2022-05-10 NOTE — LETTER
5/10/2022         RE: Hamilton Angulo  [address] [place] [address]        Dear Colleague,    Thank you for referring your patient, Hamilton Angulo, to the [facility] BLOOD AND MARROW TRANSPLANT PROGRAM [place]. Please see a copy of my visit note below.    BMT Teaching Flowsheet    Hamilton Angulo is a 71 year old male  Diagnoses of Examination prior to chemotherapy, Other long term (current) drug therapy , Myeloma (H), and Personal history of diseases of blood and blood-forming organs were pertinent to this visit.    Teaching Topic: 2016-35    Person(s) involved in teaching: Patient and Spouse  Motivation Level  Asks Questions: Yes  Eager to Learn: Yes  Cooperative: Yes  Receptive (willing/able to accept information): Yes  Any cultural factors/Pentecostal beliefs that may influence understanding or compliance? No    Patient and Caregiver demonstrates understanding of the following:  - Reason for the appointment, diagnosis and treatment plan: Yes  - Knowledge of proper use of medications and conditions for which they are ordered (with special attention to potential side effects or drug interactions): Yes  - Which situations necessitate calling provider and whom to contact: Yes    Teaching concerns addressed: none identified    Proper use and care of (medical equipment, care aids, etc.) Yes  Pain management techniques: Yes  Patient instructed on hand hygiene: Yes  How and/when to access community resources: Yes    Infection Control:  Patient and Caregiver demonstrates understanding of the following:  Surgical procedure site care taught NA  Signs and symptoms of infection taught Yes  Wound care taught NA  Central venous catheter care taught No, explain: Patient and caregiver had PLC class. However, patient and wife prefer daily clinic appointments for line care instead of performing at home.    Instructional Materials Used/Given:   Patient was given and reviewed BMT Teaching Binder, including  medication pamphlets, sample treatment calendars, consents, contact phone numbers, hospital and discharge guidelines.  Patient verbalizes understanding of the material and was encouraged to call with any additional questions.     Research participant Hamilton Angulo was provided the information on the Research Participant Information Sheet by Beckie Villafana RN on May 10, 2022. This document contains information regarding the risks of participating in a research study during the COVID-19 pandemic. Receipt of the information sheet was confirmed by study personnel prior to the visit.    Time spent with patient: 60 minutes.      Specific Concerns: LUKE Villafana RN ([place]) Care Coordinator - BMT  Phone: [phone]  Pager: [phone]        Again, thank you for allowing me to participate in the care of your patient.      Sincerely,    BMT Nurse Coordinator

## 2022-05-10 NOTE — NURSING NOTE
"Oncology Rooming Note    May 10, 2022 1:55 PM   Hamilton Angulo is a 71 year old male who presents for:    Chief Complaint   Patient presents with     Oncology Clinic Visit     Bmbx r/t MM     Initial Vitals: /77   Pulse 72   Temp 97.7  F (36.5  C)   Wt 65.4 kg (144 lb 1.6 oz)   BMI 20.11 kg/m   Estimated body mass index is 20.11 kg/m  as calculated from the following:    Height as of 5/4/22: 1.803 m (5' 10.98\").    Weight as of this encounter: 65.4 kg (144 lb 1.6 oz). Body surface area is 1.81 meters squared.  No Pain (0) Comment: Data Unavailable   No LMP for male patient.  Allergies reviewed: Yes  Medications reviewed: Yes    Medications: Medication refills not needed today.  Pharmacy [facility] entered into [employer]: [id number] - [place], [place] - [address] AT [facility] & [address]    Clinical concerns: VSS. No clinical concerns at this time.     Johanny Davenport RN              "

## 2022-05-11 LAB
M PROTEIN MFR UR ELPH: 0 %
PATH REPORT.COMMENTS IMP SPEC: NORMAL
PATH REPORT.FINAL DX SPEC: NORMAL
PATH REPORT.MICROSCOPIC SPEC OTHER STN: NORMAL
PATH REPORT.RELEVANT HX SPEC: NORMAL
PROT ELPH PNL UR ELPH: NORMAL
PROT PATTERN UR ELPH-IMP: NORMAL

## 2022-05-11 PROCEDURE — 86335 IMMUNFIX E-PHORSIS/URINE/CSF: CPT | Mod: 26

## 2022-05-11 PROCEDURE — 84166 PROTEIN E-PHORESIS/URINE/CSF: CPT | Mod: 26

## 2022-05-12 ENCOUNTER — OFFICE VISIT (OUTPATIENT)
Dept: CARDIOLOGY | Facility: CLINIC | Age: 72
End: 2022-05-12
Attending: INTERNAL MEDICINE
Payer: COMMERCIAL

## 2022-05-12 VITALS
DIASTOLIC BLOOD PRESSURE: 78 MMHG | HEART RATE: 91 BPM | WEIGHT: 144 LBS | HEIGHT: 70 IN | BODY MASS INDEX: 20.62 KG/M2 | SYSTOLIC BLOOD PRESSURE: 158 MMHG

## 2022-05-12 DIAGNOSIS — I50.32 CHRONIC DIASTOLIC CONGESTIVE HEART FAILURE (H): ICD-10-CM

## 2022-05-12 DIAGNOSIS — R93.1 ABNORMAL ECHOCARDIOGRAM: ICD-10-CM

## 2022-05-12 DIAGNOSIS — C90.00 MULTIPLE MYELOMA, REMISSION STATUS UNSPECIFIED (H): Primary | ICD-10-CM

## 2022-05-12 LAB
INTERPRETATION: NORMAL
NT-PROBNP SERPL-MCNC: 52 PG/ML (ref 0–900)
PATH REPORT.COMMENTS IMP SPEC: NORMAL
PATH REPORT.COMMENTS IMP SPEC: NORMAL
PATH REPORT.FINAL DX SPEC: NORMAL
PATH REPORT.GROSS SPEC: NORMAL
PATH REPORT.MICROSCOPIC SPEC OTHER STN: NORMAL
PATH REPORT.MICROSCOPIC SPEC OTHER STN: NORMAL
PATH REPORT.RELEVANT HX SPEC: NORMAL

## 2022-05-12 PROCEDURE — 88311 DECALCIFY TISSUE: CPT | Mod: 26 | Performed by: STUDENT IN AN ORGANIZED HEALTH CARE EDUCATION/TRAINING PROGRAM

## 2022-05-12 PROCEDURE — 88341 IMHCHEM/IMCYTCHM EA ADD ANTB: CPT | Mod: 26 | Performed by: STUDENT IN AN ORGANIZED HEALTH CARE EDUCATION/TRAINING PROGRAM

## 2022-05-12 PROCEDURE — 88305 TISSUE EXAM BY PATHOLOGIST: CPT | Mod: 26 | Performed by: STUDENT IN AN ORGANIZED HEALTH CARE EDUCATION/TRAINING PROGRAM

## 2022-05-12 PROCEDURE — 88342 IMHCHEM/IMCYTCHM 1ST ANTB: CPT | Mod: 26 | Performed by: STUDENT IN AN ORGANIZED HEALTH CARE EDUCATION/TRAINING PROGRAM

## 2022-05-12 PROCEDURE — 99203 OFFICE O/P NEW LOW 30 MIN: CPT | Performed by: INTERNAL MEDICINE

## 2022-05-12 PROCEDURE — 85097 BONE MARROW INTERPRETATION: CPT | Performed by: STUDENT IN AN ORGANIZED HEALTH CARE EDUCATION/TRAINING PROGRAM

## 2022-05-12 NOTE — PROGRESS NOTES
HISTORY:    Hamilton Angulo is an extremely pleasant 71-year-old male with a history of treated hypertension but no previous cardiac history who was recently undergone 4 rounds of chemotherapy for multiple myeloma.  He is being considered for bone marrow transplant and as part of his evaluation underwent an echocardiogram.  That echocardiogram included a strain pattern suggesting possible myocardial involvement (amyloid heart) and cardiology consultation was requested on urgent basis.    Hamilton denies cardiac history.  He feels that his energy and stamina are normal.  He denies exertional chest arm neck shoulder or jaw discomfort, PND/orthopnea, syncope or near syncope, strokelike symptoms, orthostasis, peripheral edema, or claudication.    I personally reviewed the echo images.  He has normal LV size and function.  The LV thickness is normal.  LV myocardial texture appears normal.  There is no left atrial enlargement.  Diastolic parameters are within normal limits.  There is no pericardial effusion.  Many or all of the above listed factors would be expected to be abnormal in the setting of amyloid heart, which of course can be seen in the setting of multiple myeloma.  The only abnormality seen was mildly abnormal global longitudinal strain (GLS) with a target-like appearance (apical values normal, basal values abnormal).  This is not a specific finding.      ASSESSMENT/PLAN:    1.  Abnormal GLS.  This is a nonspecific strain pattern abnormality and there are multiple other parameters on echo that would be expected to be abnormal should this patient have amyloid heart.  All of these other factors are normal.  In addition the patient is clinically normal without any evidence of heart failure.  I will arrange a proBNP to be done.  This is felt to be very sensitive and a normal value reliably excludes amyloid heart.  Even if this value is high the patient's other echo parameters and lack of symptoms indicates a very low  probability of amyloid heart.  The other approach we could take is myocardial biopsy, but even this is not completely reliable.  Myocardial biopsy poses far more risk than benefit and is not indicated in this patient.  He may proceed with his bone marrow transplant without cardiac limitations regardless of the results of the upcoming proBNP value.  No further cardiac evaluation is needed.  2.  Hypertension.  BP is not well controlled.  This should be addressed by his other caregivers.  Consider increasing amlodipine or adding a diuretic.  No med changes made today, he does not require another caregiver in his complicated medical care.    Addendum 5/13/2022;  Pro BNP is normal, reliably excluding amyloid heart.    OK for BMT from cardiology standpoint, no further visits needed.       Thank you for inviting me to participate in the care of your patient.  Please don't hesitate to call if I can be of further assistance.  30 minutes were spent today reviewing the chart and other records, interviewing and examining the patient, and documenting our visit.    Chart documentation was completed, in part, with [employer] voice-recognition software. Even though reviewed, some grammatical, spelling, and word errors may remain.       Orders Placed This Encounter   Procedures     N terminal pro BNP outpatient     No orders of the defined types were placed in this encounter.    There are no discontinued medications.    10 year ASCVD risk: The 10-year ASCVD risk score (Subha DC Jr., et al., 2013) is: 24.7%    Values used to calculate the score:      Age: 71 years      Sex: Male      Is Non- : No      Diabetic: No      Tobacco smoker: No      Systolic Blood Pressure: 158 mmHg      Is BP treated: Yes      HDL Cholesterol: 63 mg/dL      Total Cholesterol: 147 mg/dL    Encounter Diagnoses   Name Primary?     Multiple myeloma, remission status unspecified (H) Yes     Abnormal echocardiogram      Chronic diastolic  congestive heart failure (H)        CURRENT MEDICATIONS:  Current Outpatient Medications   Medication Sig Dispense Refill     acyclovir (ZOVIRAX) 400 MG tablet Take 400 mg by mouth 2 times daily       amLODIPine (NORVASC) 2.5 MG tablet TAKE 1 TABLET(2.5 MG) BY MOUTH DAILY (Patient taking differently: Take 2.5 mg by mouth daily) 90 tablet 3     Artificial Tear Solution (SM ARTIFICIAL TEARS) SOLN Apply 1 Dose to eye every 2 hours as needed Use per label instructions       lisinopril (ZESTRIL) 20 MG tablet Take 1 tablet (20 mg) by mouth daily 90 tablet 3     Multiple Vitamins-Minerals (EQ COMPLETE MULTIVITAMIN-ADULT) TABS Take 1 tablet by mouth daily       acetaminophen (TYLENOL) 325 MG tablet Take 325-650 mg by mouth every 4 hours as needed Take as needed following label instructions (Patient not taking: Reported on 5/10/2022)         ALLERGIES     Allergies   Allergen Reactions     Shrimp Hives       PAST MEDICAL HISTORY:  Past Medical History:   Diagnosis Date     Bilateral cataracts 2001     Herniated disc 1983    Lumbar     Hyperlipidemia LDL goal <130      Hypertension 5/2004     Tear of MCL (medial collateral ligament) of knee 6/20/2001    LT Knee/WC       PAST SURGICAL HISTORY:  Past Surgical History:   Procedure Laterality Date     CATARACT IOL, RT/LT  12/2003    Bilateral       FAMILY HISTORY:  Family History   Problem Relation Age of Onset     Breast Cancer Mother      Hypertension Mother         She potentially had this.     Other - See Comments Father         He potentially had liver issues and dementia.     Heart Disease Maternal Grandmother         She potentially had this.     Cancer Maternal Grandfather         Unknown type of cancer.     Other - See Comments Paternal Half-Brother         He potentially had dementia.       SOCIAL HISTORY:  Social History     Socioeconomic History     Marital status:      Spouse name: None     Number of children: None     Years of education: None     Highest  "education level: None   Tobacco Use     Smoking status: Never Smoker     Smokeless tobacco: Never Used   Vaping Use     Vaping Use: Never used   Substance and Sexual Activity     Alcohol use: Yes     Comment: Ocss.     Drug use: No     Sexual activity: Yes     Partners: Female       Review of Systems:  Skin:  Positive for bruising   Eyes:  Negative    ENT:  Negative    Respiratory:  Negative    Cardiovascular:  Negative    Gastroenterology: Negative    Genitourinary:  not assessed    Musculoskeletal:  Negative    Neurologic:  Negative    Psychiatric:  Negative    Heme/Lymph/Imm:  Positive for allergies  Endocrine:  Negative      Physical Exam:  Vitals: BP (!) 158/78   Pulse 91   Ht 1.778 m (5' 10\")   Wt 65.3 kg (144 lb)   BMI 20.66 kg/m      Constitutional:  cooperative, alert and oriented, well developed, well nourished, in no acute distress        Skin:  warm and dry to the touch, no apparent skin lesions or masses noted        Head:  normocephalic, no masses or lesions        Eyes:  sclera white;pupils equal and round;no xanthalasma        ENT:  no pallor or cyanosis   masked    Neck:  carotid pulses are full and equal bilaterally, JVP normal, no carotid bruit        Chest:  normal breath sounds, clear to auscultation, normal A-P diameter, normal symmetry, normal respiratory excursion, no use of accessory muscles        Cardiac: regular rhythm, normal S1/S2, no S3 or S4, apical impulse not displaced, no murmurs, gallops or rubs                  Abdomen:  abdomen soft;BS normoactive        Vascular: pulses full and equal                                      Extremities and Muscular Skeletal:  no edema           Neurological:  no gross motor deficits        Psych:  affect appropriate, oriented to time, person and place     Recent Lab Results:  LIPID RESULTS:  Lab Results   Component Value Date    CHOL 147 11/29/2021    CHOL 142 10/30/2020    HDL 63 11/29/2021    HDL 56 10/30/2020    LDL 62 11/29/2021    LDL 55 " 10/30/2020    TRIG 111 11/29/2021    TRIG 153 (H) 10/30/2020    CHOLHDLRATIO 3.8 09/28/2015       LIVER ENZYME RESULTS:  Lab Results   Component Value Date    AST 15 05/04/2022    AST 11 06/23/2021    ALT 19 05/04/2022    ALT 13 06/23/2021       CBC RESULTS:  Lab Results   Component Value Date    WBC 4.0 05/10/2022    WBC 3.3 (L) 06/23/2021    RBC 2.99 (L) 05/10/2022    RBC 2.95 (L) 06/23/2021    HGB 10.7 (L) 05/10/2022    HGB 10.0 (L) 06/23/2021    HCT 31.8 (L) 05/10/2022    HCT 30.6 (L) 06/23/2021     (H) 05/10/2022     (H) 06/23/2021    MCH 35.8 (H) 05/10/2022    MCH 33.9 (H) 06/23/2021    MCHC 33.6 05/10/2022    MCHC 32.7 06/23/2021    RDW 13.7 05/10/2022    RDW 13.7 06/23/2021     05/10/2022     06/23/2021       BMP RESULTS:  Lab Results   Component Value Date     05/04/2022     06/23/2021    POTASSIUM 4.0 05/04/2022    POTASSIUM 3.6 06/23/2021    CHLORIDE 108 05/04/2022    CHLORIDE 108 06/23/2021    CO2 24 05/04/2022    CO2 27 06/23/2021    ANIONGAP 7 05/04/2022    ANIONGAP 5 06/23/2021     (H) 05/04/2022     (H) 06/23/2021    BUN 13 05/04/2022    BUN 15 06/23/2021    CR 1.03 05/10/2022    CR 0.97 06/23/2021    GFRESTIMATED 78 05/10/2022    GFRESTIMATED 78 06/23/2021    GFRESTBLACK >90 06/23/2021    RAKESH 8.8 05/04/2022    RAKESH 8.5 06/23/2021        A1C RESULTS:  Lab Results   Component Value Date    A1C 5.2 10/30/2020       INR RESULTS:  Lab Results   Component Value Date    INR 0.97 05/04/2022    INR 1.04 11/30/2021         Wilder Desai MD, FACC    CC  Pepper Elise MD  [address]  [place] [address]

## 2022-05-12 NOTE — LETTER
5/12/2022    Bro Herrera MD  [address]  [place] [address]    RE: Hamilton Angulo       Dear Colleague,     I had the pleasure of seeing Hamilton Angulo in the [facility].  HISTORY:    Hamilton Angulo is an extremely pleasant 71-year-old male with a history of treated hypertension but no previous cardiac history who was recently undergone 4 rounds of chemotherapy for multiple myeloma.  He is being considered for bone marrow transplant and as part of his evaluation underwent an echocardiogram.  That echocardiogram included a strain pattern suggesting possible myocardial involvement and cardiology consultation was requested on urgent basis.    Hamilton denies cardiac history.  He feels that his energy and stamina are normal.  He denies exertional chest arm neck shoulder or jaw discomfort, PND/orthopnea, syncope or near syncope, strokelike symptoms, orthostasis, peripheral edema, or claudication.    I personally reviewed the echo images.  He has normal LV size and function.  The LV thickness is normal.  LV myocardial texture appears normal.  There is no left atrial enlargement.  Diastolic parameters are within normal limits.  There is no pericardial effusion.  Many or all of the above listed factors would be expected to be abnormal in the setting of amyloid heart, which of course can be seen in the setting of multiple myeloma.  The only abnormality seen was mildly abnormal global longitudinal strain (GLS) with a target-like appearance (apical values normal, basal values abnormal).  This is not a specific finding.      ASSESSMENT/PLAN:    1.  Abnormal GLS.  This is a nonspecific strain pattern abnormality and there are multiple other parameters on echo that would be expected to be abnormal should this patient have amyloid heart.  All of these other factors are normal.  In addition the patient is clinically normal without any evidence of heart failure.  I will arrange a proBNP to be done.  This is felt  to be very sensitive and a normal value reliably excludes amyloid heart.  Even if this value is high the patient's other echo parameters and lack of symptoms indicates a very low probability of amyloid heart.  The other approach we could take is myocardial biopsy, but even this is not completely reliable.  Myocardial biopsy poses far more risk than benefit it is not indicated in this patient.  He may proceed with his bone marrow transplant without cardiac limitations regardless of the results of the upcoming proBNP value.  No further cardiac evaluation is needed.    Thank you for inviting me to participate in the care of your patient.  Please don't hesitate to call if I can be of further assistance.  30 minutes were spent today reviewing the chart and other records, interviewing and examining the patient, and documenting our visit.    Chart documentation was completed, in part, with [employer] voice-recognition software. Even though reviewed, some grammatical, spelling, and word errors may remain.       Orders Placed This Encounter   Procedures     N terminal pro BNP outpatient     No orders of the defined types were placed in this encounter.    There are no discontinued medications.    10 year ASCVD risk: The 10-year ASCVD risk score (Subha DELMY Jr., et al., 2013) is: 24.7%    Values used to calculate the score:      Age: 71 years      Sex: Male      Is Non- : No      Diabetic: No      Tobacco smoker: No      Systolic Blood Pressure: 158 mmHg      Is BP treated: Yes      HDL Cholesterol: 63 mg/dL      Total Cholesterol: 147 mg/dL    Encounter Diagnoses   Name Primary?     Multiple myeloma, remission status unspecified (H) Yes     Abnormal echocardiogram      Chronic diastolic congestive heart failure (H)        CURRENT MEDICATIONS:  Current Outpatient Medications   Medication Sig Dispense Refill     acyclovir (ZOVIRAX) 400 MG tablet Take 400 mg by mouth 2 times daily       amLODIPine (NORVASC) 2.5  MG tablet TAKE 1 TABLET(2.5 MG) BY MOUTH DAILY (Patient taking differently: Take 2.5 mg by mouth daily) 90 tablet 3     Artificial Tear Solution (SM ARTIFICIAL TEARS) SOLN Apply 1 Dose to eye every 2 hours as needed Use per label instructions       lisinopril (ZESTRIL) 20 MG tablet Take 1 tablet (20 mg) by mouth daily 90 tablet 3     Multiple Vitamins-Minerals (EQ COMPLETE MULTIVITAMIN-ADULT) TABS Take 1 tablet by mouth daily       acetaminophen (TYLENOL) 325 MG tablet Take 325-650 mg by mouth every 4 hours as needed Take as needed following label instructions (Patient not taking: Reported on 5/10/2022)         ALLERGIES     Allergies   Allergen Reactions     Shrimp Hives       PAST MEDICAL HISTORY:  Past Medical History:   Diagnosis Date     Bilateral cataracts 2001     Herniated disc 1983    Lumbar     Hyperlipidemia LDL goal <130      Hypertension 5/2004     Tear of MCL (medial collateral ligament) of knee 6/20/2001    LT Knee/WC       PAST SURGICAL HISTORY:  Past Surgical History:   Procedure Laterality Date     CATARACT IOL, RT/LT  12/2003    Bilateral       FAMILY HISTORY:  Family History   Problem Relation Age of Onset     Breast Cancer Mother      Hypertension Mother         She potentially had this.     Other - See Comments Father         He potentially had liver issues and dementia.     Heart Disease Maternal Grandmother         She potentially had this.     Cancer Maternal Grandfather         Unknown type of cancer.     Other - See Comments Paternal Half-Brother         He potentially had dementia.       SOCIAL HISTORY:  Social History     Socioeconomic History     Marital status:      Spouse name: None     Number of children: None     Years of education: None     Highest education level: None   Tobacco Use     Smoking status: Never Smoker     Smokeless tobacco: Never Used   Vaping Use     Vaping Use: Never used   Substance and Sexual Activity     Alcohol use: Yes     Comment: Ocss.     Drug use:  "No     Sexual activity: Yes     Partners: Female       Review of Systems:  Skin:  Positive for bruising   Eyes:  Negative    ENT:  Negative    Respiratory:  Negative    Cardiovascular:  Negative    Gastroenterology: Negative    Genitourinary:  not assessed    Musculoskeletal:  Negative    Neurologic:  Negative    Psychiatric:  Negative    Heme/Lymph/Imm:  Positive for allergies  Endocrine:  Negative      Physical Exam:  Vitals: BP (!) 158/78   Pulse 91   Ht 1.778 m (5' 10\")   Wt 65.3 kg (144 lb)   BMI 20.66 kg/m      Constitutional:  cooperative, alert and oriented, well developed, well nourished, in no acute distress        Skin:  warm and dry to the touch, no apparent skin lesions or masses noted        Head:  normocephalic, no masses or lesions        Eyes:  sclera white;pupils equal and round;no xanthalasma        ENT:  no pallor or cyanosis   masked    Neck:  carotid pulses are full and equal bilaterally, JVP normal, no carotid bruit        Chest:  normal breath sounds, clear to auscultation, normal A-P diameter, normal symmetry, normal respiratory excursion, no use of accessory muscles        Cardiac: regular rhythm, normal S1/S2, no S3 or S4, apical impulse not displaced, no murmurs, gallops or rubs                  Abdomen:  abdomen soft;BS normoactive        Vascular: pulses full and equal                                      Extremities and Muscular Skeletal:  no edema           Neurological:  no gross motor deficits        Psych:  affect appropriate, oriented to time, person and place     Recent Lab Results:  LIPID RESULTS:  Lab Results   Component Value Date    CHOL 147 11/29/2021    CHOL 142 10/30/2020    HDL 63 11/29/2021    HDL 56 10/30/2020    LDL 62 11/29/2021    LDL 55 10/30/2020    TRIG 111 11/29/2021    TRIG 153 (H) 10/30/2020    CHOLHDLRATIO 3.8 09/28/2015       LIVER ENZYME RESULTS:  Lab Results   Component Value Date    AST 15 05/04/2022    AST 11 06/23/2021    ALT 19 05/04/2022    ALT 13 " 06/23/2021       CBC RESULTS:  Lab Results   Component Value Date    WBC 4.0 05/10/2022    WBC 3.3 (L) 06/23/2021    RBC 2.99 (L) 05/10/2022    RBC 2.95 (L) 06/23/2021    HGB 10.7 (L) 05/10/2022    HGB 10.0 (L) 06/23/2021    HCT 31.8 (L) 05/10/2022    HCT 30.6 (L) 06/23/2021     (H) 05/10/2022     (H) 06/23/2021    MCH 35.8 (H) 05/10/2022    MCH 33.9 (H) 06/23/2021    MCHC 33.6 05/10/2022    MCHC 32.7 06/23/2021    RDW 13.7 05/10/2022    RDW 13.7 06/23/2021     05/10/2022     06/23/2021       BMP RESULTS:  Lab Results   Component Value Date     05/04/2022     06/23/2021    POTASSIUM 4.0 05/04/2022    POTASSIUM 3.6 06/23/2021    CHLORIDE 108 05/04/2022    CHLORIDE 108 06/23/2021    CO2 24 05/04/2022    CO2 27 06/23/2021    ANIONGAP 7 05/04/2022    ANIONGAP 5 06/23/2021     (H) 05/04/2022     (H) 06/23/2021    BUN 13 05/04/2022    BUN 15 06/23/2021    CR 1.03 05/10/2022    CR 0.97 06/23/2021    GFRESTIMATED 78 05/10/2022    GFRESTIMATED 78 06/23/2021    GFRESTBLACK >90 06/23/2021    RAKESH 8.8 05/04/2022    RAKESH 8.5 06/23/2021        A1C RESULTS:  Lab Results   Component Value Date    A1C 5.2 10/30/2020       INR RESULTS:  Lab Results   Component Value Date    INR 0.97 05/04/2022    INR 1.04 11/30/2021         Wilder Desai MD, FACC    CC  Pepper Elise MD  [address] [facility] [address]  [place] [address]    Thank you for allowing me to participate in the care of your patient.      Sincerely,     Wilder Desai MD     [facility]

## 2022-05-13 ENCOUNTER — ALLIED HEALTH/NURSE VISIT (OUTPATIENT)
Dept: TRANSPLANT | Facility: CLINIC | Age: 72
End: 2022-05-13
Attending: INTERNAL MEDICINE
Payer: COMMERCIAL

## 2022-05-13 VITALS
SYSTOLIC BLOOD PRESSURE: 142 MMHG | OXYGEN SATURATION: 99 % | DIASTOLIC BLOOD PRESSURE: 85 MMHG | HEIGHT: 70 IN | WEIGHT: 145.6 LBS | HEART RATE: 74 BPM | TEMPERATURE: 97.8 F | BODY MASS INDEX: 20.84 KG/M2 | RESPIRATION RATE: 16 BRPM

## 2022-05-13 DIAGNOSIS — C90.01 MULTIPLE MYELOMA IN REMISSION (H): ICD-10-CM

## 2022-05-13 DIAGNOSIS — Z86.2 PERSONAL HISTORY OF DISEASES OF BLOOD AND BLOOD-FORMING ORGANS: ICD-10-CM

## 2022-05-13 DIAGNOSIS — Z79.899 OTHER LONG TERM (CURRENT) DRUG THERAPY: ICD-10-CM

## 2022-05-13 DIAGNOSIS — Z01.818 EXAMINATION PRIOR TO CHEMOTHERAPY: ICD-10-CM

## 2022-05-13 DIAGNOSIS — Z71.9 VISIT FOR COUNSELING: Primary | ICD-10-CM

## 2022-05-13 DIAGNOSIS — C90.00 MYELOMA (H): ICD-10-CM

## 2022-05-13 PROCEDURE — G0463 HOSPITAL OUTPT CLINIC VISIT: HCPCS

## 2022-05-13 PROCEDURE — 99215 OFFICE O/P EST HI 40 MIN: CPT | Mod: GC

## 2022-05-13 ASSESSMENT — PAIN SCALES - GENERAL: PAINLEVEL: NO PAIN (0)

## 2022-05-13 NOTE — NURSING NOTE
"Oncology Rooming Note    May 13, 2022 3:55 PM   Hamilton Angulo is a 71 year old male who presents for:    Chief Complaint   Patient presents with     Oncology Clinic Visit     [facility] RETURN - MULTIPLE MYELOMA     Initial Vitals: BP (!) 142/85   Pulse 74   Temp 97.8  F (36.6  C)   Resp 16   Ht 1.778 m (5' 10\")   Wt 66 kg (145 lb 9.6 oz)   SpO2 99%   BMI 20.89 kg/m   Estimated body mass index is 20.89 kg/m  as calculated from the following:    Height as of this encounter: 1.778 m (5' 10\").    Weight as of this encounter: 66 kg (145 lb 9.6 oz). Body surface area is 1.81 meters squared.  No Pain (0) Comment: Data Unavailable   No LMP for male patient.  Allergies reviewed: Yes  Medications reviewed: Yes    Medications: Medication refills not needed today.  Pharmacy [facility] entered into [employer]: [id number] [place], [place] - [address] AT [facility] [address]    Clinical concerns: No new concerns. Timbosdlinsey was notified.      Terence Osuna LPN            "

## 2022-05-13 NOTE — LETTER
5/13/2022     RE: Hamilton nAgulo  [address] [facility] [address]    Dear Colleague,    Thank you for referring your patient, Hamilton Angulo, to the [facility] BLOOD AND MARROW TRANSPLANT PROGRAM [place]. Please see a copy of my visit note below.    BMT/Cell Therapy Consultation      Workup Nurse Coordinator: Andrea  Primary BMT Physician: Arik  Closing BMT Physician (if different): Chey  Date of Summary:  05/13/2022  Reason for Transplant: MM  Protocol: 2016-35 auto MM      Hamilton Angulo is a 71 year old male referred by Dr. Bowling and Caleb for IgG kappa multiple myeloma, RISS stage 1, standard risk.      Diagnosis and Treatment Summary         Disease presentation and baseline characteristics:  Standard risk IgG kappa MM presenting as progressive but asymptomatic anemia. WBC 3.3.  Hemoglobin 10.  Platelets 155. CMP showed normal creatinine and calcium.  Total protein 8.7.  LFTs unremarkable.  Albumin 4.1, Beta-2 microglobulin 1.9. Bone marrow biopsy showed plasma cell myeloma with normocellular marrow, cellularity 20 to 30% with trilineage hematopoiesis and 20- 25% kappa monotypic plasma cells.  Increased marrow storage iron. Bone marrow flow cytometry showed 1.2% plasma cells which express CD38, CD19, CD20 (partial dim), CD45 (dim), CD56  and monotypic cytoplasmic kappa immunoglobulin light chains. FISH Hyperdiploid with gains of chromosomes 5, 9, and/or 15 (85%), gain of 11q (95%), monosomy 13 (46%), loss of IGH (4.5%) (control range 0-2.8%); no rearrangement of IGH. Cytogenetics 46 XY. Baseline M spike of 2.1. Serum IgG level was 2862.  Monoclonal peak in the urine was 57.5 and MANDI showed large monoclonal free kappa and monoclonal IgG kappa. [place] free light chain 57.  Lambda 0.65 and the ratio 88. On 10/1/21, his M spike was down to 0.8 and kappa FLC down to 25 (partial response).  Kappa FLC plateaued at 27 at pre-autologous transplant workup.  Started D-Kd x 4.  On 5/4/22 m-spike  was 0.3, kappa FLC 2.25.    Date Treatment Name Response Side Effects / Toxicities   8/2021 RVd x 6 Partial response Neutropenia, neuropathy   12/2021 D-Kd x4 VGPR                       HPI:    See above for disease and treatment history.  Hamilton presents today accompanied by his wife Heather to review the results of his workup.  He reports he is generally doing well without fevers/chills, nausea/vomiting, diarrhea/constipation, rashes or other concerns.  He remains physically active at home without any significant limitations.      ROS:    10 point ROS neg other than the symptoms noted above in the HPI.        Past Medical History:   Diagnosis Date     Bilateral cataracts 2001     Herniated disc 1983    Lumbar     Hyperlipidemia LDL goal <130      Hypertension 5/2004     Tear of MCL (medial collateral ligament) of knee 6/20/2001    LT Knee/WC       Past Surgical History:   Procedure Laterality Date     CATARACT IOL, RT/LT  12/2003    Bilateral       Family History   Problem Relation Age of Onset     Breast Cancer Mother      Hypertension Mother         She potentially had this.     Other - See Comments Father         He potentially had liver issues and dementia.     Heart Disease Maternal Grandmother         She potentially had this.     Cancer Maternal Grandfather         Unknown type of cancer.     Other - See Comments Paternal Half-Brother         He potentially had dementia.       Social History     Tobacco Use     Smoking status: Never Smoker     Smokeless tobacco: Never Used   Vaping Use     Vaping Use: Never used   Substance Use Topics     Alcohol use: Yes     Comment: [employer].     Drug use: No         Allergies   Allergen Reactions     Shrimp Hives        Current Outpatient Medications   Medication Sig Dispense Refill     acyclovir (ZOVIRAX) 400 MG tablet Take 400 mg by mouth 2 times daily       amLODIPine (NORVASC) 2.5 MG tablet TAKE 1 TABLET(2.5 MG) BY MOUTH DAILY (Patient taking differently: Take 2.5 mg by  "mouth daily) 90 tablet 3     Artificial Tear Solution (SM ARTIFICIAL TEARS) SOLN Apply 1 Dose to eye every 2 hours as needed Use per label instructions       lisinopril (ZESTRIL) 20 MG tablet Take 1 tablet (20 mg) by mouth daily 90 tablet 3     Multiple Vitamins-Minerals (EQ COMPLETE MULTIVITAMIN-ADULT) TABS Take 1 tablet by mouth daily       acetaminophen (TYLENOL) 325 MG tablet Take 325-650 mg by mouth every 4 hours as needed Take as needed following label instructions (Patient not taking: No sig reported)           Physical Exam:     Vital Signs: BP (!) 142/85   Pulse 74   Temp 97.8  F (36.6  C)   Resp 16   Ht 1.778 m (5' 10\")   Wt 66 kg (145 lb 9.6 oz)   SpO2 99%   BMI 20.89 kg/m       Constitutional: No acute distress, appears comfortable sitting up on exam table  HEENT: Oropharynx unremarkable  Respiratory: CTAB  Card: RRR  Abd: Non-tender, non-distended, normoactive bowel sounds  Skin: No rashes/lesions appreciated in visualized areas  Neuro: EOMI, no overt focal deficits  Psych: Normal mood/affect  ECO      BMT and Cell Therapy Informed Consent Discussion     Mr. Angulo has completed his workup and passed his assessments.  He previously was cleared for transplant but asked to delay for 4-6 months in order to be more confident in management given the challenges the winter poses, the stress on his wife, and to work toward obtaining a deeper remission.  He has now accomplished this and clinically doing well.  We will proceed with autologous transplantation.      Patient Active Problem List   Diagnosis     HYPERLIPIDEMIA LDL GOAL <130     PSEUDOPHAKIA OU     Advanced directives, counseling/discussion     History of actinic keratoses     PVD (posterior vitreous detachment), bilateral     Nonexudative senile macular degeneration of retina     Elevated glucose     Essential hypertension with goal blood pressure less than 140/90     Elevated prostate specific antigen (PSA)     Dupuytren contracture     " Benign prostatic hyperplasia with urinary retention     Multiple myeloma not having achieved remission (H)     Patient has been seen and evaluated by me. I have reviewed today's vital signs, medications, labs and imaging results. I have discussed the plan with the team and agree with the findings and plan in this note.    Reviewed results summarized in Fellow note.  PErsisting small M protein but BM in morphologic and flow remission.   No exam findings of bone tenderness, resp sx or rash. no signs of bleeding  Reviewed planned course of autograft in detail;  questions answered in full and he confirmed his consent to proceed.    BMT and Cell Therapy Informed Consent Discussion     In today's visit, we discussed in detail the research for which Hamilton Angulo is eligible. We discussed the potential risks and potential benefits of each protocol individually. We explained potential alternatives to the protocols discussed. We explained to the patient that participation is voluntary and that consent may be withdrawn at any time.     We discussed:    The rationale for our approach to the disease treatment    The eligibility requirements for treatment in the context of clinical trials    The need for caregiver support and the caregiver's role in recovery    The importance of adherence to the treatment plan and appropriate follow up    The requirements for contraception while undergoing treatment    The potential risks of morbidity and mortality related to this treatment    The requirements for supportive care to reduce the risk of infection and other complications    The role of the dietician and PT/OT to reduce the risk of muscle loss/sarcopenia    Support that is available through our social workers and care team to mitigate distress    The desired outcomes/goals of treatment, including the possibility of long-term disease control    The patient completed the last round of treatment on April    HCT-CI score: .0  We  counseled the patient about the impact of this on the risk of treatment related and overall mortality. The score fit within treatment protocol eligibility criteria.    Karnofsky performance score:       ECOG: (required for CAR-T): 0    Active infections:  0  Prior infections that require additional special prophylaxis considerations: .  I reviewed and discussed infectious disease evaluation with the patient and the management plan during treatment.    Reproductive status: What methods of birth control does the patient plan to use during the treatment period beginning with conditioning and ending with the discontinuation of immune suppression (indicate with an X all that apply):  __ The patient is confirmed to be sterile or post-menopausal  __ Sexual abstinence  __ Condoms  __ Implants  __ Injectables  __ Oral contraceptives  __ Intrauterine devices (IUD)  __ Other (describe)    The patient received appropriate reproductive counseling and agreed with the need for effective contraception during the treatment procedures.      Dental health suitable to proceed: Yes      Transplants for multiple myeloma 1  The patient has Standard risk MM, and the collection goal is 8 million CD34/kg for 2 transplants..  The day for admission to begin melphalan conditioning is Day -1 for melphalan 200 mg/m2 (not frail, creatinine clearance 30+). .    After our detailed discussion above, the patient signed the following consents for treatment and protocols:    autograft for MM     Known issues that I take into account for medical decisions, with salient changes to the plan considering these complexities noted above.    Patient Active Problem List   Diagnosis     HYPERLIPIDEMIA LDL GOAL <130     PSEUDOPHAKIA OU     Advanced directives, counseling/discussion     History of actinic keratoses     PVD (posterior vitreous detachment), bilateral     Nonexudative senile macular degeneration of retina     Elevated glucose     Essential  hypertension with goal blood pressure less than 140/90     Elevated prostate specific antigen (PSA)     Dupuytren contracture     Benign prostatic hyperplasia with urinary retention     Multiple myeloma not having achieved remission (H)         I spent 65 minutes in the care of this patient today, which included time necessary for preparation for the visit, obtaining history, ordering medications/tests/procedures as medically indicated, review of pertinent medical literature, counseling of the patient, communication of recommendations to the care team, and documentation time.    Tu Guerrier MD            Blood Counts       Recent Labs   Lab Test 05/10/22  1403 05/04/22  0955 12/01/21  1357   HGB 10.7* 10.7* 10.8*   HCT 31.8* 32.5* 33.3*   WBC 4.0 2.9* 3.3*   ANEUTAUTO 1.8 1.1* 0.7*   ALYMPAUTO 1.6 1.2 2.2   AMONOAUTO 0.6 0.6 0.4   AEOSAUTO 0.0 0.0 0.0   ABSBASO 0.0 0.0 0.0   NRBCMAN 0.0 0.0 0.0    144* 196       ABO/RH    Recent Labs   Lab Test 05/04/22  0933   ABORH A POS       Hemoglobin S Screening    Recent Labs   Lab Test 11/30/21  1148   HGBS Negative         Chemistries     Basic Panel  Recent Labs   Lab Test 05/10/22  1403 05/10/22  1100 05/04/22  0933 12/08/21  1521 11/30/21  1148 11/29/21  0930   NA  --   --  139  --  143 141   POTASSIUM  --   --  4.0  --  3.8 4.3   CHLORIDE  --   --  108  --  108 110*   CO2  --   --  24  --  29 27   BUN  --   --  13  --  17 16   CR 1.03 1.03 0.93   < > 0.87 0.94   GLC  --   --  107*  --  109* 106*    < > = values in this interval not displayed.        Calcium, Magnesium, Phosphorus  Recent Labs   Lab Test 05/04/22  0933 11/30/21  1148 11/29/21  0930   RAKESH 8.8 9.3 9.3   MAG 2.2 2.2  --    PHOS 3.4 3.2  --         LFTs  Recent Labs   Lab Test 05/04/22  0933 11/30/21  1148 06/23/21  0942   BILITOTAL 0.6 0.6 0.6   ALKPHOS 53 49 46   AST 15 11 11   ALT 19 22 13   ALBUMIN 4.3 3.8 4.1       LDH  Recent Labs   Lab Test 05/04/22  0933 11/30/21  1148  06/23/21  0942    176 155       B2-Microglobulin  Recent Labs   Lab Test 05/04/22  0933 11/30/21  1148 06/09/21  0827   [id number] 2.3* 1.7 1.9       Vitamin D  Recent Labs   Lab Test 05/04/22  0933   VITDT 34         Urine Studies       Recent Labs   Lab Test 05/04/22  0942 11/30/21  1159   COLOR Yellow Yellow   APPEARANCE Cloudy* Cloudy*   URINEGLC Negative Negative   URINEBILI Negative Negative   URINEKETONE Negative Negative   SG 1.013 1.011   UBLD Small* Small*   URINEPH 5.0 6.0   PROTEIN Negative Negative   UUROI Normal Normal   NITRITE Positive* Positive*   LEUKEST Large* Large*   MUCUS  --  Present*   RBCU 14* 7*   WBCU >182* >182*       Creatinine Clearance    Recent Labs   Lab Test 05/10/22  1100 12/02/21  0545   HT 65 181   .0 64.0   RAW 74 108   STD 71 104   VOL 1,452  1,452 1,198  1,198   DUR 24.0  24.0 24.0  24.0   CREATININE  --  0.87   UCRR 76  76 113  109   UCR24 1.10  1.10 1.35  1.31         Infectious Disease Markers     [facility] IDM    Recent Labs   Lab Test 05/04/22  0933   DCMIG POSITIVE*   DHBSAG Non-Reactive   DHBCAB Non-Reactive   DHIVAB Non-Reactive   DHCVAB Non-Reactive   DHTLVA Non-Reactive   TCRUZI Non-Reactive   DTRPAB Non-Reactive         Hepatitis and HIV    Recent Labs   Lab Test 10/07/16  0929   HCVAB Nonreactive   Assay performance characteristics have not been established for newborns,   infants, and children           CMV  No lab results found.      EBV    Recent Labs   Lab Test 05/04/22  0933   EBVCAG Positive*       HSV 1/2    Recent Labs   Lab Test 05/04/22  0933   N5IGVUN 4.82*   H1IGG Positive.  IgG antibody to HSV-1 detected.*   M3UWOFY 0.11   H2IGG No HSV-2 IgG antibodies detected.         VZV    No lab results found.      HTLV    Recent Labs   Lab Test 05/04/22  0933   DHTLVA Non-Reactive         Toxoplasma  (not routinely checked)      COVID    Recent Labs   Lab Test 05/04/22  0933   [id number] Negative         Immunoglobulins     Recent  Labs   Lab Test 05/04/22  0933 11/30/21  1148 06/23/21  0942 04/28/21  1214   * 1,247 2,530* 2,862*       Recent Labs   Lab Test 05/04/22  0933 11/30/21  1148 06/23/21  0942 04/28/21  1214   IGA 22* 50* 37* 35*       Recent Labs   Lab Test 05/04/22  0933 11/30/21  1148 06/23/21  0942 04/28/21  1214   IGM 16* 41 30* 31*         Monocloncal Protein Studies     M spike    Recent Labs   Lab Test 05/04/22  0933 11/30/21  1148 06/23/21  0942 04/28/21  1214   ELPM 0.3* 0.7* 1.7* 2.1*       [place] FLC    Recent Labs   Lab Test 05/04/22  0933 11/30/21  1148   KFLCA 2.25* 27.07*       Lambda FLC    Recent Labs   Lab Test 05/04/22  0933 11/30/21  1148   LFLCA 0.45* 1.21       FLC Ratio    Recent Labs   Lab Test 05/04/22  0933 11/30/21  1148   KLRA 5.00* 22.37*           Bone Marrow Biopsy       Morphology    Results for orders placed or performed in visit on 05/10/22 (from the past 8760 hour(s))   Bone marrow biopsy   Result Value    Final Diagnosis      Bone marrow, posterior iliac crest, left decalcified trephine biopsy and touch imprint; left direct aspirate smear, and concentrated aspirate smear; and peripheral smear:    - No morphologic or immunophenotypic evidence of plasma cell neoplasm  - Normocellular marrow for age (overall 30%) with trilineage hematopoiesis, no increase in blasts, and less than 1% polytypic plasma cells  - Peripheral blood showing moderate normochromic macrocytic anemia  - See comment      Comment      Flow cytometry analysis on concurrent specimen ([id number]) showed polytypic plasma cells and polytypic B-cells.    Concurrent ancillary studies are in progress and will be reported separately.  Correlation with the results of ancillary tests and clinical findings is recommended.      Clinical Information      From Epic electronic medical record; 71-year-old male is being worked up for autologous peripheral blood stem cell transplant for IgG kappa multiple myeloma.      Peripheral Hematologic  Data      CBC WITH DIFFERENTIAL(05/10/2022 02:26 PM CDT):     RESULT VALUE REF. RANGE UNITS   WBC Count   Hemoglobin    Hematocrit   Platelet Count   RBC Count    MCV    MCH   MCHC  RDW 4.0 (NORMAL)     10.7  ( L )      31.8  ( L )  209 (NORMAL)   2.99  ( L )        106  ( H )      35.8  ( H )     33.6 (NORMAL)     13.7 (NORMAL) 4.0-11.0  13.3-17.7  40.0-53.0  150-450  4.40-5.90    26.5-33.0  31.5-36.5  10.0-15.0 10e3/uL  g/dL  %  10e3/uL  10e6/uL  fL  pg  g/dL  %   % Neutrophils  % Lymphocytes  % Monocytes  % Eosinophils  % Basophils  % Immature Granulocytes  Absolute Neutrophils  Absolute Lymphocytes  Absolute Monocytes  Absolute Eosinophils  Absolute Basophils  Absolute Immature Granulocytes  NRBCs per 100 WBC  Absolute NRBCs 44  39  15  1  0  1  1.8 (NORMAL)  1.6 (NORMAL)  0.6 (NORMAL)  0.0 (NORMAL)  0.0 (NORMAL)  0.0 (NORMAL)  0 (NORMAL)  0.0 () N/A  N/A  N/A  N/A  N/A  N/A  1.6-8.3  0.8-5.3  0.0-1.3  0.0-0.7  0.0-0.2  <=0.4  <1  <=0.0 %  %  %  %  %  %  10e3/uL  10e3/uL  10e3/uL  10e3/uL  10e3/uL  10e3/uL  /100  10e3/uL         Microscopic Description      PERIPHERAL BLOOD SMEAR MORPHOLOGY:  The red blood cells appear normochromic.  Poikilocytosis includes rare echinocytes and very rare fragmented red blood cells including very rare helmet cells.  Polychromasia is not increased.  Rouleaux formation is not increased. Lymphocyte morphology is polymorphous. Neutrophils displays predominantly normal cytoplasmic granularity and unremarkable nuclear morphology. The morphology of the platelets is normal.     Bone marrow aspirates and trephine core biopsy touch imprints are reviewed.    BONE MARROW DIFFERENTIAL (500 cells on touch imprints)  Percent (%) Cell Population Reference Range (%)   0.0 Blasts  (0 - 1)   0.4 Neutrophil promyelocytes (2 - 4)   61.4 Neutrophils and precursors (54 - 63)   17.8 Erythroid precursors (18 - 24)   5.8 Monocytes (1 - 1.5)   0.6 Eosinophils (1 - 3)   0.4 Basophils (0 - 1)   13.0  Lymphocytes (8 - 12)   0.6 Plasma cells (0 - 1.5)     The aspirate smears are possibly hemodilute.    Granulocytes are adequate in number with progressive and complete maturation; no overt dysplasia seen.  Erythrocytes are adequate in number with progressive and complete maturation; no overt dysplasia seen. Megakaryocytes with unremarkable morphology are present.     TREPHINE SECTIONS:  Hematoxylin and eosin stains are reviewed. The quality of the trephine core biopsy is adequate.  Marrow have slight to moderate fragmentation artifact which limits accurate assessment of marrow cellularity.  The marrow cellularity in intact areas is overall estimated at 30%. The cellular composition reflects the touch imprints differential. The number of megakaryocytes appears consistent with the degree of marrow cellularity; the morphology and distribution of megakaryocytes are unremarkable.  No overt infiltration of plasma cells seen.  Bone trabeculae is unremarkable.      IMMUNOHISTOCHEMISTRY:  Immunohistochemical stains are performed on the paraffin-embedded trephine core with appropriate controls.    Stains for , cytoplasmic kappa and lambda immunoglobulin light chains show scattered polytypic plasma cells comprising less than 1% of marrow cellularity; no clusters or aggregates of plasma cells noted.    Note: These immunohistochemical stains are deemed medically necessary. Some of the antigens may also be evaluated by flow cytometry. Concurrent evaluation by immunohistochemistry on clot and/or trephine sections is indicated in this case in order to correlate immunophenotype with cell morphology and determine extent of involvement, spatial pattern, and focality of potential disease distribution.       Gross Description      Procedure/Gross Description   Aspirate(s) and trephine(s) procured by [facility]    Specimen sent for Special Studies:         Flow Cytometry: left aspirate        Cytogenetics: left aspirate         Molecular Diagnostics: left aspirate         Biopsy aspiration site: left posterior iliac crest                                                      (Reference Range)          Amount of aspirate           4.1   mL        Fat and P.V. cell layer        trace    %               (1 - 3)        Particles                             0   %        Myeloid-erythroid layer    2    %               (5 - 8)          Clot Section: no    Trephine biopsy site: left posterior iliac crest    Designated left posterior iliac crest are 2 cylinders of gritty tissue, labeled with the patient's name and hospital number, obtained with 11 gauge needle and an aggregate length of 19 mm; entirely submitted in 1 cassette; acetic zinc formalin fixed, decalcified, processed, and stained for hematoxylin and eosin per laboratory protocol.        Performing Labs      The technical component of this testing was completed at [facility]             Chest X-Ray - 2 view     Results for orders placed during the hospital encounter of 05/06/22    XR CHEST 2 VW    Status: Normal 5/6/2022    Narrative  EXAM: XR CHEST 2 VW  5/6/2022 10:29 AM    HISTORY:  Examination prior to chemotherapy; Other long term (current)  drug therapy; Myeloma (H); Personal history of diseases of blood and  blood-forming organs    COMPARISON:  Chest radiograph 12/1/2021    FINDINGS: Two views of the chest.    Trachea is midline. The cardiomediastinal silhouette is within normal  limits. Atherosclerotic calcifications of the aortic arch. No  significant pleural effusion or pneumothorax. No focal airspace  opacity. The visualized upper abdomen is unremarkable.    Impression  IMPRESSION: No acute airspace disease.    I have personally reviewed the examination and initial interpretation  and I agree with the findings.    COLLETTE KAUR MD      SYSTEM ID:  [id number]          PFTs     FVC%  Recent Labs   Lab Test  22  1050 21  1318    118 116       FEV1%  Recent Labs   Lab Test 22  1050 21  1318    125 118       DLCO%  Recent Labs   Lab Test 22  1050 21  1318   [id number] 113 106           EKG       ECG results from 22   EKG 12-lead complete w/read - Clinics     Value    Systolic Blood Pressure     Diastolic Blood Pressure     Ventricular Rate 72    Atrial Rate 72    AK Interval 132    QRS Duration 104        QTc 427    P Axis 83    R AXIS 52    T Axis 68    Interpretation ECG      Sinus rhythm  When compared with ECG of 2021 13:35,  No significant change was found  Confirmed by MD LINNEA, ALISSA ([id number]) on 2022 11:56:30 PM           ECHOCARDIOGRAM       Results for orders placed during the hospital encounter of 22    ECHOCARDIOGRAM COMPLETE    Status: Normal 2022    Narrative  [id number]  [id number]  [id number]  [id number]^SHU^MADIHA^LEAH    [facility],[place]  Echocardiography Laboratory  [address] [place] [id number]    Name: BOB RINALDI  MRN: [id number]  : 1950  Study Date: 2022 09:57 AM  Age: 71 yrs  Gender: Male  Patient Location: [facility]  Reason For Study: Examination prior to chemotherapy, Other long term (current)  [facility]  Ordering Physician: MADIHA IRELAND  Referring Physician: MADIHA IRELAND  Performed By: Lashaun Tai    BSA: 1.8 m2  Height: 71 in  Weight: 145 lb  BP: 134/61 mmHg  ______________________________________________________________________________  Procedure  Echocardiogram with two-dimensional, color and spectral Doppler performed.  Good quality two-dimensional was performed and interpreted.  ______________________________________________________________________________  Interpretation Summary  Left ventricular size, wall motion and function are normal. The ejection  fraction is 60-65%.  Global peak LV longitudinal strain is averaged at -16%. This suggests abnormal  strain (normal  <-18%).  The clinical value of assessment of global peak longitudinal strain in the  surveillance for cardiotoxicity lies in serial measurements.  Global right ventricular function is normal.  No significant valvular abnormalities.  No pericardial effusion is present.  Compaed to prior study 12/8/2021, GLS has mildly worsened, otherwise no  significant change.  ______________________________________________________________________________  Left Ventricle  Left ventricular size, wall motion and function are normal. The ejection  fraction is 60-65%. Global peak LV longitudinal strain is averaged at -16%.  This suggests abnormal strain (normal <-18%). The clinical value of assessment  of global peak longitudinal strain in the surveillance for cardiotoxicity lies  in serial measurements. Left ventricular diastolic function is normal.    Right Ventricle  The right ventricle is normal size. Global right ventricular function is  normal.    Atria  Both atria appear normal.    Mitral Valve  The mitral valve is normal. Trace mitral insufficiency is present.    Aortic Valve  Aortic valve is normal in structure and function. The aortic valve is  tricuspid.    Tricuspid Valve  Mild tricuspid insufficiency is present. Estimated pulmonary artery systolic  pressure is 36 mmHg plus right atrial pressure.    Pulmonic Valve  The valve leaflets are not well visualized. Trace pulmonic insufficiency is  present.    Vessels  The aorta root is normal. The inferior vena cava was normal in size with  preserved respiratory variability.    Pericardium  No pericardial effusion is present.    Compared to Previous Study  Compaed to prior study 12/8/2021, GLS has mildly worsened, otherwise no  significant change.    Attestation  I have personally viewed the imaging and agree with the interpretation and  report as documented by the fellow, Tu Hoffman, and/or edited by  me.  ______________________________________________________________________________  MMode/2D Measurements & Calculations  IVSd: 0.74 cm  LVIDd: 4.7 cm  LVIDs: 2.2 cm  LVPWd: 0.82 cm  FS: 52.5 %  LV mass(C)d: 119.1 grams  LV mass(C)dI: 64.7 grams/m2  Ao root diam: 2.8 cm  LVOT diam: 2.1 cm  LVOT area: 3.5 cm2  RWT: 0.35    Doppler Measurements & Calculations  MV E max cammy: 85.9 cm/sec  MV A max cammy: 51.4 cm/sec  MV E/A: 1.7  MV dec slope: 378.0 cm/sec2  MV dec time: 0.23 sec  TR max cammy: 301.0 cm/sec  TR max P.2 mmHg  E/E' avg: 10.6    Lateral E/e': 11.3  Medial E/e': 10.0    ______________________________________________________________________________  Report approved by: Eugene CUMMINS 2022 11:32 AM        PET Scan       Results for orders placed during the hospital encounter of 22    PET ONCOLOGY WHOLE BODY    Status: Normal 2022    Narrative  Combined Report of:    PET and CT on  2022 12:21 PM :    1. PET of the neck, chest, abdomen, and pelvis.  2. PET CT Fusion for Attenuation Correction and Anatomical  Localization:  3. 3D MIP and PET-CT fused images were processed on an independent  workstation and archived to PACS and reviewed by a radiologist.    Technique:    1. PET: The patient received 9.72 mCi of F-18-FDG; the serum glucose  was 107 prior to administration, body weight was 65.1 kg. Images were  evaluated in the axial, sagittal, and coronal planes as well as the  rotational whole body MIP. Images were acquired from the Vertex to the  Feet.    UPTAKE WAS MEASURED AT 62 MINUTES.    BACKGROUND:  Liver SUV max= 3.32,   Aorta Blood SUV Max: 2.83.    2. CT: CT only obtained for attenuation correction and not diagnostic  purposes.    INDICATION: Examination prior to chemotherapy; Other long term  (current) drug therapy; Myeloma (H); Personal history of diseases of  blood and blood-forming organs    ADDITIONAL INFORMATION OBTAINED FROM EMR: Post-BMT    COMPARISON: PET/CT  12/1/2021    FINDINGS:    HEAD/NECK:  There is no suspicious FDG uptake in the neck.    CHEST:  There is no suspicious FDG uptake in the chest.    ABDOMEN AND PELVIS:  There is no suspicious FDG uptake in the abdomen or pelvis.    Colonic diverticulosis without evidence of acute diverticulitis.  Prostatomegaly.    LOWER EXTREMITIES:  No abnormal masses or hypermetabolic lesions.    BONES:  There is no abnormal FDG uptake in the skeleton. Unchanged scattered  punctate non-FDG avid lucencies in the pelvic bone, sacrum and  sternum.    Impression  IMPRESSION: In this patient with multiple myeloma;  1. No abnormal FDG uptake in the skeleton.  2. Scattered small non-FDG avid lucencies in the pelvic bone, sacrum  and sternum, may represent treated multiple myeloma versus inherently  non-metabolic myeloma lesions.    I have personally reviewed the examination and initial interpretation  and I agree with the findings.    SUMI MARAVILLA MD  SYSTEM ID:  [id number]     Again, thank you for allowing me to participate in the care of your patient.      Sincerely,      BMT DOM

## 2022-05-13 NOTE — PROGRESS NOTES
CLINICAL SOCIAL WORK   PSYCHOSOCIAL ASSESSMENT  BLOOD AND MARROW TRANSPLANT SERVICE      Assessment completed on May 13, 2022  of living situation, support system, financial status, functional status, coping, stressors, need for resources and social work intervention provided as needed.  Information for this assessment was provided by Pt and spouse report in addition to medical chart review and consultation with medical team.     Present at assessment: Patient, Hamilton Angulo  and Heather Angulo were present for this assessment conducted by CHRIS Gray . Visit was short as the pt did not arrive in time for our appts, some aspects of the assessment were pulled from the prior psychosocial assessment in 2021.    Diagnosis: Multiple Myeloma (MM)    Date of Diagnosis: 2021    Transplant type: Autologous    Donor: Autologous     Physician: Pepper Elise MD    Nurse Coordinator: Naomi Griffin RN    : ARNOLD Gray, [facility]     Permanent Address:   [address] [place] [id number]    Contact Information:  Pt Home Phone: [phone]  Pt Cell Phone: n/a  Pt Email: [email or web address]  Pt's wife Heather's Phone: [phone]    Presenting Information:  Hamilton is a 71 year old male diagnosed with MM who presents for evaluation for an autologous transplant at the [facility] ([facility]).  Pt was accompanied to today's visit by his wife Heather.     Decision Making:   Self     Health Care Directive:   Will bring in copy     Relationship Status:    to his wife Heather for 44 years. They indicate their relationship as stable and supportive    Special Lodging Needs: None identified at this time.     Family/Support System: Pt endorsed a good support system including family and close friends who will be available to support Pt throughout transplant process.     Spouse: Heather Angulo    Children: n/a    Grandchildren: n/a    Parents:     Siblings:  n/a    Friends: Jimbo ESPINOSA (cousin) and Cornelia Adames (Heather's aunt)    Caregiver: SW discussed with pt the caregiver role and expectation at length. Pt is agreeable to having a full time caregiver for a minimum of 30 days until cleared by the BMT physician. Pt's identified caregiver is his wife Heather. Pt signed the caregiver contract which will be scanned into the EMR. Caregiver education and resources provided. No caregiver concerns identified. Pt and Pt's wife confirmed understanding caregiving requirement, including driving restrictions, as discussed during psychosocial assessment.     Name & Numbers  Heather Angulo [phone]    Transportation Mode:  Private Car . Pt is aware of driving restrictions post-BMT and the need for the caregiver is to drive until cleared to drive by the  BMT physician. SW provided information on parking info and monthly parking pass options. Pt will utilize the [employer] for transportation to and from the [facility] and BMT Clinic[facility]Hospital.    Insurance:  No Insurance issues identified.  Pt denied specific insurance concerns at this time. SW reiterated information about the [employer]  should specific insurance questions arise as Pt moves through transplant process.     Sources of Income:  No income concerns identified  Pt denied anticipation of financial hardship related to BMT at this time.  SW encouraged Pt to contact this SW for additional potential resources should financial situation change.     Employment:   Employer: [employer]  Last Day of Work: 17 years ago     Spouse's Employment:  Employer:  retired    Mental Health: No mental health issues identified       PHQ-9 assessment, score was 0 ,which indicates no current concerns of depression.  GAD7 assessment, score was 0, which indicates no current concerns of anxiety.     We talked about how some patients may see an increase in feelings of anxiety or depression while hospitalized for  "extended periods along with isolation. Encouraged Hamilton and Althea to let us know if they are noticing an increase in symptoms. We talked about the variety of modalities available to use as coping mechanisms (including but not limited to guided imagery, relaxation techniques, progressive muscle relaxation, counseling/talk therapy and medication).    Chemical Use: No issues identified. Hamilton denied the use of tobacco, alcohol, marijuana or other drugs. Based on the information provided, there appear to be no specific risks or concerns identified at this time.     Trauma/Loss/Abuse History: Multiple losses associated with cancer diagnosis and treatment, including health, employment, changes to physical appearance, etc.     Spirituality:  Patient identifies with sarah community. Hamilton identifies that he is Delroy Sabianist. He is not interested in a blessing ceremony    Coping: Pt noted that he is currently feeling \"postive, worried, and focused\".  Pt shared that his main coping mechanisms are talking with friends and family. SW and Pt discussed additional positive coping mechanisms that Pt can utilize while in the hospital.     Caregiver Coping: Pt's wife noted that she is feeling \"ready to begin\" at this time.  Althea noted that she marisol by talking with friends and family\.     Education Provided: Transplant process expectations, Caregiver requirements, Caregiver self-care, Financial issues related to transplant, Financial resources/grants available, Common psychosocial stressors pre/post transplant, Support group(s) available, Tour/layout of the inpatient unit/non-use of cell phones, Hospital resources available, Web site information, Resources for transplant patients and their families as well as the Clinical Social Work role.     Interventions Provided: Supportive counseling and education     Recreation/Leisure Activities:  Hamilton shared that he [place], going out to eat, and using his " bike    Plans for Hospital Stay Leisure:  While IP Hamilton plans to watch TV.    Assessment and Recommendations for Team:  Pt is a 71 year old male diagnosed with MM who is here undergoing preparation for a planned autologous transplant.     Pt is a pleasant and articulate male who feels comfortable communicating with the medical team. Pt has a good support team who are involved.     Pt may benefit from ongoing psychosocial support in regards to coping with the adjustment to the BMT process. Pt's family may benefit from ongoing psychosocial support in regards to coping with the adjustment to the BMT process and may also benefit from attending the BMT Caregiver Support Group that meets weekly on the inpatient unit.     Pt has a good, but limited support system and a good caregiver plan. Pt verbalizes understanding of the transplant process and wanting to proceed. SW provided contact information and encouraged Pt to contact SW with questions, concerns, resources and for support. Per this assessment, I did not identify any barriers to this patient moving forward with transplant      Important Information:   - Hamilton is not interested in a blessing ceremony.    Follow up Planned:   Psychosocial support    ARNOLD Gray, [facility]  Pager: [phone]  Phone: [phone]

## 2022-05-13 NOTE — LETTER
5/13/2022     RE: Hamilton Angulo  [address] [facility]  [place] [address]    BMT/Cell Therapy Consultation      Workup Nurse Coordinator: Andrea  Primary BMT Physician: Arik  [facility] BMT Physician (if different): Chey  Date of Summary:  05/13/2022  Reason for Transplant: MM  Protocol: 2016-35 auto MM      Hamilton Angulo is a 71 year old male referred by Dr. Bowling and [place] for IgG kappa multiple myeloma, RISS stage 1, standard risk.      Diagnosis and Treatment Summary         Disease presentation and baseline characteristics:  Standard risk IgG kappa MM presenting as progressive but asymptomatic anemia. WBC 3.3.  Hemoglobin 10.  Platelets 155. CMP showed normal creatinine and calcium.  Total protein 8.7.  LFTs unremarkable.  Albumin 4.1, Beta-2 microglobulin 1.9. Bone marrow biopsy showed plasma cell myeloma with normocellular marrow, cellularity 20 to 30% with trilineage hematopoiesis and 20- 25% kappa monotypic plasma cells.  Increased marrow storage iron. Bone marrow flow cytometry showed 1.2% plasma cells which express CD38, CD19, CD20 (partial dim), CD45 (dim), CD56  and monotypic cytoplasmic kappa immunoglobulin light chains. FISH Hyperdiploid with gains of chromosomes 5, 9, and/or 15 (85%), gain of 11q (95%), monosomy 13 (46%), loss of IGH (4.5%) (control range 0-2.8%); no rearrangement of IGH. Cytogenetics 46 XY. Baseline M spike of 2.1. Serum IgG level was 2862.  Monoclonal peak in the urine was 57.5 and MANDI showed large monoclonal free kappa and monoclonal IgG kappa. [place] free light chain 57.  Lambda 0.65 and the ratio 88. On 10/1/21, his M spike was down to 0.8 and kappa FLC down to 25 (partial response).  Kappa FLC plateaued at 27 at pre-autologous transplant workup.  Started D-Kd x 4.  On 5/4/22 m-spike was 0.3, kappa FLC 2.25.    Date Treatment Name Response Side Effects / Toxicities   8/2021 RVd x 6 Partial response Neutropenia, neuropathy   12/2021 D-Kd x4 VGPR                       HPI:     See above for disease and treatment history.  Hamilton presents today accompanied by his wife Heather to review the results of his workup.  He reports he is generally doing well without fevers/chills, nausea/vomiting, diarrhea/constipation, rashes or other concerns.  He remains physically active at home without any significant limitations.      ROS:    10 point ROS neg other than the symptoms noted above in the HPI.        Past Medical History:   Diagnosis Date     Bilateral cataracts 2001     Herniated disc 1983    Lumbar     Hyperlipidemia LDL goal <130      Hypertension 5/2004     Tear of MCL (medial collateral ligament) of knee 6/20/2001    LT Knee/WC       Past Surgical History:   Procedure Laterality Date     CATARACT IOL, RT/LT  12/2003    Bilateral       Family History   Problem Relation Age of Onset     Breast Cancer Mother      Hypertension Mother         She potentially had this.     Other - See Comments Father         He potentially had liver issues and dementia.     Heart Disease Maternal Grandmother         She potentially had this.     Cancer Maternal Grandfather         Unknown type of cancer.     Other - See Comments Paternal Half-Brother         He potentially had dementia.       Social History     Tobacco Use     Smoking status: Never Smoker     Smokeless tobacco: Never Used   Vaping Use     Vaping Use: Never used   Substance Use Topics     Alcohol use: Yes     Comment: [employer].     Drug use: No         Allergies   Allergen Reactions     Shrimp Hives        Current Outpatient Medications   Medication Sig Dispense Refill     acyclovir (ZOVIRAX) 400 MG tablet Take 400 mg by mouth 2 times daily       amLODIPine (NORVASC) 2.5 MG tablet TAKE 1 TABLET(2.5 MG) BY MOUTH DAILY (Patient taking differently: Take 2.5 mg by mouth daily) 90 tablet 3     Artificial Tear Solution (SM ARTIFICIAL TEARS) SOLN Apply 1 Dose to eye every 2 hours as needed Use per label instructions       lisinopril (ZESTRIL) 20 MG tablet  "Take 1 tablet (20 mg) by mouth daily 90 tablet 3     Multiple Vitamins-Minerals (EQ COMPLETE MULTIVITAMIN-ADULT) TABS Take 1 tablet by mouth daily       acetaminophen (TYLENOL) 325 MG tablet Take 325-650 mg by mouth every 4 hours as needed Take as needed following label instructions (Patient not taking: No sig reported)           Physical Exam:     Vital Signs: BP (!) 142/85   Pulse 74   Temp 97.8  F (36.6  C)   Resp 16   Ht 1.778 m (5' 10\")   Wt 66 kg (145 lb 9.6 oz)   SpO2 99%   BMI 20.89 kg/m       Constitutional: No acute distress, appears comfortable sitting up on exam table  HEENT: Oropharynx unremarkable  Respiratory: CTAB  Card: RRR  Abd: Non-tender, non-distended, normoactive bowel sounds  Skin: No rashes/lesions appreciated in visualized areas  Neuro: EOMI, no overt focal deficits  Psych: Normal mood/affect  ECO      BMT and Cell Therapy Informed Consent Discussion     Mr. Angulo has completed his workup and passed his assessments.  He previously was cleared for transplant but asked to delay for 4-6 months in order to be more confident in management given the challenges the winter poses, the stress on his wife, and to work toward obtaining a deeper remission.  He has now accomplished this and clinically doing well.  We will proceed with autologous transplantation.      Patient Active Problem List   Diagnosis     HYPERLIPIDEMIA LDL GOAL <130     PSEUDOPHAKIA OU     Advanced directives, counseling/discussion     History of actinic keratoses     PVD (posterior vitreous detachment), bilateral     Nonexudative senile macular degeneration of retina     Elevated glucose     Essential hypertension with goal blood pressure less than 140/90     Elevated prostate specific antigen (PSA)     Dupuytren contracture     Benign prostatic hyperplasia with urinary retention     Multiple myeloma not having achieved remission (H)     Patient has been seen and evaluated by me. I have reviewed today's vital signs, " medications, labs and imaging results. I have discussed the plan with the team and agree with the findings and plan in this note.    Reviewed results summarized in Fellow note.  PErsisting small M protein but BM in morphologic and flow remission.   No exam findings of bone tenderness, resp sx or rash. no signs of bleeding  Reviewed planned course of autograft in detail;  questions answered in full and he confirmed his consent to proceed.    BMT and Cell Therapy Informed Consent Discussion     In today's visit, we discussed in detail the research for which Hamilton Angulo is eligible. We discussed the potential risks and potential benefits of each protocol individually. We explained potential alternatives to the protocols discussed. We explained to the patient that participation is voluntary and that consent may be withdrawn at any time.     We discussed:    The rationale for our approach to the disease treatment    The eligibility requirements for treatment in the context of clinical trials    The need for caregiver support and the caregiver's role in recovery    The importance of adherence to the treatment plan and appropriate follow up    The requirements for contraception while undergoing treatment    The potential risks of morbidity and mortality related to this treatment    The requirements for supportive care to reduce the risk of infection and other complications    The role of the dietician and PT/OT to reduce the risk of muscle loss/sarcopenia    Support that is available through our social workers and care team to mitigate distress    The desired outcomes/goals of treatment, including the possibility of long-term disease control    The patient completed the last round of treatment on April    HCT-CI score: .0  We counseled the patient about the impact of this on the risk of treatment related and overall mortality. The score fit within treatment protocol eligibility criteria.    Karnofsky performance score:        ECOG: (required for CAR-T): 0    Active infections:  0  Prior infections that require additional special prophylaxis considerations: .  I reviewed and discussed infectious disease evaluation with the patient and the management plan during treatment.    Reproductive status: What methods of birth control does the patient plan to use during the treatment period beginning with conditioning and ending with the discontinuation of immune suppression (indicate with an X all that apply):  __ The patient is confirmed to be sterile or post-menopausal  __ Sexual abstinence  __ Condoms  __ Implants  __ Injectables  __ Oral contraceptives  __ Intrauterine devices (IUD)  __ Other (describe)    The patient received appropriate reproductive counseling and agreed with the need for effective contraception during the treatment procedures.      Dental health suitable to proceed: Yes      Transplants for multiple myeloma 1  The patient has Standard risk MM, and the collection goal is 8 million CD34/kg for 2 transplants..  The day for admission to begin melphalan conditioning is Day -1 for melphalan 200 mg/m2 (not frail, creatinine clearance 30+). .    After our detailed discussion above, the patient signed the following consents for treatment and protocols:    autograft for MM     Known issues that I take into account for medical decisions, with salient changes to the plan considering these complexities noted above.    Patient Active Problem List   Diagnosis     HYPERLIPIDEMIA LDL GOAL <130     PSEUDOPHAKIA OU     Advanced directives, counseling/discussion     History of actinic keratoses     PVD (posterior vitreous detachment), bilateral     Nonexudative senile macular degeneration of retina     Elevated glucose     Essential hypertension with goal blood pressure less than 140/90     Elevated prostate specific antigen (PSA)     Dupuytren contracture     Benign prostatic hyperplasia with urinary retention     Multiple myeloma not  having achieved remission (H)         I spent 65 minutes in the care of this patient today, which included time necessary for preparation for the visit, obtaining history, ordering medications/tests/procedures as medically indicated, review of pertinent medical literature, counseling of the patient, communication of recommendations to the care team, and documentation time.    Tu Guerrier MD            Blood Counts       Recent Labs   Lab Test 05/10/22  1403 05/04/22  0955 12/01/21  1357   HGB 10.7* 10.7* 10.8*   HCT 31.8* 32.5* 33.3*   WBC 4.0 2.9* 3.3*   ANEUTAUTO 1.8 1.1* 0.7*   ALYMPAUTO 1.6 1.2 2.2   AMONOAUTO 0.6 0.6 0.4   AEOSAUTO 0.0 0.0 0.0   ABSBASO 0.0 0.0 0.0   NRBCMAN 0.0 0.0 0.0    144* 196       ABO/RH    Recent Labs   Lab Test 05/04/22  0933   ABORH A POS       Hemoglobin S Screening    Recent Labs   Lab Test 11/30/21  1148   HGBS Negative         Chemistries     Basic Panel  Recent Labs   Lab Test 05/10/22  1403 05/10/22  1100 05/04/22  0933 12/08/21  1521 11/30/21  1148 11/29/21  0930   NA  --   --  139  --  143 141   POTASSIUM  --   --  4.0  --  3.8 4.3   CHLORIDE  --   --  108  --  108 110*   CO2  --   --  24  --  29 27   BUN  --   --  13  --  17 16   CR 1.03 1.03 0.93   < > 0.87 0.94   GLC  --   --  107*  --  109* 106*    < > = values in this interval not displayed.        Calcium, Magnesium, Phosphorus  Recent Labs   Lab Test 05/04/22  0933 11/30/21  1148 11/29/21  0930   RAKESH 8.8 9.3 9.3   MAG 2.2 2.2  --    PHOS 3.4 3.2  --         LFTs  Recent Labs   Lab Test 05/04/22  0933 11/30/21  1148 06/23/21  0942   BILITOTAL 0.6 0.6 0.6   ALKPHOS 53 49 46   AST 15 11 11   ALT 19 22 13   ALBUMIN 4.3 3.8 4.1       LDH  Recent Labs   Lab Test 05/04/22  0933 11/30/21  1148 06/23/21  0942    176 155       B2-Microglobulin  Recent Labs   Lab Test 05/04/22  0933 11/30/21  1148 06/09/21  0827   [id number] 2.3* 1.7 1.9       Vitamin D  Recent Labs   Lab Test 05/04/22  0933   VITDT 34          Urine Studies       Recent Labs   Lab Test 05/04/22  0942 11/30/21  1159   COLOR Yellow Yellow   APPEARANCE Cloudy* Cloudy*   URINEGLC Negative Negative   URINEBILI Negative Negative   URINEKETONE Negative Negative   SG 1.013 1.011   UBLD Small* Small*   URINEPH 5.0 6.0   PROTEIN Negative Negative   UUROI Normal Normal   NITRITE Positive* Positive*   LEUKEST Large* Large*   MUCUS  --  Present*   RBCU 14* 7*   WBCU >182* >182*       Creatinine Clearance    Recent Labs   Lab Test 05/10/22  1100 12/02/21  0545   HT 65 181   .0 64.0   RAW 74 108   STD 71 104   VOL 1,452  1,452 1,198  1,198   DUR 24.0  24.0 24.0  24.0   CREATININE  --  0.87   UCRR 76  76 113  109   UCR24 1.10  1.10 1.35  1.31         Infectious Disease Markers     [facility] IDM    Recent Labs   Lab Test 05/04/22  0933   DCMIG POSITIVE*   DHBSAG Non-Reactive   DHBCAB Non-Reactive   DHIVAB Non-Reactive   DHCVAB Non-Reactive   DHTLVA Non-Reactive   TCRUZI Non-Reactive   DTRPAB Non-Reactive         Hepatitis and HIV    Recent Labs   Lab Test 10/07/16  0929   HCVAB Nonreactive   Assay performance characteristics have not been established for newborns,   infants, and children           CMV  No lab results found.      EBV    Recent Labs   Lab Test 05/04/22  0933   EBVCAG Positive*       HSV 1/2    Recent Labs   Lab Test 05/04/22  0933   [id number] 4.82*   H1IGG Positive.  IgG antibody to HSV-1 detected.*   Y1SIREB 0.11   H2IGG No HSV-2 IgG antibodies detected.         VZV    No lab results found.      HTLV    Recent Labs   Lab Test 05/04/22  0933   DHTLVA Non-Reactive         Toxoplasma  (not routinely checked)      COVID    Recent Labs   Lab Test 05/04/22  0933   [id number] Negative         Immunoglobulins     Recent Labs   Lab Test 05/04/22  0933 11/30/21  1148 06/23/21  0942 04/28/21  1214   * 1,247 2,530* 2,862*       Recent Labs   Lab Test 05/04/22  0933 11/30/21  1148 06/23/21  0942 04/28/21  1214   IGA 22* 50* 37*  35*       Recent Labs   Lab Test 05/04/22  0933 11/30/21  1148 06/23/21  0942 04/28/21  1214   IGM 16* 41 30* 31*         Monocloncal Protein Studies     M spike    Recent Labs   Lab Test 05/04/22  0933 11/30/21  1148 06/23/21  0942 04/28/21  1214   ELPM 0.3* 0.7* 1.7* 2.1*       [place] FLC    Recent Labs   Lab Test 05/04/22  0933 11/30/21  1148   KFLCA 2.25* 27.07*       Lambda FLC    Recent Labs   Lab Test 05/04/22  0933 11/30/21  1148   LFLCA 0.45* 1.21       FLC Ratio    Recent Labs   Lab Test 05/04/22  0933 11/30/21  1148   KLRA 5.00* 22.37*           Bone Marrow Biopsy       Morphology    Results for orders placed or performed in visit on 05/10/22 (from the past 8760 hour(s))   Bone marrow biopsy   Result Value    Final Diagnosis      Bone marrow, posterior iliac crest, left decalcified trephine biopsy and touch imprint; left direct aspirate smear, and concentrated aspirate smear; and peripheral smear:    - No morphologic or immunophenotypic evidence of plasma cell neoplasm  - Normocellular marrow for age (overall 30%) with trilineage hematopoiesis, no increase in blasts, and less than 1% polytypic plasma cells  - Peripheral blood showing moderate normochromic macrocytic anemia  - See comment      Comment      Flow cytometry analysis on concurrent specimen ([id number]) showed polytypic plasma cells and polytypic B-cells.    Concurrent ancillary studies are in progress and will be reported separately.  Correlation with the results of ancillary tests and clinical findings is recommended.      Clinical Information      From Epic electronic medical record; 71-year-old male is being worked up for autologous peripheral blood stem cell transplant for IgG kappa multiple myeloma.      Peripheral Hematologic Data      CBC WITH DIFFERENTIAL(05/10/2022 02:26 PM CDT):     RESULT VALUE REF. RANGE UNITS   WBC Count   Hemoglobin    Hematocrit   Platelet Count   RBC Count    MCV    MCH   MCHC  RDW 4.0 (NORMAL)     10.7  ( L )       31.8  ( L )  209 (NORMAL)   2.99  ( L )        106  ( H )      35.8  ( H )     33.6 (NORMAL)     13.7 (NORMAL) 4.0-11.0  13.3-17.7  40.0-53.0  150-450  4.40-5.90    26.5-33.0  31.5-36.5  10.0-15.0 10e3/uL  g/dL  %  10e3/uL  10e6/uL  fL  pg  g/dL  %   % Neutrophils  % Lymphocytes  % Monocytes  % Eosinophils  % Basophils  % Immature Granulocytes  Absolute Neutrophils  Absolute Lymphocytes  Absolute Monocytes  Absolute Eosinophils  Absolute Basophils  Absolute Immature Granulocytes  NRBCs per 100 WBC  Absolute NRBCs 44  39  15  1  0  1  1.8 (NORMAL)  1.6 (NORMAL)  0.6 (NORMAL)  0.0 (NORMAL)  0.0 (NORMAL)  0.0 (NORMAL)  0 (NORMAL)  0.0 () N/A  N/A  N/A  N/A  N/A  N/A  1.6-8.3  0.8-5.3  0.0-1.3  0.0-0.7  0.0-0.2  <=0.4  <1  <=0.0 %  %  %  %  %  %  10e3/uL  10e3/uL  10e3/uL  10e3/uL  10e3/uL  10e3/uL  /100  10e3/uL         Microscopic Description      PERIPHERAL BLOOD SMEAR MORPHOLOGY:  The red blood cells appear normochromic.  Poikilocytosis includes rare echinocytes and very rare fragmented red blood cells including very rare helmet cells.  Polychromasia is not increased.  Rouleaux formation is not increased. Lymphocyte morphology is polymorphous. Neutrophils displays predominantly normal cytoplasmic granularity and unremarkable nuclear morphology. The morphology of the platelets is normal.     Bone marrow aspirates and trephine core biopsy touch imprints are reviewed.    BONE MARROW DIFFERENTIAL (500 cells on touch imprints)  Percent (%) Cell Population Reference Range (%)   0.0 Blasts  (0 - 1)   0.4 Neutrophil promyelocytes (2 - 4)   61.4 Neutrophils and precursors (54 - 63)   17.8 Erythroid precursors (18 - 24)   5.8 Monocytes (1 - 1.5)   0.6 Eosinophils (1 - 3)   0.4 Basophils (0 - 1)   13.0 Lymphocytes (8 - 12)   0.6 Plasma cells (0 - 1.5)     The aspirate smears are possibly hemodilute.    Granulocytes are adequate in number with progressive and complete maturation; no overt dysplasia seen.  Erythrocytes  are adequate in number with progressive and complete maturation; no overt dysplasia seen. Megakaryocytes with unremarkable morphology are present.     TREPHINE SECTIONS:  Hematoxylin and eosin stains are reviewed. The quality of the trephine core biopsy is adequate.  Marrow have slight to moderate fragmentation artifact which limits accurate assessment of marrow cellularity.  The marrow cellularity in intact areas is overall estimated at 30%. The cellular composition reflects the touch imprints differential. The number of megakaryocytes appears consistent with the degree of marrow cellularity; the morphology and distribution of megakaryocytes are unremarkable.  No overt infiltration of plasma cells seen.  Bone trabeculae is unremarkable.      IMMUNOHISTOCHEMISTRY:  Immunohistochemical stains are performed on the paraffin-embedded trephine core with appropriate controls.    Stains for , cytoplasmic kappa and lambda immunoglobulin light chains show scattered polytypic plasma cells comprising less than 1% of marrow cellularity; no clusters or aggregates of plasma cells noted.    Note: These immunohistochemical stains are deemed medically necessary. Some of the antigens may also be evaluated by flow cytometry. Concurrent evaluation by immunohistochemistry on clot and/or trephine sections is indicated in this case in order to correlate immunophenotype with cell morphology and determine extent of involvement, spatial pattern, and focality of potential disease distribution.       Gross Description      Procedure/Gross Description   Aspirate(s) and trephine(s) procured by [facility]    Specimen sent for Special Studies:         Flow Cytometry: left aspirate        Cytogenetics: left aspirate        Molecular Diagnostics: left aspirate         Biopsy aspiration site: left posterior iliac crest                                                      (Reference Range)          Amount of aspirate           4.1   mL         Fat and P.V. cell layer        trace    %               (1 - 3)        Particles                             0   %        Myeloid-erythroid layer    2    %               (5 - 8)          Clot Section: no    Trephine biopsy site: left posterior iliac crest    Designated left posterior iliac crest are 2 cylinders of gritty tissue, labeled with the patient's name and hospital number, obtained with 11 gauge needle and an aggregate length of 19 mm; entirely submitted in 1 cassette; acetic zinc formalin fixed, decalcified, processed, and stained for hematoxylin and eosin per laboratory protocol.        Performing Labs      The technical component of this testing was completed at [facility]             Chest X-Ray - 2 view     Results for orders placed during the hospital encounter of 05/06/22    XR CHEST 2 VW    Status: Normal 5/6/2022    Narrative  EXAM: XR CHEST 2 VW  5/6/2022 10:29 AM    HISTORY:  Examination prior to chemotherapy; Other long term (current)  drug therapy; Myeloma (H); Personal history of diseases of blood and  blood-forming organs    COMPARISON:  Chest radiograph 12/1/2021    FINDINGS: Two views of the chest.    Trachea is midline. The cardiomediastinal silhouette is within normal  limits. Atherosclerotic calcifications of the aortic arch. No  significant pleural effusion or pneumothorax. No focal airspace  opacity. The visualized upper abdomen is unremarkable.    Impression  IMPRESSION: No acute airspace disease.    I have personally reviewed the examination and initial interpretation  and I agree with the findings.    COLLETTE KAUR MD      SYSTEM ID:  [id number]          PFTs     FVC%  Recent Labs   Lab Test 05/04/22  1050 11/30/21  1318   01090 118 116       FEV1%  Recent Labs   Lab Test 05/04/22  1050 11/30/21  1318   20016 125 118       DLCO%  Recent Labs   Lab Test 05/04/22  1050 11/30/21  1318   [id number] 113 106           EKG        ECG results from 22   EKG 12-lead complete w/read - Clinics     Value    Systolic Blood Pressure     Diastolic Blood Pressure     Ventricular Rate 72    Atrial Rate 72    RI Interval 132    QRS Duration 104        QTc 427    P Axis 83    R AXIS 52    T Axis 68    Interpretation ECG      Sinus rhythm  When compared with ECG of 2021 13:35,  No significant change was found  Confirmed by MD LINNEA, ALISSA ([id number]) on 2022 11:56:30 PM           ECHOCARDIOGRAM       Results for orders placed during the hospital encounter of 22    ECHOCARDIOGRAM COMPLETE    Status: Normal 2022    [facility]  [id number]  [id number]  [id number]  [id number]^SHU^MADIHA^LEAH    [facility],[place]  Echocardiography Laboratory  [address] [place] [address]    Name: BOB RINALDI  MRN: [id number]  : 1950  Study Date: 2022 09:57 AM  Age: 71 yrs  Gender: Male  Patient Location: [facility]  Reason For Study: Examination prior to chemotherapy, Other long term (current)  fredy  Ordering Physician: MADIHA IRELAND  Referring Physician: MADIHA IRELAND  Performed By: Lashaun Tai    BSA: 1.8 m2  Height: 71 in  Weight: 145 lb  BP: 134/61 mmHg  ______________________________________________________________________________  Procedure  Echocardiogram with two-dimensional, color and spectral Doppler performed.  Good quality two-dimensional was performed and interpreted.  ______________________________________________________________________________  Interpretation Summary  Left ventricular size, wall motion and function are normal. The ejection  fraction is 60-65%.  Global peak LV longitudinal strain is averaged at -16%. This suggests abnormal  strain (normal <-18%).  The clinical value of assessment of global peak longitudinal strain in the  surveillance for cardiotoxicity lies in serial measurements.  Global right ventricular function is normal.  No significant valvular  abnormalities.  No pericardial effusion is present.  Compaed to prior study 12/8/2021, GLS has mildly worsened, otherwise no  significant change.  ______________________________________________________________________________  Left Ventricle  Left ventricular size, wall motion and function are normal. The ejection  fraction is 60-65%. Global peak LV longitudinal strain is averaged at -16%.  This suggests abnormal strain (normal <-18%). The clinical value of assessment  of global peak longitudinal strain in the surveillance for cardiotoxicity lies  in serial measurements. Left ventricular diastolic function is normal.    Right Ventricle  The right ventricle is normal size. Global right ventricular function is  normal.    Atria  Both atria appear normal.    Mitral Valve  The mitral valve is normal. Trace mitral insufficiency is present.    Aortic Valve  Aortic valve is normal in structure and function. The aortic valve is  tricuspid.    Tricuspid Valve  Mild tricuspid insufficiency is present. Estimated pulmonary artery systolic  pressure is 36 mmHg plus right atrial pressure.    Pulmonic Valve  The valve leaflets are not well visualized. Trace pulmonic insufficiency is  present.    Vessels  The aorta root is normal. The inferior vena cava was normal in size with  preserved respiratory variability.    Pericardium  No pericardial effusion is present.    Compared to Previous Study  Compaed to prior study 12/8/2021, GLS has mildly worsened, otherwise no  significant change.    Attestation  I have personally viewed the imaging and agree with the interpretation and  report as documented by the fellow, Tu Hoffman, and/or edited by me.  ______________________________________________________________________________  MMode/2D Measurements & Calculations  IVSd: 0.74 cm  LVIDd: 4.7 cm  LVIDs: 2.2 cm  LVPWd: 0.82 cm  FS: 52.5 %  LV mass(C)d: 119.1 grams  LV mass(C)dI: 64.7 grams/m2  Ao root diam: 2.8 cm  LVOT diam: 2.1  cm  LVOT area: 3.5 cm2  RWT: 0.35    Doppler Measurements & Calculations  MV E max cammy: 85.9 cm/sec  MV A max cammy: 51.4 cm/sec  MV E/A: 1.7  MV dec slope: 378.0 cm/sec2  MV dec time: 0.23 sec  TR max cammy: 301.0 cm/sec  TR max P.2 mmHg  E/E' avg: 10.6    Lateral E/e': 11.3  Medial E/e': 10.0    ______________________________________________________________________________  Report approved by: Eugene CUMMINS 2022 11:32 AM        PET Scan       Results for orders placed during the hospital encounter of 22    PET ONCOLOGY WHOLE BODY    Status: Normal 2022    Narrative  Combined Report of:    PET and CT on  2022 12:21 PM :    1. PET of the neck, chest, abdomen, and pelvis.  2. PET CT Fusion for Attenuation Correction and Anatomical  Localization:  3. 3D MIP and PET-CT fused images were processed on an independent  workstation and archived to PACS and reviewed by a radiologist.    Technique:    1. PET: The patient received 9.72 mCi of F-18-FDG; the serum glucose  was 107 prior to administration, body weight was 65.1 kg. Images were  evaluated in the axial, sagittal, and coronal planes as well as the  rotational whole body MIP. Images were acquired from the Vertex to the  Feet.    UPTAKE WAS MEASURED AT 62 MINUTES.    BACKGROUND:  Liver SUV max= 3.32,   Aorta Blood SUV Max: 2.83.    2. CT: CT only obtained for attenuation correction and not diagnostic  purposes.    INDICATION: Examination prior to chemotherapy; Other long term  (current) drug therapy; Myeloma (H); Personal history of diseases of  blood and blood-forming organs    ADDITIONAL INFORMATION OBTAINED FROM EMR: Post-BMT    COMPARISON: PET/CT 2021    FINDINGS:    HEAD/NECK:  There is no suspicious FDG uptake in the neck.    CHEST:  There is no suspicious FDG uptake in the chest.    ABDOMEN AND PELVIS:  There is no suspicious FDG uptake in the abdomen or pelvis.    Colonic diverticulosis without evidence of acute  diverticulitis.  Prostatomegaly.    LOWER EXTREMITIES:  No abnormal masses or hypermetabolic lesions.    BONES:  There is no abnormal FDG uptake in the skeleton. Unchanged scattered  punctate non-FDG avid lucencies in the pelvic bone, sacrum and  sternum.    Impression  IMPRESSION: In this patient with multiple myeloma;  1. No abnormal FDG uptake in the skeleton.  2. Scattered small non-FDG avid lucencies in the pelvic bone, sacrum  and sternum, may represent treated multiple myeloma versus inherently  non-metabolic myeloma lesions.    I have personally reviewed the examination and initial interpretation  and I agree with the findings.    SUMI MARAVILLA MD  SYSTEM ID:  [id number]         [facility]

## 2022-05-13 NOTE — H&P (VIEW-ONLY)
BMT/Cell Therapy Consultation      Workup Nurse Coordinator: Andrea  Primary BMT Physician: Arik  Closing BMT Physician (if different): Chey  Date of Summary:  05/13/2022  Reason for Transplant: MM  Protocol: 2016-35 auto MM      Hamilton Angulo is a 71 year old male referred by Dr. Bowling and Caleb for IgG kappa multiple myeloma, RISS stage 1, standard risk.      Diagnosis and Treatment Summary         Disease presentation and baseline characteristics:  Standard risk IgG kappa MM presenting as progressive but asymptomatic anemia. WBC 3.3.  Hemoglobin 10.  Platelets 155. CMP showed normal creatinine and calcium.  Total protein 8.7.  LFTs unremarkable.  Albumin 4.1, Beta-2 microglobulin 1.9. Bone marrow biopsy showed plasma cell myeloma with normocellular marrow, cellularity 20 to 30% with trilineage hematopoiesis and 20- 25% kappa monotypic plasma cells.  Increased marrow storage iron. Bone marrow flow cytometry showed 1.2% plasma cells which express CD38, CD19, CD20 (partial dim), CD45 (dim), CD56  and monotypic cytoplasmic kappa immunoglobulin light chains. FISH Hyperdiploid with gains of chromosomes 5, 9, and/or 15 (85%), gain of 11q (95%), monosomy 13 (46%), loss of IGH (4.5%) (control range 0-2.8%); no rearrangement of IGH. Cytogenetics 46 XY. Baseline M spike of 2.1. Serum IgG level was 2862.  Monoclonal peak in the urine was 57.5 and MANDI showed large monoclonal free kappa and monoclonal IgG kappa. [place]land free light chain 57.  Lambda 0.65 and the ratio 88. On 10/1/21, his M spike was down to 0.8 and kappa FLC down to 25 (partial response).  Kappa FLC plateaued at 27 at pre-autologous transplant workup.  Started D-Kd x 4.  On 5/4/22 m-spike was 0.3, kappa FLC 2.25.    Date Treatment Name Response Side Effects / Toxicities   8/2021 RVd x 6 Partial response Neutropenia, neuropathy   12/2021 D-Kd x4 VGPR                       HPI:    See above for disease and treatment history.  Hamilton presents today  accompanied by his wife Heather to review the results of his workup.  He reports he is generally doing well without fevers/chills, nausea/vomiting, diarrhea/constipation, rashes or other concerns.  He remains physically active at home without any significant limitations.      ROS:    10 point ROS neg other than the symptoms noted above in the HPI.        Past Medical History:   Diagnosis Date     Bilateral cataracts 2001     Herniated disc 1983    Lumbar     Hyperlipidemia LDL goal <130      Hypertension 5/2004     Tear of MCL (medial collateral ligament) of knee 6/20/2001    LT Knee/WC       Past Surgical History:   Procedure Laterality Date     CATARACT IOL, RT/LT  12/2003    Bilateral       Family History   Problem Relation Age of Onset     Breast Cancer Mother      Hypertension Mother         She potentially had this.     Other - See Comments Father         He potentially had liver issues and dementia.     Heart Disease Maternal Grandmother         She potentially had this.     Cancer Maternal Grandfather         Unknown type of cancer.     Other - See Comments Paternal Half-Brother         He potentially had dementia.       Social History     Tobacco Use     Smoking status: Never Smoker     Smokeless tobacco: Never Used   Vaping Use     Vaping Use: Never used   Substance Use Topics     Alcohol use: Yes     Comment: [employer].     Drug use: No         Allergies   Allergen Reactions     Shrimp Hives        Current Outpatient Medications   Medication Sig Dispense Refill     acyclovir (ZOVIRAX) 400 MG tablet Take 400 mg by mouth 2 times daily       amLODIPine (NORVASC) 2.5 MG tablet TAKE 1 TABLET(2.5 MG) BY MOUTH DAILY (Patient taking differently: Take 2.5 mg by mouth daily) 90 tablet 3     Artificial Tear Solution (SM ARTIFICIAL TEARS) SOLN Apply 1 Dose to eye every 2 hours as needed Use per label instructions       lisinopril (ZESTRIL) 20 MG tablet Take 1 tablet (20 mg) by mouth daily 90 tablet 3     Multiple  "Vitamins-Minerals (EQ COMPLETE MULTIVITAMIN-ADULT) TABS Take 1 tablet by mouth daily       acetaminophen (TYLENOL) 325 MG tablet Take 325-650 mg by mouth every 4 hours as needed Take as needed following label instructions (Patient not taking: No sig reported)           Physical Exam:     Vital Signs: BP (!) 142/85   Pulse 74   Temp 97.8  F (36.6  C)   Resp 16   Ht 1.778 m (5' 10\")   Wt 66 kg (145 lb 9.6 oz)   SpO2 99%   BMI 20.89 kg/m       Constitutional: No acute distress, appears comfortable sitting up on exam table  HEENT: Oropharynx unremarkable  Respiratory: CTAB  Card: RRR  Abd: Non-tender, non-distended, normoactive bowel sounds  Skin: No rashes/lesions appreciated in visualized areas  Neuro: EOMI, no overt focal deficits  Psych: Normal mood/affect  ECO      BMT and Cell Therapy Informed Consent Discussion     Mr. Angulo has completed his workup and passed his assessments.  He previously was cleared for transplant but asked to delay for 4-6 months in order to be more confident in management given the challenges the winter poses, the stress on his wife, and to work toward obtaining a deeper remission.  He has now accomplished this and clinically doing well.  We will proceed with autologous transplantation.      Patient Active Problem List   Diagnosis     HYPERLIPIDEMIA LDL GOAL <130     PSEUDOPHAKIA OU     Advanced directives, counseling/discussion     History of actinic keratoses     PVD (posterior vitreous detachment), bilateral     Nonexudative senile macular degeneration of retina     Elevated glucose     Essential hypertension with goal blood pressure less than 140/90     Elevated prostate specific antigen (PSA)     Dupuytren contracture     Benign prostatic hyperplasia with urinary retention     Multiple myeloma not having achieved remission (H)     Patient has been seen and evaluated by me. I have reviewed today's vital signs, medications, labs and imaging results. I have discussed the plan " with the team and agree with the findings and plan in this note.    Reviewed results summarized in Fellow note.  PErsisting small M protein but BM in morphologic and flow remission.   No exam findings of bone tenderness, resp sx or rash. no signs of bleeding  Reviewed planned course of autograft in detail;  questions answered in full and he confirmed his consent to proceed.    BMT and Cell Therapy Informed Consent Discussion     In today's visit, we discussed in detail the research for which Hamilton Angulo is eligible. We discussed the potential risks and potential benefits of each protocol individually. We explained potential alternatives to the protocols discussed. We explained to the patient that participation is voluntary and that consent may be withdrawn at any time.     We discussed:    The rationale for our approach to the disease treatment    The eligibility requirements for treatment in the context of clinical trials    The need for caregiver support and the caregiver's role in recovery    The importance of adherence to the treatment plan and appropriate follow up    The requirements for contraception while undergoing treatment    The potential risks of morbidity and mortality related to this treatment    The requirements for supportive care to reduce the risk of infection and other complications    The role of the dietician and PT/OT to reduce the risk of muscle loss/sarcopenia    Support that is available through our social workers and care team to mitigate distress    The desired outcomes/goals of treatment, including the possibility of long-term disease control    The patient completed the last round of treatment on April    HCT-CI score: .0  We counseled the patient about the impact of this on the risk of treatment related and overall mortality. The score fit within treatment protocol eligibility criteria.    Karnofsky performance score:       ECOG: (required for CAR-T): 0    Active infections:  0  Prior  infections that require additional special prophylaxis considerations: .  I reviewed and discussed infectious disease evaluation with the patient and the management plan during treatment.    Reproductive status: What methods of birth control does the patient plan to use during the treatment period beginning with conditioning and ending with the discontinuation of immune suppression (indicate with an X all that apply):  __ The patient is confirmed to be sterile or post-menopausal  __ Sexual abstinence  __ Condoms  __ Implants  __ Injectables  __ Oral contraceptives  __ Intrauterine devices (IUD)  __ Other (describe)    The patient received appropriate reproductive counseling and agreed with the need for effective contraception during the treatment procedures.      Dental health suitable to proceed: Yes      Transplants for multiple myeloma 1  The patient has Standard risk MM, and the collection goal is 8 million CD34/kg for 2 transplants..  The day for admission to begin melphalan conditioning is Day -1 for melphalan 200 mg/m2 (not frail, creatinine clearance 30+). .    After our detailed discussion above, the patient signed the following consents for treatment and protocols:    autograft for MM     Known issues that I take into account for medical decisions, with salient changes to the plan considering these complexities noted above.    Patient Active Problem List   Diagnosis     HYPERLIPIDEMIA LDL GOAL <130     PSEUDOPHAKIA OU     Advanced directives, counseling/discussion     History of actinic keratoses     PVD (posterior vitreous detachment), bilateral     Nonexudative senile macular degeneration of retina     Elevated glucose     Essential hypertension with goal blood pressure less than 140/90     Elevated prostate specific antigen (PSA)     Dupuytren contracture     Benign prostatic hyperplasia with urinary retention     Multiple myeloma not having achieved remission (H)         I spent 65 minutes in the care  of this patient today, which included time necessary for preparation for the visit, obtaining history, ordering medications/tests/procedures as medically indicated, review of pertinent medical literature, counseling of the patient, communication of recommendations to the care team, and documentation time.    Tu Guerrier MD            Blood Counts       Recent Labs   Lab Test 05/10/22  1403 05/04/22  0955 12/01/21  1357   HGB 10.7* 10.7* 10.8*   HCT 31.8* 32.5* 33.3*   WBC 4.0 2.9* 3.3*   ANEUTAUTO 1.8 1.1* 0.7*   ALYMPAUTO 1.6 1.2 2.2   AMONOAUTO 0.6 0.6 0.4   AEOSAUTO 0.0 0.0 0.0   ABSBASO 0.0 0.0 0.0   NRBCMAN 0.0 0.0 0.0    144* 196       ABO/RH    Recent Labs   Lab Test 05/04/22  0933   ABORH A POS       Hemoglobin S Screening    Recent Labs   Lab Test 11/30/21  1148   HGBS Negative         Chemistries     Basic Panel  Recent Labs   Lab Test 05/10/22  1403 05/10/22  1100 05/04/22  0933 12/08/21  1521 11/30/21  1148 11/29/21  0930   NA  --   --  139  --  143 141   POTASSIUM  --   --  4.0  --  3.8 4.3   CHLORIDE  --   --  108  --  108 110*   CO2  --   --  24  --  29 27   BUN  --   --  13  --  17 16   CR 1.03 1.03 0.93   < > 0.87 0.94   GLC  --   --  107*  --  109* 106*    < > = values in this interval not displayed.        Calcium, Magnesium, Phosphorus  Recent Labs   Lab Test 05/04/22  0933 11/30/21  1148 11/29/21  0930   RAKESH 8.8 9.3 9.3   MAG 2.2 2.2  --    PHOS 3.4 3.2  --         LFTs  Recent Labs   Lab Test 05/04/22  0933 11/30/21  1148 06/23/21  0942   BILITOTAL 0.6 0.6 0.6   ALKPHOS 53 49 46   AST 15 11 11   ALT 19 22 13   ALBUMIN 4.3 3.8 4.1       LDH  Recent Labs   Lab Test 05/04/22  0933 11/30/21  1148 06/23/21  0942    176 155       B2-Microglobulin  Recent Labs   Lab Test 05/04/22  0933 11/30/21  1148 06/09/21  0827   [id number] 2.3* 1.7 1.9       Vitamin D  Recent Labs   Lab Test 05/04/22  0933   VITDT 34         Urine Studies       Recent Labs   Lab Test 05/04/22  0942  11/30/21  1159   COLOR Yellow Yellow   APPEARANCE Cloudy* Cloudy*   URINEGLC Negative Negative   URINEBILI Negative Negative   URINEKETONE Negative Negative   SG 1.013 1.011   UBLD Small* Small*   URINEPH 5.0 6.0   PROTEIN Negative Negative   UUROI Normal Normal   NITRITE Positive* Positive*   LEUKEST Large* Large*   MUCUS  --  Present*   RBCU 14* 7*   WBCU >182* >182*       Creatinine Clearance    Recent Labs   Lab Test 05/10/22  1100 12/02/21  0545   HT 65 181   .0 64.0   RAW 74 108   STD 71 104   VOL 1,452  1,452 1,198  1,198   DUR 24.0  24.0 24.0  24.0   CREATININE  --  0.87   UCRR 76  76 113  109   UCR24 1.10  1.10 1.35  1.31         Infectious Disease Markers     [facility] IDM    Recent Labs   Lab Test 05/04/22  0933   DCMIG POSITIVE*   DHBSAG Non-Reactive   DHBCAB Non-Reactive   DHIVAB Non-Reactive   DHCVAB Non-Reactive   DHTLVA Non-Reactive   TCRUZI Non-Reactive   DTRPAB Non-Reactive         Hepatitis and HIV    Recent Labs   Lab Test 10/07/16  0929   HCVAB Nonreactive   Assay performance characteristics have not been established for newborns,   infants, and children           CMV  No lab results found.      EBV    Recent Labs   Lab Test 05/04/22  0933   EBVCAG Positive*       HSV 1/2    Recent Labs   Lab Test 05/04/22  0933   [id number] 4.82*   H1IGG Positive.  IgG antibody to HSV-1 detected.*   [id number] 0.11   H2IGG No HSV-2 IgG antibodies detected.         VZV    No lab results found.      HTLV    Recent Labs   Lab Test 05/04/22  0933   DHTLVA Non-Reactive         Toxoplasma  (not routinely checked)      COVID    Recent Labs   Lab Test 05/04/22  0933   [id number] Negative         Immunoglobulins     Recent Labs   Lab Test 05/04/22  0933 11/30/21  1148 06/23/21  0942 04/28/21  1214   * 1,247 2,530* 2,862*       Recent Labs   Lab Test 05/04/22  0933 11/30/21  1148 06/23/21  0942 04/28/21  1214   IGA 22* 50* 37* 35*       Recent Labs   Lab Test 05/04/22  0933 11/30/21  1148  06/23/21  0942 04/28/21  1214   IGM 16* 41 30* 31*         Monocloncal Protein Studies     M spike    Recent Labs   Lab Test 05/04/22  0933 11/30/21  1148 06/23/21  0942 04/28/21  1214   ELPM 0.3* 0.7* 1.7* 2.1*       [place] FLC    Recent Labs   Lab Test 05/04/22  0933 11/30/21  1148   KFLCA 2.25* 27.07*       Lambda FLC    Recent Labs   Lab Test 05/04/22  0933 11/30/21  1148   LFLCA 0.45* 1.21       FLC Ratio    Recent Labs   Lab Test 05/04/22  0933 11/30/21  1148   KLRA 5.00* 22.37*           Bone Marrow Biopsy       Morphology    Results for orders placed or performed in visit on 05/10/22 (from the past 8760 hour(s))   Bone marrow biopsy   Result Value    Final Diagnosis      Bone marrow, posterior iliac crest, left decalcified trephine biopsy and touch imprint; left direct aspirate smear, and concentrated aspirate smear; and peripheral smear:    - No morphologic or immunophenotypic evidence of plasma cell neoplasm  - Normocellular marrow for age (overall 30%) with trilineage hematopoiesis, no increase in blasts, and less than 1% polytypic plasma cells  - Peripheral blood showing moderate normochromic macrocytic anemia  - See comment      Comment      Flow cytometry analysis on concurrent specimen ([id number]) showed polytypic plasma cells and polytypic B-cells.    Concurrent ancillary studies are in progress and will be reported separately.  Correlation with the results of ancillary tests and clinical findings is recommended.      Clinical Information      From Epic electronic medical record; 71-year-old male is being worked up for autologous peripheral blood stem cell transplant for IgG kappa multiple myeloma.      Peripheral Hematologic Data      CBC WITH DIFFERENTIAL(05/10/2022 02:26 PM CDT):     RESULT VALUE REF. RANGE UNITS   WBC Count   Hemoglobin    Hematocrit   Platelet Count   RBC Count    MCV    MCH   MCHC  RDW 4.0 (NORMAL)     10.7  ( L )      31.8  ( L )  209 (NORMAL)   2.99  ( L )        106  ( H )       35.8  ( H )     33.6 (NORMAL)     13.7 (NORMAL) 4.0-11.0  13.3-17.7  40.0-53.0  150-450  4.40-5.90    26.5-33.0  31.5-36.5  10.0-15.0 10e3/uL  g/dL  %  10e3/uL  10e6/uL  fL  pg  g/dL  %   % Neutrophils  % Lymphocytes  % Monocytes  % Eosinophils  % Basophils  % Immature Granulocytes  Absolute Neutrophils  Absolute Lymphocytes  Absolute Monocytes  Absolute Eosinophils  Absolute Basophils  Absolute Immature Granulocytes  NRBCs per 100 WBC  Absolute NRBCs 44  39  15  1  0  1  1.8 (NORMAL)  1.6 (NORMAL)  0.6 (NORMAL)  0.0 (NORMAL)  0.0 (NORMAL)  0.0 (NORMAL)  0 (NORMAL)  0.0 () N/A  N/A  N/A  N/A  N/A  N/A  1.6-8.3  0.8-5.3  0.0-1.3  0.0-0.7  0.0-0.2  <=0.4  <1  <=0.0 %  %  %  %  %  %  10e3/uL  10e3/uL  10e3/uL  10e3/uL  10e3/uL  10e3/uL  /100  10e3/uL         Microscopic Description      PERIPHERAL BLOOD SMEAR MORPHOLOGY:  The red blood cells appear normochromic.  Poikilocytosis includes rare echinocytes and very rare fragmented red blood cells including very rare helmet cells.  Polychromasia is not increased.  Rouleaux formation is not increased. Lymphocyte morphology is polymorphous. Neutrophils displays predominantly normal cytoplasmic granularity and unremarkable nuclear morphology. The morphology of the platelets is normal.     Bone marrow aspirates and trephine core biopsy touch imprints are reviewed.    BONE MARROW DIFFERENTIAL (500 cells on touch imprints)  Percent (%) Cell Population Reference Range (%)   0.0 Blasts  (0 - 1)   0.4 Neutrophil promyelocytes (2 - 4)   61.4 Neutrophils and precursors (54 - 63)   17.8 Erythroid precursors (18 - 24)   5.8 Monocytes (1 - 1.5)   0.6 Eosinophils (1 - 3)   0.4 Basophils (0 - 1)   13.0 Lymphocytes (8 - 12)   0.6 Plasma cells (0 - 1.5)     The aspirate smears are possibly hemodilute.    Granulocytes are adequate in number with progressive and complete maturation; no overt dysplasia seen.  Erythrocytes are adequate in number with progressive and complete  maturation; no overt dysplasia seen. Megakaryocytes with unremarkable morphology are present.     TREPHINE SECTIONS:  Hematoxylin and eosin stains are reviewed. The quality of the trephine core biopsy is adequate.  Marrow have slight to moderate fragmentation artifact which limits accurate assessment of marrow cellularity.  The marrow cellularity in intact areas is overall estimated at 30%. The cellular composition reflects the touch imprints differential. The number of megakaryocytes appears consistent with the degree of marrow cellularity; the morphology and distribution of megakaryocytes are unremarkable.  No overt infiltration of plasma cells seen.  Bone trabeculae is unremarkable.      IMMUNOHISTOCHEMISTRY:  Immunohistochemical stains are performed on the paraffin-embedded trephine core with appropriate controls.    Stains for , cytoplasmic kappa and lambda immunoglobulin light chains show scattered polytypic plasma cells comprising less than 1% of marrow cellularity; no clusters or aggregates of plasma cells noted.    Note: These immunohistochemical stains are deemed medically necessary. Some of the antigens may also be evaluated by flow cytometry. Concurrent evaluation by immunohistochemistry on clot and/or trephine sections is indicated in this case in order to correlate immunophenotype with cell morphology and determine extent of involvement, spatial pattern, and focality of potential disease distribution.       Gross Description      Procedure/Gross Description   Aspirate(s) and trephine(s) procured by [facility] Miranda    Specimen sent for Special Studies:         Flow Cytometry: left aspirate        Cytogenetics: left aspirate        Molecular Diagnostics: left aspirate         Biopsy aspiration site: left posterior iliac crest                                                      (Reference Range)          Amount of aspirate           4.1   mL        Fat and P.V. cell layer        trace    %                (1 - 3)        Particles                             0   %        Myeloid-erythroid layer    2    %               (5 - 8)          Clot Section: no    Trephine biopsy site: left posterior iliac crest    Designated left posterior iliac crest are 2 cylinders of gritty tissue, labeled with the patient's name and hospital number, obtained with 11 gauge needle and an aggregate length of 19 mm; entirely submitted in 1 cassette; acetic zinc formalin fixed, decalcified, processed, and stained for hematoxylin and eosin per laboratory protocol.        Performing Labs      The technical component of this testing was completed at [facility]             Chest X-Ray - 2 view     Results for orders placed during the hospital encounter of 05/06/22    XR CHEST 2 VW    Status: Normal 5/6/2022    Narrative  EXAM: XR CHEST 2 VW  5/6/2022 10:29 AM    HISTORY:  Examination prior to chemotherapy; Other long term (current)  drug therapy; Myeloma (H); Personal history of diseases of blood and  blood-forming organs    COMPARISON:  Chest radiograph 12/1/2021    FINDINGS: Two views of the chest.    Trachea is midline. The cardiomediastinal silhouette is within normal  limits. Atherosclerotic calcifications of the aortic arch. No  significant pleural effusion or pneumothorax. No focal airspace  opacity. The visualized upper abdomen is unremarkable.    Impression  IMPRESSION: No acute airspace disease.    I have personally reviewed the examination and initial interpretation  and I agree with the findings.    COLLETTE KAUR MD      SYSTEM ID:  [id number]          PFTs     FVC%  Recent Labs   Lab Test 05/04/22  1050 11/30/21  1318   [id number] 118 116       FEV1%  Recent Labs   Lab Test 05/04/22  1050 11/30/21  1318   [id number] 125 118       DLCO%  Recent Labs   Lab Test 05/04/22  1050 11/30/21  1318   [id number] 113 106           EKG       ECG results from 05/04/22   EKG 12-lead complete  w/read - Clinics     Value    Systolic Blood Pressure     Diastolic Blood Pressure     Ventricular Rate 72    Atrial Rate 72    IN Interval 132    QRS Duration 104        QTc 427    P Axis 83    R AXIS 52    T Axis 68    Interpretation ECG      Sinus rhythm  When compared with ECG of 2021 13:35,  No significant change was found  Confirmed by MD LINNEA, ALISSA ([id number]) on 2022 11:56:30 PM           ECHOCARDIOGRAM       Results for orders placed during the hospital encounter of 22    ECHOCARDIOGRAM COMPLETE    Status: Normal 2022    [facility]  [id number]  [id number]  [id number]  [id number]^SHU^MADIHA^LEAH    [facility],[place]  Echocardiography Laboratory  [address] [place] [address]    Name: BOB RINALDI  MRN: [id number]  : 1950  Study Date: 2022 09:57 AM  Age: 71 yrs  Gender: Male  Patient Location: [facility]  Reason For Study: Examination prior to chemotherapy, Other long term (current)  fredy  Ordering Physician: MADIHA IRELAND  Referring Physician: MADIHA IRELAND  Performed By: Lashaun Tai    BSA: 1.8 m2  Height: 71 in  Weight: 145 lb  BP: 134/61 mmHg  ______________________________________________________________________________  Procedure  Echocardiogram with two-dimensional, color and spectral Doppler performed.  Good quality two-dimensional was performed and interpreted.  ______________________________________________________________________________  Interpretation Summary  Left ventricular size, wall motion and function are normal. The ejection  fraction is 60-65%.  Global peak LV longitudinal strain is averaged at -16%. This suggests abnormal  strain (normal <-18%).  The clinical value of assessment of global peak longitudinal strain in the  surveillance for cardiotoxicity lies in serial measurements.  Global right ventricular function is normal.  No significant valvular abnormalities.  No pericardial effusion is present.  Compaed to  prior study 12/8/2021, GLS has mildly worsened, otherwise no  significant change.  ______________________________________________________________________________  Left Ventricle  Left ventricular size, wall motion and function are normal. The ejection  fraction is 60-65%. Global peak LV longitudinal strain is averaged at -16%.  This suggests abnormal strain (normal <-18%). The clinical value of assessment  of global peak longitudinal strain in the surveillance for cardiotoxicity lies  in serial measurements. Left ventricular diastolic function is normal.    Right Ventricle  The right ventricle is normal size. Global right ventricular function is  normal.    Atria  Both atria appear normal.    Mitral Valve  The mitral valve is normal. Trace mitral insufficiency is present.    Aortic Valve  Aortic valve is normal in structure and function. The aortic valve is  tricuspid.    Tricuspid Valve  Mild tricuspid insufficiency is present. Estimated pulmonary artery systolic  pressure is 36 mmHg plus right atrial pressure.    Pulmonic Valve  The valve leaflets are not well visualized. Trace pulmonic insufficiency is  present.    Vessels  The aorta root is normal. The inferior vena cava was normal in size with  preserved respiratory variability.    Pericardium  No pericardial effusion is present.    Compared to Previous Study  Compaed to prior study 12/8/2021, GLS has mildly worsened, otherwise no  significant change.    Attestation  I have personally viewed the imaging and agree with the interpretation and  report as documented by the fellow, Tu Hoffman, and/or edited by me.  ______________________________________________________________________________  MMode/2D Measurements & Calculations  IVSd: 0.74 cm  LVIDd: 4.7 cm  LVIDs: 2.2 cm  LVPWd: 0.82 cm  FS: 52.5 %  LV mass(C)d: 119.1 grams  LV mass(C)dI: 64.7 grams/m2  Ao root diam: 2.8 cm  LVOT diam: 2.1 cm  LVOT area: 3.5 cm2  RWT: 0.35    Doppler Measurements &  Calculations  MV E max cammy: 85.9 cm/sec  MV A max cammy: 51.4 cm/sec  MV E/A: 1.7  MV dec slope: 378.0 cm/sec2  MV dec time: 0.23 sec  TR max cammy: 301.0 cm/sec  TR max P.2 mmHg  E/E' avg: 10.6    Lateral E/e': 11.3  Medial E/e': 10.0    ______________________________________________________________________________  Report approved by: Eugene CUMMINS 2022 11:32 AM        PET Scan       Results for orders placed during the hospital encounter of 22    PET ONCOLOGY WHOLE BODY    Status: Normal 2022    Narrative  Combined Report of:    PET and CT on  2022 12:21 PM :    1. PET of the neck, chest, abdomen, and pelvis.  2. PET CT Fusion for Attenuation Correction and Anatomical  Localization:  3. 3D MIP and PET-CT fused images were processed on an independent  workstation and archived to PACS and reviewed by a radiologist.    Technique:    1. PET: The patient received 9.72 mCi of F-18-FDG; the serum glucose  was 107 prior to administration, body weight was 65.1 kg. Images were  evaluated in the axial, sagittal, and coronal planes as well as the  rotational whole body MIP. Images were acquired from the Vertex to the  Feet.    UPTAKE WAS MEASURED AT 62 MINUTES.    BACKGROUND:  Liver SUV max= 3.32,   Aorta Blood SUV Max: 2.83.    2. CT: CT only obtained for attenuation correction and not diagnostic  purposes.    INDICATION: Examination prior to chemotherapy; Other long term  (current) drug therapy; Myeloma (H); Personal history of diseases of  blood and blood-forming organs    ADDITIONAL INFORMATION OBTAINED FROM EMR: Post-BMT    COMPARISON: PET/CT 2021    FINDINGS:    HEAD/NECK:  There is no suspicious FDG uptake in the neck.    CHEST:  There is no suspicious FDG uptake in the chest.    ABDOMEN AND PELVIS:  There is no suspicious FDG uptake in the abdomen or pelvis.    Colonic diverticulosis without evidence of acute diverticulitis.  Prostatomegaly.    LOWER EXTREMITIES:  No abnormal masses or  hypermetabolic lesions.    BONES:  There is no abnormal FDG uptake in the skeleton. Unchanged scattered  punctate non-FDG avid lucencies in the pelvic bone, sacrum and  sternum.    Impression  IMPRESSION: In this patient with multiple myeloma;  1. No abnormal FDG uptake in the skeleton.  2. Scattered small non-FDG avid lucencies in the pelvic bone, sacrum  and sternum, may represent treated multiple myeloma versus inherently  non-metabolic myeloma lesions.    I have personally reviewed the examination and initial interpretation  and I agree with the findings.    SUMI MARAVILLA MD      SYSTEM ID:  [id number]

## 2022-05-13 NOTE — PROGRESS NOTES
BMT/Cell Therapy Consultation      Workup Nurse Coordinator: Andrea  Primary BMT Physician: Arik  Closing BMT Physician (if different): Chey  Date of Summary:  05/13/2022  Reason for Transplant: MM  Protocol: 2016-35 auto MM      Hamilton Angulo is a 71 year old male referred by Dr. Bowling and Caleb for IgG kappa multiple myeloma, RISS stage 1, standard risk.      Diagnosis and Treatment Summary         Disease presentation and baseline characteristics:  Standard risk IgG kappa MM presenting as progressive but asymptomatic anemia. WBC 3.3.  Hemoglobin 10.  Platelets 155. CMP showed normal creatinine and calcium.  Total protein 8.7.  LFTs unremarkable.  Albumin 4.1, Beta-2 microglobulin 1.9. Bone marrow biopsy showed plasma cell myeloma with normocellular marrow, cellularity 20 to 30% with trilineage hematopoiesis and 20- 25% kappa monotypic plasma cells.  Increased marrow storage iron. Bone marrow flow cytometry showed 1.2% plasma cells which express CD38, CD19, CD20 (partial dim), CD45 (dim), CD56  and monotypic cytoplasmic kappa immunoglobulin light chains. FISH Hyperdiploid with gains of chromosomes 5, 9, and/or 15 (85%), gain of 11q (95%), monosomy 13 (46%), loss of IGH (4.5%) (control range 0-2.8%); no rearrangement of IGH. Cytogenetics 46 XY. Baseline M spike of 2.1. Serum IgG level was 2862.  Monoclonal peak in the urine was 57.5 and MANDI showed large monoclonal free kappa and monoclonal IgG kappa. [facility] free light chain 57.  Lambda 0.65 and the ratio 88. On 10/1/21, his M spike was down to 0.8 and kappa FLC down to 25 (partial response).  Kappa FLC plateaued at 27 at pre-autologous transplant workup.  Started D-Kd x 4.  On 5/4/22 m-spike was 0.3, kappa FLC 2.25.    Date Treatment Name Response Side Effects / Toxicities   8/2021 RVd x 6 Partial response Neutropenia, neuropathy   12/2021 D-Kd x4 VGPR                       HPI:    See above for disease and treatment history.  Hamilton presents today  accompanied by his wife Heather to review the results of his workup.  He reports he is generally doing well without fevers/chills, nausea/vomiting, diarrhea/constipation, rashes or other concerns.  He remains physically active at home without any significant limitations.      ROS:    10 point ROS neg other than the symptoms noted above in the HPI.        Past Medical History:   Diagnosis Date     Bilateral cataracts 2001     Herniated disc 1983    Lumbar     Hyperlipidemia LDL goal <130      Hypertension 5/2004     Tear of MCL (medial collateral ligament) of knee 6/20/2001    LT Knee/WC       Past Surgical History:   Procedure Laterality Date     CATARACT IOL, RT/LT  12/2003    Bilateral       Family History   Problem Relation Age of Onset     Breast Cancer Mother      Hypertension Mother         She potentially had this.     Other - See Comments Father         He potentially had liver issues and dementia.     Heart Disease Maternal Grandmother         She potentially had this.     Cancer Maternal Grandfather         Unknown type of cancer.     Other - See Comments Paternal Half-Brother         He potentially had dementia.       Social History     Tobacco Use     Smoking status: Never Smoker     Smokeless tobacco: Never Used   Vaping Use     Vaping Use: Never used   Substance Use Topics     Alcohol use: Yes     Comment: [employer].     Drug use: No         Allergies   Allergen Reactions     Shrimp Hives        Current Outpatient Medications   Medication Sig Dispense Refill     acyclovir (ZOVIRAX) 400 MG tablet Take 400 mg by mouth 2 times daily       amLODIPine (NORVASC) 2.5 MG tablet TAKE 1 TABLET(2.5 MG) BY MOUTH DAILY (Patient taking differently: Take 2.5 mg by mouth daily) 90 tablet 3     Artificial Tear Solution (SM ARTIFICIAL TEARS) SOLN Apply 1 Dose to eye every 2 hours as needed Use per label instructions       lisinopril (ZESTRIL) 20 MG tablet Take 1 tablet (20 mg) by mouth daily 90 tablet 3     Multiple  "Vitamins-Minerals (EQ COMPLETE MULTIVITAMIN-ADULT) TABS Take 1 tablet by mouth daily       acetaminophen (TYLENOL) 325 MG tablet Take 325-650 mg by mouth every 4 hours as needed Take as needed following label instructions (Patient not taking: No sig reported)           Physical Exam:     Vital Signs: BP (!) 142/85   Pulse 74   Temp 97.8  F (36.6  C)   Resp 16   Ht 1.778 m (5' 10\")   Wt 66 kg (145 lb 9.6 oz)   SpO2 99%   BMI 20.89 kg/m       Constitutional: No acute distress, appears comfortable sitting up on exam table  HEENT: Oropharynx unremarkable  Respiratory: CTAB  Card: RRR  Abd: Non-tender, non-distended, normoactive bowel sounds  Skin: No rashes/lesions appreciated in visualized areas  Neuro: EOMI, no overt focal deficits  Psych: Normal mood/affect  ECO      BMT and Cell Therapy Informed Consent Discussion     Mr. Angulo has completed his workup and passed his assessments.  He previously was cleared for transplant but asked to delay for 4-6 months in order to be more confident in management given the challenges the winter poses, the stress on his wife, and to work toward obtaining a deeper remission.  He has now accomplished this and clinically doing well.  We will proceed with autologous transplantation.      Patient Active Problem List   Diagnosis     HYPERLIPIDEMIA LDL GOAL <130     PSEUDOPHAKIA OU     Advanced directives, counseling/discussion     History of actinic keratoses     PVD (posterior vitreous detachment), bilateral     Nonexudative senile macular degeneration of retina     Elevated glucose     Essential hypertension with goal blood pressure less than 140/90     Elevated prostate specific antigen (PSA)     Dupuytren contracture     Benign prostatic hyperplasia with urinary retention     Multiple myeloma not having achieved remission (H)     Patient has been seen and evaluated by me. I have reviewed today's vital signs, medications, labs and imaging results. I have discussed the plan " with the team and agree with the findings and plan in this note.    Reviewed results summarized in Fellow note.  PErsisting small M protein but BM in morphologic and flow remission.   No exam findings of bone tenderness, resp sx or rash. no signs of bleeding  Reviewed planned course of autograft in detail;  questions answered in full and he confirmed his consent to proceed.    BMT and Cell Therapy Informed Consent Discussion     In today's visit, we discussed in detail the research for which Hamilton Angulo is eligible. We discussed the potential risks and potential benefits of each protocol individually. We explained potential alternatives to the protocols discussed. We explained to the patient that participation is voluntary and that consent may be withdrawn at any time.     We discussed:    The rationale for our approach to the disease treatment    The eligibility requirements for treatment in the context of clinical trials    The need for caregiver support and the caregiver's role in recovery    The importance of adherence to the treatment plan and appropriate follow up    The requirements for contraception while undergoing treatment    The potential risks of morbidity and mortality related to this treatment    The requirements for supportive care to reduce the risk of infection and other complications    The role of the dietician and PT/OT to reduce the risk of muscle loss/sarcopenia    Support that is available through our social workers and care team to mitigate distress    The desired outcomes/goals of treatment, including the possibility of long-term disease control    The patient completed the last round of treatment on April    HCT-CI score: .0  We counseled the patient about the impact of this on the risk of treatment related and overall mortality. The score fit within treatment protocol eligibility criteria.    Karnofsky performance score:       ECOG: (required for CAR-T): 0    Active infections:  0  Prior  infections that require additional special prophylaxis considerations: .  I reviewed and discussed infectious disease evaluation with the patient and the management plan during treatment.    Reproductive status: What methods of birth control does the patient plan to use during the treatment period beginning with conditioning and ending with the discontinuation of immune suppression (indicate with an X all that apply):  __ The patient is confirmed to be sterile or post-menopausal  __ Sexual abstinence  __ Condoms  __ Implants  __ Injectables  __ Oral contraceptives  __ Intrauterine devices (IUD)  __ Other (describe)    The patient received appropriate reproductive counseling and agreed with the need for effective contraception during the treatment procedures.      Dental health suitable to proceed: Yes      Transplants for multiple myeloma 1  The patient has Standard risk MM, and the collection goal is 8 million CD34/kg for 2 transplants..  The day for admission to begin melphalan conditioning is Day -1 for melphalan 200 mg/m2 (not frail, creatinine clearance 30+). .    After our detailed discussion above, the patient signed the following consents for treatment and protocols:    autograft for MM     Known issues that I take into account for medical decisions, with salient changes to the plan considering these complexities noted above.    Patient Active Problem List   Diagnosis     HYPERLIPIDEMIA LDL GOAL <130     PSEUDOPHAKIA OU     Advanced directives, counseling/discussion     History of actinic keratoses     PVD (posterior vitreous detachment), bilateral     Nonexudative senile macular degeneration of retina     Elevated glucose     Essential hypertension with goal blood pressure less than 140/90     Elevated prostate specific antigen (PSA)     Dupuytren contracture     Benign prostatic hyperplasia with urinary retention     Multiple myeloma not having achieved remission (H)         I spent 65 minutes in the care  of this patient today, which included time necessary for preparation for the visit, obtaining history, ordering medications/tests/procedures as medically indicated, review of pertinent medical literature, counseling of the patient, communication of recommendations to the care team, and documentation time.    Tu Guerrier MD            Blood Counts       Recent Labs   Lab Test 05/10/22  1403 05/04/22  0955 12/01/21  1357   HGB 10.7* 10.7* 10.8*   HCT 31.8* 32.5* 33.3*   WBC 4.0 2.9* 3.3*   ANEUTAUTO 1.8 1.1* 0.7*   ALYMPAUTO 1.6 1.2 2.2   AMONOAUTO 0.6 0.6 0.4   AEOSAUTO 0.0 0.0 0.0   ABSBASO 0.0 0.0 0.0   NRBCMAN 0.0 0.0 0.0    144* 196       ABO/RH    Recent Labs   Lab Test 05/04/22  0933   ABORH A POS       Hemoglobin S Screening    Recent Labs   Lab Test 11/30/21  1148   HGBS Negative         Chemistries     Basic Panel  Recent Labs   Lab Test 05/10/22  1403 05/10/22  1100 05/04/22  0933 12/08/21  1521 11/30/21  1148 11/29/21  0930   NA  --   --  139  --  143 141   POTASSIUM  --   --  4.0  --  3.8 4.3   CHLORIDE  --   --  108  --  108 110*   CO2  --   --  24  --  29 27   BUN  --   --  13  --  17 16   CR 1.03 1.03 0.93   < > 0.87 0.94   GLC  --   --  107*  --  109* 106*    < > = values in this interval not displayed.        Calcium, Magnesium, Phosphorus  Recent Labs   Lab Test 05/04/22  0933 11/30/21  1148 11/29/21  0930   RAKESH 8.8 9.3 9.3   MAG 2.2 2.2  --    PHOS 3.4 3.2  --         LFTs  Recent Labs   Lab Test 05/04/22  0933 11/30/21  1148 06/23/21  0942   BILITOTAL 0.6 0.6 0.6   ALKPHOS 53 49 46   AST 15 11 11   ALT 19 22 13   ALBUMIN 4.3 3.8 4.1       LDH  Recent Labs   Lab Test 05/04/22  0933 11/30/21  1148 06/23/21  0942    176 155       B2-Microglobulin  Recent Labs   Lab Test 05/04/22  0933 11/30/21  1148 06/09/21  0827   [id number] 2.3* 1.7 1.9       Vitamin D  Recent Labs   Lab Test 05/04/22  0933   VITDT 34         Urine Studies       Recent Labs   Lab Test 05/04/22  0942  11/30/21  1159   COLOR Yellow Yellow   APPEARANCE Cloudy* Cloudy*   URINEGLC Negative Negative   URINEBILI Negative Negative   URINEKETONE Negative Negative   SG 1.013 1.011   UBLD Small* Small*   URINEPH 5.0 6.0   PROTEIN Negative Negative   UUROI Normal Normal   NITRITE Positive* Positive*   LEUKEST Large* Large*   MUCUS  --  Present*   RBCU 14* 7*   WBCU >182* >182*       Creatinine Clearance    Recent Labs   Lab Test 05/10/22  1100 12/02/21  0545   HT 65 181   .0 64.0   RAW 74 108   STD 71 104   VOL 1,452  1,452 1,198  1,198   DUR 24.0  24.0 24.0  24.0   CREATININE  --  0.87   UCRR 76  76 113  109   UCR24 1.10  1.10 1.35  1.31         Infectious Disease Markers     [facility] IDM    Recent Labs   Lab Test 05/04/22  0933   DCMIG POSITIVE*   DHBSAG Non-Reactive   DHBCAB Non-Reactive   DHIVAB Non-Reactive   DHCVAB Non-Reactive   DHTLVA Non-Reactive   TCRUZI Non-Reactive   DTRPAB Non-Reactive         Hepatitis and HIV    Recent Labs   Lab Test 10/07/16  0929   HCVAB Nonreactive   Assay performance characteristics have not been established for newborns,   infants, and children           CMV  No lab results found.      EBV    Recent Labs   Lab Test 05/04/22  0933   EBVCAG Positive*       HSV 1/2    Recent Labs   Lab Test 05/04/22  0933   [id number] 4.82*   H1IGG Positive.  IgG antibody to HSV-1 detected.*   [id number] 0.11   H2IGG No HSV-2 IgG antibodies detected.         VZV    No lab results found.      HTLV    Recent Labs   Lab Test 05/04/22  0933   DHTLVA Non-Reactive         Toxoplasma  (not routinely checked)      COVID    Recent Labs   Lab Test 05/04/22  0933   [id number] Negative         Immunoglobulins     Recent Labs   Lab Test 05/04/22  0933 11/30/21  1148 06/23/21  0942 04/28/21  1214   * 1,247 2,530* 2,862*       Recent Labs   Lab Test 05/04/22  0933 11/30/21  1148 06/23/21  0942 04/28/21  1214   IGA 22* 50* 37* 35*       Recent Labs   Lab Test 05/04/22  0933 11/30/21  1148  06/23/21  0942 04/28/21  1214   IGM 16* 41 30* 31*         Monocloncal Protein Studies     M spike    Recent Labs   Lab Test 05/04/22  0933 11/30/21  1148 06/23/21  0942 04/28/21  1214   ELPM 0.3* 0.7* 1.7* 2.1*       [place] FLC    Recent Labs   Lab Test 05/04/22  0933 11/30/21  1148   KFLCA 2.25* 27.07*       Lambda FLC    Recent Labs   Lab Test 05/04/22  0933 11/30/21  1148   LFLCA 0.45* 1.21       FLC Ratio    Recent Labs   Lab Test 05/04/22  0933 11/30/21  1148   KLRA 5.00* 22.37*           Bone Marrow Biopsy       Morphology    Results for orders placed or performed in visit on 05/10/22 (from the past 8760 hour(s))   Bone marrow biopsy   Result Value    Final Diagnosis      Bone marrow, posterior iliac crest, left decalcified trephine biopsy and touch imprint; left direct aspirate smear, and concentrated aspirate smear; and peripheral smear:    - No morphologic or immunophenotypic evidence of plasma cell neoplasm  - Normocellular marrow for age (overall 30%) with trilineage hematopoiesis, no increase in blasts, and less than 1% polytypic plasma cells  - Peripheral blood showing moderate normochromic macrocytic anemia  - See comment      Comment      Flow cytometry analysis on concurrent specimen ([id number]) showed polytypic plasma cells and polytypic B-cells.    Concurrent ancillary studies are in progress and will be reported separately.  Correlation with the results of ancillary tests and clinical findings is recommended.      Clinical Information      From Epic electronic medical record; 71-year-old male is being worked up for autologous peripheral blood stem cell transplant for IgG kappa multiple myeloma.      Peripheral Hematologic Data      CBC WITH DIFFERENTIAL(05/10/2022 02:26 PM CDT):     RESULT VALUE REF. RANGE UNITS   WBC Count   Hemoglobin    Hematocrit   Platelet Count   RBC Count    MCV    MCH   MCHC  RDW 4.0 (NORMAL)     10.7  ( L )      31.8  ( L )  209 (NORMAL)   2.99  ( L )        106  ( H )       35.8  ( H )     33.6 (NORMAL)     13.7 (NORMAL) 4.0-11.0  13.3-17.7  40.0-53.0  150-450  4.40-5.90    26.5-33.0  31.5-36.5  10.0-15.0 10e3/uL  g/dL  %  10e3/uL  10e6/uL  fL  pg  g/dL  %   % Neutrophils  % Lymphocytes  % Monocytes  % Eosinophils  % Basophils  % Immature Granulocytes  Absolute Neutrophils  Absolute Lymphocytes  Absolute Monocytes  Absolute Eosinophils  Absolute Basophils  Absolute Immature Granulocytes  NRBCs per 100 WBC  Absolute NRBCs 44  39  15  1  0  1  1.8 (NORMAL)  1.6 (NORMAL)  0.6 (NORMAL)  0.0 (NORMAL)  0.0 (NORMAL)  0.0 (NORMAL)  0 (NORMAL)  0.0 () N/A  N/A  N/A  N/A  N/A  N/A  1.6-8.3  0.8-5.3  0.0-1.3  0.0-0.7  0.0-0.2  <=0.4  <1  <=0.0 %  %  %  %  %  %  10e3/uL  10e3/uL  10e3/uL  10e3/uL  10e3/uL  10e3/uL  /100  10e3/uL         Microscopic Description      PERIPHERAL BLOOD SMEAR MORPHOLOGY:  The red blood cells appear normochromic.  Poikilocytosis includes rare echinocytes and very rare fragmented red blood cells including very rare helmet cells.  Polychromasia is not increased.  Rouleaux formation is not increased. Lymphocyte morphology is polymorphous. Neutrophils displays predominantly normal cytoplasmic granularity and unremarkable nuclear morphology. The morphology of the platelets is normal.     Bone marrow aspirates and trephine core biopsy touch imprints are reviewed.    BONE MARROW DIFFERENTIAL (500 cells on touch imprints)  Percent (%) Cell Population Reference Range (%)   0.0 Blasts  (0 - 1)   0.4 Neutrophil promyelocytes (2 - 4)   61.4 Neutrophils and precursors (54 - 63)   17.8 Erythroid precursors (18 - 24)   5.8 Monocytes (1 - 1.5)   0.6 Eosinophils (1 - 3)   0.4 Basophils (0 - 1)   13.0 Lymphocytes (8 - 12)   0.6 Plasma cells (0 - 1.5)     The aspirate smears are possibly hemodilute.    Granulocytes are adequate in number with progressive and complete maturation; no overt dysplasia seen.  Erythrocytes are adequate in number with progressive and complete  maturation; no overt dysplasia seen. Megakaryocytes with unremarkable morphology are present.     TREPHINE SECTIONS:  Hematoxylin and eosin stains are reviewed. The quality of the trephine core biopsy is adequate.  Marrow have slight to moderate fragmentation artifact which limits accurate assessment of marrow cellularity.  The marrow cellularity in intact areas is overall estimated at 30%. The cellular composition reflects the touch imprints differential. The number of megakaryocytes appears consistent with the degree of marrow cellularity; the morphology and distribution of megakaryocytes are unremarkable.  No overt infiltration of plasma cells seen.  Bone trabeculae is unremarkable.      IMMUNOHISTOCHEMISTRY:  Immunohistochemical stains are performed on the paraffin-embedded trephine core with appropriate controls.    Stains for , cytoplasmic kappa and lambda immunoglobulin light chains show scattered polytypic plasma cells comprising less than 1% of marrow cellularity; no clusters or aggregates of plasma cells noted.    Note: These immunohistochemical stains are deemed medically necessary. Some of the antigens may also be evaluated by flow cytometry. Concurrent evaluation by immunohistochemistry on clot and/or trephine sections is indicated in this case in order to correlate immunophenotype with cell morphology and determine extent of involvement, spatial pattern, and focality of potential disease distribution.       Gross Description      Procedure/Gross Description   Aspirate(s) and trephine(s) procured by [facility] Miranda    Specimen sent for Special Studies:         Flow Cytometry: left aspirate        Cytogenetics: left aspirate        Molecular Diagnostics: left aspirate         Biopsy aspiration site: left posterior iliac crest                                                      (Reference Range)          Amount of aspirate           4.1   mL        Fat and P.V. cell layer        trace    %                (1 - 3)        Particles                             0   %        Myeloid-erythroid layer    2    %               (5 - 8)          Clot Section: no    Trephine biopsy site: left posterior iliac crest    Designated left posterior iliac crest are 2 cylinders of gritty tissue, labeled with the patient's name and hospital number, obtained with 11 gauge needle and an aggregate length of 19 mm; entirely submitted in 1 cassette; acetic zinc formalin fixed, decalcified, processed, and stained for hematoxylin and eosin per laboratory protocol.        Performing Labs      The technical component of this testing was completed at [facility]             Chest X-Ray - 2 view     Results for orders placed during the hospital encounter of 05/06/22    XR CHEST 2 VW    Status: Normal 5/6/2022    Narrative  EXAM: XR CHEST 2 VW  5/6/2022 10:29 AM    HISTORY:  Examination prior to chemotherapy; Other long term (current)  drug therapy; Myeloma (H); Personal history of diseases of blood and  blood-forming organs    COMPARISON:  Chest radiograph 12/1/2021    FINDINGS: Two views of the chest.    Trachea is midline. The cardiomediastinal silhouette is within normal  limits. Atherosclerotic calcifications of the aortic arch. No  significant pleural effusion or pneumothorax. No focal airspace  opacity. The visualized upper abdomen is unremarkable.    Impression  IMPRESSION: No acute airspace disease.    I have personally reviewed the examination and initial interpretation  and I agree with the findings.    COLLETTE KAUR MD      SYSTEM ID:  [id number]          PFTs     FVC%  Recent Labs   Lab Test 05/04/22  1050 11/30/21  1318   [id number] 118 116       FEV1%  Recent Labs   Lab Test 05/04/22  1050 11/30/21  1318   [id number] 125 118       DLCO%  Recent Labs   Lab Test 05/04/22  1050 11/30/21  1318   [id number] 113 106           EKG       ECG results from 05/04/22   EKG 12-lead complete  w/read - Clinics     Value    Systolic Blood Pressure     Diastolic Blood Pressure     Ventricular Rate 72    Atrial Rate 72    MI Interval 132    QRS Duration 104        QTc 427    P Axis 83    R AXIS 52    T Axis 68    Interpretation ECG      Sinus rhythm  When compared with ECG of 2021 13:35,  No significant change was found  Confirmed by MD LINNEA, ALISSA ([id number]) on 2022 11:56:30 PM           ECHOCARDIOGRAM       Results for orders placed during the hospital encounter of 22    ECHOCARDIOGRAM COMPLETE    Status: Normal 2022    [facility]  [id number]  [id number]  [id number]  [id number]^SHU^MADIHA^LEAH    [facility],[place]  Echocardiography Laboratory  [address] [place] [address]    Name: BOB RINALDI  MRN: [id number]  : 1950  Study Date: 2022 09:57 AM  Age: 71 yrs  Gender: Male  Patient [facility]: [facility]  Reason For Study: Examination prior to chemotherapy, Other long term (current)  fredy  Ordering Physician: MADIHA IRELAND  Referring Physician: MADIHA IRELAND  Performed By: Lashaun Tai    BSA: 1.8 m2  Height: 71 in  Weight: 145 lb  BP: 134/61 mmHg  ______________________________________________________________________________  Procedure  Echocardiogram with two-dimensional, color and spectral Doppler performed.  Good quality two-dimensional was performed and interpreted.  ______________________________________________________________________________  Interpretation Summary  Left ventricular size, wall motion and function are normal. The ejection  fraction is 60-65%.  Global peak LV longitudinal strain is averaged at -16%. This suggests abnormal  strain (normal <-18%).  The clinical value of assessment of global peak longitudinal strain in the  surveillance for cardiotoxicity lies in serial measurements.  Global right ventricular function is normal.  No significant valvular abnormalities.  No pericardial effusion is present.  Compaed to  prior study 12/8/2021, GLS has mildly worsened, otherwise no  significant change.  ______________________________________________________________________________  Left Ventricle  Left ventricular size, wall motion and function are normal. The ejection  fraction is 60-65%. Global peak LV longitudinal strain is averaged at -16%.  This suggests abnormal strain (normal <-18%). The clinical value of assessment  of global peak longitudinal strain in the surveillance for cardiotoxicity lies  in serial measurements. Left ventricular diastolic function is normal.    Right Ventricle  The right ventricle is normal size. Global right ventricular function is  normal.    Atria  Both atria appear normal.    Mitral Valve  The mitral valve is normal. Trace mitral insufficiency is present.    Aortic Valve  Aortic valve is normal in structure and function. The aortic valve is  tricuspid.    Tricuspid Valve  Mild tricuspid insufficiency is present. Estimated pulmonary artery systolic  pressure is 36 mmHg plus right atrial pressure.    Pulmonic Valve  The valve leaflets are not well visualized. Trace pulmonic insufficiency is  present.    Vessels  The aorta root is normal. The inferior vena cava was normal in size with  preserved respiratory variability.    Pericardium  No pericardial effusion is present.    Compared to Previous Study  Compaed to prior study 12/8/2021, GLS has mildly worsened, otherwise no  significant change.    Attestation  I have personally viewed the imaging and agree with the interpretation and  report as documented by the fellow, Tu Hoffman, and/or edited by me.  ______________________________________________________________________________  MMode/2D Measurements & Calculations  IVSd: 0.74 cm  LVIDd: 4.7 cm  LVIDs: 2.2 cm  LVPWd: 0.82 cm  FS: 52.5 %  LV mass(C)d: 119.1 grams  LV mass(C)dI: 64.7 grams/m2  Ao root diam: 2.8 cm  LVOT diam: 2.1 cm  LVOT area: 3.5 cm2  RWT: 0.35    Doppler Measurements &  Calculations  MV E max cammy: 85.9 cm/sec  MV A max cammy: 51.4 cm/sec  MV E/A: 1.7  MV dec slope: 378.0 cm/sec2  MV dec time: 0.23 sec  TR max cammy: 301.0 cm/sec  TR max P.2 mmHg  E/E' avg: 10.6    Lateral E/e': 11.3  Medial E/e': 10.0    ______________________________________________________________________________  Report approved by: Eugene CUMMINS 2022 11:32 AM        PET Scan       Results for orders placed during the hospital encounter of 22    PET ONCOLOGY WHOLE BODY    Status: Normal 2022    Narrative  Combined Report of:    PET and CT on  2022 12:21 PM :    1. PET of the neck, chest, abdomen, and pelvis.  2. PET CT Fusion for Attenuation Correction and Anatomical  Localization:  3. 3D MIP and PET-CT fused images were processed on an independent  workstation and archived to PACS and reviewed by a radiologist.    Technique:    1. PET: The patient received 9.72 mCi of F-18-FDG; the serum glucose  was 107 prior to administration, body weight was 65.1 kg. Images were  evaluated in the axial, sagittal, and coronal planes as well as the  rotational whole body MIP. Images were acquired from the Vertex to the  Feet.    UPTAKE WAS MEASURED AT 62 MINUTES.    BACKGROUND:  Liver SUV max= 3.32,   Aorta Blood SUV Max: 2.83.    2. CT: CT only obtained for attenuation correction and not diagnostic  purposes.    INDICATION: Examination prior to chemotherapy; Other long term  (current) drug therapy; Myeloma (H); Personal history of diseases of  blood and blood-forming organs    ADDITIONAL INFORMATION OBTAINED FROM EMR: Post-BMT    COMPARISON: PET/CT 2021    FINDINGS:    HEAD/NECK:  There is no suspicious FDG uptake in the neck.    CHEST:  There is no suspicious FDG uptake in the chest.    ABDOMEN AND PELVIS:  There is no suspicious FDG uptake in the abdomen or pelvis.    Colonic diverticulosis without evidence of acute diverticulitis.  Prostatomegaly.    LOWER EXTREMITIES:  No abnormal masses or  hypermetabolic lesions.    BONES:  There is no abnormal FDG uptake in the skeleton. Unchanged scattered  punctate non-FDG avid lucencies in the pelvic bone, sacrum and  sternum.    Impression  IMPRESSION: In this patient with multiple myeloma;  1. No abnormal FDG uptake in the skeleton.  2. Scattered small non-FDG avid lucencies in the pelvic bone, sacrum  and sternum, may represent treated multiple myeloma versus inherently  non-metabolic myeloma lesions.    I have personally reviewed the examination and initial interpretation  and I agree with the findings.    SUMI MARAVILLA MD      SYSTEM ID:  [id number]

## 2022-05-16 ENCOUNTER — CARE COORDINATION (OUTPATIENT)
Dept: TRANSPLANT | Facility: CLINIC | Age: 72
End: 2022-05-16
Payer: COMMERCIAL

## 2022-05-16 DIAGNOSIS — C90.00 MULTIPLE MYELOMA NOT HAVING ACHIEVED REMISSION (H): Primary | ICD-10-CM

## 2022-05-16 DIAGNOSIS — C90.00 MYELOMA (H): ICD-10-CM

## 2022-05-16 DIAGNOSIS — Z11.59 ENCOUNTER FOR SCREENING FOR OTHER VIRAL DISEASES: Primary | ICD-10-CM

## 2022-05-16 ASSESSMENT — ANXIETY QUESTIONNAIRES
5. BEING SO RESTLESS THAT IT IS HARD TO SIT STILL: NOT AT ALL
6. BECOMING EASILY ANNOYED OR IRRITABLE: NOT AT ALL
IF YOU CHECKED OFF ANY PROBLEMS ON THIS QUESTIONNAIRE, HOW DIFFICULT HAVE THESE PROBLEMS MADE IT FOR YOU TO DO YOUR WORK, TAKE CARE OF THINGS AT HOME, OR GET ALONG WITH OTHER PEOPLE: NOT DIFFICULT AT ALL
3. WORRYING TOO MUCH ABOUT DIFFERENT THINGS: NOT AT ALL
2. NOT BEING ABLE TO STOP OR CONTROL WORRYING: NOT AT ALL
1. FEELING NERVOUS, ANXIOUS, OR ON EDGE: NOT AT ALL
GAD7 TOTAL SCORE: 0
7. FEELING AFRAID AS IF SOMETHING AWFUL MIGHT HAPPEN: NOT AT ALL

## 2022-05-16 ASSESSMENT — PATIENT HEALTH QUESTIONNAIRE - PHQ9
SUM OF ALL RESPONSES TO PHQ QUESTIONS 1-9: 0
5. POOR APPETITE OR OVEREATING: NOT AT ALL

## 2022-05-16 NOTE — PROGRESS NOTES
Writer called patient to inform of upcoming G-CSF schedule (beginning Friday 5/20/2022), line placement (Monday 5/23/2022) and stem cell collections.  Patient was provided pre-op instructions for line placement as per recommendations written via IR consult. Patient and wife wrote down appointment times and repeated them back to me. Patient verbalized understanding of instructions via teach-back and no further questions at this time.    After CVC placed, patient and wife request daily clinic appointments for line flushes in place of providing line care at home.       Pepito Villafana (Allison)  RN Care Coordinator - BMT  Phone: [phone]  Pager: [phone]

## 2022-05-17 DIAGNOSIS — Z52.011 AUTOLOGOUS DONOR OF STEM CELLS: ICD-10-CM

## 2022-05-17 ASSESSMENT — ANXIETY QUESTIONNAIRES: GAD7 TOTAL SCORE: 0

## 2022-05-19 ENCOUNTER — LAB (OUTPATIENT)
Dept: LAB | Facility: CLINIC | Age: 72
End: 2022-05-19
Attending: PHYSICIAN ASSISTANT
Payer: COMMERCIAL

## 2022-05-19 DIAGNOSIS — Z11.59 ENCOUNTER FOR SCREENING FOR OTHER VIRAL DISEASES: ICD-10-CM

## 2022-05-19 LAB — SARS-COV-2 RNA RESP QL NAA+PROBE: NEGATIVE

## 2022-05-19 PROCEDURE — U0003 INFECTIOUS AGENT DETECTION BY NUCLEIC ACID (DNA OR RNA); SEVERE ACUTE RESPIRATORY SYNDROME CORONAVIRUS 2 (SARS-COV-2) (CORONAVIRUS DISEASE [COVID-19]), AMPLIFIED PROBE TECHNIQUE, MAKING USE OF HIGH THROUGHPUT TECHNOLOGIES AS DESCRIBED BY CMS-2020-01-R: HCPCS

## 2022-05-19 PROCEDURE — U0005 INFEC AGEN DETEC AMPLI PROBE: HCPCS

## 2022-05-19 RX ORDER — SODIUM CHLORIDE 9 MG/ML
INJECTION, SOLUTION INTRAVENOUS CONTINUOUS
Status: CANCELLED | OUTPATIENT
Start: 2022-05-19

## 2022-05-19 RX ORDER — LIDOCAINE 40 MG/G
CREAM TOPICAL
Status: CANCELLED | OUTPATIENT
Start: 2022-05-19

## 2022-05-20 ENCOUNTER — ONCOLOGY VISIT (OUTPATIENT)
Dept: TRANSPLANT | Facility: CLINIC | Age: 72
End: 2022-05-20
Attending: STUDENT IN AN ORGANIZED HEALTH CARE EDUCATION/TRAINING PROGRAM
Payer: COMMERCIAL

## 2022-05-20 VITALS
DIASTOLIC BLOOD PRESSURE: 84 MMHG | OXYGEN SATURATION: 98 % | WEIGHT: 146 LBS | BODY MASS INDEX: 20.95 KG/M2 | SYSTOLIC BLOOD PRESSURE: 156 MMHG | HEART RATE: 66 BPM | TEMPERATURE: 98.1 F

## 2022-05-20 DIAGNOSIS — Z52.011 AUTOLOGOUS DONOR OF STEM CELLS: ICD-10-CM

## 2022-05-20 DIAGNOSIS — C90.00 MULTIPLE MYELOMA NOT HAVING ACHIEVED REMISSION (H): Primary | ICD-10-CM

## 2022-05-20 PROCEDURE — 99214 OFFICE O/P EST MOD 30 MIN: CPT

## 2022-05-20 PROCEDURE — G0463 HOSPITAL OUTPT CLINIC VISIT: HCPCS | Mod: 25

## 2022-05-20 PROCEDURE — 250N000011 HC RX IP 250 OP 636: Performed by: INTERNAL MEDICINE

## 2022-05-20 PROCEDURE — 96372 THER/PROPH/DIAG INJ SC/IM: CPT | Performed by: INTERNAL MEDICINE

## 2022-05-20 RX ORDER — HEPARIN SODIUM (PORCINE) LOCK FLUSH IV SOLN 100 UNIT/ML 100 UNIT/ML
5 SOLUTION INTRAVENOUS
Status: CANCELLED | OUTPATIENT
Start: 2022-05-23

## 2022-05-20 RX ORDER — HEPARIN SODIUM,PORCINE 10 UNIT/ML
5 VIAL (ML) INTRAVENOUS
Status: CANCELLED | OUTPATIENT
Start: 2022-05-23

## 2022-05-20 RX ORDER — LORATADINE 10 MG/1
10 TABLET ORAL DAILY
Qty: 30 TABLET | Refills: 1 | Status: SHIPPED | OUTPATIENT
Start: 2022-05-20

## 2022-05-20 RX ADMIN — FILGRASTIM-SNDZ 780 MCG: 480 INJECTION, SOLUTION INTRAVENOUS; SUBCUTANEOUS at 10:10

## 2022-05-20 ASSESSMENT — PAIN SCALES - GENERAL: PAINLEVEL: NO PAIN (0)

## 2022-05-20 NOTE — NURSING NOTE
"Oncology Rooming Note    May 20, 2022 9:09 AM   Hamilton Angulo is a 71 year old male who presents for:    Chief Complaint   Patient presents with     Oncology Clinic Visit     Multiple myeloma     Initial Vitals: BP (!) 156/84 (BP Location: Right arm, Patient Position: Sitting, Cuff Size: Adult Regular)   Pulse 66   Temp 98.1  F (36.7  C) (Oral)   Wt 66.2 kg (146 lb)   SpO2 98%   BMI 20.95 kg/m   Estimated body mass index is 20.95 kg/m  as calculated from the following:    Height as of 5/13/22: 1.778 m (5' 10\").    Weight as of this encounter: 66.2 kg (146 lb). Body surface area is 1.81 meters squared.  No Pain (0) Comment: Data Unavailable   No LMP for male patient.  Allergies reviewed: Yes  Medications reviewed: Yes    Medications: Medication refills not needed today.  Pharmacy name entered into [employer]: [id number] - [place], [place] - [address] AT [facility] & [address]    Clinical concerns: none       Yohannes Scales            "

## 2022-05-20 NOTE — NURSING NOTE
Infusion Nursing Note:  Hamilton Angulo presents today for scheduled injection.    Patient seen by provider today: Yes: Stephanie   present during visit today: Not Applicable.    Note: Patient received neupogen injection per therapy plan. Patient was instructed to wait 15 minutes post injection, no reaction noted.      Intravenous Access:  No Intravenous access/labs at this visit.    Treatment Conditions:  Results reviewed, labs MET treatment parameters, ok to proceed with treatment.      Post Infusion Assessment:  Patient tolerated injection without incident.       Discharge Plan:   Patient and/or family verbalized understanding of discharge instructions and all questions answered.      Vera Yarbrough RN

## 2022-05-20 NOTE — LETTER
5/20/2022         RE: Hamilton Angulo  [address] [facility] [address]        Dear Colleague,    Thank you for referring your patient, Hamilton Angulo, to the [facility] BLOOD AND MARROW TRANSPLANT PROGRAM [place]. Please see a copy of my visit note below.    BMT Daily Progress Note      Chief Complaint:   Chief Complaint   Patient presents with     Oncology Clinic Visit     Multiple myeloma     HPI: Mr Angulo presents to clinic today to start gcsf. He denies any new infectious complaints, no fevers, no cough or  Congestion. He denies n/v/d.     ROS: 8 point review with pertinent positive and negatives in HPI    Physical Exam:     Vital Signs: BP (!) 156/84 (BP Location: Right arm, Patient Position: Sitting, Cuff Size: Adult Regular)   Pulse 66   Temp 98.1  F (36.7  C) (Oral)   Wt 66.2 kg (146 lb)   SpO2 98%   BMI 20.95 kg/m    General Appearance: healthy, alert and no distress  Eyes: PERRL, conjunctiva and lids normal, sclera nonicteric  Nose/M/Throat: Oral mucosa and posterior oropharynx normal, moist mucous membranes  Cardio/Vascular:regular rate and rhythm, normal S1 and S2, no murmur  Resp Effort And Auscultation: Normal - Clear to auscultation without rales, rhonchi, or wheezing.  GI: soft, nontender, bowel sounds present in all four quadrants, no hepatosplenomegaly  Edema: none  Skin: Skin color, texture, turgor normal. No rashes or lesions.  Psych/Affect: NEGATIVE for changes in mood or affect  Vascular Access: Peripheral    Assessment Plans:     BMT/Engraftment: Multiple myeloma  Hematology: counts stable  Infectious Disease: afebrile   Cardiovascular: HTN continues on norvasc, lisinopril   GI: no complaints  Other: possible bone pain, start claritin, tylenol prn    Active Problems:     Patient Active Problem List   Diagnosis     HYPERLIPIDEMIA LDL GOAL <130     PSEUDOPHAKIA OU     Advanced directives, counseling/discussion     History of actinic keratoses     PVD (posterior vitreous  "detachment), bilateral     Nonexudative senile macular degeneration of retina     Elevated glucose     Essential hypertension with goal blood pressure less than 140/90     Elevated prostate specific antigen (PSA)     Dupuytren contracture     Benign prostatic hyperplasia with urinary retention     Multiple myeloma not having achieved remission (H)     Autologous donor of stem cells     Plan: given 1st dose gcsf  RTC: daily for gcsf. Monday line placement  Called IR to request labs to be drawn with them to start CD34 in process earlier  (second floor lab appointment requested to be done AFTER provider visit Monday. Discussed with IR, labs ordered as \"pre-op\" phase of care, this way, they can be drawn before line placement in IR. 2nd floor lab appointment will likely be canceled, can review with pt at provider visit to ensure they were done)    Counseling time: 15 minutes  Total time: 30    Stephanie Cartwright [facility]  [phone]      Again, thank you for allowing me to participate in the care of your patient.      Sincerely,    BMT Advanced Practice Provider  "

## 2022-05-20 NOTE — PROGRESS NOTES
BMT Daily Progress Note      Chief Complaint:   Chief Complaint   Patient presents with     Oncology Clinic Visit     Multiple myeloma     HPI: Mr Angulo presents to clinic today to start gcsf. He denies any new infectious complaints, no fevers, no cough or  Congestion. He denies n/v/d.     ROS: 8 point review with pertinent positive and negatives in HPI    Physical Exam:     Vital Signs: BP (!) 156/84 (BP Location: Right arm, Patient Position: Sitting, Cuff Size: Adult Regular)   Pulse 66   Temp 98.1  F (36.7  C) (Oral)   Wt 66.2 kg (146 lb)   SpO2 98%   BMI 20.95 kg/m    General Appearance: healthy, alert and no distress  Eyes: PERRL, conjunctiva and lids normal, sclera nonicteric  Nose/M/Throat: Oral mucosa and posterior oropharynx normal, moist mucous membranes  Cardio/Vascular:regular rate and rhythm, normal S1 and S2, no murmur  Resp Effort And Auscultation: Normal - Clear to auscultation without rales, rhonchi, or wheezing.  GI: soft, nontender, bowel sounds present in all four quadrants, no hepatosplenomegaly  Edema: none  Skin: Skin color, texture, turgor normal. No rashes or lesions.  Psych/Affect: NEGATIVE for changes in mood or affect  Vascular Access: Peripheral    Assessment Plans:     BMT/Engraftment: Multiple myeloma  Hematology: counts stable  Infectious Disease: afebrile   Cardiovascular: HTN continues on norvasc, lisinopril   GI: no complaints  Other: possible bone pain, start claritin, tylenol prn    Active Problems:     Patient Active Problem List   Diagnosis     HYPERLIPIDEMIA LDL GOAL <130     PSEUDOPHAKIA OU     Advanced directives, counseling/discussion     History of actinic keratoses     PVD (posterior vitreous detachment), bilateral     Nonexudative senile macular degeneration of retina     Elevated glucose     Essential hypertension with goal blood pressure less than 140/90     Elevated prostate specific antigen (PSA)     Dupuytren contracture     Benign prostatic hyperplasia with  "urinary retention     Multiple myeloma not having achieved remission (H)     Autologous donor of stem cells       Plan: given 1st dose gcsf  RTC: daily for gcsf. Monday line placement  Called IR to request labs to be drawn with them to start CD34 in process earlier  (second floor lab appointment requested to be done AFTER provider visit Monday. Discussed with IR, labs ordered as \"pre-op\" phase of care, this way, they can be drawn before line placement in IR. 2nd floor lab appointment will likely be canceled, can review with pt at provider visit to ensure they were done)    Counseling time: 15 minutes  Total time: 30    Stephanie RIOS  [phone]  "

## 2022-05-21 ENCOUNTER — ALLIED HEALTH/NURSE VISIT (OUTPATIENT)
Dept: TRANSPLANT | Facility: CLINIC | Age: 72
End: 2022-05-21
Attending: INTERNAL MEDICINE
Payer: COMMERCIAL

## 2022-05-21 DIAGNOSIS — C90.00 MULTIPLE MYELOMA NOT HAVING ACHIEVED REMISSION (H): ICD-10-CM

## 2022-05-21 DIAGNOSIS — Z52.011 AUTOLOGOUS DONOR OF STEM CELLS: Primary | ICD-10-CM

## 2022-05-21 PROCEDURE — 96372 THER/PROPH/DIAG INJ SC/IM: CPT | Performed by: INTERNAL MEDICINE

## 2022-05-21 PROCEDURE — 250N000011 HC RX IP 250 OP 636: Performed by: INTERNAL MEDICINE

## 2022-05-21 RX ORDER — HEPARIN SODIUM (PORCINE) LOCK FLUSH IV SOLN 100 UNIT/ML 100 UNIT/ML
5 SOLUTION INTRAVENOUS
Status: CANCELLED | OUTPATIENT
Start: 2022-05-23

## 2022-05-21 RX ORDER — HEPARIN SODIUM,PORCINE 10 UNIT/ML
5 VIAL (ML) INTRAVENOUS
Status: CANCELLED | OUTPATIENT
Start: 2022-05-23

## 2022-05-21 RX ADMIN — FILGRASTIM-SNDZ 780 MCG: 480 INJECTION, SOLUTION INTRAVENOUS; SUBCUTANEOUS at 09:53

## 2022-05-21 NOTE — PROGRESS NOTES
Chief Complaint   Patient presents with     RECHECK     Pre bmt for MM here for zarxio inj     Tolerating inj well, aware of future visits  Laurel Self RN on 5/21/2022 at 9:57 AM

## 2022-05-22 ENCOUNTER — ALLIED HEALTH/NURSE VISIT (OUTPATIENT)
Dept: TRANSPLANT | Facility: CLINIC | Age: 72
End: 2022-05-22
Attending: INTERNAL MEDICINE
Payer: COMMERCIAL

## 2022-05-22 DIAGNOSIS — Z52.011 AUTOLOGOUS DONOR OF STEM CELLS: Primary | ICD-10-CM

## 2022-05-22 DIAGNOSIS — C90.00 MULTIPLE MYELOMA NOT HAVING ACHIEVED REMISSION (H): ICD-10-CM

## 2022-05-22 PROCEDURE — 96372 THER/PROPH/DIAG INJ SC/IM: CPT | Performed by: INTERNAL MEDICINE

## 2022-05-22 PROCEDURE — 250N000011 HC RX IP 250 OP 636: Performed by: INTERNAL MEDICINE

## 2022-05-22 RX ORDER — HEPARIN SODIUM,PORCINE 10 UNIT/ML
5 VIAL (ML) INTRAVENOUS
Status: CANCELLED | OUTPATIENT
Start: 2022-05-23

## 2022-05-22 RX ORDER — HEPARIN SODIUM (PORCINE) LOCK FLUSH IV SOLN 100 UNIT/ML 100 UNIT/ML
5 SOLUTION INTRAVENOUS
Status: CANCELLED | OUTPATIENT
Start: 2022-05-23

## 2022-05-22 RX ADMIN — FILGRASTIM-SNDZ 780 MCG: 480 INJECTION, SOLUTION INTRAVENOUS; SUBCUTANEOUS at 10:08

## 2022-05-22 NOTE — PROGRESS NOTES
Chief Complaint   Patient presents with     Infusion     Scheduled injection r/t MM       780mcg Zarxio injection given subcutaneously in LLQ of abdomen. Patient tolerated well. See MAR for details.    Johanny Davenport RN

## 2022-05-23 ENCOUNTER — ALLIED HEALTH/NURSE VISIT (OUTPATIENT)
Dept: TRANSPLANT | Facility: CLINIC | Age: 72
End: 2022-05-23
Attending: INTERNAL MEDICINE
Payer: COMMERCIAL

## 2022-05-23 ENCOUNTER — APPOINTMENT (OUTPATIENT)
Dept: LAB | Facility: CLINIC | Age: 72
End: 2022-05-23
Attending: INTERNAL MEDICINE
Payer: COMMERCIAL

## 2022-05-23 ENCOUNTER — ANCILLARY PROCEDURE (OUTPATIENT)
Dept: RADIOLOGY | Facility: AMBULATORY SURGERY CENTER | Age: 72
End: 2022-05-23
Attending: INTERNAL MEDICINE
Payer: COMMERCIAL

## 2022-05-23 ENCOUNTER — ANESTHESIA (OUTPATIENT)
Dept: SURGERY | Facility: AMBULATORY SURGERY CENTER | Age: 72
End: 2022-05-23
Payer: COMMERCIAL

## 2022-05-23 ENCOUNTER — ANESTHESIA EVENT (OUTPATIENT)
Dept: SURGERY | Facility: AMBULATORY SURGERY CENTER | Age: 72
End: 2022-05-23
Payer: COMMERCIAL

## 2022-05-23 ENCOUNTER — HOSPITAL ENCOUNTER (OUTPATIENT)
Facility: AMBULATORY SURGERY CENTER | Age: 72
Discharge: HOME | End: 2022-05-23
Attending: PHYSICIAN ASSISTANT
Payer: COMMERCIAL

## 2022-05-23 VITALS
SYSTOLIC BLOOD PRESSURE: 109 MMHG | HEART RATE: 77 BPM | BODY MASS INDEX: 20.22 KG/M2 | RESPIRATION RATE: 16 BRPM | OXYGEN SATURATION: 99 % | TEMPERATURE: 97.6 F | DIASTOLIC BLOOD PRESSURE: 57 MMHG | WEIGHT: 145 LBS

## 2022-05-23 VITALS
HEIGHT: 71 IN | WEIGHT: 146 LBS | BODY MASS INDEX: 20.44 KG/M2 | SYSTOLIC BLOOD PRESSURE: 92 MMHG | HEART RATE: 71 BPM | DIASTOLIC BLOOD PRESSURE: 43 MMHG | TEMPERATURE: 97 F | RESPIRATION RATE: 16 BRPM | OXYGEN SATURATION: 100 %

## 2022-05-23 VITALS
RESPIRATION RATE: 16 BRPM | DIASTOLIC BLOOD PRESSURE: 82 MMHG | OXYGEN SATURATION: 100 % | SYSTOLIC BLOOD PRESSURE: 156 MMHG | TEMPERATURE: 97.7 F | HEART RATE: 74 BPM

## 2022-05-23 DIAGNOSIS — Z52.011 AUTOLOGOUS DONOR OF STEM CELLS: ICD-10-CM

## 2022-05-23 DIAGNOSIS — Z52.011 AUTOLOGOUS DONOR OF STEM CELLS: Primary | ICD-10-CM

## 2022-05-23 DIAGNOSIS — C90.00 MULTIPLE MYELOMA NOT HAVING ACHIEVED REMISSION (H): ICD-10-CM

## 2022-05-23 LAB
ANION GAP SERPL CALCULATED.3IONS-SCNC: 7 MMOL/L (ref 3–14)
BASOPHILS # BLD AUTO: 0.1 10E3/UL (ref 0–0.2)
BASOPHILS NFR BLD AUTO: 0 %
BUN SERPL-MCNC: 12 MG/DL (ref 7–30)
CALCIUM SERPL-MCNC: 9.1 MG/DL (ref 8.5–10.1)
CD34 ABSOLUTE COUNT COMMENT: NORMAL
CD34 CELLS # SPEC: NORMAL
CHLORIDE BLD-SCNC: 111 MMOL/L (ref 94–109)
CO2 SERPL-SCNC: 27 MMOL/L (ref 20–32)
CREAT SERPL-MCNC: 0.97 MG/DL (ref 0.66–1.25)
EOSINOPHIL # BLD AUTO: 0 10E3/UL (ref 0–0.7)
EOSINOPHIL NFR BLD AUTO: 0 %
ERYTHROCYTE [DISTWIDTH] IN BLOOD BY AUTOMATED COUNT: 14.2 % (ref 10–15)
GFR SERPL CREATININE-BSD FRML MDRD: 83 ML/MIN/1.73M2
GLUCOSE BLD-MCNC: 132 MG/DL (ref 70–99)
HCT VFR BLD AUTO: 31.4 % (ref 40–53)
HGB BLD-MCNC: 10.1 G/DL (ref 13.3–17.7)
IMM GRANULOCYTES # BLD: 0.9 10E3/UL
IMM GRANULOCYTES NFR BLD: 2 %
LYMPHOCYTES # BLD AUTO: 2.3 10E3/UL (ref 0.8–5.3)
LYMPHOCYTES NFR BLD AUTO: 6 %
MCH RBC QN AUTO: 35.6 PG (ref 26.5–33)
MCHC RBC AUTO-ENTMCNC: 32.2 G/DL (ref 31.5–36.5)
MCV RBC AUTO: 111 FL (ref 78–100)
MONOCYTES # BLD AUTO: 4.4 10E3/UL (ref 0–1.3)
MONOCYTES NFR BLD AUTO: 12 %
NEUTROPHILS # BLD AUTO: 30.1 10E3/UL (ref 1.6–8.3)
NEUTROPHILS NFR BLD AUTO: 80 %
NRBC # BLD AUTO: 0 10E3/UL
NRBC BLD AUTO-RTO: 0 /100
PLATELET # BLD AUTO: 159 10E3/UL (ref 150–450)
POTASSIUM BLD-SCNC: 4 MMOL/L (ref 3.4–5.3)
PRODUCT NUMBER FLOW CYTOMETRY: NORMAL
RBC # BLD AUTO: 2.84 10E6/UL (ref 4.4–5.9)
SODIUM SERPL-SCNC: 145 MMOL/L (ref 133–144)
WBC # BLD AUTO: 37.7 10E3/UL (ref 4–11)

## 2022-05-23 PROCEDURE — 80048 BASIC METABOLIC PNL TOTAL CA: CPT

## 2022-05-23 PROCEDURE — 96372 THER/PROPH/DIAG INJ SC/IM: CPT | Performed by: PHYSICIAN ASSISTANT

## 2022-05-23 PROCEDURE — G0463 HOSPITAL OUTPT CLINIC VISIT: HCPCS | Mod: 25

## 2022-05-23 PROCEDURE — 36592 COLLECT BLOOD FROM PICC: CPT

## 2022-05-23 PROCEDURE — 96372 THER/PROPH/DIAG INJ SC/IM: CPT | Performed by: INTERNAL MEDICINE

## 2022-05-23 PROCEDURE — 76937 US GUIDE VASCULAR ACCESS: CPT | Mod: 26 | Performed by: PHYSICIAN ASSISTANT

## 2022-05-23 PROCEDURE — 99215 OFFICE O/P EST HI 40 MIN: CPT

## 2022-05-23 PROCEDURE — 36558 INSERT TUNNELED CV CATH: CPT | Mod: RT | Performed by: PHYSICIAN ASSISTANT

## 2022-05-23 PROCEDURE — 85025 COMPLETE CBC W/AUTO DIFF WBC: CPT

## 2022-05-23 PROCEDURE — G0463 HOSPITAL OUTPT CLINIC VISIT: HCPCS

## 2022-05-23 PROCEDURE — 77001 FLUOROGUIDE FOR VEIN DEVICE: CPT | Mod: 26 | Performed by: PHYSICIAN ASSISTANT

## 2022-05-23 PROCEDURE — 250N000011 HC RX IP 250 OP 636: Performed by: PHYSICIAN ASSISTANT

## 2022-05-23 PROCEDURE — 250N000011 HC RX IP 250 OP 636: Performed by: INTERNAL MEDICINE

## 2022-05-23 PROCEDURE — 86367 STEM CELLS TOTAL COUNT: CPT

## 2022-05-23 RX ORDER — HEPARIN SODIUM (PORCINE) LOCK FLUSH IV SOLN 100 UNIT/ML 100 UNIT/ML
SOLUTION INTRAVENOUS PRN
Status: DISCONTINUED | OUTPATIENT
Start: 2022-05-23 | End: 2022-05-23 | Stop reason: HOSPADM

## 2022-05-23 RX ORDER — EPHEDRINE SULFATE 50 MG/ML
INJECTION, SOLUTION INTRAMUSCULAR; INTRAVENOUS; SUBCUTANEOUS PRN
Status: DISCONTINUED | OUTPATIENT
Start: 2022-05-23 | End: 2022-05-23

## 2022-05-23 RX ORDER — PLERIXAFOR 24 MG/1.2ML
0.24 SOLUTION SUBCUTANEOUS DAILY
Status: CANCELLED
Start: 2022-05-24

## 2022-05-23 RX ORDER — LIDOCAINE 40 MG/G
CREAM TOPICAL
Status: DISCONTINUED | OUTPATIENT
Start: 2022-05-23 | End: 2022-05-24 | Stop reason: HOSPADM

## 2022-05-23 RX ORDER — CALCIUM CARBONATE 500 MG/1
1000 TABLET, CHEWABLE ORAL
Status: CANCELLED | OUTPATIENT
Start: 2022-05-23

## 2022-05-23 RX ORDER — HEPARIN SODIUM,PORCINE 10 UNIT/ML
3 VIAL (ML) INTRAVENOUS ONCE
Status: COMPLETED | OUTPATIENT
Start: 2022-05-23 | End: 2022-05-23

## 2022-05-23 RX ORDER — PLERIXAFOR 24 MG/1.2ML
0.24 SOLUTION SUBCUTANEOUS DAILY
Status: DISCONTINUED | OUTPATIENT
Start: 2022-05-23 | End: 2022-05-23 | Stop reason: HOSPADM

## 2022-05-23 RX ORDER — PLERIXAFOR 24 MG/1.2ML
0.24 SOLUTION SUBCUTANEOUS DAILY
Status: CANCELLED
Start: 2022-05-23

## 2022-05-23 RX ORDER — HEPARIN SODIUM (PORCINE) LOCK FLUSH IV SOLN 100 UNIT/ML 100 UNIT/ML
5 SOLUTION INTRAVENOUS
Status: CANCELLED | OUTPATIENT
Start: 2022-05-23

## 2022-05-23 RX ORDER — LIDOCAINE HYDROCHLORIDE 20 MG/ML
INJECTION, SOLUTION INFILTRATION; PERINEURAL PRN
Status: DISCONTINUED | OUTPATIENT
Start: 2022-05-23 | End: 2022-05-23

## 2022-05-23 RX ORDER — HEPARIN SODIUM,PORCINE 10 UNIT/ML
5 VIAL (ML) INTRAVENOUS
Status: CANCELLED | OUTPATIENT
Start: 2022-05-23

## 2022-05-23 RX ORDER — HEPARIN SODIUM,PORCINE 10 UNIT/ML
3 VIAL (ML) INTRAVENOUS ONCE
Status: DISCONTINUED | OUTPATIENT
Start: 2022-05-23 | End: 2022-05-23 | Stop reason: HOSPADM

## 2022-05-23 RX ORDER — PROPOFOL 10 MG/ML
INJECTION, EMULSION INTRAVENOUS CONTINUOUS PRN
Status: DISCONTINUED | OUTPATIENT
Start: 2022-05-23 | End: 2022-05-23

## 2022-05-23 RX ORDER — HEPARIN SODIUM,PORCINE 10 UNIT/ML
5 VIAL (ML) INTRAVENOUS
Status: CANCELLED | OUTPATIENT
Start: 2022-05-24

## 2022-05-23 RX ORDER — SODIUM CHLORIDE, SODIUM LACTATE, POTASSIUM CHLORIDE, CALCIUM CHLORIDE 600; 310; 30; 20 MG/100ML; MG/100ML; MG/100ML; MG/100ML
INJECTION, SOLUTION INTRAVENOUS CONTINUOUS PRN
Status: DISCONTINUED | OUTPATIENT
Start: 2022-05-23 | End: 2022-05-23

## 2022-05-23 RX ORDER — HEPARIN SODIUM (PORCINE) LOCK FLUSH IV SOLN 100 UNIT/ML 100 UNIT/ML
5 SOLUTION INTRAVENOUS
Status: CANCELLED | OUTPATIENT
Start: 2022-05-24

## 2022-05-23 RX ORDER — LIDOCAINE HYDROCHLORIDE 10 MG/ML
INJECTION, SOLUTION EPIDURAL; INFILTRATION; INTRACAUDAL; PERINEURAL PRN
Status: DISCONTINUED | OUTPATIENT
Start: 2022-05-23 | End: 2022-05-23 | Stop reason: HOSPADM

## 2022-05-23 RX ORDER — SODIUM CHLORIDE 9 MG/ML
INJECTION, SOLUTION INTRAVENOUS CONTINUOUS
Status: DISCONTINUED | OUTPATIENT
Start: 2022-05-23 | End: 2022-05-24 | Stop reason: HOSPADM

## 2022-05-23 RX ORDER — CEFAZOLIN SODIUM 2 G/50ML
2 SOLUTION INTRAVENOUS
Status: COMPLETED | OUTPATIENT
Start: 2022-05-23 | End: 2022-05-23

## 2022-05-23 RX ORDER — HEPARIN SODIUM (PORCINE) LOCK FLUSH IV SOLN 100 UNIT/ML 100 UNIT/ML
3 SOLUTION INTRAVENOUS ONCE
Status: CANCELLED | OUTPATIENT
Start: 2022-05-23 | End: 2022-05-23

## 2022-05-23 RX ORDER — HEPARIN SODIUM (PORCINE) LOCK FLUSH IV SOLN 100 UNIT/ML 100 UNIT/ML
3 SOLUTION INTRAVENOUS
Status: DISCONTINUED | OUTPATIENT
Start: 2022-05-23 | End: 2022-05-24 | Stop reason: HOSPADM

## 2022-05-23 RX ORDER — HEPARIN SODIUM (PORCINE) LOCK FLUSH IV SOLN 100 UNIT/ML 100 UNIT/ML
3 SOLUTION INTRAVENOUS EVERY 24 HOURS
Status: DISCONTINUED | OUTPATIENT
Start: 2022-05-23 | End: 2022-05-24 | Stop reason: HOSPADM

## 2022-05-23 RX ADMIN — EPHEDRINE SULFATE 5 MG: 50 INJECTION, SOLUTION INTRAMUSCULAR; INTRAVENOUS; SUBCUTANEOUS at 07:37

## 2022-05-23 RX ADMIN — Medication 3 ML: at 09:02

## 2022-05-23 RX ADMIN — Medication 100 MCG: at 07:52

## 2022-05-23 RX ADMIN — Medication 100 MCG: at 07:45

## 2022-05-23 RX ADMIN — LIDOCAINE HYDROCHLORIDE 60 MG: 20 INJECTION, SOLUTION INFILTRATION; PERINEURAL at 07:22

## 2022-05-23 RX ADMIN — FILGRASTIM-SNDZ 780 MCG: 480 INJECTION, SOLUTION INTRAVENOUS; SUBCUTANEOUS at 09:47

## 2022-05-23 RX ADMIN — PLERIXAFOR 15.8 MG: 24 SOLUTION SUBCUTANEOUS at 16:02

## 2022-05-23 RX ADMIN — CEFAZOLIN SODIUM 2 G: 2 SOLUTION INTRAVENOUS at 07:15

## 2022-05-23 RX ADMIN — Medication 100 MCG: at 07:38

## 2022-05-23 RX ADMIN — PROPOFOL 200 MCG/KG/MIN: 10 INJECTION, EMULSION INTRAVENOUS at 07:22

## 2022-05-23 RX ADMIN — EPHEDRINE SULFATE 5 MG: 50 INJECTION, SOLUTION INTRAMUSCULAR; INTRAVENOUS; SUBCUTANEOUS at 07:47

## 2022-05-23 RX ADMIN — SODIUM CHLORIDE, SODIUM LACTATE, POTASSIUM CHLORIDE, CALCIUM CHLORIDE: 600; 310; 30; 20 INJECTION, SOLUTION INTRAVENOUS at 07:16

## 2022-05-23 ASSESSMENT — PAIN SCALES - GENERAL
PAINLEVEL: NO PAIN (0)
PAINLEVEL: NO PAIN (0)

## 2022-05-23 NOTE — NURSING NOTE
Chief Complaint   Patient presents with     Blood Draw     Labs drawn via cvc by RN. Vitals taken.     Labs collected from CVC by RN, line flushed with saline and heparin.  Vitals taken. Pt checked in for appointment(s).    Emma Young RN

## 2022-05-23 NOTE — ANESTHESIA CARE TRANSFER NOTE
Patient: Hamilton Angulo    Procedure: Procedure(s):  INSERTION, VASCULAR ACCESS CATHETER NON VALVE DUAL LUMEN       Diagnosis: Multiple myeloma not having achieved remission (H) [C90.00]  Diagnosis Additional Information: No value filed.    Anesthesia Type:   MAC     Note:    Oropharynx: spontaneously breathing  Level of Consciousness: drowsy  Oxygen Supplementation: room air    Independent Airway: airway patency satisfactory and stable  Dentition: dentition unchanged  Vital Signs Stable: post-procedure vital signs reviewed and stable  Report to RN Given: handoff report given  Patient transferred to: Phase II    Handoff Report: Identifed the Patient, Identified the Reponsible Provider, Reviewed the pertinent medical history, Discussed the surgical course, Reviewed Intra-OP anesthesia mangement and issues during anesthesia, Set expectations for post-procedure period and Allowed opportunity for questions and acknowledgement of understanding      Vitals:  Vitals Value Taken Time   BP     Temp     Pulse     Resp     SpO2         Electronically Signed By: NAOMI Schroeder CRNA  May 23, 2022  7:54 AM

## 2022-05-23 NOTE — DISCHARGE INSTRUCTIONS
A collaboration between [facility] and [facility]  Experts in minimally invasive, targeted treatments performed using imaging guidance    Venous Access Device,  Port Catheter or Tunneled or Non-Tunneled Central Line Placement    Today you had a procedure today to install a venous access device; either a tunneled central vein catheter or a subcutaneous port catheter.    After you go home:  Drink plenty of fluids.  Generally 6-8 (8 ounce) glasses a day is recommended.  Resume your regular diet unless otherwise ordered by a medical provider.  Keep any applied tape/gauze dressings clean and dry.  Change tape/gauze dressings if they get wet or soiled.  You may shower the following day after procedure, however cover and protect from moisture any tape/gauze dressings.  You may let water hit and run over dried skin glue, but do not scrub.  Pat the area dry after showering.  Do not perform strenuous activities or lift greater than 10 pounds for the next three days.  If there is bleeding or oozing from the procedure site, apply firm pressure to the area for 5-10 minutes.  If the bleeding continues seek medical advice at the numbers below.  Mild procedure site discomfort can be treated with an ice pack and over-the-counter pain relievers.        For 24 hours after any sedation used:  Relax and take it easy.  No strenuous activities.  Do not drive or operate machines at home or at work.  No alcohol consumption.  Do not make any important or legal decisions.    Call our Interventional Radiology (IR) service if:  If you start bleeding from the procedure site.  If you do start to bleed from the site, lie down and hold some pressure on the site.  Our radiology provider can help you decide if you need to return to the hospital.  If you have new or worsening pain related to the procedure.  If you have concerning swelling at the procedure site.  If you develop persistent nausea or  vomiting.  If you develop hives or a rash or any unexplained itching.  If you have a fever of greater than 100.5  F and chills in the first 5 days after procedure.  Any other concerns related to your procedure.      [facility]  Interventional Radiology (IR)  [address]  [address] [facility] [place] [address]    Contact Number:  [phone]  (IR control desk)  Monday - Friday 8:00 am - 4:30 pm    After hours for urgent concerns:  [phone]  After 4:30 pm Monday - Friday, Weekends and Holidays.   Ask for Interventional Radiology on-call.  Someone is available 24 hours a day.  [facility] toll free number:  [phone]

## 2022-05-23 NOTE — LETTER
5/23/2022         RE: Hamilton Angulo  [address] [place] [address]        Dear Colleague,    Thank you for referring your patient, Hamilton Angulo, to the [facility] BLOOD AND MARROW TRANSPLANT PROGRAM [place]. Please see a copy of my visit note below.    Infusion Nursing Note:  Hamilton Angulo presents today for first dose mozobil injection.    Patient seen by provider today: Yes: Mary Rivas   present during visit today: Not Applicable.    Note: Hamilton here for 1st dose mozobil per Mary Rivas. Educated on side effects of medication. Administered mozobil in RLQ of abdomen, pt tolerated well and was monitored for 30 minutes after administration.      Intravenous Access:  No Intravenous access/labs at this visit.    Treatment Conditions:  Not Applicable.      Post Infusion Assessment:  Patient tolerated injection without incident.  Patient observed for 30 minutes post Mozobil per protocol.       Discharge Plan:   Patient discharged in stable condition accompanied by: self.  Departure Mode: Ambulatory.      Johanny Ambriz, KARELY                        Again, thank you for allowing me to participate in the care of your patient.        Sincerely,        [facility]

## 2022-05-23 NOTE — NURSING NOTE
Patient was given Zarxio 780mcg in right lower abdomen. Patient tolerated well and was discharged. See MAR for details.    Emely Metcalf LPN on 5/23/2022 at 9:51 AM

## 2022-05-23 NOTE — NURSING NOTE
"Oncology Rooming Note    May 23, 2022 9:19 AM   Hamilton Angulo is a 71 year old male who presents for:    Chief Complaint   Patient presents with     Blood Draw     Labs drawn via cvc by RN. Vitals taken.     Oncology Clinic Visit     Multiple myeloma not having achieved remission      Initial Vitals: /57 (BP Location: Right arm)   Pulse 77   Temp 97.6  F (36.4  C) (Oral)   Resp 16   Wt 65.8 kg (145 lb)   SpO2 99%   BMI 20.22 kg/m   Estimated body mass index is 20.22 kg/m  as calculated from the following:    Height as of an earlier encounter on 5/23/22: 1.803 m (5' 11\").    Weight as of this encounter: 65.8 kg (145 lb). Body surface area is 1.82 meters squared.  No Pain (0) Comment: Data Unavailable   No LMP for male patient.  Allergies reviewed: Yes  Medications reviewed: Yes    Medications: Medication refills not needed today.  Pharmacy name entered into [employer]: [id number] - [place], [place] - [address] AT [facility] & [address]        Rebeca Roman CMA            "

## 2022-05-23 NOTE — PROGRESS NOTES
BMT Clinic Note       HPI: Mr Angulo is a undergoing gcsf priming, central line placed thsi morning-- not too painful now, insertion site with slight stinging. He has had some mild lower back pain which he attributes to gcsf but managed with tyelnol or even just resolves on its own. He is taking claritin. No infectious issues. No abdominal pain.     ROS: 8 point review with pertinent positive and negatives in HPI  Lab Results   Component Value Date    WBC 37.7 (H) 05/23/2022    ANEU 1.0 (L) 06/23/2021    HGB 10.1 (L) 05/23/2022    HCT 31.4 (L) 05/23/2022     05/23/2022     (H) 05/23/2022    POTASSIUM 4.0 05/23/2022    CHLORIDE 111 (H) 05/23/2022    CO2 27 05/23/2022     (H) 05/23/2022    BUN 12 05/23/2022    CR 0.97 05/23/2022    MAG 2.2 05/04/2022    INR 0.97 05/04/2022    BILITOTAL 0.6 05/04/2022    AST 15 05/04/2022    ALT 19 05/04/2022    ALKPHOS 53 05/04/2022    PROTTOTAL 7.0 05/04/2022    ALBUMIN 4.3 05/04/2022       Physical Exam:     Vital Signs: /57 (BP Location: Right arm)   Pulse 77   Temp 97.6  F (36.4  C) (Oral)   Resp 16   Wt 65.8 kg (145 lb)   SpO2 99%   BMI 20.22 kg/m    General Appearance: healthy, alert and no distress  Eyes: PERRL, conjunctiva and lids normal, sclera nonicteric  Nose/M/Throat: Oral mucosa and posterior oropharynx normal, moist mucous membranes  Cardio/Vascular:regular rate and rhythm, normal S1 and S2, no murmur  Resp Effort And Auscultation: Normal - Clear to auscultation without rales, rhonchi, or wheezing.  GI: soft, nontender, bowel sounds present in all four quadrants, no hepatosplenomegaly  Edema: none  Skin: Skin color, texture, turgor normal. No rashes or lesions.  Psych/Affect: NEGATIVE for changes in mood or affect  Vascular Access: right chest decker placed today. 12 oclock position on biopatch with blood tinged - no obvert active bleeding noted.     Assessment Plans:     BMT/Engraftment: Multiple myeloma standard risk  Undergoing gcsf  priming only  - day 4 gcsf today, 5/23.  CD34 not detectable. Give mozobil today 5/23 at 3:45pm (pt aware of infusion appt). Plan to start collections 5/24 for day 1 collections. If CD34 not >10 and yield is low likely discuss with DOM.     Hematology: Leukocytosis due to gcsf.   Infectious Disease: afebrile   Cardiovascular: HTN continues on norvasc, lisinopril   GI: no complaints  Other: possible bone pain, start claritin, tylenol prn-- this is sufficient.     Active Problems:     Patient Active Problem List   Diagnosis     HYPERLIPIDEMIA LDL GOAL <130     PSEUDOPHAKIA OU     Advanced directives, counseling/discussion     History of actinic keratoses     PVD (posterior vitreous detachment), bilateral     Nonexudative senile macular degeneration of retina     Elevated glucose     Essential hypertension with goal blood pressure less than 140/90     Elevated prostate specific antigen (PSA)     Dupuytren contracture     Benign prostatic hyperplasia with urinary retention     Multiple myeloma not having achieved remission (H)     Autologous donor of stem cells       Plan: gcsf day 4, rtc for mozobil (dose #1 at 3:45pm).   - RTC 5/24 8am for day 1 stem cell collections. F/u CD34 after mozobil. Anticipate 2nd mozobil dose tomorrow.       Counseling time: 15 minutes  Total time: 40  40 minutes spent on the date of the encounter doing chart review, review of test results, patient visit, documentation and discussion with other provider(s)     Mary Rivas PA-C  [phone]

## 2022-05-23 NOTE — LETTER
5/23/2022         RE: Hamilton Angulo  [address]  [place] [address]        Dear Colleague,    Thank you for referring your patient, Hamilton Angulo, to the [facility] BLOOD AND MARROW TRANSPLANT PROGRAM [place]. Please see a copy of my visit note below.    BMT Clinic Note       HPI: Mr Angulo is a undergoing gcsf priming, central line placed thsi morning-- not too painful now, insertion site with slight stinging. He has had some mild lower back pain which he attributes to gcsf but managed with tyelnol or even just resolves on its own. He is taking claritin. No infectious issues. No abdominal pain.     ROS: 8 point review with pertinent positive and negatives in HPI  Lab Results   Component Value Date    WBC 37.7 (H) 05/23/2022    ANEU 1.0 (L) 06/23/2021    HGB 10.1 (L) 05/23/2022    HCT 31.4 (L) 05/23/2022     05/23/2022     (H) 05/23/2022    POTASSIUM 4.0 05/23/2022    CHLORIDE 111 (H) 05/23/2022    CO2 27 05/23/2022     (H) 05/23/2022    BUN 12 05/23/2022    CR 0.97 05/23/2022    MAG 2.2 05/04/2022    INR 0.97 05/04/2022    BILITOTAL 0.6 05/04/2022    AST 15 05/04/2022    ALT 19 05/04/2022    ALKPHOS 53 05/04/2022    PROTTOTAL 7.0 05/04/2022    ALBUMIN 4.3 05/04/2022       Physical Exam:     Vital Signs: /57 (BP Location: Right arm)   Pulse 77   Temp 97.6  F (36.4  C) (Oral)   Resp 16   Wt 65.8 kg (145 lb)   SpO2 99%   BMI 20.22 kg/m    General Appearance: healthy, alert and no distress  Eyes: PERRL, conjunctiva and lids normal, sclera nonicteric  Nose/M/Throat: Oral mucosa and posterior oropharynx normal, moist mucous membranes  Cardio/Vascular:regular rate and rhythm, normal S1 and S2, no murmur  Resp Effort And Auscultation: Normal - Clear to auscultation without rales, rhonchi, or wheezing.  GI: soft, nontender, bowel sounds present in all four quadrants, no hepatosplenomegaly  Edema: none  Skin: Skin color, texture, turgor normal. No rashes or  lesions.  Psych/Affect: NEGATIVE for changes in mood or affect  Vascular Access: right chest decker placed today. 12 oclock position on biopatch with blood tinged - no obvert active bleeding noted.     Assessment Plans:     BMT/Engraftment: Multiple myeloma standard risk  Undergoing gcsf priming only  - day 4 gcsf today, 5/23.  CD34 not detectable. Give mozobil today 5/23 at 3:45pm (pt aware of infusion appt). Plan to start collections 5/24 for day 1 collections. If CD34 not >10 and yield is low likely discuss with DOM.     Hematology: Leukocytosis due to gcsf.   Infectious Disease: afebrile   Cardiovascular: HTN continues on norvasc, lisinopril   GI: no complaints  Other: possible bone pain, start claritin, tylenol prn-- this is sufficient.     Active Problems:     Patient Active Problem List   Diagnosis     HYPERLIPIDEMIA LDL GOAL <130     PSEUDOPHAKIA OU     Advanced directives, counseling/discussion     History of actinic keratoses     PVD (posterior vitreous detachment), bilateral     Nonexudative senile macular degeneration of retina     Elevated glucose     Essential hypertension with goal blood pressure less than 140/90     Elevated prostate specific antigen (PSA)     Dupuytren contracture     Benign prostatic hyperplasia with urinary retention     Multiple myeloma not having achieved remission (H)     Autologous donor of stem cells       Plan: gcsf day 4, rtc for mozobil (dose #1 at 3:45pm).   - RTC 5/24 8am for day 1 stem cell collections. F/u CD34 after mozobil. Anticipate 2nd mozobil dose tomorrow.       Counseling time: 15 minutes  Total time: 40  40 minutes spent on the date of the encounter doing chart review, review of test results, patient visit, documentation and discussion with other provider(s)     Mary Rivas PA-C  [phone]      Again, thank you for allowing me to participate in the care of your patient.      Sincerely,    BMT Advanced Practice Provider

## 2022-05-23 NOTE — ANESTHESIA PREPROCEDURE EVALUATION
Anesthesia Pre-Procedure Evaluation    Patient: Hamilton Angulo   MRN: [id number] : 1950        Procedure : Procedure(s):  INSERTION, VASCULAR ACCESS CATHETER NON VALVE DUAL LUMEN          Past Medical History:   Diagnosis Date     Bilateral cataracts      Herniated disc 1983    Lumbar     Hyperlipidemia LDL goal <130      Hypertension 2004     Tear of MCL (medial collateral ligament) of knee 2001    LT Knee/WC      Past Surgical History:   Procedure Laterality Date     CATARACT IOL, RT/LT  2003    Bilateral      Allergies   Allergen Reactions     Shrimp Hives      Social History     Tobacco Use     Smoking status: Never Smoker     Smokeless tobacco: Never Used   Substance Use Topics     Alcohol use: Yes     Comment: Ocss.      Wt Readings from Last 1 Encounters:   22 66.2 kg (146 lb)        Anesthesia Evaluation   Pt has had prior anesthetic. Type: General.        ROS/MED HX  ENT/Pulmonary:  - neg pulmonary ROS     Neurologic:  - neg neurologic ROS     Cardiovascular:     (+) hypertension-----    METS/Exercise Tolerance:     Hematologic:  - neg hematologic  ROS     Musculoskeletal:  - neg musculoskeletal ROS     GI/Hepatic:  - neg GI/hepatic ROS     Renal/Genitourinary:  - neg Renal ROS     Endo:  - neg endo ROS     Psychiatric/Substance Use:  - neg psychiatric ROS     Infectious Disease:  - neg infectious disease ROS     Malignancy:       Other:            Physical Exam    Airway  airway exam normal           Respiratory Devices and Support         Dental  no notable dental history         Cardiovascular   cardiovascular exam normal          Pulmonary   pulmonary exam normal                OUTSIDE LABS:  CBC:   Lab Results   Component Value Date    WBC 4.0 05/10/2022    WBC 2.9 (L) 2022    HGB 10.7 (L) 05/10/2022    HGB 10.7 (L) 2022    HCT 31.8 (L) 05/10/2022    HCT 32.5 (L) 2022     05/10/2022     (L) 2022     BMP:   Lab Results   Component Value  Date     05/04/2022     11/30/2021    POTASSIUM 4.0 05/04/2022    POTASSIUM 3.8 11/30/2021    CHLORIDE 108 05/04/2022    CHLORIDE 108 11/30/2021    CO2 24 05/04/2022    CO2 29 11/30/2021    BUN 13 05/04/2022    BUN 17 11/30/2021    CR 1.03 05/10/2022    CR 1.03 05/10/2022     (H) 05/04/2022     (H) 11/30/2021     COAGS:   Lab Results   Component Value Date    PTT 21 (L) 05/04/2022    INR 0.97 05/04/2022     POC: No results found for: BGM, HCG, HCGS  HEPATIC:   Lab Results   Component Value Date    ALBUMIN 4.3 05/04/2022    PROTTOTAL 7.0 05/04/2022    ALT 19 05/04/2022    AST 15 05/04/2022    ALKPHOS 53 05/04/2022    BILITOTAL 0.6 05/04/2022     OTHER:   Lab Results   Component Value Date    A1C 5.2 10/30/2020    RAKESH 8.8 05/04/2022    PHOS 3.4 05/04/2022    MAG 2.2 05/04/2022    TSH 1.36 04/28/2021    CRP <2.9 10/29/2019    SED 15 10/29/2019       Anesthesia Plan    ASA Status:  2   NPO Status:  NPO Appropriate    Anesthesia Type: MAC.     - Reason for MAC: straight local not clinically adequate   Induction: Intravenous.   Maintenance: TIVA.        Consents    Anesthesia Plan(s) and associated risks, benefits, and realistic alternatives discussed. Questions answered and patient/representative(s) expressed understanding.     - Discussed: Risks, Benefits and Alternatives for BOTH SEDATION and the PROCEDURE were discussed     - Discussed with:  Patient         Postoperative Care    Pain management: Oral pain medications.   PONV prophylaxis: Ondansetron (or other 5HT-3), Dexamethasone or Solumedrol     Comments:                Arnaud Menon MD, MD

## 2022-05-23 NOTE — ANESTHESIA POSTPROCEDURE EVALUATION
Patient: Hamilton Angulo    Procedure: Procedure(s):  INSERTION, VASCULAR ACCESS CATHETER NON VALVE DUAL LUMEN       Anesthesia Type:  MAC    Note:  Disposition: Outpatient   Postop Pain Control: Uneventful            Sign Out: Well controlled pain   PONV: No   Neuro/Psych: Uneventful            Sign Out: Acceptable/Baseline neuro status   Airway/Respiratory: Uneventful            Sign Out: Acceptable/Baseline resp. status   CV/Hemodynamics: Uneventful            Sign Out: Acceptable CV status; No obvious hypovolemia; No obvious fluid overload   Other NRE: NONE   DID A NON-ROUTINE EVENT OCCUR? No           Last vitals:  Vitals Value Taken Time   BP 92/43 05/23/22 0817   Temp 36.1  C (97  F) 05/23/22 0817   Pulse     Resp 16 05/23/22 0817   SpO2 100 % 05/23/22 0817       Electronically Signed By: Arnaud Menon MD, MD  May 23, 2022  8:17 AM

## 2022-05-23 NOTE — PROGRESS NOTES
Infusion Nursing Note:  Hamilton Angulo presents today for first dose mozobil injection.    Patient seen by provider today: Yes: Mary Rivas   present during visit today: Not Applicable.    Note: Hamilton here for 1st dose mozobil per Mary Rivas. Educated on side effects of medication. Administered mozobil in RLQ of abdomen, pt tolerated well and was monitored for 30 minutes after administration.      Intravenous Access:  No Intravenous access/labs at this visit.    Treatment Conditions:  Not Applicable.      Post Infusion Assessment:  Patient tolerated injection without incident.  Patient observed for 30 minutes post Mozobil per protocol.       Discharge Plan:   Patient discharged in stable condition accompanied by: self.  Departure Mode: Ambulatory.      Johanny Ambriz RN

## 2022-05-24 ENCOUNTER — HOSPITAL ENCOUNTER (OUTPATIENT)
Dept: LAB | Facility: CLINIC | Age: 72
Discharge: HOME | End: 2022-05-24
Attending: INTERNAL MEDICINE
Payer: COMMERCIAL

## 2022-05-24 ENCOUNTER — ONCOLOGY VISIT (OUTPATIENT)
Dept: TRANSPLANT | Facility: CLINIC | Age: 72
End: 2022-05-24
Attending: INTERNAL MEDICINE
Payer: COMMERCIAL

## 2022-05-24 VITALS
RESPIRATION RATE: 18 BRPM | WEIGHT: 144.18 LBS | HEART RATE: 79 BPM | DIASTOLIC BLOOD PRESSURE: 66 MMHG | TEMPERATURE: 98 F | BODY MASS INDEX: 20.11 KG/M2 | SYSTOLIC BLOOD PRESSURE: 123 MMHG

## 2022-05-24 DIAGNOSIS — C90.00 MULTIPLE MYELOMA, REMISSION STATUS UNSPECIFIED (H): Primary | ICD-10-CM

## 2022-05-24 DIAGNOSIS — Z52.011 AUTOLOGOUS DONOR OF STEM CELLS: ICD-10-CM

## 2022-05-24 DIAGNOSIS — C90.00 MULTIPLE MYELOMA NOT HAVING ACHIEVED REMISSION (H): ICD-10-CM

## 2022-05-24 DIAGNOSIS — Z52.011 AUTOLOGOUS DONOR OF STEM CELLS: Primary | ICD-10-CM

## 2022-05-24 DIAGNOSIS — C90.00 MULTIPLE MYELOMA NOT HAVING ACHIEVED REMISSION (H): Primary | ICD-10-CM

## 2022-05-24 DIAGNOSIS — C90.00 MULTIPLE MYELOMA, REMISSION STATUS UNSPECIFIED (H): ICD-10-CM

## 2022-05-24 LAB
ANION GAP SERPL CALCULATED.3IONS-SCNC: 8 MMOL/L (ref 3–14)
BASOPHILS # BLD MANUAL: 0 10E3/UL (ref 0–0.2)
BASOPHILS NFR BLD MANUAL: 0 %
BILL ONLY AUTO PBPC FREEZE: NORMAL
BUN SERPL-MCNC: 13 MG/DL (ref 7–30)
CALCIUM SERPL-MCNC: 9.6 MG/DL (ref 8.5–10.1)
CD34 ABSOLUTE COUNT COMMENT: NORMAL
CD34 CELLS # SPEC: 8 CELLS/UL
CD34 CELLS NFR SPEC: 0.01 %
CHLORIDE BLD-SCNC: 108 MMOL/L (ref 94–109)
CO2 SERPL-SCNC: 26 MMOL/L (ref 20–32)
CREAT SERPL-MCNC: 0.98 MG/DL (ref 0.66–1.25)
EOSINOPHIL # BLD MANUAL: 0 10E3/UL (ref 0–0.7)
EOSINOPHIL NFR BLD MANUAL: 0 %
ERYTHROCYTE [DISTWIDTH] IN BLOOD BY AUTOMATED COUNT: 14.3 % (ref 10–15)
GFR SERPL CREATININE-BSD FRML MDRD: 82 ML/MIN/1.73M2
GLUCOSE BLD-MCNC: 96 MG/DL (ref 70–99)
HCT VFR BLD AUTO: 30.8 % (ref 40–53)
HGB BLD-MCNC: 10.1 G/DL (ref 13.3–17.7)
LYMPHOCYTES # BLD MANUAL: 9 10E3/UL (ref 0.8–5.3)
LYMPHOCYTES NFR BLD MANUAL: 12 %
MAGNESIUM SERPL-MCNC: 2.2 MG/DL (ref 1.6–2.3)
MCH RBC QN AUTO: 35.9 PG (ref 26.5–33)
MCHC RBC AUTO-ENTMCNC: 32.8 G/DL (ref 31.5–36.5)
MCV RBC AUTO: 110 FL (ref 78–100)
METAMYELOCYTES # BLD MANUAL: 1.5 10E3/UL
METAMYELOCYTES NFR BLD MANUAL: 2 %
MONOCYTES # BLD MANUAL: 16.5 10E3/UL (ref 0–1.3)
MONOCYTES NFR BLD MANUAL: 22 %
MYELOCYTES # BLD MANUAL: 0.7 10E3/UL
MYELOCYTES NFR BLD MANUAL: 1 %
NEUTROPHILS # BLD MANUAL: 47.2 10E3/UL (ref 1.6–8.3)
NEUTROPHILS NFR BLD MANUAL: 63 %
PLAT MORPH BLD: ABNORMAL
PLATELET # BLD AUTO: 155 10E3/UL (ref 150–450)
POLYCHROMASIA BLD QL SMEAR: SLIGHT
POTASSIUM BLD-SCNC: 4.1 MMOL/L (ref 3.4–5.3)
PRODUCT NUMBER FLOW CYTOMETRY: NORMAL
RBC # BLD AUTO: 2.81 10E6/UL (ref 4.4–5.9)
RBC MORPH BLD: ABNORMAL
SODIUM SERPL-SCNC: 142 MMOL/L (ref 133–144)
VIABLE CD34 CELLS NFR FLD: 83.54 %
WBC # BLD AUTO: 74.9 10E3/UL (ref 4–11)

## 2022-05-24 PROCEDURE — 96372 THER/PROPH/DIAG INJ SC/IM: CPT | Performed by: PHYSICIAN ASSISTANT

## 2022-05-24 PROCEDURE — 250N000011 HC RX IP 250 OP 636: Mod: JW | Performed by: PHYSICIAN ASSISTANT

## 2022-05-24 PROCEDURE — 80048 BASIC METABOLIC PNL TOTAL CA: CPT

## 2022-05-24 PROCEDURE — 99214 OFFICE O/P EST MOD 30 MIN: CPT

## 2022-05-24 PROCEDURE — 250N000009 HC RX 250: Performed by: STUDENT IN AN ORGANIZED HEALTH CARE EDUCATION/TRAINING PROGRAM

## 2022-05-24 PROCEDURE — 83735 ASSAY OF MAGNESIUM: CPT | Performed by: STUDENT IN AN ORGANIZED HEALTH CARE EDUCATION/TRAINING PROGRAM

## 2022-05-24 PROCEDURE — 250N000011 HC RX IP 250 OP 636: Performed by: INTERNAL MEDICINE

## 2022-05-24 PROCEDURE — 85007 BL SMEAR W/DIFF WBC COUNT: CPT

## 2022-05-24 PROCEDURE — 85027 COMPLETE CBC AUTOMATED: CPT

## 2022-05-24 PROCEDURE — 96372 THER/PROPH/DIAG INJ SC/IM: CPT | Performed by: INTERNAL MEDICINE

## 2022-05-24 PROCEDURE — 36592 COLLECT BLOOD FROM PICC: CPT

## 2022-05-24 PROCEDURE — 38206 HARVEST AUTO STEM CELLS: CPT

## 2022-05-24 PROCEDURE — 38207 CRYOPRESERVE STEM CELLS: CPT

## 2022-05-24 PROCEDURE — 250N000011 HC RX IP 250 OP 636: Performed by: STUDENT IN AN ORGANIZED HEALTH CARE EDUCATION/TRAINING PROGRAM

## 2022-05-24 PROCEDURE — 86367 STEM CELLS TOTAL COUNT: CPT | Performed by: STUDENT IN AN ORGANIZED HEALTH CARE EDUCATION/TRAINING PROGRAM

## 2022-05-24 RX ORDER — PLERIXAFOR 24 MG/1.2ML
0.24 SOLUTION SUBCUTANEOUS DAILY
Status: CANCELLED
Start: 2022-05-25

## 2022-05-24 RX ORDER — HEPARIN SODIUM (PORCINE) LOCK FLUSH IV SOLN 100 UNIT/ML 100 UNIT/ML
5 SOLUTION INTRAVENOUS
Status: CANCELLED | OUTPATIENT
Start: 2022-05-25

## 2022-05-24 RX ORDER — HEPARIN SODIUM,PORCINE 10 UNIT/ML
5 VIAL (ML) INTRAVENOUS
Status: CANCELLED | OUTPATIENT
Start: 2022-05-25

## 2022-05-24 RX ORDER — HEPARIN SODIUM (PORCINE) LOCK FLUSH IV SOLN 100 UNIT/ML 100 UNIT/ML
3 SOLUTION INTRAVENOUS ONCE
Status: COMPLETED | OUTPATIENT
Start: 2022-05-24 | End: 2022-05-24

## 2022-05-24 RX ORDER — HEPARIN SODIUM (PORCINE) LOCK FLUSH IV SOLN 100 UNIT/ML 100 UNIT/ML
3 SOLUTION INTRAVENOUS ONCE
Status: CANCELLED | OUTPATIENT
Start: 2022-05-24 | End: 2022-05-24

## 2022-05-24 RX ORDER — PLERIXAFOR 24 MG/1.2ML
0.24 SOLUTION SUBCUTANEOUS DAILY
Status: DISCONTINUED | OUTPATIENT
Start: 2022-05-24 | End: 2022-05-24 | Stop reason: HOSPADM

## 2022-05-24 RX ADMIN — CALCIUM GLUCONATE 1400 MG/HR: 94 INJECTION, SOLUTION INTRAVENOUS at 08:59

## 2022-05-24 RX ADMIN — FILGRASTIM-SNDZ 780 MCG: 480 INJECTION, SOLUTION INTRAVENOUS; SUBCUTANEOUS at 08:58

## 2022-05-24 RX ADMIN — Medication 3 ML: at 13:59

## 2022-05-24 RX ADMIN — PLERIXAFOR 15.8 MG: 24 SOLUTION SUBCUTANEOUS at 15:02

## 2022-05-24 RX ADMIN — ANTICOAGULANT CITRATE DEXTROSE SOLUTION FORMULA A: 12.25; 11; 3.65 SOLUTION INTRAVENOUS at 08:59

## 2022-05-24 NOTE — PROCEDURES
Transfusion Medicine Procedure    Hamilton Angulo [id number]   YOB: 1950 Age: 71 year old   Date of Procedure: 5/24/2022     Procedure: Autologous Mononuclear Cell (MNC)  Collection           Assessment and Plan:   71 year old male presents for autologous MNC collection for multiple myeloma. The plan is to collect for 1 to 3 days or until the target goal is met.  A central line has been placed for access for the procedure.     The patient is currently tolerating the collection.  Continue with plan per BMT.           History of Present Illness:   71 year old male presents for consultation for autologous  MNC  collection.  His past medical history includes multiple myeloma, cataracts, herniated discs (lumbar), HLD, and HTN.  He is currently well.  The patient denies any back pain that would prevent him from tolerating the procedure.  The patient confirms a recent flu and COID vaccination.  No significant travel history.  The patient has no identifiable risk factors for infectious disease.  The procedure, risks/benefits were discussed with the patient and all of his questions were addressed at this time.             Past Medical History:     Past Medical History:   Diagnosis Date     Bilateral cataracts 2001     Herniated disc 1983    Lumbar     Hyperlipidemia LDL goal <130      Hypertension 5/2004     Tear of MCL (medial collateral ligament) of knee 6/20/2001    LT Knee/WC             Past Surgical History:     Past Surgical History:   Procedure Laterality Date     CATARACT IOL, RT/LT  12/2003    Bilateral     INSERT CATHETER VASCULAR ACCESS Right 5/23/2022    Procedure: INSERTION, VASCULAR ACCESS CATHETER NON VALVE DUAL LUMEN;  Surgeon: Timothy Reddy PA-C;  Location: [facility] OR              Social History:     Social History     Tobacco Use     Smoking status: Never Smoker     Smokeless tobacco: Never Used   Substance Use Topics     Alcohol use: Yes     Comment: Ocss.             Family History:      Family History   Problem Relation Age of Onset     Breast Cancer Mother      Hypertension Mother         She potentially had this.     Other - See Comments Father         He potentially had liver issues and dementia.     Heart Disease Maternal Grandmother         She potentially had this.     Cancer Maternal Grandfather         Unknown type of cancer.     Other - See Comments Paternal Half-Brother         He potentially had dementia.             Immunizations:     Immunization History   Administered Date(s) Administered     COVID-19,PF,Pfizer (12+ Yrs) 03/09/2021, 03/30/2021, 10/05/2021     FLU 6-35 months 11/11/2006, 10/27/2007, 10/25/2008, 11/06/2009, 10/23/2010, 09/09/2011, 10/02/2012     Influenza (H1N1) 02/25/2010     Influenza (High Dose) 3 valent vaccine 09/28/2015, 09/22/2016, 10/09/2017, 10/22/2018, 09/18/2019     Influenza (IIV3) PF 10/26/2002, 10/25/2003, 11/06/2009, 10/23/2010, 09/09/2011, 10/02/2012     Influenza Vaccine IM > 6 months Valent IIV4 (Alfuria,Fluzone) 10/29/2013, 09/22/2014     Influenza, Quad, High Dose, Pf, 65yr+ (Fluzone HD) 09/10/2020, 10/19/2021     Pneumo Conj 13-V (2010&after) 09/28/2015     Pneumococcal 23 valent 10/07/2016     TD (ADULT, 7+) 10/13/2017     TDAP Vaccine (Adacel) 06/27/2007     Td (Adult), Adsorbed 10/13/2017     Zoster vaccine, live 09/20/2013             Allergies:     Allergies   Allergen Reactions     Shrimp Hives             Medications:     Current Outpatient Medications   Medication Sig     acetaminophen (TYLENOL) 325 MG tablet Take 325-650 mg by mouth every 4 hours as needed Take as needed following label instructions     acyclovir (ZOVIRAX) 400 MG tablet Take 400 mg by mouth 2 times daily     amLODIPine (NORVASC) 2.5 MG tablet TAKE 1 TABLET(2.5 MG) BY MOUTH DAILY (Patient taking differently: Take 2.5 mg by mouth daily)     Artificial Tear Solution (SM ARTIFICIAL TEARS) SOLN Apply 1 Dose to eye every 2 hours as needed Use per label instructions      lisinopril (ZESTRIL) 20 MG tablet Take 1 tablet (20 mg) by mouth daily     loratadine (CLARITIN) 10 MG tablet Take 1 tablet (10 mg) by mouth daily     Multiple Vitamins-Minerals (EQ COMPLETE MULTIVITAMIN-ADULT) TABS Take 1 tablet by mouth daily     Current Facility-Administered Medications   Medication     filgrastim-sndz (ZARXIO) 780 mcg             Review of Systems:     CONSTITUTIONAL:  negative for  fevers, chills, sweats and fatigue  EYES:  negative for  double vision, blurred vision and visual disturbance  HEENT:  negative for  hearing loss and tinnitus  RESPIRATORY:  negative for  dry cough, cough with sputum, dyspnea, wheezing and chest pain  CARDIOVASCULAR:  negative for  chest pain, dyspnea, palpitations, exertional chest pressure/discomfort, fatigue, syncope  GASTROINTESTINAL:  negative for nausea, vomiting, diarrhea and constipation  MUSCULOSKELETAL:  negative for  myalgias and arthralgias  NEUROLOGICAL:  negative for headaches, dizziness, seizures, numbness, pain and tingling           Vital Signs:   BP (!) 146/67   Pulse 76   Temp 98.2  F (36.8  C) (Oral)   Resp 16   Wt 65.4 kg (144 lb 2.9 oz)   BMI 20.11 kg/m              Data:     ROUTINE IP LABS (Last four results)  BMPRecent Labs   Lab 05/24/22  0857 05/23/22  0909    145*   POTASSIUM 4.1 4.0   CHLORIDE 108 111*   RAKESH 9.6 9.1   CO2 26 27   BUN 13 12   CR 0.98 0.97   GLC 96 132*     CBC  Recent Labs   Lab 05/24/22  0857 05/23/22  0909   WBC 74.9* 37.7*   RBC 2.81* 2.84*   HGB 10.1* 10.1*   HCT 30.8* 31.4*   * 111*   MCH 35.9* 35.6*   MCHC 32.8 32.2   RDW 14.3 14.2    159     INRNo lab results found in last 7 days.    Antibody Screen   Date Value Ref Range Status   05/04/2022 Positive (A) Negative Final     ATTESTATION STATEMENT:   During the procedure this patient was directly seen and evaluated by me , Areli Mcguire MD, PhD.      Areli Mcguire MD, PhD  Transfusion Medicine Attending  Medical Director, Blood Bank  Laboratory  Pager [phone]

## 2022-05-24 NOTE — PROGRESS NOTES
Infusion Nursing Note:  Hamilton Angulo presents today for add-on injection.    Patient seen by provider today: Yes: Stephanie Cartwright   present during visit today: Not Applicable.    Treatment:  Per Provider order, Patient received an add-on Mozobil injection in his lower right abdomen.      Post Infusion Assessment:  Patient tolerated injection without incident.       Discharge Plan:   Patient discharged in stable condition accompanied by: self.      RADHA TABOR RN

## 2022-05-24 NOTE — PROGRESS NOTES
BMT Clinic Note       HPI: Mr Angulo is a undergoing gcsf priming and was seen in apheresis today. He is feeling reasonably well. Mild aching in legs and back. Taking claritin, not needing tylenol. Had a hard time sleeping anticipating collections, declines ativan. No fevers. No n/v/d or other complaints.    ROS: 8 point review with pertinent positive and negatives in HPI      Lab Results   Component Value Date    WBC 74.9 (HH) 05/24/2022    ANEU 47.2 (H) 05/24/2022    HGB 10.1 (L) 05/24/2022    HCT 30.8 (L) 05/24/2022     05/24/2022     05/24/2022    POTASSIUM 4.1 05/24/2022    CHLORIDE 108 05/24/2022    CO2 26 05/24/2022    GLC 96 05/24/2022    BUN 13 05/24/2022    CR 0.98 05/24/2022    MAG 2.2 05/24/2022    INR 0.97 05/04/2022    BILITOTAL 0.6 05/04/2022    AST 15 05/04/2022    ALT 19 05/04/2022    ALKPHOS 53 05/04/2022    PROTTOTAL 7.0 05/04/2022    ALBUMIN 4.3 05/04/2022       Physical Exam:     Vital Signs: reviewed in apheresis, VSS  General Appearance: healthy, alert and no distress  Eyes: PERRL, conjunctiva and lids normal, sclera nonicteric  Nose/M/Throat: Oral mucosa and posterior oropharynx normal, moist mucous membranes  Cardio/Vascular:regular rate and rhythm, normal S1 and S2, no murmur  Resp Effort And Auscultation: Normal - Clear to auscultation without rales, rhonchi, or wheezing.  GI: soft, nontender, bowel sounds present in all four quadrants, no hepatosplenomegaly  Edema: none  Skin: Skin color, texture, turgor normal. No rashes or lesions.  Psych/Affect: NEGATIVE for changes in mood or affect  Vascular Access: right chest decker trace blood around biopatch dry. No visible active bleeding    Assessment Plans:     BMT/Engraftment: Multiple myeloma standard risk  Undergoing gcsf priming only  CD34 undetectible 5/23, mozobil given  Day 5, CD34 =8 5/24     Hematology: Leukocytosis due to gcsf.   Infectious Disease: afebrile   Cardiovascular: HTN continues on norvasc, lisinopril   GI: no  complaints  Other: possible bone pain, start claritin, tylenol prn-- this is sufficient.     Active Problems:     Patient Active Problem List   Diagnosis     HYPERLIPIDEMIA LDL GOAL <130     PSEUDOPHAKIA OU     Advanced directives, counseling/discussion     History of actinic keratoses     PVD (posterior vitreous detachment), bilateral     Nonexudative senile macular degeneration of retina     Elevated glucose     Essential hypertension with goal blood pressure less than 140/90     Elevated prostate specific antigen (PSA)     Dupuytren contracture     Benign prostatic hyperplasia with urinary retention     Multiple myeloma not having achieved remission (H)     Autologous donor of stem cells       Plan: gcsf, day 5 and first day stem cell collections.  2nd mozobil dose given today    Counseling time: 15 minutes  Total time: 30  30 minutes spent on the date of the encounter doing chart review, review of test results, patient visit, documentation and discussion with other provider(s)     Stephanie Cartwright PAC  [phone]

## 2022-05-24 NOTE — LETTER
5/24/2022         RE: Hamilton Angulo  [address] [place] [address]        Dear Colleague,    Thank you for referring your patient, Hamilton nAgulo, to the [facility] BLOOD AND MARROW TRANSPLANT PROGRAM [place]. Please see a copy of my visit note below.    Infusion Nursing Note:  Hamilton Angulo presents today for add-on injection.    Patient seen by provider today: Yes: Stephanie Cartwright   present during visit today: Not Applicable.    Treatment:  Per Provider order, Patient received an add-on Mozobil injection in his lower right abdomen.      Post Infusion Assessment:  Patient tolerated injection without incident.       Discharge Plan:   Patient discharged in stable condition accompanied by: self.      RADHA TABOR RN                          Again, thank you for allowing me to participate in the care of your patient.        Sincerely,        [facility]

## 2022-05-24 NOTE — DISCHARGE INSTRUCTIONS
Autologous HPC/MNC Collection:   In most cases, the cell dose report will be available tomorrow morning.   The Bone Marrow Transplant (BMT) clinic staff looks at your report, and a decision is made if you will need another collection.   Remember it is important to follow a low fat diet during the collection process. Sometimes following the procedure, your blood platelet count may be low.  If you are told your platelet count is low, you need to avoid taking aspirin/aspirin containing products and avoid heavy physical activity and activities that may result in bruising or traumatic injury.  To contact the BMT fellow or attending physician after 5 p.m. call [phone].    Apheresis Blood Donor Center Post Instructions  You may feel tired after your procedure today.   Please call your doctor if you have:  bleeding that doesn t stop, fever, pain where a needle or tube (catheter) was placed, seizures, trouble breathing, red urine, nausea or vomiting, other health concerns.     If your symptoms are severe, call [phone].  If your veins were used, keep the bandages on for 2-4 hours.  Avoid heavy lifting with your arms.  If bleeding occurs from these sites, apply firm pressure for 5-10 minutes.  Call your physician if bleeding continues.    If you have a Central Venous Catheter:  Notify your doctor if you have had a fever, chills, shaking  or redness, warmth, swelling, drainage at the exit-site.  This could be a sign of infection.    If we used your fistula or graft, watch for signs of bleeding.  Please remove the bandages after 4 hours.  The Apheresis/Blood Donor Center is open Monday-Friday 7:30 a.m. to 5 p.m.  The phone number is [phone].  A Transfusion Medicine physician can be reached after 5:00 p.m. weekdays and on weekends /Holidays by calling [phone], and asking for the physician on call.

## 2022-05-24 NOTE — LETTER
5/24/2022         RE: Hamilton Angulo  [address] [facility] [address]        Dear Colleague,    Thank you for referring your patient, Hamilton Angulo, to the [facility] BLOOD AND MARROW TRANSPLANT PROGRAM [place]. Please see a copy of my visit note below.    BMT Clinic Note       HPI: Mr Angulo is a undergoing gcsf priming and was seen in apheresis today. He is feeling reasonably well. Mild aching in legs and back. Taking claritin, not needing tylenol. Had a hard time sleeping anticipating collections, declines ativan. No fevers. No n/v/d or other complaints.    ROS: 8 point review with pertinent positive and negatives in HPI      Lab Results   Component Value Date    WBC 74.9 (HH) 05/24/2022    ANEU 47.2 (H) 05/24/2022    HGB 10.1 (L) 05/24/2022    HCT 30.8 (L) 05/24/2022     05/24/2022     05/24/2022    POTASSIUM 4.1 05/24/2022    CHLORIDE 108 05/24/2022    CO2 26 05/24/2022    GLC 96 05/24/2022    BUN 13 05/24/2022    CR 0.98 05/24/2022    MAG 2.2 05/24/2022    INR 0.97 05/04/2022    BILITOTAL 0.6 05/04/2022    AST 15 05/04/2022    ALT 19 05/04/2022    ALKPHOS 53 05/04/2022    PROTTOTAL 7.0 05/04/2022    ALBUMIN 4.3 05/04/2022       Physical Exam:     Vital Signs: reviewed in apheresis, VSS  General Appearance: healthy, alert and no distress  Eyes: PERRL, conjunctiva and lids normal, sclera nonicteric  Nose/M/Throat: Oral mucosa and posterior oropharynx normal, moist mucous membranes  Cardio/Vascular:regular rate and rhythm, normal S1 and S2, no murmur  Resp Effort And Auscultation: Normal - Clear to auscultation without rales, rhonchi, or wheezing.  GI: soft, nontender, bowel sounds present in all four quadrants, no hepatosplenomegaly  Edema: none  Skin: Skin color, texture, turgor normal. No rashes or lesions.  Psych/Affect: NEGATIVE for changes in mood or affect  Vascular Access: right chest decker trace blood around biopatch dry. No visible active bleeding    Assessment Plans:      BMT/Engraftment: Multiple myeloma standard risk  Undergoing gcsf priming only  CD34 undetectible 5/23, mozobil given  Day 5, CD34 =8 5/24     Hematology: Leukocytosis due to gcsf.   Infectious Disease: afebrile   Cardiovascular: HTN continues on norvasc, lisinopril   GI: no complaints  Other: possible bone pain, start claritin, tylenol prn-- this is sufficient.     Active Problems:     Patient Active Problem List   Diagnosis     HYPERLIPIDEMIA LDL GOAL <130     PSEUDOPHAKIA OU     Advanced directives, counseling/discussion     History of actinic keratoses     PVD (posterior vitreous detachment), bilateral     Nonexudative senile macular degeneration of retina     Elevated glucose     Essential hypertension with goal blood pressure less than 140/90     Elevated prostate specific antigen (PSA)     Dupuytren contracture     Benign prostatic hyperplasia with urinary retention     Multiple myeloma not having achieved remission (H)     Autologous donor of stem cells       Plan: gcsf, day 5 and first day stem cell collections.  2nd mozobil dose given today    Counseling time: 15 minutes  Total time: 30  30 minutes spent on the date of the encounter doing chart review, review of test results, patient visit, documentation and discussion with other provider(s)     Stephanie Cartwright PAC  [phone]

## 2022-05-25 ENCOUNTER — HOSPITAL ENCOUNTER (OUTPATIENT)
Dept: LAB | Facility: CLINIC | Age: 72
Discharge: HOME | End: 2022-05-25
Attending: STUDENT IN AN ORGANIZED HEALTH CARE EDUCATION/TRAINING PROGRAM
Payer: COMMERCIAL

## 2022-05-25 ENCOUNTER — ONCOLOGY VISIT (OUTPATIENT)
Dept: TRANSPLANT | Facility: CLINIC | Age: 72
End: 2022-05-25
Attending: STUDENT IN AN ORGANIZED HEALTH CARE EDUCATION/TRAINING PROGRAM
Payer: COMMERCIAL

## 2022-05-25 VITALS
SYSTOLIC BLOOD PRESSURE: 122 MMHG | DIASTOLIC BLOOD PRESSURE: 47 MMHG | HEART RATE: 74 BPM | RESPIRATION RATE: 18 BRPM | TEMPERATURE: 98.4 F

## 2022-05-25 DIAGNOSIS — Z52.011 AUTOLOGOUS DONOR OF STEM CELLS: ICD-10-CM

## 2022-05-25 DIAGNOSIS — C90.00 MULTIPLE MYELOMA, REMISSION STATUS UNSPECIFIED (H): Primary | ICD-10-CM

## 2022-05-25 DIAGNOSIS — C90.00 MULTIPLE MYELOMA NOT HAVING ACHIEVED REMISSION (H): Primary | ICD-10-CM

## 2022-05-25 DIAGNOSIS — C90.00 MULTIPLE MYELOMA NOT HAVING ACHIEVED REMISSION (H): ICD-10-CM

## 2022-05-25 DIAGNOSIS — Z52.011 AUTOLOGOUS DONOR OF STEM CELLS: Primary | ICD-10-CM

## 2022-05-25 LAB
ANION GAP SERPL CALCULATED.3IONS-SCNC: 8 MMOL/L (ref 3–14)
BASOPHILS # BLD MANUAL: 0 10E3/UL (ref 0–0.2)
BASOPHILS NFR BLD MANUAL: 0 %
BILL ONLY AUTO PBPC FREEZE: NORMAL
BUN SERPL-MCNC: 10 MG/DL (ref 7–30)
CALCIUM SERPL-MCNC: 9.4 MG/DL (ref 8.5–10.1)
CD34 ABSOLUTE COUNT COMMENT: NORMAL
CD34 CELLS # SPEC: 6 CELLS/UL
CD34 CELLS NFR SPEC: 0.01 %
CHLORIDE BLD-SCNC: 106 MMOL/L (ref 94–109)
CO2 SERPL-SCNC: 30 MMOL/L (ref 20–32)
CREAT SERPL-MCNC: 0.87 MG/DL (ref 0.66–1.25)
EOSINOPHIL # BLD MANUAL: 0 10E3/UL (ref 0–0.7)
EOSINOPHIL NFR BLD MANUAL: 0 %
ERYTHROCYTE [DISTWIDTH] IN BLOOD BY AUTOMATED COUNT: 14.1 % (ref 10–15)
GFR SERPL CREATININE-BSD FRML MDRD: >90 ML/MIN/1.73M2
GLUCOSE BLD-MCNC: 101 MG/DL (ref 70–99)
HCT VFR BLD AUTO: 29.1 % (ref 40–53)
HGB BLD-MCNC: 9.5 G/DL (ref 13.3–17.7)
LYMPHOCYTES # BLD MANUAL: 5.7 10E3/UL (ref 0.8–5.3)
LYMPHOCYTES NFR BLD MANUAL: 10 %
MCH RBC QN AUTO: 35.4 PG (ref 26.5–33)
MCHC RBC AUTO-ENTMCNC: 32.6 G/DL (ref 31.5–36.5)
MCV RBC AUTO: 109 FL (ref 78–100)
METAMYELOCYTES # BLD MANUAL: 0.6 10E3/UL
METAMYELOCYTES NFR BLD MANUAL: 1 %
MONOCYTES # BLD MANUAL: 7.4 10E3/UL (ref 0–1.3)
MONOCYTES NFR BLD MANUAL: 13 %
NEUTROPHILS # BLD MANUAL: 43.3 10E3/UL (ref 1.6–8.3)
NEUTROPHILS NFR BLD MANUAL: 76 %
PLAT MORPH BLD: ABNORMAL
PLATELET # BLD AUTO: 107 10E3/UL (ref 150–450)
POTASSIUM BLD-SCNC: 3.6 MMOL/L (ref 3.4–5.3)
PRODUCT NUMBER FLOW CYTOMETRY: NORMAL
RBC # BLD AUTO: 2.68 10E6/UL (ref 4.4–5.9)
RBC MORPH BLD: ABNORMAL
SODIUM SERPL-SCNC: 144 MMOL/L (ref 133–144)
VIABLE CD34 CELLS NFR FLD: 95.96 %
WBC # BLD AUTO: 57 10E3/UL (ref 4–11)

## 2022-05-25 PROCEDURE — 36592 COLLECT BLOOD FROM PICC: CPT

## 2022-05-25 PROCEDURE — 99215 OFFICE O/P EST HI 40 MIN: CPT

## 2022-05-25 PROCEDURE — 80048 BASIC METABOLIC PNL TOTAL CA: CPT

## 2022-05-25 PROCEDURE — 250N000011 HC RX IP 250 OP 636: Performed by: STUDENT IN AN ORGANIZED HEALTH CARE EDUCATION/TRAINING PROGRAM

## 2022-05-25 PROCEDURE — 96372 THER/PROPH/DIAG INJ SC/IM: CPT | Performed by: INTERNAL MEDICINE

## 2022-05-25 PROCEDURE — 85027 COMPLETE CBC AUTOMATED: CPT

## 2022-05-25 PROCEDURE — 250N000009 HC RX 250: Performed by: STUDENT IN AN ORGANIZED HEALTH CARE EDUCATION/TRAINING PROGRAM

## 2022-05-25 PROCEDURE — 96372 THER/PROPH/DIAG INJ SC/IM: CPT | Mod: XU | Performed by: PHYSICIAN ASSISTANT

## 2022-05-25 PROCEDURE — 96374 THER/PROPH/DIAG INJ IV PUSH: CPT | Mod: XU

## 2022-05-25 PROCEDURE — 38206 HARVEST AUTO STEM CELLS: CPT

## 2022-05-25 PROCEDURE — 38207 CRYOPRESERVE STEM CELLS: CPT

## 2022-05-25 PROCEDURE — 250N000011 HC RX IP 250 OP 636: Performed by: PHYSICIAN ASSISTANT

## 2022-05-25 PROCEDURE — 250N000011 HC RX IP 250 OP 636: Performed by: INTERNAL MEDICINE

## 2022-05-25 PROCEDURE — 85007 BL SMEAR W/DIFF WBC COUNT: CPT

## 2022-05-25 PROCEDURE — 86367 STEM CELLS TOTAL COUNT: CPT

## 2022-05-25 RX ORDER — HEPARIN SODIUM (PORCINE) LOCK FLUSH IV SOLN 100 UNIT/ML 100 UNIT/ML
3 SOLUTION INTRAVENOUS ONCE
Status: COMPLETED | OUTPATIENT
Start: 2022-05-25 | End: 2022-05-25

## 2022-05-25 RX ORDER — HEPARIN SODIUM (PORCINE) LOCK FLUSH IV SOLN 100 UNIT/ML 100 UNIT/ML
5 SOLUTION INTRAVENOUS
Status: CANCELLED | OUTPATIENT
Start: 2022-05-26

## 2022-05-25 RX ORDER — HEPARIN SODIUM (PORCINE) LOCK FLUSH IV SOLN 100 UNIT/ML 100 UNIT/ML
3 SOLUTION INTRAVENOUS ONCE
Status: CANCELLED | OUTPATIENT
Start: 2022-05-25 | End: 2022-05-25

## 2022-05-25 RX ORDER — PLERIXAFOR 24 MG/1.2ML
0.24 SOLUTION SUBCUTANEOUS DAILY
Status: CANCELLED
Start: 2022-05-26

## 2022-05-25 RX ORDER — HEPARIN SODIUM,PORCINE 10 UNIT/ML
5 VIAL (ML) INTRAVENOUS
Status: CANCELLED | OUTPATIENT
Start: 2022-05-26

## 2022-05-25 RX ORDER — PLERIXAFOR 24 MG/1.2ML
0.24 SOLUTION SUBCUTANEOUS DAILY
Status: DISCONTINUED | OUTPATIENT
Start: 2022-05-25 | End: 2022-05-25 | Stop reason: HOSPADM

## 2022-05-25 RX ADMIN — ANTICOAGULANT CITRATE DEXTROSE SOLUTION FORMULA A 1619 ML: 12.25; 11; 3.65 SOLUTION INTRAVENOUS at 08:21

## 2022-05-25 RX ADMIN — Medication 3 ML: at 13:21

## 2022-05-25 RX ADMIN — PLERIXAFOR 15.8 MG: 24 SOLUTION SUBCUTANEOUS at 15:22

## 2022-05-25 RX ADMIN — CALCIUM GLUCONATE 1308 MG/HR: 98 INJECTION, SOLUTION INTRAVENOUS at 08:22

## 2022-05-25 RX ADMIN — FILGRASTIM-SNDZ 780 MCG: 480 INJECTION, SOLUTION INTRAVENOUS; SUBCUTANEOUS at 09:16

## 2022-05-25 NOTE — PROGRESS NOTES
Infusion Nursing Note:  Hamilton Angulo presents today for scheduled mozobil injection.    Patient seen by provider today: Yes: Stephanie YuenMezzobit CAITLYN   present during visit today: Not Applicable.    Note:   Pt received mozobil 15.8 mg by subcutaneous route to right lower abdomen.      Intravenous Access:  Weldon.    Treatment Conditions:  Labs were monitored    Post Infusion Assessment:  Patient tolerated injection without incident.       Discharge Plan:   Patient discharged in stable condition accompanied by: self and wife.  Departure Mode: Ambulatory.      Noemi Infante RN

## 2022-05-25 NOTE — LETTER
5/25/2022         RE: Hamilton Angulo  [address] [facility] [address]        Dear Colleague,    Thank you for referring your patient, Hamilton Angulo, to the [facility] BLOOD AND MARROW TRANSPLANT PROGRAM [place]. Please see a copy of my visit note below.    BMT Clinic Note       HPI: Mr Angulo is a undergoing gcsf with mozobil priming and was seen in apheresis today. He is feeling well, no focal complaints. No diarrhea with mozobil. No n/v. No fevers.    ROS: 8 point review with pertinent positive and negatives in HPI      Lab Results   Component Value Date    WBC 57.0 (HH) 05/25/2022    ANEU 43.3 (H) 05/25/2022    HGB 9.5 (L) 05/25/2022    HCT 29.1 (L) 05/25/2022     (L) 05/25/2022     05/25/2022    POTASSIUM 3.6 05/25/2022    CHLORIDE 106 05/25/2022    CO2 30 05/25/2022     (H) 05/25/2022    BUN 10 05/25/2022    CR 0.87 05/25/2022    MAG 2.2 05/24/2022    INR 0.97 05/04/2022    BILITOTAL 0.6 05/04/2022    AST 15 05/04/2022    ALT 19 05/04/2022    ALKPHOS 53 05/04/2022    PROTTOTAL 7.0 05/04/2022    ALBUMIN 4.3 05/04/2022       Physical Exam:     Vital Signs: reviewed in apheresis, VSS  General Appearance: healthy, alert and no distress  Eyes: PERRL, conjunctiva and lids normal, sclera nonicteric  Nose/M/Throat: Oral mucosa and posterior oropharynx normal, moist mucous membranes  Cardio/Vascular:regular rate and rhythm, normal S1 and S2, no murmur  Resp Effort And Auscultation: Normal - Clear to auscultation without rales, rhonchi, or wheezing.  GI: soft, nontender, no palpable hepatosplenomegaly  Edema: none  Skin: Skin color, texture, turgor normal. No rashes or lesions.  Psych/Affect: NEGATIVE for changes in mood or affect  Vascular Access: right chest decker trace blood around biopatch dry. No visible active bleeding    Assessment Plans:     BMT/Engraftment: Multiple myeloma standard risk  Undergoing gcsf priming only  CD34 undetectible 5/23, mozobil given  Day 5, CD34 =8  5/24; collections =0.5 x10^6  Day 6, CD34 =6 5/25    Hematology: Leukocytosis due to gcsf.   Infectious Disease: afebrile   Cardiovascular: HTN continues on norvasc, lisinopril   GI: no complaints  Other: possible bone pain, start claritin, tylenol prn-- this is sufficient.     Active Problems:     Patient Active Problem List   Diagnosis     HYPERLIPIDEMIA LDL GOAL <130     PSEUDOPHAKIA OU     Advanced directives, counseling/discussion     History of actinic keratoses     PVD (posterior vitreous detachment), bilateral     Nonexudative senile macular degeneration of retina     Elevated glucose     Essential hypertension with goal blood pressure less than 140/90     Elevated prostate specific antigen (PSA)     Dupuytren contracture     Benign prostatic hyperplasia with urinary retention     Multiple myeloma not having achieved remission (H)     Autologous donor of stem cells       Plan: gcsf, day 6/ 2nd day stem cell collections.  Mozobil dose given today    Messaged Arik (she is out today, back tomorrow), NC aware we should hear from Arik prior to continuation of collections 5/26  Discussed with DOM. Although peripheral collections may fail this week, will continue with mozobil today as possible collections could continue tomorrow.     Counseling time: 20 minutes  Total time: 40 minutes spent on the date of the encounter doing chart review, review of test results, patient visit, documentation and discussion with other provider(s)     Stephanie Cartwright PAC  [phone]        Again, thank you for allowing me to participate in the care of your patient.      Sincerely,    BMT Advanced Practice Provider

## 2022-05-25 NOTE — PROGRESS NOTES
BMT Clinic Note       HPI: Mr Angulo is a undergoing gcsf with mozobil priming and was seen in apheresis today. He is feeling well, no focal complaints. No diarrhea with mozobil. No n/v. No fevers.    ROS: 8 point review with pertinent positive and negatives in HPI      Lab Results   Component Value Date    WBC 57.0 (HH) 05/25/2022    ANEU 43.3 (H) 05/25/2022    HGB 9.5 (L) 05/25/2022    HCT 29.1 (L) 05/25/2022     (L) 05/25/2022     05/25/2022    POTASSIUM 3.6 05/25/2022    CHLORIDE 106 05/25/2022    CO2 30 05/25/2022     (H) 05/25/2022    BUN 10 05/25/2022    CR 0.87 05/25/2022    MAG 2.2 05/24/2022    INR 0.97 05/04/2022    BILITOTAL 0.6 05/04/2022    AST 15 05/04/2022    ALT 19 05/04/2022    ALKPHOS 53 05/04/2022    PROTTOTAL 7.0 05/04/2022    ALBUMIN 4.3 05/04/2022       Physical Exam:     Vital Signs: reviewed in apheresis, VSS  General Appearance: healthy, alert and no distress  Eyes: PERRL, conjunctiva and lids normal, sclera nonicteric  Nose/M/Throat: Oral mucosa and posterior oropharynx normal, moist mucous membranes  Cardio/Vascular:regular rate and rhythm, normal S1 and S2, no murmur  Resp Effort And Auscultation: Normal - Clear to auscultation without rales, rhonchi, or wheezing.  GI: soft, nontender, no palpable hepatosplenomegaly  Edema: none  Skin: Skin color, texture, turgor normal. No rashes or lesions.  Psych/Affect: NEGATIVE for changes in mood or affect  Vascular Access: right chest decker trace blood around biopatch dry. No visible active bleeding    Assessment Plans:     BMT/Engraftment: Multiple myeloma standard risk  Undergoing gcsf priming only  CD34 undetectible 5/23, mozobil given  Day 5, CD34 =8 5/24; collections =0.5 x10^6  Day 6, CD34 =6 5/25    Hematology: Leukocytosis due to gcsf.   Infectious Disease: afebrile   Cardiovascular: HTN continues on norvasc, lisinopril   GI: no complaints  Other: possible bone pain, start claritin, tylenol prn-- this is sufficient.      Active Problems:     Patient Active Problem List   Diagnosis     HYPERLIPIDEMIA LDL GOAL <130     PSEUDOPHAKIA OU     Advanced directives, counseling/discussion     History of actinic keratoses     PVD (posterior vitreous detachment), bilateral     Nonexudative senile macular degeneration of retina     Elevated glucose     Essential hypertension with goal blood pressure less than 140/90     Elevated prostate specific antigen (PSA)     Dupuytren contracture     Benign prostatic hyperplasia with urinary retention     Multiple myeloma not having achieved remission (H)     Autologous donor of stem cells       Plan: gcsf, day 6/ 2nd day stem cell collections.  Mozobil dose given today    Messaged Arik (she is out today, back tomorrow), NC aware we should hear from Arik prior to continuation of collections 5/26  Discussed with DOM. Although peripheral collections may fail this week, will continue with mozobil today as possible collections could continue tomorrow.     Counseling time: 20 minutes  Total time: 40 minutes spent on the date of the encounter doing chart review, review of test results, patient visit, documentation and discussion with other provider(s)     Stephanie Cartwright PAC  [phone]

## 2022-05-25 NOTE — NURSING NOTE
Chief Complaint   Patient presents with     Infusion     Scheduled mozobil injection.  History of multiple myeloma.     Isela Infante RN

## 2022-05-25 NOTE — PROCEDURES
Transfusion Medicine Procedure    Hamilton Anuglo [id number]   YOB: 1950 Age: 71 year old   Date of Procedure: 5/25/2022     Procedure: Autologous Mononuclear Cell (MNC)  Collection           Assessment and Plan:   71 year old male presents for autologous MNC collection for multiple myeloma. The plan is to collect for 1 to 3 days or until the target goal is met.  A central line has been placed for access for the procedure.     The patient is currently tolerating the second day of collection. Very low yield from yesterday's collection.  Team aware. Continue with plan per BMT.           History of Present Illness:   71 year old male presents for consultation for autologous  MNC  collection.  His past medical history includes multiple myeloma, cataracts, herniated discs (lumbar), HLD, and HTN.  He is currently well.  The patient denies any back pain that would prevent him from tolerating the procedure.  The patient confirms a recent flu and COID vaccination.  No significant travel history.  The patient has no identifiable risk factors for infectious disease.  The procedure, risks/benefits were discussed with the patient and all of his questions were addressed at this time.             Past Medical History:     Past Medical History:   Diagnosis Date     Bilateral cataracts 2001     Herniated disc 1983    Lumbar     Hyperlipidemia LDL goal <130      Hypertension 5/2004     Tear of MCL (medial collateral ligament) of knee 6/20/2001    LT Knee/WC             Past Surgical History:     Past Surgical History:   Procedure Laterality Date     CATARACT IOL, RT/LT  12/2003    Bilateral     INSERT CATHETER VASCULAR ACCESS Right 5/23/2022    Procedure: INSERTION, VASCULAR ACCESS CATHETER NON VALVE DUAL LUMEN;  Surgeon: Timothy Reddy PA-C;  [place] [facility] [place]              Social History:     Social History     Tobacco Use     Smoking status: Never Smoker     Smokeless tobacco: Never Used   Substance Use Topics      Alcohol use: Yes     Comment: Ocss.             Family History:     Family History   Problem Relation Age of Onset     Breast Cancer Mother      Hypertension Mother         She potentially had this.     Other - See Comments Father         He potentially had liver issues and dementia.     Heart Disease Maternal Grandmother         She potentially had this.     Cancer Maternal Grandfather         Unknown type of cancer.     Other - See Comments Paternal Half-Brother         He potentially had dementia.             Immunizations:     Immunization History   Administered Date(s) Administered     COVID-19,PF,Pfizer (12+ Yrs) 03/09/2021, 03/30/2021, 10/05/2021     FLU 6-35 months 11/11/2006, 10/27/2007, 10/25/2008, 11/06/2009, 10/23/2010, 09/09/2011, 10/02/2012     Influenza (H1N1) 02/25/2010     Influenza (High Dose) 3 valent vaccine 09/28/2015, 09/22/2016, 10/09/2017, 10/22/2018, 09/18/2019     Influenza (IIV3) PF 10/26/2002, 10/25/2003, 11/06/2009, 10/23/2010, 09/09/2011, 10/02/2012     Influenza Vaccine IM > 6 months Valent IIV4 (Alfuria,Fluzone) 10/29/2013, 09/22/2014     Influenza, Quad, High Dose, Pf, 65yr+ (Fluzone HD) 09/10/2020, 10/19/2021     Pneumo Conj 13-V (2010&after) 09/28/2015     Pneumococcal 23 valent 10/07/2016     TD (ADULT, 7+) 10/13/2017     TDAP Vaccine (Adacel) 06/27/2007     Td (Adult), Adsorbed 10/13/2017     Zoster vaccine, live 09/20/2013             Allergies:     Allergies   Allergen Reactions     Shrimp Hives             Medications:     Current Outpatient Medications   Medication Sig     acetaminophen (TYLENOL) 325 MG tablet Take 325-650 mg by mouth every 4 hours as needed Take as needed following label instructions     acyclovir (ZOVIRAX) 400 MG tablet Take 400 mg by mouth 2 times daily     amLODIPine (NORVASC) 2.5 MG tablet TAKE 1 TABLET(2.5 MG) BY MOUTH DAILY (Patient taking differently: Take 2.5 mg by mouth daily)     Artificial Tear Solution (SM ARTIFICIAL TEARS) SOLN Apply 1 Dose  to eye every 2 hours as needed Use per label instructions     lisinopril (ZESTRIL) 20 MG tablet Take 1 tablet (20 mg) by mouth daily     loratadine (CLARITIN) 10 MG tablet Take 1 tablet (10 mg) by mouth daily     Multiple Vitamins-Minerals (EQ COMPLETE MULTIVITAMIN-ADULT) TABS Take 1 tablet by mouth daily     Current Facility-Administered Medications   Medication     filgrastim-sndz (ZARXIO) 780 mcg             Review of Systems:     CONSTITUTIONAL:  negative for  fevers, chills, sweats and fatigue  EYES:  negative for  double vision, blurred vision and visual disturbance  HEENT:  negative for  hearing loss and tinnitus  RESPIRATORY:  negative for  dry cough, cough with sputum, dyspnea, wheezing and chest pain  CARDIOVASCULAR:  negative for  chest pain, dyspnea, palpitations, exertional chest pressure/discomfort, fatigue, syncope  GASTROINTESTINAL:  negative for nausea, vomiting, diarrhea and constipation  MUSCULOSKELETAL:  negative for  myalgias and arthralgias  NEUROLOGICAL:  negative for headaches, dizziness, seizures, numbness, pain and tingling           Vital Signs:   BP (!) 167/69   Pulse 87   Temp 98.2  F (36.8  C) (Oral)   Resp 18             Data:     ROUTINE IP LABS (Last four results)  BMP  Recent Labs   Lab 05/25/22  0822 05/24/22  0857 05/23/22  0909    142 145*   POTASSIUM 3.6 4.1 4.0   CHLORIDE 106 108 111*   RAKESH 9.4 9.6 9.1   CO2 30 26 27   BUN 10 13 12   CR 0.87 0.98 0.97   * 96 132*     CBC  Recent Labs   Lab 05/25/22  0822 05/24/22  0857 05/23/22  0909   WBC 57.0* 74.9* 37.7*   RBC 2.68* 2.81* 2.84*   HGB 9.5* 10.1* 10.1*   HCT 29.1* 30.8* 31.4*   * 110* 111*   MCH 35.4* 35.9* 35.6*   MCHC 32.6 32.8 32.2   RDW 14.1 14.3 14.2   * 155 159     INRNo lab results found in last 7 days.    Antibody Screen   Date Value Ref Range Status   05/04/2022 Positive (A) Negative Final     ATTESTATION STATEMENT:   During the procedure this patient was directly seen and evaluated by me  , Areli Mcguire MD, PhD.      Areli Mcguire MD, PhD  Transfusion Medicine Attending  Medical Director, Blood Bank Laboratory  Pager [phone]

## 2022-05-25 NOTE — LETTER
5/25/2022         RE: Hamilton Angulo  [address] [place] [address]        Dear Colleague,    Thank you for referring your patient, Hamilton Angulo, to the [facility] BLOOD AND MARROW TRANSPLANT PROGRAM [place]. Please see a copy of my visit note below.    Infusion Nursing Note:  Hamilton Angulo presents today for scheduled mozobil injection.    Patient seen by provider today: Yes: Stephanie Jewell CAITLYN   present during visit today: Not Applicable.    Note:   Pt received mozobil 15.8 mg by subcutaneous route to right lower abdomen.      Intravenous Access:  Weldon.    Treatment Conditions:  Labs were monitored    Post Infusion Assessment:  Patient tolerated injection without incident.       Discharge Plan:   Patient discharged in stable condition accompanied by: self and wife.  Departure Mode: Ambulatory.      Noemi Infante RN                          Again, thank you for allowing me to participate in the care of your patient.        Sincerely,        [facility]

## 2022-05-26 ENCOUNTER — ONCOLOGY VISIT (OUTPATIENT)
Dept: TRANSPLANT | Facility: CLINIC | Age: 72
End: 2022-05-26
Attending: PHYSICIAN ASSISTANT
Payer: COMMERCIAL

## 2022-05-26 ENCOUNTER — NURSE TRIAGE (OUTPATIENT)
Dept: NURSING | Facility: CLINIC | Age: 72
End: 2022-05-26

## 2022-05-26 VITALS
HEART RATE: 81 BPM | BODY MASS INDEX: 20.22 KG/M2 | OXYGEN SATURATION: 98 % | SYSTOLIC BLOOD PRESSURE: 162 MMHG | WEIGHT: 145 LBS | TEMPERATURE: 98.3 F | DIASTOLIC BLOOD PRESSURE: 71 MMHG

## 2022-05-26 DIAGNOSIS — C90.00 MULTIPLE MYELOMA NOT HAVING ACHIEVED REMISSION (H): Primary | ICD-10-CM

## 2022-05-26 DIAGNOSIS — Z52.011 AUTOLOGOUS DONOR OF STEM CELLS: ICD-10-CM

## 2022-05-26 LAB
ANION GAP SERPL CALCULATED.3IONS-SCNC: 6 MMOL/L (ref 3–14)
BASOPHILS # BLD MANUAL: 0 10E3/UL (ref 0–0.2)
BASOPHILS NFR BLD MANUAL: 0 %
BUN SERPL-MCNC: 8 MG/DL (ref 7–30)
CALCIUM SERPL-MCNC: 9.7 MG/DL (ref 8.5–10.1)
CHLORIDE BLD-SCNC: 105 MMOL/L (ref 94–109)
CO2 SERPL-SCNC: 33 MMOL/L (ref 20–32)
CREAT SERPL-MCNC: 0.93 MG/DL (ref 0.66–1.25)
EOSINOPHIL # BLD MANUAL: 0.6 10E3/UL (ref 0–0.7)
EOSINOPHIL NFR BLD MANUAL: 1 %
ERYTHROCYTE [DISTWIDTH] IN BLOOD BY AUTOMATED COUNT: 14 % (ref 10–15)
GFR SERPL CREATININE-BSD FRML MDRD: 88 ML/MIN/1.73M2
GLUCOSE BLD-MCNC: 107 MG/DL (ref 70–99)
HCT VFR BLD AUTO: 29.1 % (ref 40–53)
HGB BLD-MCNC: 9.4 G/DL (ref 13.3–17.7)
LYMPHOCYTES # BLD MANUAL: 1.2 10E3/UL (ref 0.8–5.3)
LYMPHOCYTES NFR BLD MANUAL: 2 %
MCH RBC QN AUTO: 35.6 PG (ref 26.5–33)
MCHC RBC AUTO-ENTMCNC: 32.3 G/DL (ref 31.5–36.5)
MCV RBC AUTO: 110 FL (ref 78–100)
METAMYELOCYTES # BLD MANUAL: 1.8 10E3/UL
METAMYELOCYTES NFR BLD MANUAL: 3 %
MONOCYTES # BLD MANUAL: 8.3 10E3/UL (ref 0–1.3)
MONOCYTES NFR BLD MANUAL: 14 %
MYELOCYTES # BLD MANUAL: 2.4 10E3/UL
MYELOCYTES NFR BLD MANUAL: 4 %
NEUTROPHILS # BLD MANUAL: 45.1 10E3/UL (ref 1.6–8.3)
NEUTROPHILS NFR BLD MANUAL: 76 %
PLAT MORPH BLD: ABNORMAL
PLATELET # BLD AUTO: 75 10E3/UL (ref 150–450)
POTASSIUM BLD-SCNC: 3.3 MMOL/L (ref 3.4–5.3)
RBC # BLD AUTO: 2.64 10E6/UL (ref 4.4–5.9)
RBC MORPH BLD: ABNORMAL
SODIUM SERPL-SCNC: 144 MMOL/L (ref 133–144)
WBC # BLD AUTO: 59.3 10E3/UL (ref 4–11)

## 2022-05-26 PROCEDURE — 82310 ASSAY OF CALCIUM: CPT

## 2022-05-26 PROCEDURE — 99215 OFFICE O/P EST HI 40 MIN: CPT

## 2022-05-26 PROCEDURE — 250N000013 HC RX MED GY IP 250 OP 250 PS 637: Performed by: PHYSICIAN ASSISTANT

## 2022-05-26 PROCEDURE — 36592 COLLECT BLOOD FROM PICC: CPT

## 2022-05-26 PROCEDURE — G0463 HOSPITAL OUTPT CLINIC VISIT: HCPCS

## 2022-05-26 PROCEDURE — 85007 BL SMEAR W/DIFF WBC COUNT: CPT

## 2022-05-26 PROCEDURE — 250N000011 HC RX IP 250 OP 636: Performed by: INTERNAL MEDICINE

## 2022-05-26 PROCEDURE — 85027 COMPLETE CBC AUTOMATED: CPT

## 2022-05-26 RX ORDER — POTASSIUM CHLORIDE 1500 MG/1
40 TABLET, EXTENDED RELEASE ORAL ONCE
Status: COMPLETED | OUTPATIENT
Start: 2022-05-26 | End: 2022-05-26

## 2022-05-26 RX ORDER — PLERIXAFOR 24 MG/1.2ML
0.24 SOLUTION SUBCUTANEOUS DAILY
Status: CANCELLED
Start: 2022-05-27

## 2022-05-26 RX ORDER — HEPARIN SODIUM,PORCINE 10 UNIT/ML
5 VIAL (ML) INTRAVENOUS
Status: DISCONTINUED | OUTPATIENT
Start: 2022-05-26 | End: 2022-05-26 | Stop reason: HOSPADM

## 2022-05-26 RX ORDER — HEPARIN SODIUM,PORCINE 10 UNIT/ML
5 VIAL (ML) INTRAVENOUS
Status: CANCELLED | OUTPATIENT
Start: 2022-05-27

## 2022-05-26 RX ORDER — HEPARIN SODIUM (PORCINE) LOCK FLUSH IV SOLN 100 UNIT/ML 100 UNIT/ML
5 SOLUTION INTRAVENOUS
Status: CANCELLED | OUTPATIENT
Start: 2022-05-27

## 2022-05-26 RX ADMIN — POTASSIUM CHLORIDE 40 MEQ: 1500 TABLET, EXTENDED RELEASE ORAL at 09:46

## 2022-05-26 RX ADMIN — Medication 5 ML: at 08:45

## 2022-05-26 RX ADMIN — Medication 5 ML: at 08:46

## 2022-05-26 ASSESSMENT — PAIN SCALES - GENERAL: PAINLEVEL: NO PAIN (0)

## 2022-05-26 NOTE — NURSING NOTE
"Oncology Rooming Note    May 26, 2022 8:19 AM   Hamilton Angulo is a 71 year old male who presents for:    Chief Complaint   Patient presents with     Oncology Clinic Visit     Multiple myeloma      Initial Vitals: BP (!) 162/71 (BP Location: Right arm, Patient Position: Sitting, Cuff Size: Adult Regular)   Pulse 81   Temp 98.3  F (36.8  C) (Oral)   Wt 65.8 kg (145 lb)   SpO2 98%   BMI 20.22 kg/m   Estimated body mass index is 20.22 kg/m  as calculated from the following:    Height as of 5/23/22: 1.803 m (5' 11\").    Weight as of this encounter: 65.8 kg (145 lb). Body surface area is 1.82 meters squared.  No Pain (0) Comment: Data Unavailable   No LMP for male patient.  Allergies reviewed: Yes  Medications reviewed: Yes    Medications: Medication refills not needed today.  Pharmacy name entered into [employer]: [id number] - [place], [place] - [address] AT [facility] & [address]    Clinical concerns: NONE       Yohannes Scales            "

## 2022-05-26 NOTE — TELEPHONE ENCOUNTER
Pt calling to report:     Stopped growth factor treatment   Start new plan after next Wednesday  Coming in for a line cleaning every day  Would like to know if he can go back to previous lifestyle while off of his treatments: diet, exercising    Declines triage     Routing to provider    This writer instructed pt to call back during regular business hours to see if he could get better resource from the Cancer Center. Pt states he will be at the Cancer Center tomorrow and will ask the questions then.    Kelsey Galvan RN  [place] Nurse Advisor  05/26/22  6:37 PM        Reason for Disposition    [1] Caller requesting NON-URGENT health information AND [2] PCP's office is the best resource    Protocols used: INFORMATION ONLY CALL - NO TRIAGE-A-

## 2022-05-26 NOTE — LETTER
5/26/2022         RE: Hamilton Angulo  [address] [facility] [address]        Dear Colleague,    Thank you for referring your patient, Hamilton Angulo, to the [facility] BLOOD AND MARROW TRANSPLANT PROGRAM [place]. Please see a copy of my visit note below.    BMT Clinic Note      Mr Angulo is a undergoing gcsf with added mozobil priming for planned auto PBSCT for Multiple Myeloma    HPI:  Hamilton is doing well today.  He has not had any diarrhea from the mozibil. He denies bone pain with the growth factors.   No n/v. No fevers. No bleeding.  He was counseled that we are stopping collection as after 3 days he still has not reached even 1 X10^6 million cells.    ROS: 8 point review with pertinent positive and negatives in HPI      Lab Results   Component Value Date    WBC 59.3 (HH) 05/26/2022    ANEU 45.1 (H) 05/26/2022    HGB 9.4 (L) 05/26/2022    HCT 29.1 (L) 05/26/2022    PLT 75 (L) 05/26/2022     05/26/2022    POTASSIUM 3.3 (L) 05/26/2022    CHLORIDE 105 05/26/2022    CO2 33 (H) 05/26/2022     (H) 05/26/2022    BUN 8 05/26/2022    CR 0.93 05/26/2022    MAG 2.2 05/24/2022    INR 0.97 05/04/2022    BILITOTAL 0.6 05/04/2022    AST 15 05/04/2022    ALT 19 05/04/2022    ALKPHOS 53 05/04/2022    PROTTOTAL 7.0 05/04/2022    ALBUMIN 4.3 05/04/2022       Physical Exam:       Blood pressure (!) 162/71, pulse 81, temperature 98.3  F (36.8  C), temperature source Oral, weight 65.8 kg (145 lb), SpO2 98 %.    Wt Readings from Last 4 Encounters:   05/26/22 65.8 kg (145 lb)   05/24/22 65.4 kg (144 lb 2.9 oz)   05/23/22 66.2 kg (146 lb)   05/23/22 65.8 kg (145 lb)       General Appearance:  alert and no distress  Eyes:  sclera nonicteric  Nose/M/Throat: Oral mucosa and posterior oropharynx normal, moist mucous membranes  Cardio/Vascular:regular rate and rhythm, normal S1 and S2, no murmur  Resp Effort And Auscultation: Normal - Clear to auscultation   GI: soft, nontender, +BS  Edema: none  Vascular  Access: right chest [place]ise[place] trace blood around biopatch dry. No visible active bleeding or infection    Assessment Plans:   Mr Angulo is a undergoing gcsf with added mozobil priming for planned auto PBSCT for Multiple Myeloma  BMT/Engraftment: Multiple myeloma standard risk  Undergoing gcsf priming only  CD34 undetectible 5/23, mozobil given  5/23, 5/24, 5/25: CD34 =8 on 5/24 so started collections.  After 3 days of collections CD34 collections =0.848 X10^6  Hamilton had RVd x6 cycles and D-kd x4 cycles before coming to transplant.  Discussed stopping with CALIXTO and then sent inbasket message to Dr Elise.  Stopped, did not collect today    Hematology: Leukocytosis due to gcsf.  Platelets decreased from apheresis- now apheresis machines down so had to use an older one   Infectious Disease: afebrile   Cardiovascular: HTN continues on norvasc, lisinopril - hypertensive in clinic again today.  Will take bp at home, record his results and bring into clinic  GI: no complaints  Other: No bone pain, done with collection, stop claritin, tylenol prn--      Active Problems:     Patient Active Problem List   Diagnosis     HYPERLIPIDEMIA LDL GOAL <130     PSEUDOPHAKIA OU     Advanced directives, counseling/discussion     History of actinic keratoses     PVD (posterior vitreous detachment), bilateral     Nonexudative senile macular degeneration of retina     Elevated glucose     Essential hypertension with goal blood pressure less than 140/90     Elevated prostate specific antigen (PSA)     Dupuytren contracture     Benign prostatic hyperplasia with urinary retention     Multiple myeloma not having achieved remission (H)     Autologous donor of stem cells       Plan:  Unable to flush line as wife does not want to do this.  Set up daily in clinic for line care and RTC on Wednesday 6/1 with CALIXTO.  Notified MORIAH Jasso and she is aware as well. Waiting for Dr Elise to get back to us about what she wants to do.        Total time: 40  minutes spent on the date of the encounter doing chart review, review of test results, patient visit, documentation and discussion with other provider(s)   Including coordinating care and counseling.    Cornelia RIOS  [phone]  5/26/2022      Again, thank you for allowing me to participate in the care of your patient.        Sincerely,        BMT Advanced Practice Provider

## 2022-05-26 NOTE — PROGRESS NOTES
BMT Clinic Note      Mr Angulo is a undergoing gcsf with added mozobil priming for planned auto PBSCT for Multiple Myeloma    HPI:  Hamilton is doing well today.  He has not had any diarrhea from the mozibil. He denies bone pain with the growth factors.   No n/v. No fevers. No bleeding.  He was counseled that we are stopping collection as after 3 days he still has not reached even 1 X10^6 million cells.    ROS: 8 point review with pertinent positive and negatives in HPI      Lab Results   Component Value Date    WBC 59.3 (HH) 05/26/2022    ANEU 45.1 (H) 05/26/2022    HGB 9.4 (L) 05/26/2022    HCT 29.1 (L) 05/26/2022    PLT 75 (L) 05/26/2022     05/26/2022    POTASSIUM 3.3 (L) 05/26/2022    CHLORIDE 105 05/26/2022    CO2 33 (H) 05/26/2022     (H) 05/26/2022    BUN 8 05/26/2022    CR 0.93 05/26/2022    MAG 2.2 05/24/2022    INR 0.97 05/04/2022    BILITOTAL 0.6 05/04/2022    AST 15 05/04/2022    ALT 19 05/04/2022    ALKPHOS 53 05/04/2022    PROTTOTAL 7.0 05/04/2022    ALBUMIN 4.3 05/04/2022       Physical Exam:       Blood pressure (!) 162/71, pulse 81, temperature 98.3  F (36.8  C), temperature source Oral, weight 65.8 kg (145 lb), SpO2 98 %.    Wt Readings from Last 4 Encounters:   05/26/22 65.8 kg (145 lb)   05/24/22 65.4 kg (144 lb 2.9 oz)   05/23/22 66.2 kg (146 lb)   05/23/22 65.8 kg (145 lb)       General Appearance:  alert and no distress  Eyes:  sclera nonicteric  Nose/M/Throat: Oral mucosa and posterior oropharynx normal, moist mucous membranes  Cardio/Vascular:regular rate and rhythm, normal S1 and S2, no murmur  Resp Effort And Auscultation: Normal - Clear to auscultation   GI: soft, nontender, +BS  Edema: none  Vascular Access: right chest [place] trace blood around biopatch dry. No visible active bleeding or infection    Assessment Plans:   Mr Angulo is a undergoing gcsf with added mozobil priming for planned auto PBSCT for Multiple Myeloma  BMT/Engraftment: Multiple myeloma standard  risk  Undergoing gcsf priming only  CD34 undetectible 5/23, mozobil given  5/23, 5/24, 5/25: CD34 =8 on 5/24 so started collections.  After 3 days of collections CD34 collections =0.848 X10^6  Hamilton had RVd x6 cycles and D-kd x4 cycles before coming to transplant.  Discussed stopping with CALIXTO and then sent inbasket message to Dr Elise.  Stopped, did not collect today    Hematology: Leukocytosis due to gcsf.  Platelets decreased from apheresis- now apheresis machines down so had to use an older one   Infectious Disease: afebrile   Cardiovascular: HTN continues on norvasc, lisinopril - hypertensive in clinic again today.  Will take bp at home, record his results and bring into clinic  GI: no complaints  Other: No bone pain, done with collection, stop claritin, tylenol prn--      Active Problems:     Patient Active Problem List   Diagnosis     HYPERLIPIDEMIA LDL GOAL <130     PSEUDOPHAKIA OU     Advanced directives, counseling/discussion     History of actinic keratoses     PVD (posterior vitreous detachment), bilateral     Nonexudative senile macular degeneration of retina     Elevated glucose     Essential hypertension with goal blood pressure less than 140/90     Elevated prostate specific antigen (PSA)     Dupuytren contracture     Benign prostatic hyperplasia with urinary retention     Multiple myeloma not having achieved remission (H)     Autologous donor of stem cells       Plan:  Unable to flush line as wife does not want to do this.  Set up daily in clinic for line care and RTC on Wednesday 6/1 with CALIXTO.  Notified [place] EMY Jasso and she is aware as well. Waiting for Dr Elise to get back to us about what she wants to do.        Total time: 40 minutes spent on the date of the encounter doing chart review, review of test results, patient visit, documentation and discussion with other provider(s)   Including coordinating care and counseling.    Cornelia Moreno PAC  [phone]  5/26/2022

## 2022-05-26 NOTE — NURSING NOTE
K 3.3 today; pt meets parameters for potassium replacement. Pt given 40 mEq K+ PO per Cornelia Nova CAITLYN. Pt instructed to take pills with food and full glass of water. Pt verbalized understanding.     Sugey Ellis RN

## 2022-05-26 NOTE — PATIENT INSTRUCTIONS
Needs daily appointment for line care a dressing change on Monday,    Return on Wednesday, 6/1/2022

## 2022-05-26 NOTE — NURSING NOTE
Labs drawn from CVC by clinic RN. Blood return noted on both lumens and both heparin locked. Pt tolerated without incident.     Sugey Ellis RN

## 2022-05-27 ENCOUNTER — LAB (OUTPATIENT)
Dept: LAB | Facility: CLINIC | Age: 72
End: 2022-05-27
Attending: INTERNAL MEDICINE
Payer: COMMERCIAL

## 2022-05-27 PROCEDURE — 250N000011 HC RX IP 250 OP 636: Performed by: INTERNAL MEDICINE

## 2022-05-27 RX ORDER — HEPARIN SODIUM,PORCINE 10 UNIT/ML
5 VIAL (ML) INTRAVENOUS ONCE
Status: COMPLETED | OUTPATIENT
Start: 2022-05-27 | End: 2022-05-27

## 2022-05-27 RX ADMIN — Medication 5 ML: at 08:27

## 2022-05-27 NOTE — NURSING NOTE
Chief Complaint   Patient presents with     Labs Only     CVC flushed with saline and heparin       CVC flushed with saline and heparin    Liza Quinonez RN

## 2022-05-28 ENCOUNTER — LAB (OUTPATIENT)
Dept: LAB | Facility: CLINIC | Age: 72
End: 2022-05-28
Attending: INTERNAL MEDICINE
Payer: COMMERCIAL

## 2022-05-28 PROCEDURE — 250N000011 HC RX IP 250 OP 636: Performed by: INTERNAL MEDICINE

## 2022-05-28 RX ORDER — HEPARIN SODIUM,PORCINE 10 UNIT/ML
5 VIAL (ML) INTRAVENOUS ONCE
Status: COMPLETED | OUTPATIENT
Start: 2022-05-28 | End: 2022-05-28

## 2022-05-28 RX ADMIN — Medication 5 ML: at 09:37

## 2022-05-28 NOTE — NURSING NOTE
Chief Complaint   Patient presents with     Blood Draw     CVC line flush by RN.     CVC flushed by RN. Good blood return in both lumens. Lumens were flushed with NS and heparin. Pt tolerated well.     Sriram Garcia RN

## 2022-05-29 ENCOUNTER — LAB (OUTPATIENT)
Dept: LAB | Facility: CLINIC | Age: 72
End: 2022-05-29
Attending: INTERNAL MEDICINE
Payer: COMMERCIAL

## 2022-05-29 DIAGNOSIS — Z52.011 AUTOLOGOUS DONOR OF STEM CELLS: Primary | ICD-10-CM

## 2022-05-29 DIAGNOSIS — C90.00 MULTIPLE MYELOMA NOT HAVING ACHIEVED REMISSION (H): ICD-10-CM

## 2022-05-29 PROCEDURE — 250N000011 HC RX IP 250 OP 636: Performed by: INTERNAL MEDICINE

## 2022-05-29 RX ORDER — HEPARIN SODIUM (PORCINE) LOCK FLUSH IV SOLN 100 UNIT/ML 100 UNIT/ML
5 SOLUTION INTRAVENOUS
Status: CANCELLED | OUTPATIENT
Start: 2022-05-30

## 2022-05-29 RX ORDER — HEPARIN SODIUM,PORCINE 10 UNIT/ML
5 VIAL (ML) INTRAVENOUS
Status: CANCELLED | OUTPATIENT
Start: 2022-05-30

## 2022-05-29 RX ORDER — HEPARIN SODIUM,PORCINE 10 UNIT/ML
5 VIAL (ML) INTRAVENOUS
Status: DISCONTINUED | OUTPATIENT
Start: 2022-05-29 | End: 2022-06-04 | Stop reason: HOSPADM

## 2022-05-29 RX ORDER — PLERIXAFOR 24 MG/1.2ML
0.24 SOLUTION SUBCUTANEOUS DAILY
Status: DISCONTINUED | OUTPATIENT
Start: 2022-05-29 | End: 2022-05-29 | Stop reason: CLARIF

## 2022-05-29 RX ORDER — PLERIXAFOR 24 MG/1.2ML
0.24 SOLUTION SUBCUTANEOUS DAILY
Status: CANCELLED
Start: 2022-05-30

## 2022-05-29 RX ADMIN — Medication 5 ML: at 08:09

## 2022-05-29 NOTE — NURSING NOTE
Chief Complaint   Patient presents with     Blood Draw     CVC lines flushed by RN.     CVC lines flushed with heparin. Pt tolerated well.    Sriram Garcia RN

## 2022-05-30 ENCOUNTER — LAB (OUTPATIENT)
Dept: LAB | Facility: CLINIC | Age: 72
End: 2022-05-30
Payer: COMMERCIAL

## 2022-05-30 PROCEDURE — 250N000011 HC RX IP 250 OP 636: Performed by: INTERNAL MEDICINE

## 2022-05-30 RX ORDER — HEPARIN SODIUM,PORCINE 10 UNIT/ML
5 VIAL (ML) INTRAVENOUS ONCE
Status: COMPLETED | OUTPATIENT
Start: 2022-05-30 | End: 2022-05-30

## 2022-05-30 RX ADMIN — SODIUM CHLORIDE, PRESERVATIVE FREE 5 ML: 5 INJECTION INTRAVENOUS at 08:50

## 2022-05-30 RX ADMIN — Medication 5 ML: at 08:51

## 2022-05-30 NOTE — NURSING NOTE
Chief Complaint   Patient presents with     Blood Draw     CVC line flush by RN.     CVC line flush by RN. Good blood return observed. Lines flushed with NS and heparin. Pt tolerated well.     Sriram Garcia RN

## 2022-05-31 ENCOUNTER — APPOINTMENT (OUTPATIENT)
Dept: LAB | Facility: CLINIC | Age: 72
End: 2022-05-31
Attending: INTERNAL MEDICINE
Payer: COMMERCIAL

## 2022-06-01 ENCOUNTER — ONCOLOGY VISIT (OUTPATIENT)
Dept: TRANSPLANT | Facility: CLINIC | Age: 72
End: 2022-06-01
Attending: PHYSICIAN ASSISTANT
Payer: COMMERCIAL

## 2022-06-01 ENCOUNTER — APPOINTMENT (OUTPATIENT)
Dept: LAB | Facility: CLINIC | Age: 72
End: 2022-06-01
Attending: PHYSICIAN ASSISTANT
Payer: COMMERCIAL

## 2022-06-01 VITALS
RESPIRATION RATE: 16 BRPM | WEIGHT: 146.9 LBS | OXYGEN SATURATION: 100 % | BODY MASS INDEX: 20.49 KG/M2 | DIASTOLIC BLOOD PRESSURE: 62 MMHG | TEMPERATURE: 97.8 F | HEART RATE: 70 BPM | SYSTOLIC BLOOD PRESSURE: 115 MMHG

## 2022-06-01 DIAGNOSIS — C90.00 MULTIPLE MYELOMA NOT HAVING ACHIEVED REMISSION (H): ICD-10-CM

## 2022-06-01 PROCEDURE — 99213 OFFICE O/P EST LOW 20 MIN: CPT

## 2022-06-01 PROCEDURE — G0463 HOSPITAL OUTPT CLINIC VISIT: HCPCS

## 2022-06-01 PROCEDURE — 250N000011 HC RX IP 250 OP 636: Performed by: PHYSICIAN ASSISTANT

## 2022-06-01 RX ORDER — HEPARIN SODIUM,PORCINE 10 UNIT/ML
5 VIAL (ML) INTRAVENOUS
Status: DISCONTINUED | OUTPATIENT
Start: 2022-06-01 | End: 2022-06-01 | Stop reason: HOSPADM

## 2022-06-01 RX ADMIN — Medication 5 ML: at 15:43

## 2022-06-01 ASSESSMENT — PAIN SCALES - GENERAL: PAINLEVEL: NO PAIN (0)

## 2022-06-01 NOTE — NURSING NOTE
"Oncology Rooming Note    June 1, 2022 3:52 PM   Hamilton Angulo is a 71 year old male who presents for:    Chief Complaint   Patient presents with     Oncology Clinic Visit     Multiple Myeloma     Blood Draw     CVC flushed by rn.  VS taken.     Initial Vitals: /62 (BP Location: Right arm, Patient Position: Sitting, Cuff Size: Adult Regular)   Pulse 70   Temp 97.8  F (36.6  C) (Oral)   Resp 16   Wt 66.6 kg (146 lb 14.4 oz)   SpO2 100%   BMI 20.49 kg/m   Estimated body mass index is 20.49 kg/m  as calculated from the following:    Height as of 5/23/22: 1.803 m (5' 11\").    Weight as of this encounter: 66.6 kg (146 lb 14.4 oz). Body surface area is 1.83 meters squared.  No Pain (0) Comment: Data Unavailable   No LMP for male patient.  Allergies reviewed: Yes  Medications reviewed: Yes    Medications: Medication refills not needed today.  Pharmacy name entered into [email or web address]: [id number]  [place] - [address] AT [facility] & [place]    Clinical concerns: Has some questions hed like to ask her personally       Emely Metcalf LPN            "

## 2022-06-01 NOTE — NURSING NOTE
Chief Complaint   Patient presents with     Oncology Clinic Visit     Multiple Myeloma     Blood Draw     CVC flushed by rn.  VS taken.        Good blood return noted in both lumens.  Both lumens flushed with NS and heparin.  Pt tolerated well.  VS taken.  Pt checked in for next appt.    Yoana Hopkins RN

## 2022-06-01 NOTE — PROGRESS NOTES
BMT clinic visit note.  06/01/22      Interval history:  Hamilton has a history of standard risk multiple myeloma in VGPR -   GCSF/plerixafor priming failed to allow for adequate stem cell collection (<1x10^6 cells after 3 days).  Hamilton reports he is currently doing well. He had a long discussion with  over the phone yesterday to review options.      ROS: 8 point review with pertinent positive and negatives in HPI      Lab Results   Component Value Date    WBC 2.2 (L) 05/31/2022    ANEU 1.1 (L) 05/31/2022    HGB 9.7 (L) 05/31/2022    HCT 29.0 (L) 05/31/2022     (L) 05/31/2022     05/31/2022    POTASSIUM 3.9 05/31/2022    CHLORIDE 113 (H) 05/31/2022    CO2 25 05/31/2022     (H) 05/31/2022    BUN 12 05/31/2022    CR 0.86 05/31/2022    MAG 2.2 05/24/2022    INR 0.97 05/04/2022    BILITOTAL 0.6 05/04/2022    AST 15 05/04/2022    ALT 19 05/04/2022    ALKPHOS 53 05/04/2022    PROTTOTAL 7.0 05/04/2022    ALBUMIN 4.3 05/04/2022       Physical Exam:     /62 (BP Location: Right arm, Patient Position: Sitting, Cuff Size: Adult Regular)   Pulse 70   Temp 97.8  F (36.6  C) (Oral)   Resp 16   Wt 66.6 kg (146 lb 14.4 oz)   SpO2 100%   BMI 20.49 kg/m    Physical Exam:    Patient is ambulating , AOx3, NAD, well appearing patient.   HEENT: No mucositis, MM moist, no thrush or ulcers, buccal mucosa intact  Neck supple, no peripheral adenopathy Thyroid gland midline, not enlarged   Lungs: good air exchange, CTA, no crackles,no wheezing.   Heart RRR S1 S2, no murmur or gallop   Abd soft, NT, ND. Without hepato-splenomegaly, no ascites,    LE symmetrical, no edema   Skin without lesion or rashes. No petechiae or ecchymosis.Central line tunneled.   Neuro  Intact, AOx3      Assessment Plans:   Hamilton Angulo is a 71 year old male with standard risk multiple myeloma in VGPR who is seen by phone today after stem cell collection with GCSF/plerixafor did not yield an adequate stem cell dose.    BMT/Engraftment:  Multiple myeloma standard risk   Failed stem cell collection.  He thought about the options and decided to pursue maintanance therapy with . In the future, we can consider him for BCMA CAR-T therapy or clinica trial. This is a good course and I supported this decission as he is not a suitable candidate for autologous HSCT at this time.    We also made the immediate plan to schedule tunneled line removal at [facility] soon.      Caitlyn Maddox MD

## 2022-06-01 NOTE — LETTER
6/1/2022         RE: Hamilton Angulo  [address] [place] [address]        Dear Colleague,    Thank you for referring your patient, Hamilton Angulo, to the [facility] BLOOD AND MARROW TRANSPLANT PROGRAM [place]. Please see a copy of my visit note below.    BMT clinic visit note.  06/01/22      Interval history:  Hamilton has a history of standard risk multiple myeloma in VGPR -   GCSF/plerixafor priming failed to allow for adequate stem cell collection (<1x10^6 cells after 3 days).  Hamilton reports he is currently doing well. He had a long discussion with  over the phone yesterday to review options.      ROS: 8 point review with pertinent positive and negatives in HPI      Lab Results   Component Value Date    WBC 2.2 (L) 05/31/2022    ANEU 1.1 (L) 05/31/2022    HGB 9.7 (L) 05/31/2022    HCT 29.0 (L) 05/31/2022     (L) 05/31/2022     05/31/2022    POTASSIUM 3.9 05/31/2022    CHLORIDE 113 (H) 05/31/2022    CO2 25 05/31/2022     (H) 05/31/2022    BUN 12 05/31/2022    CR 0.86 05/31/2022    MAG 2.2 05/24/2022    INR 0.97 05/04/2022    BILITOTAL 0.6 05/04/2022    AST 15 05/04/2022    ALT 19 05/04/2022    ALKPHOS 53 05/04/2022    PROTTOTAL 7.0 05/04/2022    ALBUMIN 4.3 05/04/2022       Physical Exam:     /62 (BP Location: Right arm, Patient Position: Sitting, Cuff Size: Adult Regular)   Pulse 70   Temp 97.8  F (36.6  C) (Oral)   Resp 16   Wt 66.6 kg (146 lb 14.4 oz)   SpO2 100%   BMI 20.49 kg/m    Physical Exam:    Patient is ambulating , AOx3, NAD, well appearing patient.   HEENT: No mucositis, MM moist, no thrush or ulcers, buccal mucosa intact  Neck supple, no peripheral adenopathy Thyroid gland midline, not enlarged   Lungs: good air exchange, CTA, no crackles,no wheezing.   Heart RRR S1 S2, no murmur or gallop   Abd soft, NT, ND. Without hepato-splenomegaly, no ascites,    LE symmetrical, no edema   Skin without lesion or rashes. No petechiae or ecchymosis.Central  line tunneled.   Neuro  Intact, AOx3      Assessment Plans:   Hamilton Angulo is a 71 year old male with standard risk multiple myeloma in VGPR who is seen by phone today after stem cell collection with GCSF/plerixafor did not yield an adequate stem cell dose.    BMT/Engraftment: Multiple myeloma standard risk   Failed stem cell collection.  He thought about the options and decided to pursue maintanance therapy with . In the future, we can consider him for BCMA CAR-T therapy or clinica trial. This is a good course and I supported this decission as he is not a suitable candidate for autologous HSCT at this time.    We also made the immediate plan to schedule tunneled line removal at [facility] soon.      Caitlyn Maddox MD

## 2022-06-02 ENCOUNTER — LAB (OUTPATIENT)
Dept: LAB | Facility: CLINIC | Age: 72
End: 2022-06-02
Payer: COMMERCIAL

## 2022-06-02 DIAGNOSIS — C90.00 MULTIPLE MYELOMA NOT HAVING ACHIEVED REMISSION (H): Primary | ICD-10-CM

## 2022-06-02 DIAGNOSIS — C90.00 MULTIPLE MYELOMA NOT HAVING ACHIEVED REMISSION (H): ICD-10-CM

## 2022-06-02 LAB — SARS-COV-2 RNA RESP QL NAA+PROBE: NEGATIVE

## 2022-06-02 PROCEDURE — 250N000011 HC RX IP 250 OP 636

## 2022-06-02 PROCEDURE — U0005 INFEC AGEN DETEC AMPLI PROBE: HCPCS

## 2022-06-02 RX ORDER — HEPARIN SODIUM,PORCINE 10 UNIT/ML
3 VIAL (ML) INTRAVENOUS ONCE
Status: COMPLETED | OUTPATIENT
Start: 2022-06-02 | End: 2022-06-02

## 2022-06-02 RX ADMIN — Medication 3 ML: at 08:30

## 2022-06-02 NOTE — NURSING NOTE
Chief Complaint   Patient presents with     Labs Only     CVC flushed with saline and heparin by RN. Nasal swab collected.     Central line flushed with saline and heparin by RN. Nasal swab collected.      Emma Young RN

## 2022-06-03 ENCOUNTER — LAB (OUTPATIENT)
Dept: LAB | Facility: CLINIC | Age: 72
End: 2022-06-03
Payer: COMMERCIAL

## 2022-06-03 PROCEDURE — 250N000011 HC RX IP 250 OP 636

## 2022-06-03 RX ORDER — HEPARIN SODIUM,PORCINE 10 UNIT/ML
5 VIAL (ML) INTRAVENOUS
Status: DISCONTINUED | OUTPATIENT
Start: 2022-06-03 | End: 2022-06-09 | Stop reason: HOSPADM

## 2022-06-03 RX ADMIN — Medication 5 ML: at 08:40

## 2022-06-03 RX ADMIN — Medication 5 ML: at 08:39

## 2022-06-04 ENCOUNTER — LAB (OUTPATIENT)
Dept: LAB | Facility: CLINIC | Age: 72
End: 2022-06-04
Payer: COMMERCIAL

## 2022-06-04 PROCEDURE — 250N000011 HC RX IP 250 OP 636

## 2022-06-04 RX ORDER — HEPARIN SODIUM,PORCINE 10 UNIT/ML
5 VIAL (ML) INTRAVENOUS ONCE
Status: COMPLETED | OUTPATIENT
Start: 2022-06-04 | End: 2022-06-04

## 2022-06-04 RX ADMIN — Medication 5 ML: at 08:44

## 2022-06-04 NOTE — NURSING NOTE
Chief Complaint   Patient presents with     Lab Only     CVC line flushed by RN in lab.      Brown and blue lines flushed with saline and heparin. Pt tolerated well.     Glenis Rivera RN

## 2022-06-05 ENCOUNTER — LAB (OUTPATIENT)
Dept: LAB | Facility: CLINIC | Age: 72
End: 2022-06-05
Payer: COMMERCIAL

## 2022-06-05 PROCEDURE — 250N000011 HC RX IP 250 OP 636: Performed by: INTERNAL MEDICINE

## 2022-06-05 RX ORDER — HEPARIN SODIUM,PORCINE 10 UNIT/ML
5 VIAL (ML) INTRAVENOUS ONCE
Status: COMPLETED | OUTPATIENT
Start: 2022-06-05 | End: 2022-06-05

## 2022-06-05 RX ADMIN — Medication 5 ML: at 09:49

## 2022-06-05 NOTE — NURSING NOTE
Chief Complaint   Patient presents with     Lab Only     CVC line flushed by RN in lab.      Labs collected from CVC by RN. Brown and blue lines flushed with saline and heparin.     Glenis Rivera RN

## 2022-06-06 ENCOUNTER — ANCILLARY PROCEDURE (OUTPATIENT)
Dept: ULTRASOUND IMAGING | Facility: CLINIC | Age: 72
End: 2022-06-06
Payer: COMMERCIAL

## 2022-06-06 DIAGNOSIS — C90.00 MULTIPLE MYELOMA NOT HAVING ACHIEVED REMISSION (H): ICD-10-CM

## 2022-06-06 PROCEDURE — 36589 REMOVAL TUNNELED CV CATH: CPT | Performed by: PHYSICIAN ASSISTANT

## 2022-06-06 NOTE — PROGRESS NOTES
Interventional Radiology Brief Post Procedure Note    Procedure: IR TCVC Removal    Proceduralist: Sriram Valadez PA-C    Assistant: None    Time Out: Prior to the start of the procedure and with procedural staff participation, I verbally confirmed the patient s identity using two indicators, relevant allergies, that the procedure was appropriate and matched the consent or emergent situation, and that the correct equipment/implants were available. Immediately prior to starting the procedure I conducted the Time Out with the procedural staff and re-confirmed the patient s name, procedure, and site/side. (The Joint Commission universal protocol was followed.)  Yes    Medications   Medication Event Details Admin User Admin Time       Sedation: None. Local Anesthestic used    Findings: Completed removal of right chest TCVC in its entirety.      Estimated Blood Loss: Minimal    SPECIMENS: None    Complications: 1. None     Condition: Stable    Plan: Follow-up per primary team.     Comments: See dictated procedure note for full details.    Sriram Valadez PA-C         active

## 2022-06-06 NOTE — DISCHARGE INSTRUCTIONS
A collaboration between [facility]  Experts in minimally invasive, targeted treatments performed using imaging guidance    Tunneled Central Venous Catheter Removal    Today you had your existing tunneled central venous catheter removed because it was no longer needed for treatment.    Your catheter was removed by Sriram Valadez PA-C    After you go home:  Avoid lying flat or bending at the waist for 2 hours following removal of the catheter to reduce risk of bleeding from catheter site.  Keep any applied tape/gauze dressings clean and dry. Change tape/gauze dressings if they get wet or soiled.  You may shower following the procedure, however cover and protect from moisture any tape/gauze dressings.   You may remove tape/gauze dressings after 3 days if the site looks like it is in the process of healing or scabbing over.  Avoid strenuous activities (elevating heart rate) or lifting more than 10 pounds for the next 3 days. Walking, elliptical and golf are examples of acceptable activities.  If there is bleeding or oozing from the procedure site, apply firm pressure to the area above your collar bone (where the catheter entered the bloodstream) for 10 minutes.  If the bleeding continues, continue to hold pressure and seek medical attention at the phone numbers below.  Mild procedure site discomfort can be treated with an ice pack and over-the-counter pain relievers.           Call our Interventional Radiology (IR) service if:  If you start bleeding from the procedure site. Our radiology provider can help you decide if you need to return to the hospital.  If you have new or worsening pain related to the procedure.  If you have concerning swelling at the procedure site.  If you develop hives or a rash or any unexplained itching.  If you have a fever of greater than 100.5  F and chills in the first 5 days after procedure.  Any other concerns related to your  procedure.    Contact Number:  [phone]  (IR control desk)  Monday - Friday 8:00 am - 4:30 pm    After hours for urgent concerns:  [phone]  After 4:30 pm Monday - Friday, Weekends and Holidays.   Ask for Interventional Radiology on-call.  Someone is available 24 hours a day.  [employer] toll free number:  [phone]

## 2022-06-10 ENCOUNTER — TRANSFERRED RECORDS (OUTPATIENT)
Dept: HEALTH INFORMATION MANAGEMENT | Facility: CLINIC | Age: 72
End: 2022-06-10

## 2022-06-17 LAB — CULTURE HARVEST COMPLETE DATE: NORMAL

## 2022-06-23 LAB
ADDITIONAL COMMENTS: NORMAL
CULTURE HARVEST COMPLETE DATE: NORMAL
INTERPRETATION: NORMAL
INTERPRETATION: NORMAL
ISCN: NORMAL
METHODS: NORMAL

## 2022-06-24 ENCOUNTER — TRANSFERRED RECORDS (OUTPATIENT)
Dept: HEALTH INFORMATION MANAGEMENT | Facility: CLINIC | Age: 72
End: 2022-06-24

## 2022-07-15 ENCOUNTER — TRANSFERRED RECORDS (OUTPATIENT)
Dept: HEALTH INFORMATION MANAGEMENT | Facility: CLINIC | Age: 72
End: 2022-07-15

## 2022-07-28 ENCOUNTER — OFFICE VISIT (OUTPATIENT)
Dept: OPHTHALMOLOGY | Facility: CLINIC | Age: 72
End: 2022-07-28
Payer: COMMERCIAL

## 2022-07-28 DIAGNOSIS — H52.4 MYOPIA OF BOTH EYES WITH REGULAR ASTIGMATISM AND PRESBYOPIA: ICD-10-CM

## 2022-07-28 DIAGNOSIS — H43.813 PVD (POSTERIOR VITREOUS DETACHMENT), BILATERAL: ICD-10-CM

## 2022-07-28 DIAGNOSIS — H35.3131 EARLY DRY STAGE NONEXUDATIVE AGE-RELATED MACULAR DEGENERATION OF BOTH EYES: ICD-10-CM

## 2022-07-28 DIAGNOSIS — H52.13 MYOPIA OF BOTH EYES WITH REGULAR ASTIGMATISM AND PRESBYOPIA: ICD-10-CM

## 2022-07-28 DIAGNOSIS — Z96.1 PSEUDOPHAKIA: ICD-10-CM

## 2022-07-28 DIAGNOSIS — Z01.00 EXAMINATION OF EYES AND VISION: Primary | ICD-10-CM

## 2022-07-28 DIAGNOSIS — H52.223 MYOPIA OF BOTH EYES WITH REGULAR ASTIGMATISM AND PRESBYOPIA: ICD-10-CM

## 2022-07-28 PROCEDURE — 92014 COMPRE OPH EXAM EST PT 1/>: CPT | Performed by: STUDENT IN AN ORGANIZED HEALTH CARE EDUCATION/TRAINING PROGRAM

## 2022-07-28 PROCEDURE — 92015 DETERMINE REFRACTIVE STATE: CPT | Performed by: STUDENT IN AN ORGANIZED HEALTH CARE EDUCATION/TRAINING PROGRAM

## 2022-07-28 ASSESSMENT — VISUAL ACUITY
OS_SC: 5
OD_CC+: -1
CORRECTION_TYPE: GLASSES
OS_CC: 20/20
OS_CC+: -3
METHOD: SNELLEN - LINEAR
OD_CC: 20/20
OD_SC: 1+

## 2022-07-28 ASSESSMENT — CUP TO DISC RATIO
OD_RATIO: 0.3
OS_RATIO: 0.25

## 2022-07-28 ASSESSMENT — REFRACTION_WEARINGRX
OS_AXIS: 160
OD_SPHERE: -3.25
OD_AXIS: 050
OD_CYLINDER: +1.00
OS_SPHERE: -2.00
OS_CYLINDER: +0.75
SPECS_TYPE: SVL

## 2022-07-28 ASSESSMENT — TONOMETRY
OS_IOP_MMHG: 20
IOP_METHOD: APPLANATION
OD_IOP_MMHG: 21

## 2022-07-28 ASSESSMENT — REFRACTION_MANIFEST
OS_SPHERE: -1.50
OD_SPHERE: -2.50
OD_AXIS: 045
OS_ADD: +2.50
OD_ADD: +2.50
OD_CYLINDER: +0.50
OS_AXIS: 152
OS_CYLINDER: +0.75

## 2022-07-28 ASSESSMENT — CONF VISUAL FIELD
OS_NORMAL: 1
OD_NORMAL: 1

## 2022-07-28 ASSESSMENT — SLIT LAMP EXAM - LIDS
COMMENTS: NORMAL
COMMENTS: NORMAL

## 2022-07-28 ASSESSMENT — EXTERNAL EXAM - LEFT EYE: OS_EXAM: NORMAL

## 2022-07-28 ASSESSMENT — EXTERNAL EXAM - RIGHT EYE: OD_EXAM: NORMAL

## 2022-07-28 NOTE — LETTER
7/28/2022         RE: Hamilton Angulo  [address]  [place] [address]        Dear Colleague,    Thank you for referring your patient, Hamilton Angulo, to the [facility]. Please see a copy of my visit note below.     Current Eye Medications:  Artificial tears both eyes each morning.       Subjective:  Comprehensive Eye Exam.  He continues to have intermittent floaters in his vision of each eye.  Distance vision is adequate without correction, and reading vision is adequate without correction.      Objective:  See Ophthalmology Exam.       Assessment:  Hamilton Angulo is a 72 year old male who presents with:   Encounter Diagnoses   Name Primary?     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        Sincerely,        Gabe Randhawa MD

## 2022-07-28 NOTE — PROGRESS NOTES
Current Eye Medications:  Artificial tears both eyes each morning.       Subjective:  Comprehensive Eye Exam.  He continues to have intermittent floaters in his vision of each eye.  Distance vision is adequate without correction, and reading vision is adequate without correction.      Objective:  See Ophthalmology Exam.       Assessment:  Hamilton Angulo is a 72 year old male who presents with:   Encounter Diagnoses   Name Primary?     Examination of eyes and vision   Yes     Early dry stage nonexudative age-related macular degeneration of both eyes Appears stable both eyes      Pseudophakia - Both Eyes      PVD (posterior vitreous detachment), bilateral        Myopia of both eyes with regular astigmatism and presbyopia        Plan:  Continue artificial tears up to four times a day as needed     Continue AREDS2 eye vitamins by mouth     Glasses prescription given - optional to update     Gabe Randhawa MD  [phone]

## 2022-07-28 NOTE — PATIENT INSTRUCTIONS
Continue artificial tears up to four times a day as needed     Continue AREDS2 eye vitamins by mouth     Glasses prescription given - optional to update     Gabe Randhawa MD  [phone]

## 2022-08-15 ENCOUNTER — TRANSFERRED RECORDS (OUTPATIENT)
Dept: HEALTH INFORMATION MANAGEMENT | Facility: CLINIC | Age: 72
End: 2022-08-15

## 2022-09-08 ENCOUNTER — TRANSFERRED RECORDS (OUTPATIENT)
Dept: HEALTH INFORMATION MANAGEMENT | Facility: CLINIC | Age: 72
End: 2022-09-08

## 2022-10-10 DIAGNOSIS — I10 ESSENTIAL HYPERTENSION WITH GOAL BLOOD PRESSURE LESS THAN 140/90: ICD-10-CM

## 2022-10-12 RX ORDER — AMLODIPINE BESYLATE 2.5 MG/1
2.5 TABLET ORAL DAILY
Qty: 90 TABLET | Refills: 0 | Status: SHIPPED | OUTPATIENT
Start: 2022-10-12 | End: 2023-01-13

## 2022-10-19 ENCOUNTER — MEDICAL CORRESPONDENCE (OUTPATIENT)
Dept: HEALTH INFORMATION MANAGEMENT | Facility: CLINIC | Age: 72
End: 2022-10-19

## 2022-10-19 ENCOUNTER — TRANSFERRED RECORDS (OUTPATIENT)
Dept: HEALTH INFORMATION MANAGEMENT | Facility: CLINIC | Age: 72
End: 2022-10-19

## 2022-10-25 ENCOUNTER — TELEPHONE (OUTPATIENT)
Dept: TRANSPLANT | Facility: CLINIC | Age: 72
End: 2022-10-25

## 2022-10-25 DIAGNOSIS — D46.9 MDS (MYELODYSPLASTIC SYNDROME) (H): Primary | ICD-10-CM

## 2022-11-08 ENCOUNTER — TELEPHONE (OUTPATIENT)
Dept: INTERNAL MEDICINE | Facility: CLINIC | Age: 72
End: 2022-11-08

## 2022-11-08 NOTE — TELEPHONE ENCOUNTER
Discussed upcoming Medicare Annual Wellness visit with Harmony CALI [facility] and she advised that pt will need a visit with PCP first, then RN can do Medicare annual wellness visit. Spoke with pt. Apologized for the scheduling error. Pt requested that appt with Dr. Herrera be for a physical. Appt scheduled.    Jacqueline Flynn RN  [facility]

## 2022-11-14 ENCOUNTER — TELEPHONE (OUTPATIENT)
Dept: TRANSPLANT | Facility: CLINIC | Age: 72
End: 2022-11-14

## 2022-11-14 NOTE — TELEPHONE ENCOUNTER
Called the patient to confirm their NT appointment on 11/17/22 at 1:30 PM. The patient plans to attend the appointment.

## 2022-11-17 ENCOUNTER — CARE COORDINATION (OUTPATIENT)
Dept: TRANSPLANT | Facility: CLINIC | Age: 72
End: 2022-11-17

## 2022-11-17 ENCOUNTER — OFFICE VISIT (OUTPATIENT)
Dept: TRANSPLANT | Facility: CLINIC | Age: 72
End: 2022-11-17
Attending: INTERNAL MEDICINE
Payer: COMMERCIAL

## 2022-11-17 ENCOUNTER — MEDICAL CORRESPONDENCE (OUTPATIENT)
Dept: TRANSPLANT | Facility: CLINIC | Age: 72
End: 2022-11-17

## 2022-11-17 VITALS
TEMPERATURE: 98.2 F | DIASTOLIC BLOOD PRESSURE: 85 MMHG | OXYGEN SATURATION: 100 % | WEIGHT: 152 LBS | HEART RATE: 78 BPM | SYSTOLIC BLOOD PRESSURE: 148 MMHG | HEIGHT: 72 IN | BODY MASS INDEX: 20.59 KG/M2

## 2022-11-17 DIAGNOSIS — D46.9 MDS (MYELODYSPLASTIC SYNDROME) (H): Primary | ICD-10-CM

## 2022-11-17 DIAGNOSIS — C90.00 MULTIPLE MYELOMA NOT HAVING ACHIEVED REMISSION (H): ICD-10-CM

## 2022-11-17 DIAGNOSIS — C90.00 MULTIPLE MYELOMA NOT HAVING ACHIEVED REMISSION (H): Primary | ICD-10-CM

## 2022-11-17 DIAGNOSIS — D46.9 MDS (MYELODYSPLASTIC SYNDROME) (H): ICD-10-CM

## 2022-11-17 PROCEDURE — 36415 COLL VENOUS BLD VENIPUNCTURE: CPT

## 2022-11-17 PROCEDURE — 86828 HLA CLASS I&II ANTIBODY QUAL: CPT

## 2022-11-17 PROCEDURE — 99215 OFFICE O/P EST HI 40 MIN: CPT

## 2022-11-17 PROCEDURE — 81378 HLA I & II TYPING HR: CPT

## 2022-11-17 PROCEDURE — G0463 HOSPITAL OUTPT CLINIC VISIT: HCPCS

## 2022-11-17 ASSESSMENT — PAIN SCALES - GENERAL: PAINLEVEL: NO PAIN (0)

## 2022-11-17 NOTE — PROGRESS NOTES
BMT / Cell Therapy Consultation      Hamilton Angulo is a 72 year old male referred by Dr. Bowling for MDS developing after therapy for multiple myeloma.    Disease presentation and baseline characteristics:  Standard risk IgG kappa MM presenting as progressive but asymptomatic anemia. WBC 3.3.  Hemoglobin 10.  Platelets 155. CMP showed normal creatinine and calcium.  Total protein 8.7.  LFTs unremarkable.  Albumin 4.1, Beta-2 microglobulin 1.9. Bone marrow biopsy showed plasma cell myeloma with normocellular marrow, cellularity 20 to 30% with trilineage hematopoiesis and 20- 25% kappa monotypic plasma cells.  Increased marrow storage iron. Bone marrow flow cytometry showed 1.2% plasma cells which express CD38, CD19, CD20 (partial dim), CD45 (dim), CD56  and monotypic cytoplasmic kappa immunoglobulin light chains. FISH Hyperdiploid with gains of chromosomes 5, 9, and/or 15 (85%), gain of 11q (95%), monosomy 13 (46%), loss of IGH (4.5%) (control range 0-2.8%); no rearrangement of IGH. Cytogenetics 46 XY. Baseline M spike of 2.1. Serum IgG level was 2862.  Monoclonal peak in the urine was 57.5 and MANDI showed large monoclonal free kappa and monoclonal IgG kappa. [place] free light chain 57.  Lambda 0.65 and the ratio 88. On 10/1/21, his M spike was down to 0.8 and kappa FLC down to 25 (partial response).  Kappa FLC plateaued at 27 at pre-autologous transplant workup. We attempted to mobilize for transplant and with GCSF/plerixafor priming collected <1x10^6 cells after 3 days). He went on to receive therapy with daratumumab, carfilzomib, and dex. 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 Bone marrow at that time showed 30% cellularity, no increase in blasts, mutations in SRSF2 and TET2, normal cytogenetics, R-IPSS very low risk.    Date Treatment Name Response Side Effects / Toxicities   8/2021 RVd x 6 Partial response  Neutropenia, neuropathy   7/2022 Ana/carfilzomib/dex Complete response Neutropenia, development of MDS (likely evolving prior to this therapy)       HPI:  Please see my entry above for hematologic history.  Hamilton is here with his wife for consultation regarding MDS. He is feeling well overall, staying active, gaining back some of the weight he initially lost when diagnosed with myeloma. No fevers or chills. No bruising or unexpected infections.      ASSESSMENT AND PLAN:    Hamilton Angulo is a 72 year old male with a malignancy that is currently incurable by standard chemotherapeutic approaches: MDS in the setting of multiple myeloma. To date, the only modality that offers a chance at long-term survival and potential cure is allogeneic hematopoietic stem cell transplantation.  Based upon my assessment of disease risk and comorbidities, Hamilton is a suitable candidate for allogeneic HCT.      We had a detailed discussion about the rationale for transplant in this situation, requirements for proceeding, which include insurance approval, identification of an adequate source of donor hematopoetic progenitor cells, availability of a full-time caregiver along with residence within 30 minutes of the [facility]  through at least day +100 post transplant, and compliance with the immunosuppression and supportive care medication regimen prescribed by providers at the [facility].      We had a nathalie discussion about the risks of the transplant itself, including toxicities from the preparative regimen, severe infections, the need for red blood cell and platelet transfusion support, the risk of graft rejection, the risk of acute and chronic tzsaq-fxxdcf-dhpn disease, the need for immunosuppressive medications, the potential for severe organ toxicity including lungs, liver, skin, and kidneys, the possibility of long-term disability, and the risk of death due to complications of the transplant.  We discussed the risk of disease relapse despite  going forward with a transplant.  Hamilton expressed understanding of these risks.    Under consideration at this time is a TEJ preparative regimen, with matched donor PBSC as the donor source.   Timing of transplant is likely to be in 2-3 months, pending identification of an HLA matched unrelated donor, adequate disease control and organ function.      Will a transplant be safe?   Mr. Angulo's HCTCI is 0. Frailty score was previously 1 (pre-frail due to weight loss). Non-relapse mortality is estimated at 15%.     Will a transplant be efficacious?   CIBMTR survival probability at 1 year is 69%. Although his MDS is low risk enough that a transplant would not be needed at this moment, he has a background of multiple myeloma, which will undoubtedly recur and be difficult to treat because of his persistent neutropenia.       Recommendations:     His myeloma is currently in CR, and this could be a unique window to provide long-term myeloma control and potentially cure his MDS before it progresses. He is not transfusion dependent, but again his neutropenia put him at risk of life-threatening infections that could occur at anytime. I will review his case with our BMT faculty to seek consensus on his case on Monday.    Proceed to formal donor search    Proceed to allogeneic transplant workup once a donor has been identified      I spent 50 minutes in the care of this patient today, which included time necessary for preparation for the visit, obtaining history, ordering medications/tests/procedures as medically indicated, review of pertinent medical literature, counseling of the patient, communication of recommendations to the care team, and documentation time.    Pepper Elise MD    Securely message me with the Vocera Web Console ([email or web address] or Microsoft Teams        ROS:    10 point ROS neg other than the symptoms noted above in the HPI.        Past Medical History:   Diagnosis Date      Bilateral cataracts 2001     Herniated disc 1983    Lumbar     Hyperlipidemia LDL goal <130      Hypertension 5/2004     Tear of MCL (medial collateral ligament) of knee 6/20/2001    LT Knee/WC       Past Surgical History:   Procedure Laterality Date     CATARACT IOL, RT/LT  12/2003    Bilateral     INSERT CATHETER VASCULAR ACCESS Right 5/23/2022    Procedure: INSERTION, VASCULAR ACCESS CATHETER NON VALVE DUAL LUMEN;  Surgeon: Timothy Reddy PA-C;  Location: [facility] OR     IR CVC TUNNEL PLACEMENT > 5 YRS OF AGE  5/23/2022     IR CVC TUNNEL REMOVAL RIGHT  6/6/2022       Family History   Problem Relation Age of Onset     Breast Cancer Mother      Hypertension Mother         She potentially had this.     Other - See Comments Father         He potentially had liver issues and dementia.     Heart Disease Maternal Grandmother         She potentially had this.     Cancer Maternal Grandfather         Unknown type of cancer.     Other - See Comments Paternal Half-Brother         He potentially had dementia.       Social History     Tobacco Use     Smoking status: Never     Smokeless tobacco: Never   Vaping Use     Vaping Use: Never used   Substance Use Topics     Alcohol use: Yes     Comment: O[employer].     Drug use: No         Allergies   Allergen Reactions     Shrimp Hives        Current Outpatient Medications   Medication Sig Dispense Refill     acetaminophen (TYLENOL) 325 MG tablet Take 325-650 mg by mouth every 4 hours as needed Take as needed following label instructions       acyclovir (ZOVIRAX) 400 MG tablet Take 400 mg by mouth 2 times daily       amLODIPine (NORVASC) 2.5 MG tablet Take 1 tablet (2.5 mg) by mouth daily +++NEED APPOINTMENT+++ 90 tablet 0     Artificial Tear Solution (SM ARTIFICIAL TEARS) SOLN Apply 1 Dose to eye every 2 hours as needed Use per label instructions       lisinopril (ZESTRIL) 20 MG tablet Take 1 tablet (20 mg) by mouth daily 90 tablet 3     Multiple Vitamins-Minerals (EQ COMPLETE  MULTIVITAMIN-ADULT) TABS Take 1 tablet by mouth daily       loratadine (CLARITIN) 10 MG tablet Take 1 tablet (10 mg) by mouth daily (Patient not taking: Reported on 11/17/2022) 30 tablet 1         Physical Exam:     Vital Signs: BP (!) 148/85   Pulse 78   Temp 98.2  F (36.8  C) (Oral)   Ht 1.829 m (6')   Wt 68.9 kg (152 lb)   SpO2 100%   BMI 20.61 kg/m          KPS:  90    He is pleasant, interactive, sclerae anicteric, cranial nerves grossly intact, no oral mucosal lesions, normal respiratory effort, no JVD, normal perfusion, abdomen non-distended, skin without rash, no edema, normal affect.         Hamilton understood the above assessment and recommendations.  Multiple questions answered.  No barriers to learning identified.       Known issues that I take into account for medical decisions, with salient changes to the plan considering these complexities noted above.    Patient Active Problem List   Diagnosis     HYPERLIPIDEMIA LDL GOAL <130     PSEUDOPHAKIA OU     Advanced directives, counseling/discussion     History of actinic keratoses     PVD (posterior vitreous detachment), bilateral     Nonexudative senile macular degeneration of retina     Elevated glucose     Essential hypertension with goal blood pressure less than 140/90     Elevated prostate specific antigen (PSA)     Dupuytren contracture     Benign prostatic hyperplasia with urinary retention     Multiple myeloma not having achieved remission (H)     Autologous donor of stem cells       ------------------------------------------------------------------------------------------------------------------------------------------------    Patient Care Team       Relationship Specialty Notifications Start End    Bro Herrera MD PCP - General Internal Medicine  10/29/13     Phone: [phone] Fax: [phone] [address] [facility] [address]    Bro Herrera MD Assigned PCP   9/29/13     Phone: [phone] Fax: [phone] [address]  [facility] [address]    Carmen Ellis MD Assigned Cancer Care Provider   5/2/21     Phone: [phone] Fax: [phone]         [address] [facility] [address]    Noris Gómez, RN Specialty Care Coordinator Oncology Admissions 7/7/21     phone:  [phone]    Phone: [phone]         Yusef Bowling MD MD Internal Medicine  10/28/21     Phone: [phone] Fax: [phone]         [place] [facility] [address] [facility] [address]    Jacqueline Ha, [facility]  BMT - Adult Admissions 12/8/21     Phone: [phone]         Pepper Elise MD MD Hematology & Oncology  4/21/22     Phone: [phone] Fax: [phone]         [address] [facility] [address] [facility] [address]    Pepper Elise MD BMT Physician Hematology & Oncology  5/4/22     Phone: [phone] Fax: [phone]         [address] [facility] [address] [facility] [address]    Wilder Desai MD MD Cardiovascular Disease  5/9/22     Phone: [phone] Fax: [phone]         [address] [place] [address]    Wilder Desai MD Assigned Heart and Vascular Provider   5/15/22     Phone: [phone] Fax: [phone]         [address] [place] [address]    Gabe Randhawa MD Assigned Surgical Provider   8/6/22     Phone: [phone] Fax: [phone] [address] [facility] [address]

## 2022-11-17 NOTE — LETTER
11/17/2022         RE: Hamilton Angulo  [address] [facility] [address]        Dear Colleague,    Thank you for referring your patient, Hamilton Angulo, to the [facility] BLOOD AND MARROW TRANSPLANT PROGRAM [place]. Please see a copy of my visit note below.           BMT / Cell Therapy Consultation      Hamilton Angulo is a 72 year old male referred by Dr. Bowling for MDS developing after therapy for multiple myeloma.    Disease presentation and baseline characteristics:  Standard risk IgG kappa MM presenting as progressive but asymptomatic anemia. WBC 3.3.  Hemoglobin 10.  Platelets 155. CMP showed normal creatinine and calcium.  Total protein 8.7.  LFTs unremarkable.  Albumin 4.1, Beta-2 microglobulin 1.9. Bone marrow biopsy showed plasma cell myeloma with normocellular marrow, cellularity 20 to 30% with trilineage hematopoiesis and 20- 25% kappa monotypic plasma cells.  Increased marrow storage iron. Bone marrow flow cytometry showed 1.2% plasma cells which express CD38, CD19, CD20 (partial dim), CD45 (dim), CD56  and monotypic cytoplasmic kappa immunoglobulin light chains. FISH Hyperdiploid with gains of chromosomes 5, 9, and/or 15 (85%), gain of 11q (95%), monosomy 13 (46%), loss of IGH (4.5%) (control range 0-2.8%); no rearrangement of IGH. Cytogenetics 46 XY. Baseline M spike of 2.1. Serum IgG level was 2862.  Monoclonal peak in the urine was 57.5 and MANDI showed large monoclonal free kappa and monoclonal IgG kappa. [place] free light chain 57.  Lambda 0.65 and the ratio 88. On 10/1/21, his M spike was down to 0.8 and kappa FLC down to 25 (partial response).  Kappa FLC plateaued at 27 at pre-autologous transplant workup. We attempted to mobilize for transplant and with GCSF/plerixafor priming collected <1x10^6 cells after 3 days). He went on to receive therapy with daratumumab, carfilzomib, and dex. He developed persistent neutropenia and was subsequently diagnosed with therapy-related MDS on  9/19/22, with no detectable myeloma. Bone marrow at that time showed 30% cellularity, no increase in blasts, mutations in SRSF2 and TET2, normal cytogenetics, R-IPSS very low risk.    Date Treatment Name Response Side Effects / Toxicities   8/2021 RVd x 6 Partial response Neutropenia, neuropathy   7/2022 Ana/carfilzomib/dex Complete response Neutropenia, development of MDS (likely evolving prior to this therapy)       HPI:  Please see my entry above for hematologic history.  Hamilton is here with his wife for consultation regarding MDS. He is feeling well overall, staying active, gaining back some of the weight he initially lost when diagnosed with myeloma. No fevers or chills. No bruising or unexpected infections.      ASSESSMENT AND PLAN:    Hamilton Angulo is a 72 year old male with a malignancy that is currently incurable by standard chemotherapeutic approaches: MDS in the setting of multiple myeloma. To date, the only modality that offers a chance at long-term survival and potential cure is allogeneic hematopoietic stem cell transplantation.  Based upon my assessment of disease risk and comorbidities, Hamilton is a suitable candidate for allogeneic HCT.      We had a detailed discussion about the rationale for transplant in this situation, requirements for proceeding, which include insurance approval, identification of an adequate source of donor hematopoetic progenitor cells, availability of a full-time caregiver along with residence within 30 minutes of the [facility] through at least day +100 post transplant, and compliance with the immunosuppression and supportive care medication regimen prescribed by providers [facility] the [facility]      We had a nathalie discussion about the risks of the transplant itself, including toxicities from the preparative regimen, severe infections, the need for red blood cell and platelet transfusion support, the risk of graft rejection, the risk of acute and chronic sjfbr-agqjdy-vvwm disease, the need  for immunosuppressive medications, the potential for severe organ toxicity including lungs, liver, skin, and kidneys, the possibility of long-term disability, and the risk of death due to complications of the transplant.  We discussed the risk of disease relapse despite going forward with a transplant.  Hamilton expressed understanding of these risks.    Under consideration at this time is a TEJ preparative regimen, with matched donor PBSC as the donor source.   Timing of transplant is likely to be in 2-3 months, pending identification of an HLA matched unrelated donor, adequate disease control and organ function.      Will a transplant be safe?   Mr. Angulo's HCTCI is 0. Frailty score was previously 1 (pre-frail due to weight loss). Non-relapse mortality is estimated at 15%.     Will a transplant be efficacious?   CIBMTR survival probability at 1 year is 69%. Although his MDS is low risk enough that a transplant would not be needed at this moment, he has a background of multiple myeloma, which will undoubtedly recur and be difficult to treat because of his persistent neutropenia.       Recommendations:     His myeloma is currently in CR, and this could be a unique window to provide long-term myeloma control and potentially cure his MDS before it progresses. He is not transfusion dependent, but again his neutropenia put him at risk of life-threatening infections that could occur at anytime. I will review his case with our BMT faculty to seek consensus on his case on Monday.    Proceed to formal donor search    Proceed to allogeneic transplant workup once a donor has been identified      I spent 50 minutes in the care of this patient today, which included time necessary for preparation for the visit, obtaining history, ordering medications/tests/procedures as medically indicated, review of pertinent medical literature, counseling of the patient, communication of recommendations to the care team, and documentation  time.    Pepper Elise MD    Securely message me with the Vocera Web Console ([email or web address] or Microsoft Teams        ROS:    10 point ROS neg other than the symptoms noted above in the HPI.        Past Medical History:   Diagnosis Date     Bilateral cataracts 2001     Herniated disc 1983    Lumbar     Hyperlipidemia LDL goal <130      Hypertension 5/2004     Tear of MCL (medial collateral ligament) of knee 6/20/2001    LT Knee/WC       Past Surgical History:   Procedure Laterality Date     CATARACT IOL, RT/LT  12/2003    Bilateral     INSERT CATHETER VASCULAR ACCESS Right 5/23/2022    Procedure: INSERTION, VASCULAR ACCESS CATHETER NON VALVE DUAL LUMEN;  Surgeon: Timothy Reddy PA-C;  Location: [facility] OR     IR CVC TUNNEL PLACEMENT > 5 YRS OF AGE  5/23/2022     IR CVC TUNNEL REMOVAL RIGHT  6/6/2022       Family History   Problem Relation Age of Onset     Breast Cancer Mother      Hypertension Mother         She potentially had this.     Other - See Comments Father         He potentially had liver issues and dementia.     Heart Disease Maternal Grandmother         She potentially had this.     Cancer Maternal Grandfather         Unknown type of cancer.     Other - See Comments Paternal Half-Brother         He potentially had dementia.       Social History     Tobacco Use     Smoking status: Never     Smokeless tobacco: Never   Vaping Use     Vaping Use: Never used   Substance Use Topics     Alcohol use: Yes     Comment: O[employer].     Drug use: No         Allergies   Allergen Reactions     Shrimp Hives        Current Outpatient Medications   Medication Sig Dispense Refill     acetaminophen (TYLENOL) 325 MG tablet Take 325-650 mg by mouth every 4 hours as needed Take as needed following label instructions       acyclovir (ZOVIRAX) 400 MG tablet Take 400 mg by mouth 2 times daily       amLODIPine (NORVASC) 2.5 MG tablet Take 1 tablet (2.5 mg) by mouth daily +++NEED APPOINTMENT+++ 90  tablet 0     Artificial Tear Solution (SM ARTIFICIAL TEARS) SOLN Apply 1 Dose to eye every 2 hours as needed Use per label instructions       lisinopril (ZESTRIL) 20 MG tablet Take 1 tablet (20 mg) by mouth daily 90 tablet 3     Multiple Vitamins-Minerals (EQ COMPLETE MULTIVITAMIN-ADULT) TABS Take 1 tablet by mouth daily       loratadine (CLARITIN) 10 MG tablet Take 1 tablet (10 mg) by mouth daily (Patient not taking: Reported on 11/17/2022) 30 tablet 1         Physical Exam:     Vital Signs: BP (!) 148/85   Pulse 78   Temp 98.2  F (36.8  C) (Oral)   Ht 1.829 m (6')   Wt 68.9 kg (152 lb)   SpO2 100%   BMI 20.61 kg/m          KPS:  90    He is pleasant, interactive, sclerae anicteric, cranial nerves grossly intact, no oral mucosal lesions, normal respiratory effort, no JVD, normal perfusion, abdomen non-distended, skin without rash, no edema, normal affect.         Hamilton understood the above assessment and recommendations.  Multiple questions answered.  No barriers to learning identified.       Known issues that I take into account for medical decisions, with salient changes to the plan considering these complexities noted above.    Patient Active Problem List   Diagnosis     HYPERLIPIDEMIA LDL GOAL <130     PSEUDOPHAKIA OU     Advanced directives, counseling/discussion     History of actinic keratoses     PVD (posterior vitreous detachment), bilateral     Nonexudative senile macular degeneration of retina     Elevated glucose     Essential hypertension with goal blood pressure less than 140/90     Elevated prostate specific antigen (PSA)     Dupuytren contracture     Benign prostatic hyperplasia with urinary retention     Multiple myeloma not having achieved remission (H)     Autologous donor of stem cells       ------------------------------------------------------------------------------------------------------------------------------------------------    Patient Care Team       Relationship Specialty  Notifications Start End    Bro Herrera MD PCP - General Internal Medicine  10/29/13     Phone: [phone] Fax: [phone]         [address] [facility] [address]    Bro Herrera MD Assigned PCP   9/29/13     Phone: [phone] Fax: [phone]         [address] [facility] [address]    Carmen Ellis MD Assigned Cancer Care Provider   5/2/21     Phone: [phone] Fax: [phone]         [address] [place] [address]    Noris Gómez, RN Specialty Care Coordinator Oncology Admissions 7/7/21     phone:  [phone]    Phone: [phone]         Yusef Bowling MD MD Internal Medicine  10/28/21     Phone: [phone] Fax: [phone]         [place] [facility] [address] [facility] [address] [facility] [address]    Jacqueline Ha, [facility]  BMT - Adult Admissions 12/8/21     Phone: [phone]         Pepper Elise MD MD Hematology & Oncology  4/21/22     Phone: [phone] Fax: [phone]         [address] [address]    Pepper Elise MD BMT Physician Hematology & Oncology  5/4/22     Phone: [phone] Fax: [phone]         [address] [address]    Wilder Desai MD MD Cardiovascular Disease  5/9/22     Phone: [phone] Fax: [phone]         [address] [facility] [address]    Wilder Desai MD Assigned Heart and Vascular Provider   5/15/22     Phone: [phone] Fax: [phone]         [address] [facility] [address]    Gabe Randhawa MD Assigned Surgical Provider   8/6/22     Phone: [phone] Fax: [phone] [address] [facility] [address]              Again, thank you for allowing me to participate in the care of your patient.      Sincerely,    No name on file

## 2022-11-17 NOTE — NURSING NOTE
After I verified name and date of birth. I drew labs via venipuncture on this pt while in clinic. They tolerated this well.   Labs sent down to the first floor labs.    Patti Esparza, A

## 2022-11-17 NOTE — PROGRESS NOTES
[facility]  RN Coordinator Pre-Visit Documentation      Hamilton Angulo is a 72 year old male who has been referred to the [facility] for hematopoietic cell transplant or immune effector cell therapy.      Referring MD Name: Tawnya, telephone [phone]    Referring RN Coordinator: Unknown    Reason for referral: H/O MM with newly diagnosed MDS (patient previously assessed for Auto transplant, but failed mobilization ultimately did not move forward with BMT)    Link to BMT & CT Program Algorithms      For allos only:    Previous HLA typing? Yes    Previous formal donor search? No    PRA needed? Yes    CMV IGG needed? No, previous positive IgG    and ABO needed? No    Potential family donors to type? Unknown    Need URD consents? Yes    For CAR-T Candidates Only:    Orders placed for virologies: N/A      All relevant clinical notes, labs, imaging, and pathology may be reviewed in Epic Bookmarks under name: Hilda Papa    The appropriate disease evaluation form has been emailed to the physician to complete.      Patient Care Team       Relationship Specialty Notifications Start End    Bro Herrera MD PCP - General Internal Medicine  10/29/13     Phone: [phone] Fax: [phone] [address] [facility] [address]    Bro Herrera MD Assigned PCP   9/29/13     Phone: [phone] Fax: [phone] [address] [facility] [address]    Carmen Ellis MD Assigned Cancer Care Provider   5/2/21     Phone: [phone] Fax: [phone]          [facility] [address]    Noris Gómez, RN Specialty Care Coordinator Oncology Admissions 7/7/21     phone:  [phone]    Phone: [phone]         Yusef Bowling MD MD Internal Medicine  10/28/21     Phone: 2 Fax: [phone]         [place] [facility] [address] [facility] [address]    Jacqueline Ha, [facility]  BMT - Adult  Admissions 12/8/21     Phone: [phone]         Pepper Elise MD MD Hematology & Oncology  4/21/22     Phone: [phone] Fax: [phone]         [address] [address]    Pepper Elise MD BMT Physician Hematology & Oncology  5/4/22     Phone: [phone] Fax: [phone]         [address] [address]    Wilder Desai MD MD Cardiovascular Disease  5/9/22     Phone: [phone] Fax: [phone]         [address] [place] [address]    Wilder Desai MD Assigned Heart and Vascular Provider   5/15/22     Phone: [phone] Fax: [phone]         [address] [facility] [address]    Gabe Randhawa MD Assigned Surgical Provider   8/6/22     Phone: [phone] Fax: [phone] [address] [facility] [address]            Hilda Elam RN

## 2022-11-17 NOTE — PROGRESS NOTES
Spoke with Jordan , patient's Spouse, following new transplant visit with Dr. Elise. Reviewed plan of care per NT conversation for Stem Cell Transplant. Explained role of the Nurse Coordinator throughout the BMT process as well as general time line and expectations for transplant. Discussed necessity of caregiver and program's proximity requirements. All questions were answered.     Plan: Allogeneic Transplant, pending identification of an unrelated donor and patient decision to proceed. He is ready in terms of his disease status, although unsure if he wants to move forward.    Timeline Notes:2-3 months    Contact information provided for :  no    Allo:    HLA typing drawn: Yes      PRA typing drawn:  Yes      CMV-IgG and ABO-Rh drawn or in record: No      Contact information provided for : Yes      Will sibling typing kits need to be sent? No      Financial Release for URD search obtained:  Yes    CAR-T:    Virologies drawn:  N/A    AUTO for MM:    Outpatient Infusion: N/A    EOC Reason updated: yes

## 2022-11-17 NOTE — LETTER
11/17/2022         RE: Hamilton Angulo  [address] [place] [address]             BMT / Cell Therapy Consultation      Hamilton Angulo is a 72 year old male referred by Dr. Bowling for MDS developing after therapy for multiple myeloma.    Disease presentation and baseline characteristics:  Standard risk IgG kappa MM presenting as progressive but asymptomatic anemia. WBC 3.3.  Hemoglobin 10.  Platelets 155. CMP showed normal creatinine and calcium.  Total protein 8.7.  LFTs unremarkable.  Albumin 4.1, Beta-2 microglobulin 1.9. Bone marrow biopsy showed plasma cell myeloma with normocellular marrow, cellularity 20 to 30% with trilineage hematopoiesis and 20- 25% kappa monotypic plasma cells.  Increased marrow storage iron. Bone marrow flow cytometry showed 1.2% plasma cells which express CD38, CD19, CD20 (partial dim), CD45 (dim), CD56  and monotypic cytoplasmic kappa immunoglobulin light chains. FISH Hyperdiploid with gains of chromosomes 5, 9, and/or 15 (85%), gain of 11q (95%), monosomy 13 (46%), loss of IGH (4.5%) (control range 0-2.8%); no rearrangement of IGH. Cytogenetics 46 XY. Baseline M spike of 2.1. Serum IgG level was 2862.  Monoclonal peak in the urine was 57.5 and MANDI showed large monoclonal free kappa and monoclonal IgG kappa. [place] free light chain 57.  Lambda 0.65 and the ratio 88. On 10/1/21, his M spike was down to 0.8 and kappa FLC down to 25 (partial response).  Kappa FLC plateaued at 27 at pre-autologous transplant workup. We attempted to mobilize for transplant and with GCSF/plerixafor priming collected <1x10^6 cells after 3 days). He went on to receive therapy with daratumumab, carfilzomib, and dex. 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 Bone marrow at that time showed 30% cellularity, no increase in blasts, mutations in SRSF2 and TET2, normal cytogenetics, R-IPSS very low risk.    Date  Treatment Name Response Side Effects / Toxicities   8/2021 RVd x 6 Partial response Neutropenia, neuropathy   7/2022 Ana/carfilzomib/dex Complete response Neutropenia, development of MDS (likely evolving prior to this therapy)       HPI:  Please see my entry above for hematologic history.  Hamilton is here with his wife for consultation regarding MDS. He is feeling well overall, staying active, gaining back some of the weight he initially lost when diagnosed with myeloma. No fevers or chills. No bruising or unexpected infections.      ASSESSMENT AND PLAN:    Hamilton Angulo is a 72 year old male with a malignancy that is currently incurable by standard chemotherapeutic approaches: MDS in the setting of multiple myeloma. To date, the only modality that offers a chance at long-term survival and potential cure is allogeneic hematopoietic stem cell transplantation.  Based upon my assessment of disease risk and comorbidities, Hamilton is a suitable candidate for allogeneic HCT.      We had a detailed discussion about the rationale for transplant in this situation, requirements for proceeding, which include insurance approval, identification of an adequate source of donor hematopoetic progenitor cells, availability of a full-time caregiver along with residence within 30 minutes of the [place]  through at least day +100 post transplant, and compliance with the immunosuppression and supportive care medication regimen prescribed by providers at the [facility].      We had a nathalie discussion about the risks of the transplant itself, including toxicities from the preparative regimen, severe infections, the need for red blood cell and platelet transfusion support, the risk of graft rejection, the risk of acute and chronic krwow-fmwzgt-fcqq disease, the need for immunosuppressive medications, the potential for severe organ toxicity including lungs, liver, skin, and kidneys, the possibility of long-term disability, and the risk of death due to  complications of the transplant.  We discussed the risk of disease relapse despite going forward with a transplant.  Hamilton expressed understanding of these risks.    Under consideration at this time is a TEJ preparative regimen, with matched donor PBSC as the donor source.   Timing of transplant is likely to be in 2-3 months, pending identification of an HLA matched unrelated donor, adequate disease control and organ function.      Will a transplant be safe?   Mr. Angulo's HCTCI is 0. Frailty score was previously 1 (pre-frail due to weight loss). Non-relapse mortality is estimated at 15%.     Will a transplant be efficacious?   CIBMTR survival probability at 1 year is 69%. Although his MDS is low risk enough that a transplant would not be needed at this moment, he has a background of multiple myeloma, which will undoubtedly recur and be difficult to treat because of his persistent neutropenia.       Recommendations:     His myeloma is currently in CR, and this could be a unique window to provide long-term myeloma control and potentially cure his MDS before it progresses. He is not transfusion dependent, but again his neutropenia put him at risk of life-threatening infections that could occur at anytime. I will review his case with our BMT faculty to seek consensus on his case on Monday.    Proceed to formal donor search    Proceed to allogeneic transplant workup once a donor has been identified      I spent 50 minutes in the care of this patient today, which included time necessary for preparation for the visit, obtaining history, ordering medications/tests/procedures as medically indicated, review of pertinent medical literature, counseling of the patient, communication of recommendations to the care team, and documentation time.    Pepper Elise MD    Securely message me with the Vocera Web Console ([email or web address] or Microsoft Teams        ROS:    10 point ROS neg other than the  symptoms noted above in the HPI.        Past Medical History:   Diagnosis Date     Bilateral cataracts 2001     Herniated disc 1983    Lumbar     Hyperlipidemia LDL goal <130      Hypertension 5/2004     Tear of MCL (medial collateral ligament) of knee 6/20/2001    LT Knee/WC       Past Surgical History:   Procedure Laterality Date     CATARACT IOL, RT/LT  12/2003    Bilateral     INSERT CATHETER VASCULAR ACCESS Right 5/23/2022    Procedure: INSERTION, VASCULAR ACCESS CATHETER NON VALVE DUAL LUMEN;  Surgeon: Timothy Reddy PA-C;  Location: [facility] OR     IR CVC TUNNEL PLACEMENT > 5 YRS OF AGE  5/23/2022     IR CVC TUNNEL REMOVAL RIGHT  6/6/2022       Family History   Problem Relation Age of Onset     Breast Cancer Mother      Hypertension Mother         She potentially had this.     Other - See Comments Father         He potentially had liver issues and dementia.     Heart Disease Maternal Grandmother         She potentially had this.     Cancer Maternal Grandfather         Unknown type of cancer.     Other - See Comments Paternal Half-Brother         He potentially had dementia.       Social History     Tobacco Use     Smoking status: Never     Smokeless tobacco: Never   Vaping Use     Vaping Use: Never used   Substance Use Topics     Alcohol use: Yes     Comment: Ocss.     Drug use: No         Allergies   Allergen Reactions     Shrimp Hives        Current Outpatient Medications   Medication Sig Dispense Refill     acetaminophen (TYLENOL) 325 MG tablet Take 325-650 mg by mouth every 4 hours as needed Take as needed following label instructions       acyclovir (ZOVIRAX) 400 MG tablet Take 400 mg by mouth 2 times daily       amLODIPine (NORVASC) 2.5 MG tablet Take 1 tablet (2.5 mg) by mouth daily +++NEED APPOINTMENT+++ 90 tablet 0     Artificial Tear Solution (SM ARTIFICIAL TEARS) SOLN Apply 1 Dose to eye every 2 hours as needed Use per label instructions       lisinopril (ZESTRIL) 20 MG tablet Take 1 tablet (20  mg) by mouth daily 90 tablet 3     Multiple Vitamins-Minerals (EQ COMPLETE MULTIVITAMIN-ADULT) TABS Take 1 tablet by mouth daily       loratadine (CLARITIN) 10 MG tablet Take 1 tablet (10 mg) by mouth daily (Patient not taking: Reported on 11/17/2022) 30 tablet 1         Physical Exam:     Vital Signs: BP (!) 148/85   Pulse 78   Temp 98.2  F (36.8  C) (Oral)   Ht 1.829 m (6')   Wt 68.9 kg (152 lb)   SpO2 100%   BMI 20.61 kg/m          KPS:  90    He is pleasant, interactive, sclerae anicteric, cranial nerves grossly intact, no oral mucosal lesions, normal respiratory effort, no JVD, normal perfusion, abdomen non-distended, skin without rash, no edema, normal affect.         Hamilton understood the above assessment and recommendations.  Multiple questions answered.  No barriers to learning identified.       Known issues that I take into account for medical decisions, with salient changes to the plan considering these complexities noted above.    Patient Active Problem List   Diagnosis     HYPERLIPIDEMIA LDL GOAL <130     PSEUDOPHAKIA OU     Advanced directives, counseling/discussion     History of actinic keratoses     PVD (posterior vitreous detachment), bilateral     Nonexudative senile macular degeneration of retina     Elevated glucose     Essential hypertension with goal blood pressure less than 140/90     Elevated prostate specific antigen (PSA)     Dupuytren contracture     Benign prostatic hyperplasia with urinary retention     Multiple myeloma not having achieved remission (H)     Autologous donor of stem cells       ------------------------------------------------------------------------------------------------------------------------------------------------    Patient Care Team       Relationship Specialty Notifications Start End    Bro Herrera MD PCP - General Internal Medicine  10/29/13     Phone: [phone] Fax: [phone] [address] [facility] [place] [address]    Bro Herrera MD  Assigned PCP   9/29/13     Phone: [phone] Fax: [phone]         [address] [facility] [address]    Carmen Ellis MD Assigned Cancer Care Provider   5/2/21     Phone: [phone] Fax: [phone]         [address] [facility] [address]    Noris Gómez, RN Specialty Care Coordinator Oncology Admissions 7/7/21     phone:  [phone]    Phone: [phone]         Yusef Bowling MD MD Internal Medicine  10/28/21     Phone: [phone] Fax: [phone]         [place] [facility] [address] [facility] [address]    Jacqueline Ha, [facility]  BMT - Adult Admissions 12/8/21     Phone: [phone]         Pepper Elise MD MD Hematology & Oncology  4/21/22     Phone: [phone] Fax: [phone]         [address] [place] [address]    Pepper Elise MD BMT Physician Hematology & Oncology  5/4/22     Phone: [phone] Fax: [phone]         [address] [address]    Wilder Desai MD MD Cardiovascular Disease  5/9/22     Phone: [phone] Fax: [phone]         [address] [place] [address]    Wilder Desai MD Assigned Heart and Vascular Provider   5/15/22     Phone: [phone] Fax: [phone]         [address] [facility] [address]    Gabe Randhawa MD Assigned Surgical Provider   8/6/22     Phone: [phone] Fax: [phone] [address] [facility] [address]            No name on file

## 2022-11-18 LAB
SCR 1 TEST METHOD: NORMAL
SCR1 CELL: NORMAL
SCR1 RESULT: NORMAL
SCR2 CELL: NORMAL
SCR2 RESULT: NORMAL
SCR2 TEST METHOD: NORMAL
ZZZSCR1 COMMENTS: NORMAL
ZZZSCR2 COMMENTS: NORMAL

## 2022-11-21 NOTE — NURSING NOTE
Oncology Rooming Note    November 21, 2022 10:14 AM   Hamilton Angulo is a 72 year old male who presents for:    Chief Complaint   Patient presents with     Oncology Clinic Visit     New Eval for MDS     Initial Vitals: Blood Pressure (Abnormal) 148/85   Pulse 78   Temperature 98.2  F (36.8  C) (Oral)   Height 1.829 m (6')   Weight 68.9 kg (152 lb)   Oxygen Saturation 100%   Body Mass Index 20.61 kg/m   Estimated body mass index is 20.61 kg/m  as calculated from the following:    Height as of this encounter: 1.829 m (6').    Weight as of this encounter: 68.9 kg (152 lb). Body surface area is 1.87 meters squared.  No Pain (0) Comment: Data Unavailable   No LMP for male patient.  Allergies reviewed: Yes  Medications reviewed: Yes    Medications: Medication refills not needed today.  Pharmacy name entered into Lex[facility]: [id number] - [facility] - [address] AT [facility] & [address]    Clinical concerns: none       Patti Esparza MA

## 2022-11-23 LAB
A*: NORMAL
A*LOCUS SEROLOGIC EQUIVALENT: 25
A*LOCUS: NORMAL
A*SEROLOGIC EQUIVALENT: 68
ABTEST METHOD: NORMAL
B*: NORMAL
B*LOCUS SEROLOGIC EQUIVALENT: 18
B*LOCUS: NORMAL
B*SEROLOGIC EQUIVALENT: 35
BW-1: NORMAL
C*: NORMAL
C*LOCUS SEROLOGIC EQUIVALENT: 4
C*LOCUS: NORMAL
C*SEROLOGIC EQUIVALENT: 12
DPA1*: NORMAL
DPB1*: NORMAL
DPB1*LOCUS: NORMAL
DQA1*: NORMAL
DQA1*LOCUS: NORMAL
DQB1*: NORMAL
DQB1*LOCUS SEROLOGIC EQUIVALENT: 7
DQB1*LOCUS: NORMAL
DQB1*SEROLOGIC EQUIVALENT: 5
DRB1*: NORMAL
DRB1*LOCUS SEROLOGIC EQUIVALENT: 1
DRB1*LOCUS: NORMAL
DRB1*SEROLOGIC EQUIVALENT: 11
DRB3*LOCUS SEROLOGIC EQUIVALENT: 52
DRB3*LOCUS: NORMAL
DRSSO TEST METHOD: NORMAL

## 2022-11-26 DIAGNOSIS — I10 ESSENTIAL HYPERTENSION WITH GOAL BLOOD PRESSURE LESS THAN 140/90: ICD-10-CM

## 2022-11-28 RX ORDER — LISINOPRIL 20 MG/1
20 TABLET ORAL DAILY
Qty: 90 TABLET | Refills: 0 | Status: SHIPPED | OUTPATIENT
Start: 2022-11-28 | End: 2023-01-30

## 2022-12-12 ENCOUNTER — TRANSFERRED RECORDS (OUTPATIENT)
Dept: HEALTH INFORMATION MANAGEMENT | Facility: CLINIC | Age: 72
End: 2022-12-12

## 2023-01-09 ENCOUNTER — TRANSFERRED RECORDS (OUTPATIENT)
Dept: HEALTH INFORMATION MANAGEMENT | Facility: CLINIC | Age: 73
End: 2023-01-09

## 2023-01-13 DIAGNOSIS — I10 ESSENTIAL HYPERTENSION WITH GOAL BLOOD PRESSURE LESS THAN 140/90: ICD-10-CM

## 2023-01-13 RX ORDER — AMLODIPINE BESYLATE 2.5 MG/1
2.5 TABLET ORAL DAILY
Qty: 60 TABLET | Refills: 0 | Status: SHIPPED | OUTPATIENT
Start: 2023-01-13 | End: 2023-01-30

## 2023-01-30 ENCOUNTER — OFFICE VISIT (OUTPATIENT)
Dept: INTERNAL MEDICINE | Facility: CLINIC | Age: 73
End: 2023-01-30
Payer: COMMERCIAL

## 2023-01-30 VITALS
WEIGHT: 147 LBS | DIASTOLIC BLOOD PRESSURE: 70 MMHG | OXYGEN SATURATION: 100 % | BODY MASS INDEX: 20.58 KG/M2 | HEIGHT: 71 IN | TEMPERATURE: 98.4 F | SYSTOLIC BLOOD PRESSURE: 148 MMHG | HEART RATE: 79 BPM

## 2023-01-30 DIAGNOSIS — D46.9 MDS (MYELODYSPLASTIC SYNDROME) (H): ICD-10-CM

## 2023-01-30 DIAGNOSIS — Z00.00 ENCOUNTER FOR MEDICARE ANNUAL WELLNESS EXAM: Primary | ICD-10-CM

## 2023-01-30 DIAGNOSIS — Z23 NEED FOR SHINGLES VACCINE: ICD-10-CM

## 2023-01-30 DIAGNOSIS — C90.00 MULTIPLE MYELOMA NOT HAVING ACHIEVED REMISSION (H): ICD-10-CM

## 2023-01-30 DIAGNOSIS — G62.9 PERIPHERAL POLYNEUROPATHY: ICD-10-CM

## 2023-01-30 DIAGNOSIS — I10 ESSENTIAL HYPERTENSION WITH GOAL BLOOD PRESSURE LESS THAN 140/90: ICD-10-CM

## 2023-01-30 DIAGNOSIS — H91.93 BILATERAL HEARING LOSS, UNSPECIFIED HEARING LOSS TYPE: ICD-10-CM

## 2023-01-30 DIAGNOSIS — Z13.29 SCREENING FOR HYPOTHYROIDISM: ICD-10-CM

## 2023-01-30 DIAGNOSIS — Z12.11 SCREEN FOR COLON CANCER: ICD-10-CM

## 2023-01-30 DIAGNOSIS — R73.09 ELEVATED GLUCOSE: ICD-10-CM

## 2023-01-30 DIAGNOSIS — E78.5 HYPERLIPIDEMIA LDL GOAL <130: ICD-10-CM

## 2023-01-30 LAB
ANION GAP SERPL CALCULATED.3IONS-SCNC: 4 MMOL/L (ref 3–14)
BUN SERPL-MCNC: 16 MG/DL (ref 7–30)
CALCIUM SERPL-MCNC: 9.3 MG/DL (ref 8.5–10.1)
CHLORIDE BLD-SCNC: 111 MMOL/L (ref 94–109)
CHOLEST SERPL-MCNC: 155 MG/DL
CO2 SERPL-SCNC: 28 MMOL/L (ref 20–32)
CREAT SERPL-MCNC: 0.85 MG/DL (ref 0.66–1.25)
FASTING STATUS PATIENT QL REPORTED: NORMAL
GFR SERPL CREATININE-BSD FRML MDRD: >90 ML/MIN/1.73M2
GLUCOSE BLD-MCNC: 111 MG/DL (ref 70–99)
HBA1C MFR BLD: 5.5 % (ref 0–5.6)
HDLC SERPL-MCNC: 54 MG/DL
LDLC SERPL CALC-MCNC: 84 MG/DL
NONHDLC SERPL-MCNC: 101 MG/DL
POTASSIUM BLD-SCNC: 4.5 MMOL/L (ref 3.4–5.3)
SODIUM SERPL-SCNC: 143 MMOL/L (ref 133–144)
TRIGL SERPL-MCNC: 84 MG/DL
TSH SERPL DL<=0.005 MIU/L-ACNC: 1.19 MU/L (ref 0.4–4)
VIT B12 SERPL-MCNC: 1104 PG/ML (ref 232–1245)

## 2023-01-30 PROCEDURE — 83036 HEMOGLOBIN GLYCOSYLATED A1C: CPT | Performed by: INTERNAL MEDICINE

## 2023-01-30 PROCEDURE — 82607 VITAMIN B-12: CPT | Performed by: INTERNAL MEDICINE

## 2023-01-30 PROCEDURE — 36415 COLL VENOUS BLD VENIPUNCTURE: CPT | Performed by: INTERNAL MEDICINE

## 2023-01-30 PROCEDURE — 80061 LIPID PANEL: CPT | Performed by: INTERNAL MEDICINE

## 2023-01-30 PROCEDURE — 80048 BASIC METABOLIC PNL TOTAL CA: CPT | Performed by: INTERNAL MEDICINE

## 2023-01-30 PROCEDURE — 82274 ASSAY TEST FOR BLOOD FECAL: CPT | Performed by: INTERNAL MEDICINE

## 2023-01-30 PROCEDURE — 99397 PER PM REEVAL EST PAT 65+ YR: CPT | Performed by: INTERNAL MEDICINE

## 2023-01-30 PROCEDURE — 84443 ASSAY THYROID STIM HORMONE: CPT | Performed by: INTERNAL MEDICINE

## 2023-01-30 RX ORDER — AMLODIPINE BESYLATE 2.5 MG/1
2.5 TABLET ORAL DAILY
Qty: 90 TABLET | Refills: 1 | Status: SHIPPED | OUTPATIENT
Start: 2023-01-30 | End: 2023-03-20

## 2023-01-30 RX ORDER — LISINOPRIL 20 MG/1
20 TABLET ORAL DAILY
Qty: 90 TABLET | Refills: 3 | Status: SHIPPED | OUTPATIENT
Start: 2023-01-30 | End: 2024-02-16

## 2023-01-30 ASSESSMENT — ENCOUNTER SYMPTOMS
CONSTIPATION: 0
HEMATURIA: 0
NAUSEA: 0
DIZZINESS: 0
SORE THROAT: 0
PALPITATIONS: 0
HEARTBURN: 0
MYALGIAS: 0
JOINT SWELLING: 0
DYSURIA: 0
COUGH: 0
HEADACHES: 0
NERVOUS/ANXIOUS: 0
FREQUENCY: 0
ABDOMINAL PAIN: 0
HEMATOCHEZIA: 0
CHILLS: 0
ARTHRALGIAS: 0
SHORTNESS OF BREATH: 0
DIARRHEA: 0
FEVER: 0
WEAKNESS: 0
PARESTHESIAS: 0
EYE PAIN: 0

## 2023-01-30 ASSESSMENT — ACTIVITIES OF DAILY LIVING (ADL): CURRENT_FUNCTION: NO ASSISTANCE NEEDED

## 2023-01-30 NOTE — LETTER
February 2, 2023    Hamilton Angulo  [address] [facility]  [place] [address]          Dear ,    We are writing to inform you of your test results.  All of these tests are within acceptable limits , things look good !       Resulted Orders   Lipid panel reflex to direct LDL Non-fasting   Result Value Ref Range    Cholesterol 155 <200 mg/dL    Triglycerides 84 <150 mg/dL    Direct Measure HDL 54 >=40 mg/dL    LDL Cholesterol Calculated 84 <=100 mg/dL    Non HDL Cholesterol 101 <130 mg/dL    Patient Fasting > 8hrs? Unknown     Narrative    Cholesterol  Desirable:  <200 mg/dL    Triglycerides  Normal:  Less than 150 mg/dL  Borderline High:  150-199 mg/dL  High:  200-499 mg/dL  Very High:  Greater than or equal to 500 mg/dL    Direct Measure HDL  Female:  Greater than or equal to 50 mg/dL   Male:  Greater than or equal to 40 mg/dL    LDL Cholesterol  Desirable:  <100mg/dL  Above Desirable:  100-129 mg/dL   Borderline High:  130-159 mg/dL   High:  160-189 mg/dL   Very High:  >= 190 mg/dL    Non HDL Cholesterol  Desirable:  130 mg/dL  Above Desirable:  130-159 mg/dL  Borderline High:  160-189 mg/dL  High:  190-219 mg/dL  Very High:  Greater than or equal to 220 mg/dL   BASIC METABOLIC PANEL   Result Value Ref Range    Sodium 143 133 - 144 mmol/L    Potassium 4.5 3.4 - 5.3 mmol/L    Chloride 111 (H) 94 - 109 mmol/L    Carbon Dioxide (CO2) 28 20 - 32 mmol/L    Anion Gap 4 3 - 14 mmol/L    Urea Nitrogen 16 7 - 30 mg/dL    Creatinine 0.85 0.66 - 1.25 mg/dL    Calcium 9.3 8.5 - 10.1 mg/dL    Glucose 111 (H) 70 - 99 mg/dL    GFR Estimate >90 >60 mL/min/1.73m2      Comment:      eGFR calculated using 2021 CKD-EPI equation.   Hemoglobin A1c   Result Value Ref Range    Hemoglobin A1C 5.5 0.0 - 5.6 %      Comment:      Normal <5.7%   Prediabetes 5.7-6.4%    Diabetes 6.5% or higher     Note: Adopted from ADA consensus guidelines.   Vitamin B12   Result Value Ref Range    Vitamin B12 1,104 232 - 1,245 pg/mL   TSH with free T4  reflex   Result Value Ref Range    TSH 1.19 0.40 - 4.00 mU/L       If you have any questions or concerns, please call the clinic at the number listed above.       Sincerely,      Bro Herrera MD

## 2023-01-30 NOTE — PROGRESS NOTES
"SUBJECTIVE:   Hamilton is a 72 year old who presents for Preventive Visit.    Patient has been advised of split billing requirements and indicates understanding: Yes  Are you in the first 12 months of your Medicare coverage?  No    Healthy Habits:     In general, how would you rate your overall health?  Good    Frequency of exercise:  4-5 days/week    Duration of exercise:  15-30 minutes    Do you usually eat at least 4 servings of fruit and vegetables a day, include whole grains    & fiber and avoid regularly eating high fat or \"junk\" foods?  No    Taking medications regularly:  Yes    Medication side effects:  None    Ability to successfully perform activities of daily living:  No assistance needed    Home Safety:  No safety concerns identified    Hearing Impairment:  Difficulty understanding soft or whispered speech    In the past 6 months, have you been bothered by leaking of urine?  No    In general, how would you rate your overall mental or emotional health?  Good      PHQ-2 Total Score: 0    Additional concerns today:  Yes  patient feels his mood is good. He has concerns given his chronically ill status with the myelodysplastic syndrome     Have you ever done Advance Care Planning? (For example, a Health Directive, POLST, or a discussion with a medical provider or your loved ones about your wishes): Yes, advance care planning is on file.     Fall risk  Fallen 2 or more times in the past year?: No  Any fall with injury in the past year?: No    Cognitive Screening   1) Repeat 3 items (Leader, Season, Table)    2) Clock draw: NORMAL  3) 3 item recall: Recalls 3 objects  Results: 3 items recalled: COGNITIVE IMPAIRMENT LESS LIKELY    Mini-CogTM Copyright MATT Flores. Licensed by the author for use in [facility]; reprinted with permission (mick@.[facility]). All rights reserved.      Do you have sleep apnea, excessive snoring or daytime drowsiness?: no    Reviewed and updated as needed this visit by clinical " staff   Tobacco   Meds              Reviewed and updated as needed this visit by Provider                 Social History     Tobacco Use     Smoking status: Never     Smokeless tobacco: Never   Substance Use Topics     Alcohol use: Yes     Comment: Ocss.       Alcohol Use 1/30/2023   Prescreen: >3 drinks/day or >7 drinks/week? No   Prescreen: >3 drinks/day or >7 drinks/week? -       Current providers sharing in care for this patient include:   Patient Care Team:  Bro Herrera MD as PCP - General (Internal Medicine)  Bro Herrera MD as Assigned PCP  Noris Gómez, RN as Specialty Care Coordinator (Oncology)  Yusef Bowling MD as MD (Internal Medicine)  Jacqueline Ha LICSW as  (BMT - Adult)  Pepper Elise MD as MD (Hematology & Oncology)  Pepper Elise MD as BMT Physician (Hematology & Oncology)  Wilder Desai MD as MD (Cardiovascular Disease)  Wilder Desai MD as Assigned Heart and Vascular Provider  Gabe Randhawa MD as Assigned Surgical Provider  Pepper Elise MD as Assigned Cancer Care Provider    The following health maintenance items are reviewed in Epic and correct as of today:  Health Maintenance   Topic Date Due     HF ACTION PLAN  Never done     ANNUAL REVIEW OF HM ORDERS  Never done     ZOSTER IMMUNIZATION (1 of 2) 11/15/2013     MEDICARE ANNUAL WELLNESS VISIT  11/29/2022     LIPID  11/29/2022     COLORECTAL CANCER SCREENING  11/29/2022     BMP  11/30/2022     ALT  05/04/2023     CBC  05/31/2023     EYE EXAM  07/28/2023     FALL RISK ASSESSMENT  01/30/2024     ADVANCE CARE PLANNING  11/29/2026     DTAP/TDAP/TD IMMUNIZATION (4 - Td or Tdap) 10/13/2027     TSH W/FREE T4 REFLEX  Completed     HEPATITIS C SCREENING  Completed     PHQ-2 (once per calendar year)  Completed     INFLUENZA VACCINE  Completed     Pneumococcal Vaccine: 65+ Years  Completed     AORTIC ANEURYSM SCREENING (SYSTEM ASSIGNED)  Completed     COVID-19 Vaccine   Completed     IPV IMMUNIZATION  Aged Out     MENINGITIS IMMUNIZATION  Aged Out     Health Maintenance Due   Topic Date Due     HF ACTION PLAN  Never done     ANNUAL REVIEW OF HM ORDERS  Never done     ZOSTER IMMUNIZATION (1 of 2) 11/15/2013     MEDICARE ANNUAL WELLNESS VISIT  11/29/2022     LIPID  11/29/2022     COLORECTAL CANCER SCREENING  11/29/2022     BMP  11/30/2022      Patient has a list - he was supposed to have a bone marrow transplant and was all prepared and was ready for it to happen but this did not happen. He was found with myelodysplastic syndrome as a new diagnosis , in addition to the multiple myeloma . Seeing regularly Dr. Yusef Bowling with [facility] for his cancer care. All of this healthcare is within [employer] electronic medical records     Lab work is in process  Labs reviewed in EPIC  BP Readings from Last 3 Encounters:   01/30/23 (!) 148/70   11/17/22 (!) 148/85   06/01/22 115/62    Wt Readings from Last 3 Encounters:   01/30/23 66.7 kg (147 lb)   11/17/22 68.9 kg (152 lb)   06/01/22 66.6 kg (146 lb 14.4 oz)                  Patient Active Problem List   Diagnosis     HYPERLIPIDEMIA LDL GOAL <130     PSEUDOPHAKIA OU     Advanced directives, counseling/discussion     History of actinic keratoses     PVD (posterior vitreous detachment), bilateral     Nonexudative senile macular degeneration of retina     Elevated glucose     Essential hypertension with goal blood pressure less than 140/90     Elevated prostate specific antigen (PSA)     Dupuytren contracture     Benign prostatic hyperplasia with urinary retention     Multiple myeloma not having achieved remission (H)     Autologous donor of stem cells     MDS (myelodysplastic syndrome) (H)     Past Surgical History:   Procedure Laterality Date     CATARACT IOL, RT/LT  12/2003    Bilateral     INSERT CATHETER VASCULAR ACCESS Right 5/23/2022    Procedure: INSERTION, VASCULAR ACCESS CATHETER NON VALVE DUAL LUMEN;  Surgeon: Timothy Reddy  NEIL Mcbride;  Location: [facility] OR     IR CVC TUNNEL PLACEMENT > 5 YRS OF AGE  5/23/2022     IR CVC TUNNEL REMOVAL RIGHT  6/6/2022       Social History     Tobacco Use     Smoking status: Never     Smokeless tobacco: Never   Substance Use Topics     Alcohol use: Yes     Comment: Ocss.     Family History   Problem Relation Age of Onset     Breast Cancer Mother      Hypertension Mother         She potentially had this.     Other - See Comments Father         He potentially had liver issues and dementia.     Heart Disease Maternal Grandmother         She potentially had this.     Cancer Maternal Grandfather         Unknown type of cancer.     Other - See Comments Paternal Half-Brother         He potentially had dementia.         Current Outpatient Medications   Medication Sig Dispense Refill     acetaminophen (TYLENOL) 325 MG tablet Take 325-650 mg by mouth every 4 hours as needed Take as needed following label instructions       acyclovir (ZOVIRAX) 400 MG tablet Take 400 mg by mouth 2 times daily       amLODIPine (NORVASC) 2.5 MG tablet Take 1 tablet (2.5 mg) by mouth daily No further refills until appointment 60 tablet 0     Artificial Tear Solution (SM ARTIFICIAL TEARS) SOLN Apply 1 Dose to eye every 2 hours as needed Use per label instructions       lisinopril (ZESTRIL) 20 MG tablet Take 1 tablet (20 mg) by mouth daily No further refills until appointment 90 tablet 0     Multiple Vitamins-Minerals (EQ COMPLETE MULTIVITAMIN-ADULT) TABS Take 1 tablet by mouth daily       loratadine (CLARITIN) 10 MG tablet Take 1 tablet (10 mg) by mouth daily (Patient not taking: Reported on 11/17/2022) 30 tablet 1     Allergies   Allergen Reactions     Shrimp Hives     Recent Labs   Lab Test 05/31/22  0842 05/26/22  0840 05/10/22  1100 05/04/22  0933 12/08/21  1521 11/30/21  1148 11/29/21  0930 11/29/21  0930 06/23/21  0942 04/28/21  1214 10/30/20  0925 10/29/19  0907 10/22/18  0929 10/22/18  0929   A1C  --   --   --   --   --   --   --  "  --   --   --  5.2 5.0  --  5.2   LDL  --   --   --   --   --   --   --  62  --   --  55 75  --  75   HDL  --   --   --   --   --   --   --  63  --   --  56 60  --  60   TRIG  --   --   --   --   --   --   --  111  --   --  153* 120  --  112   ALT  --   --   --  19  --  22  --   --  13 18  --  20   < >  --    CR 0.86 0.93   < > 0.93   < > 0.87   < > 0.94 0.97 1.03 0.87 0.94  --  1.02   GFRESTIMATED >90 88   < > 88  --  87   < > 81 78 73 87 82  --  73   GFRESTBLACK  --   --   --   --   --   --   --   --  >90 84 >90 >90  --  88   POTASSIUM 3.9 3.3*   < > 4.0  --  3.8   < > 4.3 3.6 4.1 4.8 4.9  --  5.1   TSH  --   --   --   --   --   --   --   --   --  1.36  --   --   --   --     < > = values in this interval not displayed.              Review of Systems   Constitutional: Negative for chills and fever.   HENT: Negative for congestion, ear pain, hearing loss and sore throat.    Eyes: Negative for pain and visual disturbance.   Respiratory: Negative for cough and shortness of breath.    Cardiovascular: Negative for chest pain, palpitations and peripheral edema.   Gastrointestinal: Negative for abdominal pain, constipation, diarrhea, heartburn, hematochezia and nausea.   Genitourinary: Negative for dysuria, frequency, genital sores, hematuria, impotence, penile discharge and urgency.   Musculoskeletal: Negative for arthralgias, joint swelling and myalgias.   Skin: Negative for rash.   Neurological: Negative for dizziness, weakness, headaches and paresthesias.   Psychiatric/Behavioral: Negative for mood changes. The patient is not nervous/anxious.      Constitutional, HEENT, cardiovascular, pulmonary, gi and gu systems are negative, except as otherwise noted.    OBJECTIVE:   BP (!) 148/70 (BP Location: Right arm, Patient Position: Chair, Cuff Size: Adult Regular)   Pulse 79   Temp 98.4  F (36.9  C) (Oral)   Ht 1.81 m (5' 11.25\")   Wt 66.7 kg (147 lb)   SpO2 100%   BMI 20.36 kg/m   Estimated body mass index is 20.36 " "kg/m  as calculated from the following:    Height as of this encounter: 1.81 m (5' 11.25\").    Weight as of this encounter: 66.7 kg (147 lb).  Physical Exam  GENERAL: healthy, alert and no distress  EYES: Eyes grossly normal to inspection, PERRL and conjunctivae and sclerae normal  HENT: ear canals and TM's normal, nose and mouth without ulcers or lesions  NECK: no adenopathy, no asymmetry, masses, or scars and thyroid normal to palpation  RESP: lungs clear to auscultation - no rales, rhonchi or wheezes  CV: regular rate and rhythm, normal S1 S2, no S3 or S4, no murmur, click or rub, no peripheral edema and peripheral pulses strong  ABDOMEN: soft, nontender, no hepatosplenomegaly, no masses and bowel sounds normal  MS: no gross musculoskeletal defects noted, no edema  SKIN: no suspicious lesions or rashes  NEURO: Normal strength and tone, mentation intact and speech normal  PSYCH: mentation appears normal, affect normal/bright    Diagnostic Test Results:  Labs reviewed in Epic  No orders of the defined types were placed in this encounter.        ASSESSMENT / PLAN:     (Z00.00) Encounter for Medicare annual wellness exam  (primary encounter diagnosis)  Comment: routine screening issues   Plan: REVIEW OF HEALTH MAINTENANCE PROTOCOL ORDERS        See orders section of this encounter     (E78.5) Hyperlipidemia LDL goal <130  Comment: due for recheck , and follow up as indicated   Plan: REVIEW OF HEALTH MAINTENANCE PROTOCOL ORDERS,         Lipid panel reflex to direct LDL Non-fasting            (Z12.11) Screen for colon cancer  Comment: patient chooses FIT test   Plan: Fecal colorectal cancer screen FIT - Future         (S+30)            (I10) Essential hypertension with goal blood pressure less than 140/90  Comment: controlled within acceptable limits   Plan: BASIC METABOLIC PANEL, amLODIPine (NORVASC) 2.5        MG tablet, lisinopril (ZESTRIL) 20 MG tablet            (C90.00) Multiple myeloma not having achieved " remission (H)  Comment: continue current plan of care  With [facility] group   Plan: BASIC METABOLIC PANEL            (R73.09) Elevated glucose  Comment: doubt clinical significance but warrants hemoglobin a1c  [ diabetes test ]   Plan: BASIC METABOLIC PANEL, Hemoglobin A1c            (D46.9) MDS (myelodysplastic syndrome) (H)  Comment: new information added to problem list   Plan: ongoing monitoring     (Z23) Need for shingles vaccine  Comment: ShingRx vaccination against herpes zoster explained to patient   Plan: recommended to get at local outside [pharmacy     (G62.9) Peripheral polyneuropathy  Comment: new symptoms, extremely subtle. We reviewed this. No anatomic abnormality , will check Vitamin B 12 levels are normal. And TSH ( thyroid test ) today along with other laboratory studies   Plan: REVIEW OF HEALTH MAINTENANCE PROTOCOL ORDERS,         BASIC METABOLIC PANEL, Vitamin B12            (H91.93) Bilateral hearing loss, unspecified hearing loss type  Comment: referral to audiologic consultation   Plan: Adult Audiology  Referral            (Z13.29) Screening for hypothyroidism  Comment: as detailed above   Plan: TSH with free T4 reflex               Patient has been advised of split billing requirements and indicates understanding: Yes      COUNSELING:  Reviewed preventive health counseling, as reflected in patient instructions        He reports that he has never smoked. He has never used smokeless tobacco.      Appropriate preventive services were discussed with this patient, including applicable screening as appropriate for cardiovascular disease, diabetes, osteopenia/osteoporosis, and glaucoma.  As appropriate for age/gender, discussed screening for colorectal cancer, prostate cancer, breast cancer, and cervical cancer. Checklist reviewing preventive services available has been given to the patient.    Reviewed patients plan of care and provided an AVS. The Basic Care Plan (routine  screening as documented in Health Maintenance) for Hamilton meets the Care Plan requirement. This Care Plan has been established and reviewed with the Patient.      Bro Herrera MD  [facility]    Identified Health Risks:

## 2023-01-30 NOTE — LETTER
February 6, 2023    Hamilton Angulo  [address] [place] [address]          Dear ,    We are writing to inform you of your test results.  The FIT test is negative       Resulted Orders   Lipid panel reflex to direct LDL Non-fasting   Result Value Ref Range    Cholesterol 155 <200 mg/dL    Triglycerides 84 <150 mg/dL    Direct Measure HDL 54 >=40 mg/dL    LDL Cholesterol Calculated 84 <=100 mg/dL    Non HDL Cholesterol 101 <130 mg/dL    Patient Fasting > 8hrs? Unknown     Narrative    Cholesterol  Desirable:  <200 mg/dL    Triglycerides  Normal:  Less than 150 mg/dL  Borderline High:  150-199 mg/dL  High:  200-499 mg/dL  Very High:  Greater than or equal to 500 mg/dL    Direct Measure HDL  Female:  Greater than or equal to 50 mg/dL   Male:  Greater than or equal to 40 mg/dL    LDL Cholesterol  Desirable:  <100mg/dL  Above Desirable:  100-129 mg/dL   Borderline High:  130-159 mg/dL   High:  160-189 mg/dL   Very High:  >= 190 mg/dL    Non HDL Cholesterol  Desirable:  130 mg/dL  Above Desirable:  130-159 mg/dL  Borderline High:  160-189 mg/dL  High:  190-219 mg/dL  Very High:  Greater than or equal to 220 mg/dL   Fecal colorectal cancer screen FIT - Future (S+30)   Result Value Ref Range    Occult Blood Screen FIT Negative Negative   BASIC METABOLIC PANEL   Result Value Ref Range    Sodium 143 133 - 144 mmol/L    Potassium 4.5 3.4 - 5.3 mmol/L    Chloride 111 (H) 94 - 109 mmol/L    Carbon Dioxide (CO2) 28 20 - 32 mmol/L    Anion Gap 4 3 - 14 mmol/L    Urea Nitrogen 16 7 - 30 mg/dL    Creatinine 0.85 0.66 - 1.25 mg/dL    Calcium 9.3 8.5 - 10.1 mg/dL    Glucose 111 (H) 70 - 99 mg/dL    GFR Estimate >90 >60 mL/min/1.73m2      Comment:      eGFR calculated using 2021 CKD-EPI equation.   Hemoglobin A1c   Result Value Ref Range    Hemoglobin A1C 5.5 0.0 - 5.6 %      Comment:      Normal <5.7%   Prediabetes 5.7-6.4%    Diabetes 6.5% or higher     Note: Adopted from ADA consensus guidelines.   Vitamin B12    Result Value Ref Range    Vitamin B12 1,104 232 - 1,245 pg/mL   TSH with free T4 reflex   Result Value Ref Range    TSH 1.19 0.40 - 4.00 mU/L       If you have any questions or concerns, please call the clinic at the number listed above.       Sincerely,      Bro Herrera MD

## 2023-01-30 NOTE — PATIENT INSTRUCTIONS
Patient Education   Personalized Prevention Plan  You are due for the preventive services outlined below.  Your care team is available to assist you in scheduling these services.  If you have already completed any of these items, please share that information with your care team to update in your medical record.  Health Maintenance Due   Topic Date Due     Heart Failure Action Plan  Never done     ANNUAL REVIEW OF HM ORDERS  Never done     Zoster (Shingles) Vaccine (1 of 2) 11/15/2013     Annual Wellness Visit  11/29/2022     Cholesterol Lab  11/29/2022     Colorectal Cancer Screening  11/29/2022     Basic Metabolic Panel  11/30/2022

## 2023-02-02 ENCOUNTER — OFFICE VISIT (OUTPATIENT)
Dept: AUDIOLOGY | Facility: CLINIC | Age: 73
End: 2023-02-02
Payer: COMMERCIAL

## 2023-02-02 DIAGNOSIS — H90.3 SENSORINEURAL HEARING LOSS, BILATERAL: ICD-10-CM

## 2023-02-02 LAB — HEMOCCULT STL QL IA: NEGATIVE

## 2023-02-02 PROCEDURE — 92557 COMPREHENSIVE HEARING TEST: CPT | Performed by: AUDIOLOGIST

## 2023-02-02 PROCEDURE — 92550 TYMPANOMETRY & REFLEX THRESH: CPT | Performed by: AUDIOLOGIST

## 2023-02-02 NOTE — PROGRESS NOTES
AUDIOLOGY REPORT    SUBJECTIVE:  Hamilton Angulo is a 72 year old male who was seen in the Audiology Clinic at the [facility] for audiologic evaluation, referred by Bro Herrera M.D.  The patient reports a history of noise exposure and gradually declining hearing. The patient denies  bilateral otalgia, bilateral drainage, bilateral aural fullness and family history of hearing loss.  The patient notes difficulty with communication in a variety of listening situations.  They were unaccompanied today.    OBJECTIVE:  Abuse Screening:  Do you feel unsafe at home or work/school? No  Do you feel threatened by someone? No  Does anyone try to keep you from having contact with others, or doing things outside of your home? No  Physical signs of abuse present? No     Fall Risk Screen:  1. Have you fallen two or more times in the past year? No  2. Have you fallen and had an injury in the past year? No    Timed Up and Go Score (in seconds): not tested  Is patient a fall risk? No  Referral initiated: No  Fall Risk Assessment Completed by Audiology    Otoscopic exam indicates partial obstruction with cerumen bilaterally     Pure Tone Thresholds assessed using conventional audiometry with good  reliability from 250-8000 Hz bilaterally using insert earphones and circumaural headphones     RIGHT:  normal from 250-500 Hz sloping to mild and moderate sensorineural hearing loss    LEFT:     normal from 250-500 Hz sloping to mild and moderate sensorineural hearing loss    Tympanogram:    RIGHT: normal eardrum mobility    LEFT:   normal eardrum mobility    Reflexes (reported by stimulus ear):  RIGHT: Ipsilateral is absent at frequencies tested  RIGHT: Contralateral is present at normal levels  LEFT:   Ipsilateral is present at normal levels  LEFT:   Contralateral is present at normal levels      Speech Reception Threshold:    RIGHT: 35 dB HL    LEFT:   40 dB HL    Speech Reception Thresholds are in good agreement with  pure tone thresholds.    Word Recognition Score:     RIGHT: 100% at 75 dB HL using NU-6 recorded word list.    LEFT:   100% at 80 dB HL using NU-6 recorded word list.      ASSESSMENT:     ICD-10-CM    1. Sensorineural hearing loss, bilateral  H90.3 Adult Audiology [facility] Referral          Today s results were discussed with the patient in detail.     PLAN:  Patient was counseled regarding hearing loss and impact on communication.  Patient is a good candidate for amplification at this time. Handout on good communication strategies, and hearing aid use was given to patient. It is recommended that the patient schedule a hearing aid consultation appointment.  Please call this clinic with questions regarding these results or recommendations.          Akil Conteh MA, CCC-A  [place] Licensed Audiologist #[id number]  2/2/2023

## 2023-02-06 ENCOUNTER — TRANSFERRED RECORDS (OUTPATIENT)
Dept: HEALTH INFORMATION MANAGEMENT | Facility: CLINIC | Age: 73
End: 2023-02-06
Payer: COMMERCIAL

## 2023-03-19 DIAGNOSIS — I10 ESSENTIAL HYPERTENSION WITH GOAL BLOOD PRESSURE LESS THAN 140/90: ICD-10-CM

## 2023-03-20 RX ORDER — AMLODIPINE BESYLATE 2.5 MG/1
TABLET ORAL
Qty: 90 TABLET | Refills: 3 | Status: SHIPPED | OUTPATIENT
Start: 2023-03-20 | End: 2024-06-06

## 2023-04-03 ENCOUNTER — TRANSFERRED RECORDS (OUTPATIENT)
Dept: HEALTH INFORMATION MANAGEMENT | Facility: CLINIC | Age: 73
End: 2023-04-03
Payer: COMMERCIAL

## 2023-04-25 ENCOUNTER — TRANSFERRED RECORDS (OUTPATIENT)
Dept: HEALTH INFORMATION MANAGEMENT | Facility: CLINIC | Age: 73
End: 2023-04-25
Payer: COMMERCIAL

## 2023-05-23 ENCOUNTER — TRANSFERRED RECORDS (OUTPATIENT)
Dept: HEALTH INFORMATION MANAGEMENT | Facility: CLINIC | Age: 73
End: 2023-05-23
Payer: COMMERCIAL

## 2023-06-06 ENCOUNTER — TRANSFERRED RECORDS (OUTPATIENT)
Dept: HEALTH INFORMATION MANAGEMENT | Facility: CLINIC | Age: 73
End: 2023-06-06
Payer: COMMERCIAL

## 2023-06-20 ENCOUNTER — TRANSFERRED RECORDS (OUTPATIENT)
Dept: HEALTH INFORMATION MANAGEMENT | Facility: CLINIC | Age: 73
End: 2023-06-20
Payer: COMMERCIAL

## 2023-07-18 ENCOUNTER — TRANSFERRED RECORDS (OUTPATIENT)
Dept: HEALTH INFORMATION MANAGEMENT | Facility: CLINIC | Age: 73
End: 2023-07-18
Payer: COMMERCIAL

## 2023-07-25 ENCOUNTER — TRANSFERRED RECORDS (OUTPATIENT)
Dept: HEALTH INFORMATION MANAGEMENT | Facility: CLINIC | Age: 73
End: 2023-07-25
Payer: COMMERCIAL

## 2023-08-22 ENCOUNTER — TRANSFERRED RECORDS (OUTPATIENT)
Dept: HEALTH INFORMATION MANAGEMENT | Facility: CLINIC | Age: 73
End: 2023-08-22
Payer: COMMERCIAL

## 2023-08-29 ENCOUNTER — TRANSFERRED RECORDS (OUTPATIENT)
Dept: HEALTH INFORMATION MANAGEMENT | Facility: CLINIC | Age: 73
End: 2023-08-29
Payer: COMMERCIAL

## 2023-09-11 DIAGNOSIS — I10 ESSENTIAL HYPERTENSION WITH GOAL BLOOD PRESSURE LESS THAN 140/90: ICD-10-CM

## 2023-09-11 RX ORDER — AMLODIPINE BESYLATE 2.5 MG/1
TABLET ORAL
Qty: 90 TABLET | Refills: 3 | OUTPATIENT
Start: 2023-09-11

## 2023-09-26 ENCOUNTER — TRANSFERRED RECORDS (OUTPATIENT)
Dept: HEALTH INFORMATION MANAGEMENT | Facility: CLINIC | Age: 73
End: 2023-09-26
Payer: COMMERCIAL

## 2023-10-17 ENCOUNTER — TRANSFERRED RECORDS (OUTPATIENT)
Dept: HEALTH INFORMATION MANAGEMENT | Facility: CLINIC | Age: 73
End: 2023-10-17
Payer: COMMERCIAL

## 2023-10-24 ENCOUNTER — TRANSFERRED RECORDS (OUTPATIENT)
Dept: HEALTH INFORMATION MANAGEMENT | Facility: CLINIC | Age: 73
End: 2023-10-24
Payer: COMMERCIAL

## 2023-12-12 ENCOUNTER — TRANSFERRED RECORDS (OUTPATIENT)
Dept: HEALTH INFORMATION MANAGEMENT | Facility: CLINIC | Age: 73
End: 2023-12-12
Payer: COMMERCIAL

## 2024-01-09 ENCOUNTER — TRANSFERRED RECORDS (OUTPATIENT)
Dept: HEALTH INFORMATION MANAGEMENT | Facility: CLINIC | Age: 74
End: 2024-01-09
Payer: COMMERCIAL

## 2024-01-23 ENCOUNTER — TRANSFERRED RECORDS (OUTPATIENT)
Dept: HEALTH INFORMATION MANAGEMENT | Facility: CLINIC | Age: 74
End: 2024-01-23
Payer: COMMERCIAL

## 2024-02-01 ENCOUNTER — OFFICE VISIT (OUTPATIENT)
Dept: INTERNAL MEDICINE | Facility: CLINIC | Age: 74
End: 2024-02-01
Payer: COMMERCIAL

## 2024-02-01 DIAGNOSIS — E78.5 HYPERLIPIDEMIA LDL GOAL <130: ICD-10-CM

## 2024-02-01 DIAGNOSIS — K52.9 ACUTE GASTROENTERITIS: Primary | ICD-10-CM

## 2024-02-01 DIAGNOSIS — Z23 NEED FOR SHINGLES VACCINE: ICD-10-CM

## 2024-02-01 DIAGNOSIS — D46.9 MDS (MYELODYSPLASTIC SYNDROME) (H): ICD-10-CM

## 2024-02-01 DIAGNOSIS — D70.8 OTHER NEUTROPENIA (H): ICD-10-CM

## 2024-02-01 DIAGNOSIS — R73.09 ELEVATED GLUCOSE: ICD-10-CM

## 2024-02-01 DIAGNOSIS — I10 ESSENTIAL HYPERTENSION WITH GOAL BLOOD PRESSURE LESS THAN 140/90: ICD-10-CM

## 2024-02-01 DIAGNOSIS — I50.32 CHRONIC DIASTOLIC CONGESTIVE HEART FAILURE (H): ICD-10-CM

## 2024-02-01 DIAGNOSIS — Z29.11 NEED FOR VACCINATION AGAINST RESPIRATORY SYNCYTIAL VIRUS: ICD-10-CM

## 2024-02-01 DIAGNOSIS — C90.00 MULTIPLE MYELOMA NOT HAVING ACHIEVED REMISSION (H): ICD-10-CM

## 2024-02-01 DIAGNOSIS — G62.0 DRUG-INDUCED POLYNEUROPATHY (H): ICD-10-CM

## 2024-02-01 LAB
ALT SERPL W P-5'-P-CCNC: 8 U/L (ref 0–70)
ANION GAP SERPL CALCULATED.3IONS-SCNC: 13 MMOL/L (ref 7–15)
BUN SERPL-MCNC: 18.9 MG/DL (ref 8–23)
CALCIUM SERPL-MCNC: 9 MG/DL (ref 8.8–10.2)
CHLORIDE SERPL-SCNC: 102 MMOL/L (ref 98–107)
CHOLEST SERPL-MCNC: 140 MG/DL
CREAT SERPL-MCNC: 1.24 MG/DL (ref 0.67–1.17)
DEPRECATED HCO3 PLAS-SCNC: 23 MMOL/L (ref 22–29)
EGFRCR SERPLBLD CKD-EPI 2021: 61 ML/MIN/1.73M2
ERYTHROCYTE [DISTWIDTH] IN BLOOD BY AUTOMATED COUNT: 14 % (ref 10–15)
FASTING STATUS PATIENT QL REPORTED: NORMAL
GLUCOSE SERPL-MCNC: 115 MG/DL (ref 70–99)
HBA1C MFR BLD: 5.4 % (ref 0–5.6)
HCT VFR BLD AUTO: 35.3 % (ref 40–53)
HDLC SERPL-MCNC: 42 MG/DL
HGB BLD-MCNC: 11.6 G/DL (ref 13.3–17.7)
LDLC SERPL CALC-MCNC: 76 MG/DL
MCH RBC QN AUTO: 33 PG (ref 26.5–33)
MCHC RBC AUTO-ENTMCNC: 32.9 G/DL (ref 31.5–36.5)
MCV RBC AUTO: 100 FL (ref 78–100)
NONHDLC SERPL-MCNC: 98 MG/DL
PLATELET # BLD AUTO: 111 10E3/UL (ref 150–450)
POTASSIUM SERPL-SCNC: 4.3 MMOL/L (ref 3.4–5.3)
RBC # BLD AUTO: 3.52 10E6/UL (ref 4.4–5.9)
SODIUM SERPL-SCNC: 138 MMOL/L (ref 135–145)
TRIGL SERPL-MCNC: 109 MG/DL
WBC # BLD AUTO: 14.7 10E3/UL (ref 4–11)

## 2024-02-01 PROCEDURE — 80061 LIPID PANEL: CPT | Performed by: INTERNAL MEDICINE

## 2024-02-01 PROCEDURE — 99214 OFFICE O/P EST MOD 30 MIN: CPT | Performed by: INTERNAL MEDICINE

## 2024-02-01 PROCEDURE — 36415 COLL VENOUS BLD VENIPUNCTURE: CPT | Performed by: INTERNAL MEDICINE

## 2024-02-01 PROCEDURE — 83036 HEMOGLOBIN GLYCOSYLATED A1C: CPT | Performed by: INTERNAL MEDICINE

## 2024-02-01 PROCEDURE — 85027 COMPLETE CBC AUTOMATED: CPT | Performed by: INTERNAL MEDICINE

## 2024-02-01 PROCEDURE — 80048 BASIC METABOLIC PNL TOTAL CA: CPT | Performed by: INTERNAL MEDICINE

## 2024-02-01 PROCEDURE — 84460 ALANINE AMINO (ALT) (SGPT): CPT | Performed by: INTERNAL MEDICINE

## 2024-02-01 RX ORDER — RESPIRATORY SYNCYTIAL VIRUS VACCINE 120MCG/0.5
0.5 KIT INTRAMUSCULAR ONCE
Qty: 1 EACH | Refills: 0 | Status: CANCELLED | OUTPATIENT
Start: 2024-02-01 | End: 2024-02-01

## 2024-02-01 ASSESSMENT — SOCIAL DETERMINANTS OF HEALTH (SDOH): HOW OFTEN DO YOU GET TOGETHER WITH FRIENDS OR RELATIVES?: ONCE A WEEK

## 2024-02-01 NOTE — LETTER
February 1, 2024    Hamilton Angulo  [address]  [place] [address]          Dear ,    We are writing to inform you of your test results.    All of these tests are within acceptable limits , things look good !     Resulted Orders   ALT   Result Value Ref Range    ALT 8 0 - 70 U/L   CBC with Platelets   Result Value Ref Range    WBC Count 14.7 (H) 4.0 - 11.0 10e3/uL    RBC Count 3.52 (L) 4.40 - 5.90 10e6/uL    Hemoglobin 11.6 (L) 13.3 - 17.7 g/dL    Hematocrit 35.3 (L) 40.0 - 53.0 %     78 - 100 fL    MCH 33.0 26.5 - 33.0 pg    MCHC 32.9 31.5 - 36.5 g/dL    RDW 14.0 10.0 - 15.0 %    Platelet Count 111 (L) 150 - 450 10e3/uL   BASIC METABOLIC PANEL   Result Value Ref Range    Sodium 138 135 - 145 mmol/L      Comment:      Reference intervals for this test were updated on 09/26/2023 to more accurately reflect our healthy population. There may be differences in the flagging of prior results with similar values performed with this method. Interpretation of those prior results can be made in the context of the updated reference intervals.     Potassium 4.3 3.4 - 5.3 mmol/L    Chloride 102 98 - 107 mmol/L    Carbon Dioxide (CO2) 23 22 - 29 mmol/L    Anion Gap 13 7 - 15 mmol/L    Urea Nitrogen 18.9 8.0 - 23.0 mg/dL    Creatinine 1.24 (H) 0.67 - 1.17 mg/dL    GFR Estimate 61 >60 mL/min/1.73m2    Calcium 9.0 8.8 - 10.2 mg/dL    Glucose 115 (H) 70 - 99 mg/dL   Lipid panel reflex to direct LDL Non-fasting   Result Value Ref Range    Cholesterol 140 <200 mg/dL    Triglycerides 109 <150 mg/dL    Direct Measure HDL 42 >=40 mg/dL    LDL Cholesterol Calculated 76 <=100 mg/dL    Non HDL Cholesterol 98 <130 mg/dL    Patient Fasting > 8hrs? Unknown     Narrative    Cholesterol  Desirable:  <200 mg/dL    Triglycerides  Normal:  Less than 150 mg/dL  Borderline High:  150-199 mg/dL  High:  200-499 mg/dL  Very High:  Greater than or equal to 500 mg/dL    Direct Measure HDL  Female:  Greater than or equal to 50 mg/dL    Male:  Greater than or equal to 40 mg/dL    LDL Cholesterol  Desirable:  <100mg/dL  Above Desirable:  100-129 mg/dL   Borderline High:  130-159 mg/dL   High:  160-189 mg/dL   Very High:  >= 190 mg/dL    Non HDL Cholesterol  Desirable:  130 mg/dL  Above Desirable:  130-159 mg/dL  Borderline High:  160-189 mg/dL  High:  190-219 mg/dL  Very High:  Greater than or equal to 220 mg/dL   Hemoglobin A1c   Result Value Ref Range    Hemoglobin A1C 5.4 0.0 - 5.6 %      Comment:      Normal <5.7%   Prediabetes 5.7-6.4%    Diabetes 6.5% or higher     Note: Adopted from ADA consensus guidelines.       If you have any questions or concerns, please call the clinic at the number listed above.       Sincerely,      Bro Herrera MD

## 2024-02-01 NOTE — PROGRESS NOTES
"    Subjective     Acute Illness  Hamilton is a 73 year old, presenting with nausea, chills, and copious diarrhea that began at approximately 5 am Wednesday [ yesterday ] . Pt notes that he went out for breakfast at a restaurant with friends on Tuesday and began noting GI upset after the meal. Bowel movements have since returned to normal and pt reports that he feels hungry and \"much better today\"     Via the Health Maintenance questionnaire, the patient has reported the following services have been completed -Eye Exam, this information has been sent to the abstraction team.  HPI     BP Readings from Last 6 Encounters:   01/30/23 (!) 148/70   11/17/22 (!) 148/85   06/01/22 115/62   05/31/22 (!) 156/80   05/26/22 (!) 162/71   05/25/22 122/47     Patient is still recovering from his acute gastroenteritis symptoms but feels drawn out and fatigued. I did not feel this visit was the right time to tweak his blood pressure medications    Onset/Duration: x2 days   Symptoms:  Fever: No  Chills/Sweats: YES  Headache (location?): YES  Sinus Pressure: No  Conjunctivitis:  No  Ear Pain: no  Rhinorrhea: No  Congestion: No  Sore Throat: YES  Cough: no  Wheeze: No  Decreased Appetite: YES  Nausea: YES  Vomiting: No  Diarrhea: YES  Dysuria/Freq.: No  Dysuria or Hematuria: No  Fatigue/Achiness: YES  Sick/Strep Exposure: No  Therapies tried and outcome: tylenol         Objective    There were no vitals taken for this visit.  There is no height or weight on file to calculate BMI.  Physical Exam  HENT:      Nose: No congestion.      Mouth/Throat:      Pharynx: No oropharyngeal exudate.   Cardiovascular:      Rate and Rhythm: Regular rhythm. Tachycardia present.   Pulmonary:      Effort: Pulmonary effort is normal.   Abdominal:      General: Abdomen is flat. There is no distension.      Palpations: Abdomen is soft.      Tenderness: There is no abdominal tenderness.   Skin:     Coloration: Skin is pale.   Neurological:      Mental Status: He " is alert.        (K52.9) Acute gastroenteritis  (primary encounter diagnosis)  Comment: Suspect mild dehydration d/t soft BP, tachycardia, and slight pallor. Decided against IV rehydration as pt alert, ambulating independently, denying dizziness or weakness, and noting that he is feeling better. Encouraged supportive cares w/ rest and fluids with additional electrolytes such as Gatorade. Educated pt to call clinic if symptoms worsen.  Plan: CBC with Platelets    (Z23) Need for shingles vaccine  Comment: Pt declined  Plan: Plan to readdress at a later date    (Z29.11) Need for vaccination against respiratory syncytial virus  Comment: Pt declined  Plan: Plan to readdress at a later date.    (E78.5) Hyperlipidemia LDL goal <130  Comment: Most recent LDL 84 as of 1/30/23. Not currently on any lipid-lowering medications.   Plan: ALT, Lipid panel reflex to direct LDL Non-fasting      (I10) Essential hypertension with goal blood pressure less than 140/90  Comment: BP low presumably d/t hydration status. Plan to reevaluate BP after pt has recovered from acute gastroenteritis.  Plan: BASIC METABOLIC PANEL    (R73.09) Elevated glucose  Comment: Most recent  as of 1/30/23  Plan: Hemoglobin A1c    (C90.00) Multiple myeloma not having achieved remission (H)  Comment: Managed by heme/onc  Plan: CBC with Platelets    Graciela Bautista NP  Signed Electronically by: Bro Herrera MD    This patient office visit today was staffed with me, I did review the entire clinical presentation and history, exam and medical decision making with MAGY Bautista  I agree with and have approved the office visit entirely. Patient seen with me and MAGY Bautista  together today. I agree with assessment and plans.

## 2024-02-16 DIAGNOSIS — I10 ESSENTIAL HYPERTENSION WITH GOAL BLOOD PRESSURE LESS THAN 140/90: ICD-10-CM

## 2024-02-16 RX ORDER — LISINOPRIL 20 MG/1
20 TABLET ORAL DAILY
Qty: 90 TABLET | Refills: 3 | Status: SHIPPED | OUTPATIENT
Start: 2024-02-16

## 2024-03-19 ENCOUNTER — TRANSFERRED RECORDS (OUTPATIENT)
Dept: HEALTH INFORMATION MANAGEMENT | Facility: CLINIC | Age: 74
End: 2024-03-19
Payer: COMMERCIAL

## 2024-04-21 ENCOUNTER — HEALTH MAINTENANCE LETTER (OUTPATIENT)
Age: 74
End: 2024-04-21

## 2024-05-21 ENCOUNTER — TRANSFERRED RECORDS (OUTPATIENT)
Dept: HEALTH INFORMATION MANAGEMENT | Facility: CLINIC | Age: 74
End: 2024-05-21
Payer: COMMERCIAL

## 2024-05-28 ENCOUNTER — TRANSFERRED RECORDS (OUTPATIENT)
Dept: HEALTH INFORMATION MANAGEMENT | Facility: CLINIC | Age: 74
End: 2024-05-28
Payer: COMMERCIAL

## 2024-06-06 DIAGNOSIS — I10 ESSENTIAL HYPERTENSION WITH GOAL BLOOD PRESSURE LESS THAN 140/90: ICD-10-CM

## 2024-06-06 RX ORDER — AMLODIPINE BESYLATE 2.5 MG/1
TABLET ORAL
Qty: 90 TABLET | Refills: 2 | Status: SHIPPED | OUTPATIENT
Start: 2024-06-06

## 2024-06-25 ENCOUNTER — TRANSFERRED RECORDS (OUTPATIENT)
Dept: HEALTH INFORMATION MANAGEMENT | Facility: CLINIC | Age: 74
End: 2024-06-25
Payer: COMMERCIAL

## 2024-06-27 ENCOUNTER — TELEPHONE (OUTPATIENT)
Dept: FAMILY MEDICINE | Facility: CLINIC | Age: 74
End: 2024-06-27

## 2024-06-27 NOTE — TELEPHONE ENCOUNTER
Patient Quality Outreach    Patient is due for the following:   Hypertension -  BP check  Physical Annual Wellness Visit    Next Steps:   Schedule a Annual Wellness Visit    Type of outreach:    Sent [employer] message.    Next Steps:  Reach out within 90 days via Letter.    Max number of attempts reached: No. Will try again in 90 days if patient still on fail list.    Questions for provider review:    None           St[facility]  Chart routed to none.

## 2024-07-23 ENCOUNTER — TRANSFERRED RECORDS (OUTPATIENT)
Dept: HEALTH INFORMATION MANAGEMENT | Facility: CLINIC | Age: 74
End: 2024-07-23
Payer: COMMERCIAL

## 2024-07-28 ENCOUNTER — PATIENT OUTREACH (OUTPATIENT)
Dept: CARE COORDINATION | Facility: CLINIC | Age: 74
End: 2024-07-28
Payer: COMMERCIAL

## 2024-08-14 ENCOUNTER — TRANSFERRED RECORDS (OUTPATIENT)
Dept: HEALTH INFORMATION MANAGEMENT | Facility: CLINIC | Age: 74
End: 2024-08-14
Payer: COMMERCIAL

## 2024-08-20 ENCOUNTER — TRANSFERRED RECORDS (OUTPATIENT)
Dept: HEALTH INFORMATION MANAGEMENT | Facility: CLINIC | Age: 74
End: 2024-08-20
Payer: COMMERCIAL

## 2024-09-17 ENCOUNTER — TRANSFERRED RECORDS (OUTPATIENT)
Dept: HEALTH INFORMATION MANAGEMENT | Facility: CLINIC | Age: 74
End: 2024-09-17
Payer: COMMERCIAL

## 2024-10-15 ENCOUNTER — TRANSFERRED RECORDS (OUTPATIENT)
Dept: HEALTH INFORMATION MANAGEMENT | Facility: CLINIC | Age: 74
End: 2024-10-15
Payer: COMMERCIAL

## 2024-12-05 NOTE — PROGRESS NOTES
Prior to the procedure, the pre-op RN had opened a consent on the ipad for patient to sign and was witnessed with the pre-op RN's signature. This writer (circulator nurse for procedure) noted consent was not complete (the PA hadn't yet signed his portion). The PA stated he had opened a different consent and signed electronically- this consent was not signed by the patient or witnessed from the pre-op RN. Upon retrieving the patient for his procedure, this RN took the open Ipad with the partially completed consent into the procedure room with the thought that the PA would sign in the procedure room prior to beginning. The patient was sterilely prepped and sedation began. PA entered room and signed the ipad's consent with the patient's and pre-op RN's signatures. Upon hitting accept for the consent, an error message populated and the consent was not saved into Epic. The procedure began without any complications.   
S/w pharmacy and patient has rx on file from 12/3 and pharmacy can fill today   
there is no prior EKG available for comparison

## 2024-12-10 ENCOUNTER — TRANSFERRED RECORDS (OUTPATIENT)
Dept: HEALTH INFORMATION MANAGEMENT | Facility: CLINIC | Age: 74
End: 2024-12-10
Payer: COMMERCIAL

## 2025-01-07 ENCOUNTER — TRANSFERRED RECORDS (OUTPATIENT)
Dept: HEALTH INFORMATION MANAGEMENT | Facility: CLINIC | Age: 75
End: 2025-01-07
Payer: COMMERCIAL

## 2025-01-10 PROBLEM — R20.0 NUMBNESS OF TOES: Status: ACTIVE | Noted: 2025-01-10

## 2025-01-10 PROBLEM — D46.Z OTHER MYELODYSPLASTIC SYNDROMES (H): Status: ACTIVE | Noted: 2023-01-30

## 2025-01-16 ENCOUNTER — APPOINTMENT (OUTPATIENT)
Dept: LAB | Facility: CLINIC | Age: 75
End: 2025-01-16
Payer: COMMERCIAL

## 2025-04-01 ENCOUNTER — TRANSFERRED RECORDS (OUTPATIENT)
Dept: HEALTH INFORMATION MANAGEMENT | Facility: CLINIC | Age: 75
End: 2025-04-01
Payer: COMMERCIAL

## 2025-04-29 ENCOUNTER — TRANSFERRED RECORDS (OUTPATIENT)
Dept: HEALTH INFORMATION MANAGEMENT | Facility: CLINIC | Age: 75
End: 2025-04-29
Payer: COMMERCIAL

## 2025-07-15 PROBLEM — R20.0 NUMBNESS OF TOES: Status: RESOLVED | Noted: 2025-01-10 | Resolved: 2025-07-15

## 2025-07-15 PROBLEM — D46.Z OTHER MYELODYSPLASTIC SYNDROMES (H): Status: RESOLVED | Noted: 2023-01-30 | Resolved: 2025-07-15

## 2025-07-15 PROBLEM — D70.8 OTHER NEUTROPENIA: Status: RESOLVED | Noted: 2024-02-01 | Resolved: 2025-07-15

## 2025-07-21 ENCOUNTER — TELEPHONE (OUTPATIENT)
Dept: OPHTHALMOLOGY | Facility: CLINIC | Age: 75
End: 2025-07-21
Payer: COMMERCIAL

## 2025-07-21 NOTE — TELEPHONE ENCOUNTER
Health Call Center    Phone Message    May a detailed message be left on voicemail: yes     Reason for Call:  Pt called wanting to schedule an apt with Dr. Randhawa. Told patient what Dr. Randhawa had available, but pt wanted to get in sooner. Pt stated that he was told he can come whenever. If someone can call pt to clarify and scheduled please.   Referring Provider Name: Dr. Randhawa  Diagnosis and/or Symptoms: N/a    Action Taken: Message routed to:  [facility]    Travel Screening: Not Applicable     Date of Service:

## 2025-08-05 ENCOUNTER — TRANSFERRED RECORDS (OUTPATIENT)
Dept: HEALTH INFORMATION MANAGEMENT | Facility: CLINIC | Age: 75
End: 2025-08-05
Payer: COMMERCIAL

## 2025-08-15 ENCOUNTER — OFFICE VISIT (OUTPATIENT)
Dept: OPHTHALMOLOGY | Facility: CLINIC | Age: 75
End: 2025-08-15
Payer: COMMERCIAL

## 2025-08-15 DIAGNOSIS — Z96.1 PSEUDOPHAKIA: ICD-10-CM

## 2025-08-15 DIAGNOSIS — H35.3131 EARLY DRY STAGE NONEXUDATIVE AGE-RELATED MACULAR DEGENERATION OF BOTH EYES: ICD-10-CM

## 2025-08-15 DIAGNOSIS — H40.003 GLAUCOMA SUSPECT OF BOTH EYES: Primary | ICD-10-CM

## 2025-08-15 DIAGNOSIS — H40.053 OCULAR HYPERTENSION, BILATERAL: ICD-10-CM

## 2025-08-15 PROCEDURE — 92012 INTRM OPH EXAM EST PATIENT: CPT | Performed by: STUDENT IN AN ORGANIZED HEALTH CARE EDUCATION/TRAINING PROGRAM

## 2025-08-15 ASSESSMENT — VISUAL ACUITY
OD_CC+: -2
OD_CC: 20/25
OS_CC: 20/25
OS_CC+: +2
CORRECTION_TYPE: GLASSES
METHOD: SNELLEN - LINEAR

## 2025-08-15 ASSESSMENT — SLIT LAMP EXAM - LIDS
COMMENTS: NORMAL
COMMENTS: NORMAL

## 2025-08-15 ASSESSMENT — TONOMETRY
IOP_METHOD: APPLANATION
OS_IOP_MMHG: 16
OD_IOP_MMHG: 14

## 2025-08-15 ASSESSMENT — EXTERNAL EXAM - LEFT EYE: OS_EXAM: NORMAL

## 2025-08-15 ASSESSMENT — EXTERNAL EXAM - RIGHT EYE: OD_EXAM: NORMAL

## (undated) DEVICE — COVER ULTRASOUND PROBE W/GEL FLEXI-FEEL 6"X58" LF  25-FF658

## (undated) DEVICE — GOWN XLG DISP 9545

## (undated) DEVICE — LINEN TOWEL PACK X5 5464

## (undated) DEVICE — KIT INTRODUCER FLUENT MICRO 5FRX10CM ECHO TIP KIT-038-04

## (undated) DEVICE — SU ETHILON 2-0 FS 18" 664H

## (undated) DEVICE — LINEN GOWN XLG 5407

## (undated) DEVICE — COVER EASY EQUIP BAG W/BAND LATEX FREE EZ-28

## (undated) DEVICE — PACK CENTRAL LINE INSERTION SAN32CLFCG

## (undated) DEVICE — DECANTER BAG 2002S

## (undated) DEVICE — Device

## (undated) DEVICE — CAP LUER LOCK MALE/FEMALE DUAL 2C6250

## (undated) DEVICE — DRSG TEGADERM IV ADVANCED 3.5X4.5" 1685

## (undated) DEVICE — DILATOR VASCULAR COONS 12FRX20CM DIST TPR G03929

## (undated) DEVICE — GLOVE PROTEXIS POWDER FREE SMT 8.0  2D72PT80X

## (undated) RX ORDER — CEFAZOLIN SODIUM 1 G/3ML
INJECTION, POWDER, FOR SOLUTION INTRAMUSCULAR; INTRAVENOUS
Status: DISPENSED
Start: 2022-05-23

## (undated) RX ORDER — LIDOCAINE HYDROCHLORIDE 10 MG/ML
INJECTION, SOLUTION EPIDURAL; INFILTRATION; INTRACAUDAL; PERINEURAL
Status: DISPENSED
Start: 2022-06-06

## (undated) RX ORDER — CALCIUM GLUCONATE 94 MG/ML
INJECTION, SOLUTION INTRAVENOUS
Status: DISPENSED
Start: 2022-05-25

## (undated) RX ORDER — CALCIUM GLUCONATE 94 MG/ML
INJECTION, SOLUTION INTRAVENOUS
Status: DISPENSED
Start: 2022-05-24

## (undated) RX ORDER — LIDOCAINE HYDROCHLORIDE 10 MG/ML
INJECTION, SOLUTION EPIDURAL; INFILTRATION; INTRACAUDAL; PERINEURAL
Status: DISPENSED
Start: 2022-05-23

## (undated) RX ORDER — HEPARIN SODIUM,PORCINE 10 UNIT/ML
VIAL (ML) INTRAVENOUS
Status: DISPENSED
Start: 2022-05-23

## (undated) RX ORDER — HEPARIN SODIUM (PORCINE) LOCK FLUSH IV SOLN 100 UNIT/ML 100 UNIT/ML
SOLUTION INTRAVENOUS
Status: DISPENSED
Start: 2022-05-23